# Patient Record
Sex: FEMALE | Race: WHITE | NOT HISPANIC OR LATINO | Employment: OTHER | URBAN - METROPOLITAN AREA
[De-identification: names, ages, dates, MRNs, and addresses within clinical notes are randomized per-mention and may not be internally consistent; named-entity substitution may affect disease eponyms.]

---

## 2020-08-25 ENCOUNTER — RECORDS - HEALTHEAST (OUTPATIENT)
Dept: LAB | Facility: CLINIC | Age: 85
End: 2020-08-25

## 2020-08-25 LAB
ALBUMIN SERPL-MCNC: 3.5 G/DL (ref 3.5–5)
ALP SERPL-CCNC: 57 U/L (ref 45–120)
ALT SERPL W P-5'-P-CCNC: 20 U/L (ref 0–45)
ANION GAP SERPL CALCULATED.3IONS-SCNC: 10 MMOL/L (ref 5–18)
AST SERPL W P-5'-P-CCNC: 19 U/L (ref 0–40)
BILIRUB SERPL-MCNC: 0.4 MG/DL (ref 0–1)
BUN SERPL-MCNC: 20 MG/DL (ref 8–28)
CALCIUM SERPL-MCNC: 9.7 MG/DL (ref 8.5–10.5)
CHLORIDE BLD-SCNC: 106 MMOL/L (ref 98–107)
CO2 SERPL-SCNC: 26 MMOL/L (ref 22–31)
CREAT SERPL-MCNC: 1.5 MG/DL (ref 0.6–1.1)
GFR SERPL CREATININE-BSD FRML MDRD: 33 ML/MIN/1.73M2
GLUCOSE BLD-MCNC: 181 MG/DL (ref 70–125)
POTASSIUM BLD-SCNC: 4.1 MMOL/L (ref 3.5–5)
PROT SERPL-MCNC: 6.8 G/DL (ref 6–8)
SODIUM SERPL-SCNC: 142 MMOL/L (ref 136–145)
TSH SERPL DL<=0.005 MIU/L-ACNC: 0.31 UIU/ML (ref 0.3–5)

## 2020-10-20 ENCOUNTER — RECORDS - HEALTHEAST (OUTPATIENT)
Dept: LAB | Facility: CLINIC | Age: 85
End: 2020-10-20

## 2020-10-20 LAB
ANION GAP SERPL CALCULATED.3IONS-SCNC: 10 MMOL/L (ref 5–18)
BUN SERPL-MCNC: 24 MG/DL (ref 8–28)
CALCIUM SERPL-MCNC: 9.4 MG/DL (ref 8.5–10.5)
CHLORIDE BLD-SCNC: 104 MMOL/L (ref 98–107)
CO2 SERPL-SCNC: 27 MMOL/L (ref 22–31)
CREAT SERPL-MCNC: 1.48 MG/DL (ref 0.6–1.1)
GFR SERPL CREATININE-BSD FRML MDRD: 33 ML/MIN/1.73M2
GLUCOSE BLD-MCNC: 158 MG/DL (ref 70–125)
POTASSIUM BLD-SCNC: 4.2 MMOL/L (ref 3.5–5)
SODIUM SERPL-SCNC: 141 MMOL/L (ref 136–145)

## 2020-10-21 LAB — BACTERIA SPEC CULT: NO GROWTH

## 2020-10-22 ENCOUNTER — TRANSFERRED RECORDS (OUTPATIENT)
Dept: HEALTH INFORMATION MANAGEMENT | Facility: CLINIC | Age: 85
End: 2020-10-22

## 2021-01-13 ENCOUNTER — RECORDS - HEALTHEAST (OUTPATIENT)
Dept: LAB | Facility: CLINIC | Age: 86
End: 2021-01-13

## 2021-01-14 LAB — BACTERIA SPEC CULT: NO GROWTH

## 2021-01-21 ENCOUNTER — RECORDS - HEALTHEAST (OUTPATIENT)
Dept: LAB | Facility: CLINIC | Age: 86
End: 2021-01-21

## 2021-01-21 LAB
ANION GAP SERPL CALCULATED.3IONS-SCNC: 10 MMOL/L (ref 5–18)
BUN SERPL-MCNC: 26 MG/DL (ref 8–28)
CALCIUM SERPL-MCNC: 9 MG/DL (ref 8.5–10.5)
CHLORIDE BLD-SCNC: 105 MMOL/L (ref 98–107)
CO2 SERPL-SCNC: 25 MMOL/L (ref 22–31)
CREAT SERPL-MCNC: 1.53 MG/DL (ref 0.6–1.1)
GFR SERPL CREATININE-BSD FRML MDRD: 32 ML/MIN/1.73M2
GLUCOSE BLD-MCNC: 176 MG/DL (ref 70–125)
POTASSIUM BLD-SCNC: 4.1 MMOL/L (ref 3.5–5)
SODIUM SERPL-SCNC: 140 MMOL/L (ref 136–145)

## 2021-04-19 ENCOUNTER — RECORDS - HEALTHEAST (OUTPATIENT)
Dept: LAB | Facility: CLINIC | Age: 86
End: 2021-04-19

## 2021-04-19 LAB
ANION GAP SERPL CALCULATED.3IONS-SCNC: 12 MMOL/L (ref 5–18)
BUN SERPL-MCNC: 22 MG/DL (ref 8–28)
CALCIUM SERPL-MCNC: 9.5 MG/DL (ref 8.5–10.5)
CHLORIDE BLD-SCNC: 104 MMOL/L (ref 98–107)
CO2 SERPL-SCNC: 25 MMOL/L (ref 22–31)
CREAT SERPL-MCNC: 1.26 MG/DL (ref 0.6–1.1)
GFR SERPL CREATININE-BSD FRML MDRD: 40 ML/MIN/1.73M2
GLUCOSE BLD-MCNC: 142 MG/DL (ref 70–125)
POTASSIUM BLD-SCNC: 3.9 MMOL/L (ref 3.5–5)
SODIUM SERPL-SCNC: 141 MMOL/L (ref 136–145)

## 2021-05-31 ENCOUNTER — RECORDS - HEALTHEAST (OUTPATIENT)
Dept: ADMINISTRATIVE | Facility: CLINIC | Age: 86
End: 2021-05-31

## 2021-07-20 ENCOUNTER — LAB REQUISITION (OUTPATIENT)
Dept: LAB | Facility: CLINIC | Age: 86
End: 2021-07-20

## 2021-07-20 DIAGNOSIS — E78.49 OTHER HYPERLIPIDEMIA: ICD-10-CM

## 2021-07-20 DIAGNOSIS — I10 ESSENTIAL (PRIMARY) HYPERTENSION: ICD-10-CM

## 2021-07-20 DIAGNOSIS — E03.9 HYPOTHYROIDISM, UNSPECIFIED: ICD-10-CM

## 2021-07-20 LAB
ANION GAP SERPL CALCULATED.3IONS-SCNC: 12 MMOL/L (ref 5–18)
BUN SERPL-MCNC: 23 MG/DL (ref 8–28)
CALCIUM SERPL-MCNC: 9.2 MG/DL (ref 8.5–10.5)
CHLORIDE BLD-SCNC: 106 MMOL/L (ref 98–107)
CHOLEST SERPL-MCNC: 163 MG/DL
CO2 SERPL-SCNC: 24 MMOL/L (ref 22–31)
CREAT SERPL-MCNC: 1.39 MG/DL (ref 0.6–1.1)
GFR SERPL CREATININE-BSD FRML MDRD: 34 ML/MIN/1.73M2
GLUCOSE BLD-MCNC: 173 MG/DL (ref 70–125)
HDLC SERPL-MCNC: 45 MG/DL
LDLC SERPL CALC-MCNC: 86 MG/DL
POTASSIUM BLD-SCNC: 4.3 MMOL/L (ref 3.5–5)
SODIUM SERPL-SCNC: 142 MMOL/L (ref 136–145)
TRIGL SERPL-MCNC: 162 MG/DL
TSH SERPL DL<=0.005 MIU/L-ACNC: 0.13 UIU/ML (ref 0.3–5)

## 2021-07-20 PROCEDURE — 80061 LIPID PANEL: CPT | Performed by: PHYSICIAN ASSISTANT

## 2021-07-20 PROCEDURE — 84439 ASSAY OF FREE THYROXINE: CPT | Performed by: PHYSICIAN ASSISTANT

## 2021-07-20 PROCEDURE — 80048 BASIC METABOLIC PNL TOTAL CA: CPT | Performed by: PHYSICIAN ASSISTANT

## 2021-07-20 PROCEDURE — 84443 ASSAY THYROID STIM HORMONE: CPT | Performed by: PHYSICIAN ASSISTANT

## 2021-07-21 LAB — T4 FREE SERPL-MCNC: 1.67 NG/DL (ref 0.7–1.8)

## 2021-10-21 ENCOUNTER — LAB REQUISITION (OUTPATIENT)
Dept: LAB | Facility: CLINIC | Age: 86
End: 2021-10-21

## 2021-10-21 DIAGNOSIS — E03.9 HYPOTHYROIDISM, UNSPECIFIED: ICD-10-CM

## 2021-10-21 DIAGNOSIS — E11.8 TYPE 2 DIABETES MELLITUS WITH UNSPECIFIED COMPLICATIONS (H): ICD-10-CM

## 2021-10-21 LAB
CREAT UR-MCNC: 110 MG/DL
MICROALBUMIN UR-MCNC: 2.72 MG/DL (ref 0–1.99)
MICROALBUMIN/CREAT UR: 24.7 MG/G CR
T4 FREE SERPL-MCNC: 1.5 NG/DL (ref 0.7–1.8)
TSH SERPL DL<=0.005 MIU/L-ACNC: 0.26 UIU/ML (ref 0.3–5)

## 2021-10-21 PROCEDURE — 84439 ASSAY OF FREE THYROXINE: CPT | Performed by: PHYSICIAN ASSISTANT

## 2021-10-21 PROCEDURE — 82043 UR ALBUMIN QUANTITATIVE: CPT | Performed by: PHYSICIAN ASSISTANT

## 2021-10-21 PROCEDURE — 84443 ASSAY THYROID STIM HORMONE: CPT | Performed by: PHYSICIAN ASSISTANT

## 2021-11-11 ENCOUNTER — LAB REQUISITION (OUTPATIENT)
Dept: LAB | Facility: CLINIC | Age: 86
End: 2021-11-11
Payer: COMMERCIAL

## 2021-11-11 DIAGNOSIS — L08.9 LOCAL INFECTION OF THE SKIN AND SUBCUTANEOUS TISSUE, UNSPECIFIED: ICD-10-CM

## 2021-11-11 PROCEDURE — 87205 SMEAR GRAM STAIN: CPT | Mod: ORL | Performed by: ORTHOPAEDIC SURGERY

## 2021-11-11 PROCEDURE — 87070 CULTURE OTHR SPECIMN AEROBIC: CPT | Mod: ORL | Performed by: ORTHOPAEDIC SURGERY

## 2021-11-12 LAB
GRAM STAIN RESULT: NORMAL
GRAM STAIN RESULT: NORMAL

## 2021-11-17 LAB — BACTERIA ABSC ANAEROBE+AEROBE CULT: NO GROWTH

## 2022-01-17 ENCOUNTER — LAB REQUISITION (OUTPATIENT)
Dept: LAB | Facility: CLINIC | Age: 87
End: 2022-01-17

## 2022-01-17 DIAGNOSIS — N39.498 OTHER SPECIFIED URINARY INCONTINENCE: ICD-10-CM

## 2022-01-17 PROCEDURE — 87086 URINE CULTURE/COLONY COUNT: CPT | Performed by: PHYSICIAN ASSISTANT

## 2022-01-19 LAB — BACTERIA UR CULT: NO GROWTH

## 2022-01-31 ENCOUNTER — LAB REQUISITION (OUTPATIENT)
Dept: LAB | Facility: CLINIC | Age: 87
End: 2022-01-31

## 2022-01-31 DIAGNOSIS — E11.8 TYPE 2 DIABETES MELLITUS WITH UNSPECIFIED COMPLICATIONS (H): ICD-10-CM

## 2022-01-31 DIAGNOSIS — E03.9 HYPOTHYROIDISM, UNSPECIFIED: ICD-10-CM

## 2022-01-31 PROCEDURE — 80048 BASIC METABOLIC PNL TOTAL CA: CPT | Performed by: PHYSICIAN ASSISTANT

## 2022-01-31 PROCEDURE — 84443 ASSAY THYROID STIM HORMONE: CPT | Performed by: PHYSICIAN ASSISTANT

## 2022-02-01 LAB
ANION GAP SERPL CALCULATED.3IONS-SCNC: 11 MMOL/L (ref 5–18)
BUN SERPL-MCNC: 21 MG/DL (ref 8–28)
CALCIUM SERPL-MCNC: 9.3 MG/DL (ref 8.5–10.5)
CHLORIDE BLD-SCNC: 104 MMOL/L (ref 98–107)
CO2 SERPL-SCNC: 26 MMOL/L (ref 22–31)
CREAT SERPL-MCNC: 1.5 MG/DL (ref 0.6–1.1)
GFR SERPL CREATININE-BSD FRML MDRD: 33 ML/MIN/1.73M2
GLUCOSE BLD-MCNC: 187 MG/DL (ref 70–125)
POTASSIUM BLD-SCNC: 4.3 MMOL/L (ref 3.5–5)
SODIUM SERPL-SCNC: 141 MMOL/L (ref 136–145)
TSH SERPL DL<=0.005 MIU/L-ACNC: 2.42 UIU/ML (ref 0.3–5)

## 2022-02-26 ENCOUNTER — HOSPITAL ENCOUNTER (INPATIENT)
Facility: HOSPITAL | Age: 87
LOS: 4 days | Discharge: HOME HEALTH | DRG: 871 | End: 2022-03-02
Attending: EMERGENCY MEDICINE | Admitting: INTERNAL MEDICINE
Payer: COMMERCIAL

## 2022-02-26 ENCOUNTER — APPOINTMENT (OUTPATIENT)
Dept: RADIOLOGY | Facility: HOSPITAL | Age: 87
DRG: 871 | End: 2022-02-26
Attending: INTERNAL MEDICINE
Payer: COMMERCIAL

## 2022-02-26 ENCOUNTER — APPOINTMENT (OUTPATIENT)
Dept: CT IMAGING | Facility: HOSPITAL | Age: 87
DRG: 871 | End: 2022-02-26
Attending: EMERGENCY MEDICINE
Payer: COMMERCIAL

## 2022-02-26 ENCOUNTER — APPOINTMENT (OUTPATIENT)
Dept: RADIOLOGY | Facility: HOSPITAL | Age: 87
DRG: 871 | End: 2022-02-26
Attending: EMERGENCY MEDICINE
Payer: COMMERCIAL

## 2022-02-26 DIAGNOSIS — N28.9 ACUTE RENAL INSUFFICIENCY: ICD-10-CM

## 2022-02-26 DIAGNOSIS — Z86.39 HISTORY OF DIABETES MELLITUS: ICD-10-CM

## 2022-02-26 DIAGNOSIS — R94.31 ABNORMAL ELECTROCARDIOGRAM: ICD-10-CM

## 2022-02-26 DIAGNOSIS — R41.0 CONFUSION: ICD-10-CM

## 2022-02-26 DIAGNOSIS — G93.40 ACUTE ENCEPHALOPATHY: ICD-10-CM

## 2022-02-26 DIAGNOSIS — K51.50 LEFT SIDED COLITIS WITHOUT COMPLICATIONS (H): ICD-10-CM

## 2022-02-26 DIAGNOSIS — I10 HYPERTENSION, UNSPECIFIED TYPE: Primary | ICD-10-CM

## 2022-02-26 DIAGNOSIS — E83.42 HYPOMAGNESEMIA: ICD-10-CM

## 2022-02-26 DIAGNOSIS — E86.0 DEHYDRATION: ICD-10-CM

## 2022-02-26 PROBLEM — A41.9 SEPSIS (H): Status: ACTIVE | Noted: 2022-02-26

## 2022-02-26 LAB
ALBUMIN SERPL-MCNC: 2.8 G/DL (ref 3.5–5)
ALBUMIN SERPL-MCNC: 3.2 G/DL (ref 3.5–5)
ALBUMIN UR-MCNC: 100 MG/DL
ALP SERPL-CCNC: 52 U/L (ref 45–120)
ALP SERPL-CCNC: 62 U/L (ref 45–120)
ALT SERPL W P-5'-P-CCNC: 14 U/L (ref 0–45)
ALT SERPL W P-5'-P-CCNC: 19 U/L (ref 0–45)
ANION GAP SERPL CALCULATED.3IONS-SCNC: 10 MMOL/L (ref 5–18)
ANION GAP SERPL CALCULATED.3IONS-SCNC: 13 MMOL/L (ref 5–18)
APPEARANCE UR: CLEAR
AST SERPL W P-5'-P-CCNC: 20 U/L (ref 0–40)
AST SERPL W P-5'-P-CCNC: 21 U/L (ref 0–40)
BASOPHILS # BLD AUTO: 0.1 10E3/UL (ref 0–0.2)
BASOPHILS NFR BLD AUTO: 0 %
BILIRUB DIRECT SERPL-MCNC: 0.3 MG/DL
BILIRUB SERPL-MCNC: 0.6 MG/DL (ref 0–1)
BILIRUB SERPL-MCNC: 0.8 MG/DL (ref 0–1)
BILIRUB UR QL STRIP: NEGATIVE
BUN SERPL-MCNC: 29 MG/DL (ref 8–28)
BUN SERPL-MCNC: 33 MG/DL (ref 8–28)
CALCIUM SERPL-MCNC: 8.6 MG/DL (ref 8.5–10.5)
CALCIUM SERPL-MCNC: 9.4 MG/DL (ref 8.5–10.5)
CHLORIDE BLD-SCNC: 101 MMOL/L (ref 98–107)
CHLORIDE BLD-SCNC: 104 MMOL/L (ref 98–107)
CO2 SERPL-SCNC: 23 MMOL/L (ref 22–31)
CO2 SERPL-SCNC: 24 MMOL/L (ref 22–31)
COLOR UR AUTO: YELLOW
CREAT SERPL-MCNC: 1.18 MG/DL (ref 0.6–1.1)
CREAT SERPL-MCNC: 1.4 MG/DL (ref 0.6–1.1)
EOSINOPHIL # BLD AUTO: 0.1 10E3/UL (ref 0–0.7)
EOSINOPHIL NFR BLD AUTO: 1 %
ERYTHROCYTE [DISTWIDTH] IN BLOOD BY AUTOMATED COUNT: 12.9 % (ref 10–15)
ERYTHROCYTE [DISTWIDTH] IN BLOOD BY AUTOMATED COUNT: 13 % (ref 10–15)
GFR SERPL CREATININE-BSD FRML MDRD: 36 ML/MIN/1.73M2
GFR SERPL CREATININE-BSD FRML MDRD: 44 ML/MIN/1.73M2
GLUCOSE BLD-MCNC: 183 MG/DL (ref 70–125)
GLUCOSE BLD-MCNC: 274 MG/DL (ref 70–125)
GLUCOSE BLDC GLUCOMTR-MCNC: 162 MG/DL (ref 70–99)
GLUCOSE BLDC GLUCOMTR-MCNC: 171 MG/DL (ref 70–99)
GLUCOSE UR STRIP-MCNC: >1000 MG/DL
HBA1C MFR BLD: 7.1 %
HCT VFR BLD AUTO: 39.9 % (ref 35–47)
HCT VFR BLD AUTO: 44.2 % (ref 35–47)
HGB BLD-MCNC: 12.9 G/DL (ref 11.7–15.7)
HGB BLD-MCNC: 14.2 G/DL (ref 11.7–15.7)
HGB UR QL STRIP: ABNORMAL
HOLD SPECIMEN: NORMAL
HOLD SPECIMEN: NORMAL
IMM GRANULOCYTES # BLD: 0.1 10E3/UL
IMM GRANULOCYTES NFR BLD: 0 %
INR PPP: 1.35 (ref 0.85–1.15)
KETONES UR STRIP-MCNC: NEGATIVE MG/DL
LACTATE SERPL-SCNC: 1.3 MMOL/L (ref 0.7–2)
LACTATE SERPL-SCNC: 2.6 MMOL/L (ref 0.7–2)
LEUKOCYTE ESTERASE UR QL STRIP: NEGATIVE
LYMPHOCYTES # BLD AUTO: 0.5 10E3/UL (ref 0.8–5.3)
LYMPHOCYTES NFR BLD AUTO: 3 %
MAGNESIUM SERPL-MCNC: 1.7 MG/DL (ref 1.8–2.6)
MCH RBC QN AUTO: 28.8 PG (ref 26.5–33)
MCH RBC QN AUTO: 28.9 PG (ref 26.5–33)
MCHC RBC AUTO-ENTMCNC: 32.1 G/DL (ref 31.5–36.5)
MCHC RBC AUTO-ENTMCNC: 32.3 G/DL (ref 31.5–36.5)
MCV RBC AUTO: 89 FL (ref 78–100)
MCV RBC AUTO: 90 FL (ref 78–100)
MONOCYTES # BLD AUTO: 1 10E3/UL (ref 0–1.3)
MONOCYTES NFR BLD AUTO: 7 %
MUCOUS THREADS #/AREA URNS LPF: PRESENT /LPF
NEUTROPHILS # BLD AUTO: 13.4 10E3/UL (ref 1.6–8.3)
NEUTROPHILS NFR BLD AUTO: 89 %
NITRATE UR QL: NEGATIVE
NRBC # BLD AUTO: 0 10E3/UL
NRBC BLD AUTO-RTO: 0 /100
PH UR STRIP: 5.5 [PH] (ref 5–7)
PLATELET # BLD AUTO: 224 10E3/UL (ref 150–450)
PLATELET # BLD AUTO: 257 10E3/UL (ref 150–450)
POTASSIUM BLD-SCNC: 3.6 MMOL/L (ref 3.5–5)
POTASSIUM BLD-SCNC: 3.7 MMOL/L (ref 3.5–5)
PROCALCITONIN SERPL-MCNC: 1.65 NG/ML (ref 0–0.49)
PROT SERPL-MCNC: 6.4 G/DL (ref 6–8)
PROT SERPL-MCNC: 7.1 G/DL (ref 6–8)
RBC # BLD AUTO: 4.47 10E6/UL (ref 3.8–5.2)
RBC # BLD AUTO: 4.93 10E6/UL (ref 3.8–5.2)
RBC URINE: 2 /HPF
SARS-COV-2 RNA RESP QL NAA+PROBE: NEGATIVE
SODIUM SERPL-SCNC: 137 MMOL/L (ref 136–145)
SODIUM SERPL-SCNC: 138 MMOL/L (ref 136–145)
SP GR UR STRIP: 1.02 (ref 1–1.03)
SQUAMOUS EPITHELIAL: 1 /HPF
TROPONIN I SERPL-MCNC: 0.02 NG/ML (ref 0–0.29)
UROBILINOGEN UR STRIP-MCNC: 3 MG/DL
WBC # BLD AUTO: 13.5 10E3/UL (ref 4–11)
WBC # BLD AUTO: 15.1 10E3/UL (ref 4–11)
WBC URINE: 1 /HPF

## 2022-02-26 PROCEDURE — 80053 COMPREHEN METABOLIC PANEL: CPT | Performed by: EMERGENCY MEDICINE

## 2022-02-26 PROCEDURE — 96365 THER/PROPH/DIAG IV INF INIT: CPT | Mod: 59

## 2022-02-26 PROCEDURE — 36415 COLL VENOUS BLD VENIPUNCTURE: CPT | Performed by: INTERNAL MEDICINE

## 2022-02-26 PROCEDURE — 36415 COLL VENOUS BLD VENIPUNCTURE: CPT | Performed by: EMERGENCY MEDICINE

## 2022-02-26 PROCEDURE — 85027 COMPLETE CBC AUTOMATED: CPT | Performed by: INTERNAL MEDICINE

## 2022-02-26 PROCEDURE — 250N000011 HC RX IP 250 OP 636

## 2022-02-26 PROCEDURE — 99285 EMERGENCY DEPT VISIT HI MDM: CPT | Mod: 25

## 2022-02-26 PROCEDURE — C9803 HOPD COVID-19 SPEC COLLECT: HCPCS

## 2022-02-26 PROCEDURE — 258N000003 HC RX IP 258 OP 636: Performed by: EMERGENCY MEDICINE

## 2022-02-26 PROCEDURE — 82248 BILIRUBIN DIRECT: CPT | Performed by: EMERGENCY MEDICINE

## 2022-02-26 PROCEDURE — 250N000011 HC RX IP 250 OP 636: Performed by: INTERNAL MEDICINE

## 2022-02-26 PROCEDURE — 81001 URINALYSIS AUTO W/SCOPE: CPT | Performed by: EMERGENCY MEDICINE

## 2022-02-26 PROCEDURE — 99221 1ST HOSP IP/OBS SF/LOW 40: CPT | Performed by: SURGERY

## 2022-02-26 PROCEDURE — 120N000001 HC R&B MED SURG/OB

## 2022-02-26 PROCEDURE — 84484 ASSAY OF TROPONIN QUANT: CPT | Mod: 91 | Performed by: INTERNAL MEDICINE

## 2022-02-26 PROCEDURE — 83605 ASSAY OF LACTIC ACID: CPT | Performed by: EMERGENCY MEDICINE

## 2022-02-26 PROCEDURE — 84155 ASSAY OF PROTEIN SERUM: CPT | Performed by: INTERNAL MEDICINE

## 2022-02-26 PROCEDURE — 83605 ASSAY OF LACTIC ACID: CPT | Performed by: INTERNAL MEDICINE

## 2022-02-26 PROCEDURE — 83735 ASSAY OF MAGNESIUM: CPT | Performed by: EMERGENCY MEDICINE

## 2022-02-26 PROCEDURE — 85025 COMPLETE CBC W/AUTO DIFF WBC: CPT | Performed by: EMERGENCY MEDICINE

## 2022-02-26 PROCEDURE — 83036 HEMOGLOBIN GLYCOSYLATED A1C: CPT | Performed by: INTERNAL MEDICINE

## 2022-02-26 PROCEDURE — 96361 HYDRATE IV INFUSION ADD-ON: CPT

## 2022-02-26 PROCEDURE — 96367 TX/PROPH/DG ADDL SEQ IV INF: CPT

## 2022-02-26 PROCEDURE — 85610 PROTHROMBIN TIME: CPT | Performed by: INTERNAL MEDICINE

## 2022-02-26 PROCEDURE — 250N000013 HC RX MED GY IP 250 OP 250 PS 637: Performed by: INTERNAL MEDICINE

## 2022-02-26 PROCEDURE — 250N000011 HC RX IP 250 OP 636: Performed by: EMERGENCY MEDICINE

## 2022-02-26 PROCEDURE — 84145 PROCALCITONIN (PCT): CPT | Performed by: INTERNAL MEDICINE

## 2022-02-26 PROCEDURE — 70450 CT HEAD/BRAIN W/O DYE: CPT

## 2022-02-26 PROCEDURE — 71045 X-RAY EXAM CHEST 1 VIEW: CPT

## 2022-02-26 PROCEDURE — 74177 CT ABD & PELVIS W/CONTRAST: CPT

## 2022-02-26 PROCEDURE — 87635 SARS-COV-2 COVID-19 AMP PRB: CPT | Performed by: EMERGENCY MEDICINE

## 2022-02-26 PROCEDURE — 84484 ASSAY OF TROPONIN QUANT: CPT | Performed by: EMERGENCY MEDICINE

## 2022-02-26 PROCEDURE — 74018 RADEX ABDOMEN 1 VIEW: CPT

## 2022-02-26 PROCEDURE — 99223 1ST HOSP IP/OBS HIGH 75: CPT | Mod: AI | Performed by: INTERNAL MEDICINE

## 2022-02-26 PROCEDURE — 93005 ELECTROCARDIOGRAM TRACING: CPT | Performed by: EMERGENCY MEDICINE

## 2022-02-26 RX ORDER — NALOXONE HYDROCHLORIDE 0.4 MG/ML
0.4 INJECTION, SOLUTION INTRAMUSCULAR; INTRAVENOUS; SUBCUTANEOUS
Status: DISCONTINUED | OUTPATIENT
Start: 2022-02-26 | End: 2022-03-02 | Stop reason: HOSPADM

## 2022-02-26 RX ORDER — NALOXONE HYDROCHLORIDE 0.4 MG/ML
0.2 INJECTION, SOLUTION INTRAMUSCULAR; INTRAVENOUS; SUBCUTANEOUS
Status: DISCONTINUED | OUTPATIENT
Start: 2022-02-26 | End: 2022-03-02 | Stop reason: HOSPADM

## 2022-02-26 RX ORDER — ONDANSETRON 2 MG/ML
4 INJECTION INTRAMUSCULAR; INTRAVENOUS EVERY 6 HOURS PRN
Status: DISCONTINUED | OUTPATIENT
Start: 2022-02-26 | End: 2022-03-02 | Stop reason: HOSPADM

## 2022-02-26 RX ORDER — ONDANSETRON 4 MG/1
4 TABLET, ORALLY DISINTEGRATING ORAL EVERY 6 HOURS PRN
Status: DISCONTINUED | OUTPATIENT
Start: 2022-02-26 | End: 2022-03-02 | Stop reason: HOSPADM

## 2022-02-26 RX ORDER — PIPERACILLIN SODIUM, TAZOBACTAM SODIUM 3; .375 G/15ML; G/15ML
3.38 INJECTION, POWDER, LYOPHILIZED, FOR SOLUTION INTRAVENOUS ONCE
Status: COMPLETED | OUTPATIENT
Start: 2022-02-26 | End: 2022-02-26

## 2022-02-26 RX ORDER — LEVOTHYROXINE SODIUM 100 UG/1
100 TABLET ORAL DAILY
Status: DISCONTINUED | OUTPATIENT
Start: 2022-02-26 | End: 2022-03-02 | Stop reason: HOSPADM

## 2022-02-26 RX ORDER — HYDRALAZINE HYDROCHLORIDE 20 MG/ML
5 INJECTION INTRAMUSCULAR; INTRAVENOUS EVERY 4 HOURS PRN
Status: DISCONTINUED | OUTPATIENT
Start: 2022-02-26 | End: 2022-03-02 | Stop reason: HOSPADM

## 2022-02-26 RX ORDER — SODIUM CHLORIDE 9 MG/ML
INJECTION, SOLUTION INTRAVENOUS ONCE
Status: COMPLETED | OUTPATIENT
Start: 2022-02-26 | End: 2022-02-26

## 2022-02-26 RX ORDER — UBIDECARENONE 75 MG
100 CAPSULE ORAL DAILY
COMMUNITY
End: 2024-01-14

## 2022-02-26 RX ORDER — SODIUM CHLORIDE AND POTASSIUM CHLORIDE 150; 900 MG/100ML; MG/100ML
INJECTION, SOLUTION INTRAVENOUS CONTINUOUS
Status: DISCONTINUED | OUTPATIENT
Start: 2022-02-26 | End: 2022-02-27

## 2022-02-26 RX ORDER — HYDRALAZINE HYDROCHLORIDE 20 MG/ML
5 INJECTION INTRAMUSCULAR; INTRAVENOUS ONCE
Status: COMPLETED | OUTPATIENT
Start: 2022-02-26 | End: 2022-02-26

## 2022-02-26 RX ORDER — AMLODIPINE BESYLATE 5 MG/1
10 TABLET ORAL DAILY
Status: DISCONTINUED | OUTPATIENT
Start: 2022-02-26 | End: 2022-03-02 | Stop reason: HOSPADM

## 2022-02-26 RX ORDER — HYDROMORPHONE HCL IN WATER/PF 6 MG/30 ML
0.5 PATIENT CONTROLLED ANALGESIA SYRINGE INTRAVENOUS
Status: DISCONTINUED | OUTPATIENT
Start: 2022-02-26 | End: 2022-03-02 | Stop reason: HOSPADM

## 2022-02-26 RX ORDER — DEXTROSE MONOHYDRATE 25 G/50ML
25-50 INJECTION, SOLUTION INTRAVENOUS
Status: DISCONTINUED | OUTPATIENT
Start: 2022-02-26 | End: 2022-03-02 | Stop reason: HOSPADM

## 2022-02-26 RX ORDER — PROCHLORPERAZINE 25 MG
12.5 SUPPOSITORY, RECTAL RECTAL EVERY 12 HOURS PRN
Status: DISCONTINUED | OUTPATIENT
Start: 2022-02-26 | End: 2022-03-02 | Stop reason: HOSPADM

## 2022-02-26 RX ORDER — LIDOCAINE 40 MG/G
CREAM TOPICAL
Status: DISCONTINUED | OUTPATIENT
Start: 2022-02-26 | End: 2022-03-02 | Stop reason: HOSPADM

## 2022-02-26 RX ORDER — NICOTINE POLACRILEX 4 MG
15-30 LOZENGE BUCCAL
Status: DISCONTINUED | OUTPATIENT
Start: 2022-02-26 | End: 2022-03-02 | Stop reason: HOSPADM

## 2022-02-26 RX ORDER — MAGNESIUM SULFATE HEPTAHYDRATE 40 MG/ML
2 INJECTION, SOLUTION INTRAVENOUS ONCE
Status: COMPLETED | OUTPATIENT
Start: 2022-02-26 | End: 2022-02-26

## 2022-02-26 RX ORDER — IOPAMIDOL 755 MG/ML
75 INJECTION, SOLUTION INTRAVASCULAR ONCE
Status: COMPLETED | OUTPATIENT
Start: 2022-02-26 | End: 2022-02-26

## 2022-02-26 RX ORDER — PIPERACILLIN SODIUM, TAZOBACTAM SODIUM 3; .375 G/15ML; G/15ML
3.38 INJECTION, POWDER, LYOPHILIZED, FOR SOLUTION INTRAVENOUS EVERY 8 HOURS
Status: DISCONTINUED | OUTPATIENT
Start: 2022-02-26 | End: 2022-03-02 | Stop reason: HOSPADM

## 2022-02-26 RX ORDER — SIMETHICONE 80 MG
80 TABLET,CHEWABLE ORAL EVERY 6 HOURS PRN
Status: DISCONTINUED | OUTPATIENT
Start: 2022-02-26 | End: 2022-03-02 | Stop reason: HOSPADM

## 2022-02-26 RX ORDER — PROCHLORPERAZINE MALEATE 5 MG
5 TABLET ORAL EVERY 6 HOURS PRN
Status: DISCONTINUED | OUTPATIENT
Start: 2022-02-26 | End: 2022-03-02 | Stop reason: HOSPADM

## 2022-02-26 RX ADMIN — PIPERACILLIN SODIUM AND TAZOBACTAM SODIUM 3.38 G: 3; .375 INJECTION, POWDER, LYOPHILIZED, FOR SOLUTION INTRAVENOUS at 23:42

## 2022-02-26 RX ADMIN — ONDANSETRON 4 MG: 4 TABLET, ORALLY DISINTEGRATING ORAL at 22:58

## 2022-02-26 RX ADMIN — Medication 1 MG: at 22:58

## 2022-02-26 RX ADMIN — MAGNESIUM SULFATE HEPTAHYDRATE 2 G: 40 INJECTION, SOLUTION INTRAVENOUS at 11:12

## 2022-02-26 RX ADMIN — AMLODIPINE BESYLATE 10 MG: 5 TABLET ORAL at 20:22

## 2022-02-26 RX ADMIN — SODIUM CHLORIDE: 9 INJECTION, SOLUTION INTRAVENOUS at 09:23

## 2022-02-26 RX ADMIN — HYDRALAZINE HYDROCHLORIDE 5 MG: 20 INJECTION INTRAMUSCULAR; INTRAVENOUS at 16:39

## 2022-02-26 RX ADMIN — PIPERACILLIN SODIUM AND TAZOBACTAM SODIUM 3.38 G: 3; .375 INJECTION, POWDER, LYOPHILIZED, FOR SOLUTION INTRAVENOUS at 17:47

## 2022-02-26 RX ADMIN — SODIUM CHLORIDE 500 ML: 9 INJECTION, SOLUTION INTRAVENOUS at 11:35

## 2022-02-26 RX ADMIN — PIPERACILLIN SODIUM AND TAZOBACTAM SODIUM 3.38 G: 3; .375 INJECTION, POWDER, LYOPHILIZED, FOR SOLUTION INTRAVENOUS at 10:01

## 2022-02-26 RX ADMIN — POTASSIUM CHLORIDE AND SODIUM CHLORIDE: 900; 150 INJECTION, SOLUTION INTRAVENOUS at 17:15

## 2022-02-26 RX ADMIN — SIMETHICONE 80 MG: 80 TABLET, CHEWABLE ORAL at 20:44

## 2022-02-26 RX ADMIN — SODIUM CHLORIDE: 9 INJECTION, SOLUTION INTRAVENOUS at 12:29

## 2022-02-26 RX ADMIN — IOPAMIDOL 75 ML: 755 INJECTION, SOLUTION INTRAVENOUS at 11:16

## 2022-02-26 RX ADMIN — HYDRALAZINE HYDROCHLORIDE 5 MG: 20 INJECTION INTRAMUSCULAR; INTRAVENOUS at 19:32

## 2022-02-26 ASSESSMENT — ACTIVITIES OF DAILY LIVING (ADL)
ADLS_ACUITY_SCORE: 7
WEAR_GLASSES_OR_BLIND: YES
FALL_HISTORY_WITHIN_LAST_SIX_MONTHS: NO
ADLS_ACUITY_SCORE: 12
ADLS_ACUITY_SCORE: 7
ADLS_ACUITY_SCORE: 12
ADLS_ACUITY_SCORE: 7
ADLS_ACUITY_SCORE: 7
CONCENTRATING,_REMEMBERING_OR_MAKING_DECISIONS_DIFFICULTY: NO
ADLS_ACUITY_SCORE: 7
ADLS_ACUITY_SCORE: 7
ADLS_ACUITY_SCORE: 12
WALKING_OR_CLIMBING_STAIRS_DIFFICULTY: NO
ADLS_ACUITY_SCORE: 7
DIFFICULTY_EATING/SWALLOWING: NO
DOING_ERRANDS_INDEPENDENTLY_DIFFICULTY: NO
ADLS_ACUITY_SCORE: 7
DRESSING/BATHING_DIFFICULTY: NO
TOILETING_ISSUES: NO
DEPENDENT_IADLS:: INDEPENDENT

## 2022-02-26 ASSESSMENT — ENCOUNTER SYMPTOMS
SHORTNESS OF BREATH: 0
ABDOMINAL PAIN: 1
BACK PAIN: 0
COUGH: 0
NAUSEA: 1

## 2022-02-26 NOTE — ED PROVIDER NOTES
EMERGENCY DEPARTMENT ENCOUNTER      NAME: Sissy Mccloud  AGE: 89 year old female  YOB: 1931  MRN: [id number]  EVALUATION DATE & TIME: 2/26/2022  8:59 AM    PCP: Estrella Niño    ED PROVIDER: Zhanna Malloy M.D.      CHIEF COMPLAINT     Chief Complaint   Patient presents with     Altered Mental Status     Abdominal Pain         FINAL IMPRESSION:     1. Acute renal insufficiency    2. Dehydration    3. Left sided colitis without complications (H)    4. Confusion    5. History of diabetes mellitus    6. Hypomagnesemia    7. Abnormal electrocardiogram          MEDICAL DECISION MAKING:       Pertinent Labs & Imaging studies reviewed. (See chart for details)    89 year old female presents to the Emergency Department for evaluation of confusion left-sided abdominal pain    ED Course as of 02/26/22 1437   Sat Feb 26, 2022   0907 80-year-old female presents via EMS.  The history is provided by patient and EMS and her daughter.  For the last 2 days she has been in the chair not doing much.  They thought she was dry heaving.  No history of fall.  Noticed that she was confused.   0907 On examination patient has dry mucous membranes.  She is oriented to self.  She knows the year does not know the month.  She has requested tenderness palpation in the left lower quadrant.  No signs of trauma.   0908 per daughter has been vaccinated against COVID has a history of thyroid problems she is diabetic hypertensive and a history of reflux.   0908 Differential diagnosis include but not limited to dehydration DKA electrolyte abnormalities sepsis pyelonephritis CVA perforation diverticulitis and subdural epidural among others.   0909 Plan to start an IV.  We will place patient.  Cardio blood pressure monitor.  Will hydrate patient.  Will image head and abdomen.   0932 EKG is abnormal with inverted T waves anterior laterally.   0934 Reevaluated.  Daughter states she will is more at baseline.  We will give her morphine  for pain she is quite tender.   1049 Renal insufficiency with a BUN of 33 and a creatinine 1.40.  Elevated white blood cell count of 15.1 with a left shift and concern about intra-abdominal pathology and with elevated lactic acid patient started on Zosyn.   1049 UA with glycosuria no nitrates no glucoside esterase.  Normal liver function test and bilirubin.   1049 Troponin 0.02 Covid negative magnesium slightly low at 1.7 will replace.   1050 Appearance Urine: Clear   1051 Carbon Dioxide: 23   1051 RBC Count: 4.93   1431 CT shows nonspecific left-sided colitis the differential is ischemic versus infectious versus inflammatory.   1431 Patient denies any recent travel no recent antibiotics no recent diarrhea.   1431 Because of ischemia was not differential I did contact Dr. Galvan general surgery who evaluated patient and felt this is more likely infectious.   1431 Clinically patient does not have pain out of proportion to examination to account for areas of chest etiology such as mesenteric ischemia.  She has pain with palpation.   1432 Plan to admit her for hydration.  I started on antibiotics given the elevated white blood cell count and lactic acid on the possible consolidation on the chest x-ray.  Admitted to hospitalist service in stable condition.   1432 Impression and decision making 89-year-old female presents via EMS for confusion.  On examination she has very dry mucous membranes she answer questions relatively appropriately no focal deficits.  Quite tender to palpation in the left lower quadrant.  She is found to be hyperglycemic with no evidence of DKA.  Elevated white blood cell count with elevated lactic acid chest x-ray with possible consolidation versus atelectasis on CT colitis.  Magnesium replaced in the Covid negative admitted for hydration pain control and repeat abdominal exams.   1433 Incidentally she does have an abnormal EKG with inverted T waves anterior laterally.  No previous EKGs available  for comparison denies chest pain or shortness of breath troponin within normal limits 0.02.       Vital Signs: Hypertensive  EKG: Normal sinus rhythm poor anterior progression inverted T waves in V2 V3 V4 V5 V6  Imaging: CT head CT abdomen and pelvis situs colitis.  Home Meds: Reviewed  ED meds/abx: Morphine Zosyn  Fluids: Normal saline    Labs  K 3.7  Cr 1.40  Wbc 15.1  Hgb 14.2  Platelets 257  Magnesium 1.7 troponin negative    Review of Previous Records  2016 cough and ear pain ED visit.      Consults  Hospitalist- Dr. Still  General Surgery- Dr. Berrios    ED COURSE     9:04 AM I met with patient for initial interview and exam. PPE includes: cap, surgical mask, protective glasses, gloves.    9:30 AM reevaluated and updated    10:52 AM evaluated and updated    12:13 PM I paged the admitting provider.    12:14 PM I updated the patient and family on plan of care.    12:22 PM I spoke with hospitalist Dr. Still about the patient. They accept her for Med-surgical inpatient admission.    12:30 PM I spoke to General Surgeon Dr. Berrios.    2:18 PM I spoke to Dr. Berrios in the ED.    At the conclusion of the encounter I discussed the results of all of the tests and the disposition. The questions were answered. The patient and daughter acknowledged understanding and was agreeable with the care plan.           MEDICATIONS GIVEN IN THE EMERGENCY:     Medications   sodium chloride 0.9% infusion ( Intravenous Stopped 2/26/22 1227)   piperacillin-tazobactam (ZOSYN) 3.375 g vial to attach to  mL bag (0 g Intravenous Stopped 2/26/22 1036)   magnesium sulfate 2 g in water intermittent infusion (0 g Intravenous Stopped 2/26/22 1226)   iopamidol (ISOVUE-370) solution 75 mL (75 mLs Intravenous Given 2/26/22 1116)   0.9% sodium chloride BOLUS (0 mLs Intravenous Stopped 2/26/22 1247)   sodium chloride 0.9% infusion ( Intravenous New Bag 2/26/22 1229)       NEW PRESCRIPTIONS STARTED AT TODAY'S ER VISIT     Current Discharge  "Medication List             =================================================================    HPI     Patient information was obtained from: patient, daughter, EMS    Use of : N/A       Sissy Mccloud is a 89 year old female who presents by EMS from home for evaluation of confusion.    Per EMS, patient comes from independent living and lives with her .  and daughter report x2 days of confusion. No recent falls. During the ride to the ED, patient began retching and was given zofran. Patient was also tender to palpation in the lower left quadrant. BS of 324. BP of 177 systolic. O2 sats WNL. Patient has a history of UTI's and bladder infections.    Per daughter, her father left her a voicemail saying the patient hasn't moved from the chair for x2 days and has been confused. The daughter drove to the patient's house. Once arriving at the patient's house, she noticed the patient was not herself. There was a bucket next to the patient that she was spitting into. No falls. Nonsmoker. No alcohol use.    Per patient, she is here because \"my  and daughter think I needed to come\". Patient reports lower left quadrant tenderness to any palpation in the area. Notes some nausea. Reports no falls.     Denies back pain, chest pain, shortness of breath, cough, and any other complaints.    Patient is vaccinated against COVID-19.    PMhx: type II diabetes mellitus, diastolic heart failure, GERD, hypertension, STEPHON, peripheral neuropathy.      REVIEW OF SYSTEMS   Review of Systems   Respiratory: Negative for cough and shortness of breath.    Cardiovascular: Negative for chest pain.   Gastrointestinal: Positive for abdominal pain (left lower quadrant) and nausea.   Musculoskeletal: Negative for back pain.   All other systems reviewed and are negative.       PAST MEDICAL HISTORY:   History reviewed. No pertinent past medical history.    PAST SURGICAL HISTORY:     Past Surgical History:   Procedure " Laterality Date     CHOLECYSTECTOMY           CURRENT MEDICATIONS:   No current outpatient medications on file.       ALLERGIES:     Allergies   Allergen Reactions     Ambien [Zolpidem] Unknown     Cleocin [Clindamycin] Unknown     Codeine Unknown       FAMILY HISTORY:   No family history on file.    SOCIAL HISTORY:     Social History     Socioeconomic History     Marital status:      Spouse name: Not on file     Number of children: Not on file     Years of education: Not on file     Highest education level: Not on file   Occupational History     Not on file   Tobacco Use     Smoking status: Not on file     Smokeless tobacco: Not on file   Substance and Sexual Activity     Alcohol use: Not on file     Drug use: Not on file     Sexual activity: Not on file   Other Topics Concern     Not on file   Social History Narrative     2/26/2022 Patient arrived via EMS.     Social Determinants of Health     Financial Resource Strain: Not on file   Food Insecurity: Not on file   Transportation Needs: Not on file   Physical Activity: Not on file   Stress: Not on file   Social Connections: Not on file   Intimate Partner Violence: Not on file   Housing Stability: Not on file       VITALS:   BP (!) 178/77   Pulse 88   Temp 98.5  F (36.9  C) (Oral)   Resp 20   Wt 82 kg (180 lb 12.4 oz)   SpO2 94%   BMI 33.06 kg/m      PHYSICAL EXAM     Physical Exam  Vitals and nursing note reviewed.   Constitutional:       Appearance: She is obese. She is ill-appearing.   HENT:      Mouth/Throat:      Comments: Severe dry  Cardiovascular:      Rate and Rhythm: Regular rhythm.   Abdominal:      Tenderness: There is abdominal tenderness.      Comments: Left lower quadrant.   Neurological:      Mental Status: She is alert.      Cranial Nerves: No dysarthria.      Comments: Knows the year does not know the mother knows the president.   Psychiatric:         Mood and Affect: Mood normal.         Physical Exam   Constitutional: Alert, elderly  female.    Head: Atraumatic.     Nose: Nose normal.     Mouth/Throat: Oropharynx is clear. Dry mucous membranes.    Eyes: EOM are normal. Pupils are equal, round, and reactive to light.     Ears: No hemotympanum.  Pearly white TM    Neck: Normal range of motion. Neck supple.     Cardiovascular: Normal rate, regular rhythm and normal heart sounds.      Pulmonary/Chest: Normal effort  and breath sounds normal.     Abdominal: Left lower quadrant tenderness. Right upper quadrant surgical scar.    Musculoskeletal: Normal range of motion.     Neurological: Alert, not oriented to month and circumstance.    Lymphatics: No edema.    : Normal anatomy.    Skin: Skin is warm and dry.     Psychiatric: Normal mood and affect. Behavior is normal.       LAB:     All pertinent labs reviewed and interpreted.  Labs Ordered and Resulted from Time of ED Arrival to Time of ED Departure   BASIC METABOLIC PANEL - Abnormal       Result Value    Sodium 137      Potassium 3.7      Chloride 101      Carbon Dioxide (CO2) 23      Anion Gap 13      Urea Nitrogen 33 (*)     Creatinine 1.40 (*)     Calcium 9.4      Glucose 274 (*)     GFR Estimate 36 (*)    HEPATIC FUNCTION PANEL - Abnormal    Bilirubin Total 0.8      Bilirubin Direct 0.3      Protein Total 7.1      Albumin 3.2 (*)     Alkaline Phosphatase 62      AST 20      ALT 19     LACTIC ACID WHOLE BLOOD - Abnormal    Lactic Acid 2.6 (*)    MAGNESIUM - Abnormal    Magnesium 1.7 (*)    ROUTINE UA WITH MICROSCOPIC REFLEX TO CULTURE - Abnormal    Color Urine Yellow      Appearance Urine Clear      Glucose Urine >1000 (*)     Bilirubin Urine Negative      Ketones Urine Negative      Specific Gravity Urine 1.025      Blood Urine 0.03 mg/dL (*)     pH Urine 5.5      Protein Albumin Urine 100  (*)     Urobilinogen Urine 3.0 (*)     Nitrite Urine Negative      Leukocyte Esterase Urine Negative      Mucus Urine Present (*)     RBC Urine 2      WBC Urine 1      Squamous Epithelials Urine 1     CBC  WITH PLATELETS AND DIFFERENTIAL - Abnormal    WBC Count 15.1 (*)     RBC Count 4.93      Hemoglobin 14.2      Hematocrit 44.2      MCV 90      MCH 28.8      MCHC 32.1      RDW 12.9      Platelet Count 257      % Neutrophils 89      % Lymphocytes 3      % Monocytes 7      % Eosinophils 1      % Basophils 0      % Immature Granulocytes 0      NRBCs per 100 WBC 0      Absolute Neutrophils 13.4 (*)     Absolute Lymphocytes 0.5 (*)     Absolute Monocytes 1.0      Absolute Eosinophils 0.1      Absolute Basophils 0.1      Absolute Immature Granulocytes 0.1      Absolute NRBCs 0.0     TROPONIN I - Normal    Troponin I 0.02     COVID-19 VIRUS (CORONAVIRUS) BY PCR - Normal    SARS CoV2 PCR Negative          RADIOLOGY:     Reviewed all pertinent imaging. Please see official radiology report.  CT Abdomen Pelvis w Contrast   Final Result   IMPRESSION:    1.  Left-sided colitis involving descending and sigmoid colon. Differential considerations would include both infectious/inflammatory colitis and ischemic colitis. Trace amount of peritoneal ascites.   2.  Colonic diverticulosis.   3.  2 mm nonobstructing calculus left lower pole   4.  Previous cholecystectomy and hysterectomy.      Head CT w/o contrast   Final Result   IMPRESSION:   1.  No acute intracranial process.      XR Chest Port 1 View   Final Result   IMPRESSION: Single AP view of the chest was obtained. Cardiomediastinal silhouette is within normal limits. Mild left basilar pulmonary opacities, likely atelectasis. A few nodular opacities project over the right midlung, appear slightly more prominent    as compared to 02/22/2016, indeterminate, can be further evaluated dedicated chest CT if clinically warranted. No significant pleural effusion or pneumothorax.              EKG:     EKG #1  Normal sinus rhythm poor anterior progression inverted T waves V1 aVL V2 through V6.  Normal axis    Time:    Ventricular rate bmp  Axis   GA interval ms  QRS duration ms  QT/QTC  ms    Compared to previous EKG on  I have independently reviewed and interpreted the EKG(s) documented above.      PROCEDURES:     Procedures      I, Savi Edgar, am serving as a scribe to document services personally performed by Dr. Malloy based on my observation and the provider's statements to me. I, Zhanna Malloy MD attest that Savi Edgar is acting in a scribe capacity, has observed my performance of the services and has documented them in accordance with my direction.    Zhanna Malloy M.D.  Emergency Medicine  [facility] EMERGENCY DEPARTMENT  [address] [place] [id number]  [phone]  Dept: [phone]       Zhanna Malloy MD  02/26/22 1039

## 2022-02-26 NOTE — CONSULTS
Care Management Initial Consult    General Information  Assessment completed with: Spouse or significant other, spouse Kareem  Type of CM/SW Visit: Initial Assessment    Primary Care Provider verified and updated as needed: Yes   Readmission within the last 30 days: no previous admission in last 30 days   Return Category: Progression of disease  Reason for Consult: discharge planning  Advance Care Planning: Advance Care Planning Reviewed: no concerns identified          Communication Assessment  Patient's communication style: spoken language (English or Bilingual)             Cognitive  Cognitive/Neuro/Behavioral:                        Living Environment:   People in home: spouse     Current living Arrangements: condominium      Able to return to prior arrangements: yes       Family/Social Support:  Care provided by: self, spouse/significant other  Provides care for: no one  Marital Status:             Description of Support System: Supportive, Involved    Support Assessment: Adequate family and caregiver support, Adequate social supports    Current Resources:   Patient receiving home care services: No     Community Resources: None  Equipment currently used at home: none  Supplies currently used at home: None    Employment/Financial:  Employment Status:          Financial Concerns: No concerns identified   Referral to Financial Worker: No       Lifestyle & Psychosocial Needs:  Social Determinants of Health     Tobacco Use: Not on file   Alcohol Use: Not on file   Financial Resource Strain: Not on file   Food Insecurity: Not on file   Transportation Needs: Not on file   Physical Activity: Not on file   Stress: Not on file   Social Connections: Not on file   Intimate Partner Violence: Not on file   Depression: Not on file   Housing Stability: Not on file       Functional Status:  Prior to admission patient needed assistance:   Dependent ADLs:: Independent  Dependent IADLs:: Independent  Assesssment of  Functional Status: Not at baseline with ADL Functioning, Not at baseline with mobility, Not at  functional baseline    Mental Health Status:  Mental Health Status: No Current Concerns       Chemical Dependency Status:                Values/Beliefs:  Spiritual, Cultural Beliefs, Congregation Practices, Values that affect care:                 Additional Information:  [facility] assessed, spoke to spouse, Kareem, lives w/spouse independent prior to arrival. No svcs and and DMEs. They are open to resources for homecare and TCU if Ins covers. Step dtr Jasmyn can transport.      Tia Seals RN

## 2022-02-26 NOTE — ED NOTES
Bed: JNED-05  Expected date: 2/26/22  Expected time:   Means of arrival: Ambulance  Comments:  Sawyer Montes  89 F

## 2022-02-26 NOTE — ED NOTES
[facility] ED Handoff Report    ED Chief Complaint: Confusion and abdominal pain    ED Diagnosis:  (N28.9) Acute renal insufficiency  Comment: None  Plan: See provider orders    (E86.0) Dehydration  Comment: None  Plan: Hydration    (K51.50) Left sided colitis without complications (H)  Comment: She is quite tender on the left side. Any movement gives her severe pain. Laying flat she is comfortable  Plan: See provider orders    (R41.0) Confusion  Comment: This has been for the last few days. Family feel it is getting worse.  Plan: See providers orders       PMH:  History reviewed. No pertinent past medical history.     Code Status:  No Order     Falls Risk: Yes Band: Applied    Current Living Situation/Residence: lives with a significant other     Elimination Status: Continent: Yes     Activity Level: SBA w/ walker    Patients Preferred Language:  English     Needed: No    Vital Signs:  BP (!) 187/80   Pulse 85   Temp 98.5  F (36.9  C) (Oral)   Resp 19   Wt 82 kg (180 lb 12.4 oz)   SpO2 94%   BMI 33.06 kg/m       Cardiac Rhythm: Sinus    Pain Score: 0/10    Is the Patient Confused:  Yes    Last Food or Drink: 2/25/22 at Unknown    Focused Assessment:  Family noted a change in confusion two days ago. They feel it has gotten worse since then. She also has had abdominal pain for some time. She is very tender on the left side with any palpation. She has not complained of any SOB.     Tests Performed: Done: Labs and Imaging    Treatments Provided:  Fluids and medications    Family Dynamics/Concerns: No    Family Updated On Visitor Policy: Yes    Plan of Care Communicated to Family: Yes    Who Was Updated about Plan of Care: Daughter has been present during her care today    Belongings Checklist Done and Signed by Patient: No    Medications sent with patient: NA    Covid: asymptomatic , negative    Additional Information: None    RN: Austen 2/26/2022 12:58 PM

## 2022-02-26 NOTE — ED TRIAGE NOTES
She comes from independent living where she lives with her . Family feel she has been confused for the last few days. Two days ago the daughter was with her and noted she was normal for her at that time. Today daughter feels she is more confused. She is able to walk at home. Unclear if she uses a walker or cane. Daughter feels she is not clear in her conversation about what she is talking about. She has tenderness in her abdomen. She is incontinent at this time. She denies SOB at this time.

## 2022-02-26 NOTE — CONSULTS
General Surgery Consultation  Sissy Mccloud MRN# [id number]   Age/Sex: 89 year old female YOB: 1931     Reason for consult: 1. Acute renal insufficiency    2. Dehydration    3. Left sided colitis without complications (H)    4. Confusion    5. History of diabetes mellitus    6. Hypomagnesemia    7. Abnormal electrocardiogram            Requesting physician: Dr. Still                   Assessment and Plan:   Assessment:  Colitis, at this point appears inconsistent with ischemic colitis.  Suspect more of a inflammatory or infectious process.  We will continue to follow closely, but no indication for surgical intervention at this point time.    Plan:  1.  IV antibiotic therapy  2.  Fluid resuscitation for dehydration and acute kidney injury  3.  Would maintain on clear liquid diet until improvements in infectious markers.          Chief Complaint:     Chief Complaint   Patient presents with     Altered Mental Status     Abdominal Pain        History is obtained from the patient    HPI:   Sissy Mccloud is a 89 year old female who presents with a -day history of worsening confusion from home.  She was brought in by her  and daughter.  At the time of her presentation, she was having some nausea and vomiting with tenderness across her lower abdomen.  She notes that she has had several small bowel movements over the past few days, and that her belly feels bigger.          Past Medical History:   History reviewed. No pertinent past medical history.           Past Surgical History:     Past Surgical History:   Procedure Laterality Date     CHOLECYSTECTOMY               Social History:               Family History:   No family history on file.           Allergies:     Allergies   Allergen Reactions     Ambien [Zolpidem] Unknown     Cleocin [Clindamycin] Unknown     Codeine Unknown              Medications:     Prior to Admission medications    Medication Sig Start Date End Date Taking? Authorizing  "Provider   amLODIPine (NORVASC) 5 MG tablet [AMLODIPINE (NORVASC) 5 MG TABLET] Take 5 mg by mouth daily. 2/22/16  Yes Provider, Historical   aspirin 81 mg chewable tablet [ASPIRIN 81 MG CHEWABLE TABLET] Chew 81 mg daily. 2/22/16  Yes Provider, Historical   cyanocobalamin (VITAMIN B-12) 100 MCG tablet Take 100 mcg by mouth daily   Yes Unknown, Entered By History   gabapentin (NEURONTIN) 300 MG capsule [GABAPENTIN (NEURONTIN) 300 MG CAPSULE] Take 300 mg by mouth daily. 2/22/16  Yes Provider, Historical   levothyroxine (SYNTHROID, LEVOTHROID) 100 MCG tablet [LEVOTHYROXINE (SYNTHROID, LEVOTHROID) 100 MCG TABLET] Take 100 mcg by mouth daily. 2/22/16  Yes Provider, Historical   metFORMIN (GLUMETZA) 500 MG (MOD) 24 hr tablet [METFORMIN (GLUMETZA) 500 MG (MOD) 24 HR TABLET] Take 500 mg by mouth daily with breakfast. 2/22/16  Yes Provider, Historical   pantoprazole (PROTONIX) 40 MG tablet [PANTOPRAZOLE (PROTONIX) 40 MG TABLET] Take 40 mg by mouth daily. 2/22/16  Yes Provider, Historical              Review of Systems:   The Review of Systems is negative other than noted in the HPI            Physical Exam:     Patient Vitals for the past 24 hrs:   BP Temp Temp src Pulse Resp SpO2 Height Weight   02/26/22 1626 (!) 185/76 98.1  F (36.7  C) Oral 87 18 95 % -- --   02/26/22 1440 (!) (P) 182/79 (P) 97.6  F (36.4  C) (P) Oral (P) 89 -- (P) 93 % 1.575 m (5' 2\") 68.7 kg (151 lb 6 oz)   02/26/22 1400 -- -- -- 88 20 94 % -- --   02/26/22 1345 -- -- -- 85 18 95 % -- --   02/26/22 1330 (!) 178/77 -- -- 88 20 92 % -- --   02/26/22 1315 -- -- -- 85 18 93 % -- --   02/26/22 1300 (!) 171/77 -- -- 84 19 95 % -- --   02/26/22 1245 -- -- -- 85 19 94 % -- --   02/26/22 1230 (!) 187/80 -- -- 65 20 97 % -- --   02/26/22 1215 -- -- -- 86 20 90 % -- --   02/26/22 1200 (!) 177/78 -- -- 89 21 96 % -- --   02/26/22 1145 -- -- -- 89 24 94 % -- --   02/26/22 1130 (!) 166/75 -- -- 91 18 95 % -- --   02/26/22 1115 (!) 175/80 -- -- 91 21 96 % -- -- "   02/26/22 1045 -- -- -- 82 22 93 % -- --   02/26/22 1030 (!) 147/69 -- -- 81 20 96 % -- --   02/26/22 1015 -- -- -- 83 21 95 % -- --   02/26/22 1000 (!) 154/70 -- -- 83 22 94 % -- --   02/26/22 0945 -- -- -- 86 19 96 % -- --   02/26/22 0930 (!) 170/74 -- -- 83 24 96 % -- --   02/26/22 0915 -- -- -- 86 22 96 % -- --   02/26/22 0903 (!) 154/110 -- -- 87 20 96 % -- 82 kg (180 lb 12.4 oz)   02/26/22 0900 (!) 154/110 98.5  F (36.9  C) Oral 87 -- 96 % -- --        No intake or output data in the 24 hours ending 02/26/22 1640   Constitutional:   awake, alert, cooperative, no apparent distress, and appears stated age       Eyes:   PERRL, conjunctiva/corneas clear, EOM's intact; no scleral edema or icterus noted        ENT:   Normocephalic, without obvious abnormality, atraumatic, Lips, mucosa, and tongue normal        Lungs:   Normal respiratory effort, no accessory muscle use       Cardiovascular:   Regular rate and rhythm       Abdomen:   Soft, tender to palpation of the left side of the abdomen.  Distended.       Musculoskeletal:   No obvious swelling, bruising or deformity       Skin:   Skin color and texture normal for patient, no rashes or lesions              Data:        CT imaging of the abdomen and pelvis obtained earlier today demonstrates colonic diverticulosis and bowel wall thickening involving the descending and sigmoid colon with some mild inflammatory fat stranding adjacent to the colon.  Notably her vasculature immediately adjacent to the descending and sigmoid colon as a reasonably good appearance of perfusion with clearly visible and perfused vessels running alongside the colon itself.  No evidence of perforation or abscess.    Results for orders placed or performed during the hospital encounter of 02/26/22 (from the past 24 hour(s))   CBC with platelets + differential    Narrative    The following orders were created for panel order CBC with platelets + differential.  Procedure                                Abnormality         Status                     ---------                               -----------         ------                     CBC with platelets and d...[[id number]]  Abnormal            Final result                 Please view results for these tests on the individual orders.   Basic metabolic panel   Result Value Ref Range    Sodium 137 136 - 145 mmol/L    Potassium 3.7 3.5 - 5.0 mmol/L    Chloride 101 98 - 107 mmol/L    Carbon Dioxide (CO2) 23 22 - 31 mmol/L    Anion Gap 13 5 - 18 mmol/L    Urea Nitrogen 33 (H) 8 - 28 mg/dL    Creatinine 1.40 (H) 0.60 - 1.10 mg/dL    Calcium 9.4 8.5 - 10.5 mg/dL    Glucose 274 (H) 70 - 125 mg/dL    GFR Estimate 36 (L) >60 mL/min/1.73m2   Hepatic function panel   Result Value Ref Range    Bilirubin Total 0.8 0.0 - 1.0 mg/dL    Bilirubin Direct 0.3 <=0.5 mg/dL    Protein Total 7.1 6.0 - 8.0 g/dL    Albumin 3.2 (L) 3.5 - 5.0 g/dL    Alkaline Phosphatase 62 45 - 120 U/L    AST 20 0 - 40 U/L    ALT 19 0 - 45 U/L   Lactic acid whole blood   Result Value Ref Range    Lactic Acid 2.6 (H) 0.7 - 2.0 mmol/L   Troponin I (now)   Result Value Ref Range    Troponin I 0.02 0.00 - 0.29 ng/mL   Magnesium   Result Value Ref Range    Magnesium 1.7 (L) 1.8 - 2.6 mg/dL   UA with Microscopic reflex to Culture    Specimen: Urine, Catheter   Result Value Ref Range    Color Urine Yellow Colorless, Straw, Light Yellow, Yellow    Appearance Urine Clear Clear    Glucose Urine >1000 (A) Negative mg/dL    Bilirubin Urine Negative Negative    Ketones Urine Negative Negative mg/dL    Specific Gravity Urine 1.025 1.001 - 1.030    Blood Urine 0.03 mg/dL (A) Negative    pH Urine 5.5 5.0 - 7.0    Protein Albumin Urine 100  (A) Negative mg/dL    Urobilinogen Urine 3.0 (A) <2.0 mg/dL    Nitrite Urine Negative Negative    Leukocyte Esterase Urine Negative Negative    Mucus Urine Present (A) None Seen /LPF    RBC Urine 2 <=2 /HPF    WBC Urine 1 <=5 /HPF    Squamous Epithelials Urine 1 <=1 /HPF    Narrative     Urine Culture not indicated   CBC with platelets and differential   Result Value Ref Range    WBC Count 15.1 (H) 4.0 - 11.0 10e3/uL    RBC Count 4.93 3.80 - 5.20 10e6/uL    Hemoglobin 14.2 11.7 - 15.7 g/dL    Hematocrit 44.2 35.0 - 47.0 %    MCV 90 78 - 100 fL    MCH 28.8 26.5 - 33.0 pg    MCHC 32.1 31.5 - 36.5 g/dL    RDW 12.9 10.0 - 15.0 %    Platelet Count 257 150 - 450 10e3/uL    % Neutrophils 89 %    % Lymphocytes 3 %    % Monocytes 7 %    % Eosinophils 1 %    % Basophils 0 %    % Immature Granulocytes 0 %    NRBCs per 100 WBC 0 <1 /100    Absolute Neutrophils 13.4 (H) 1.6 - 8.3 10e3/uL    Absolute Lymphocytes 0.5 (L) 0.8 - 5.3 10e3/uL    Absolute Monocytes 1.0 0.0 - 1.3 10e3/uL    Absolute Eosinophils 0.1 0.0 - 0.7 10e3/uL    Absolute Basophils 0.1 0.0 - 0.2 10e3/uL    Absolute Immature Granulocytes 0.1 <=0.4 10e3/uL    Absolute NRBCs 0.0 10e3/uL   Hemoglobin A1c   Result Value Ref Range    Hemoglobin A1C 7.1 (H) <=5.6 %   Asymptomatic COVID-19 Virus (Coronavirus) by PCR Nasopharyngeal    Specimen: Nasopharyngeal; Swab   Result Value Ref Range    SARS CoV2 PCR Negative Negative    Narrative    Testing was performed using the [employer]bin  SARS-CoV-2 & Influenza A/B Assay on the [employer]bin  Hanna  System.  This test should be ordered for the detection of SARS-COV-2 in individuals who meet SARS-CoV-2 clinical and/or epidemiological criteria. Test performance is unknown in asymptomatic patients.  This test is for in vitro diagnostic use under the FDA EUA for laboratories certified under CLIA to perform moderate and/or high complexity testing. This test has not been FDA cleared or approved.  A negative test does not rule out the presence of PCR inhibitors in the specimen or target RNA in concentration below the limit of detection for the assay. The possibility of a false negative should be considered if the patient's recent exposure or clinical presentation suggests COVID-19.  [facility] are certified  under the Clinical Laboratory Improvement Amendments of 1988 (CLIA-88) as qualified to perform moderate and/or high complexity laboratory testing.   XR Chest Port 1 View    Narrative    EXAM: XR CHEST PORTABLE 1 VIEW  LOCATION: [facility]  DATE/TIME: 02/26/2022, 9:21 AM    INDICATION: Confusion.  COMPARISON: Chest x-ray on 02/22/2016.      Impression    IMPRESSION: Single AP view of the chest was obtained. Cardiomediastinal silhouette is within normal limits. Mild left basilar pulmonary opacities, likely atelectasis. A few nodular opacities project over the right midlung, appear slightly more prominent   as compared to 02/22/2016, indeterminate, can be further evaluated dedicated chest CT if clinically warranted. No significant pleural effusion or pneumothorax.     Head CT w/o contrast    Narrative    EXAM: CT HEAD W/O CONTRAST  LOCATION: [facility]  DATE/TIME: 2/26/2022 11:02 AM    INDICATION: Mental status change, unknown cause  COMPARISON: 04/25/2008 brain MRI  TECHNIQUE: Routine CT Head without IV contrast. Multiplanar reformats. Dose reduction techniques were used.    FINDINGS:  INTRACRANIAL CONTENTS: No intracranial hemorrhage, extraaxial collection, or mass effect.  No CT evidence of acute infarct. Mild presumed chronic small vessel ischemic changes. Mild to moderate generalized volume loss. No hydrocephalus.     VISUALIZED ORBITS/SINUSES/MASTOIDS: Prior bilateral cataract surgery. Visualized portions of the orbits are otherwise unremarkable. No paranasal sinus mucosal disease. No middle ear or mastoid effusion.    BONES/SOFT TISSUES: No acute abnormality.      Impression    IMPRESSION:  1.  No acute intracranial process.   CT Abdomen Pelvis w Contrast    Narrative    EXAM: CT ABDOMEN PELVIS W CONTRAST  LOCATION: [facility]  DATE/TIME: 2/26/2022 11:02 AM    INDICATION: Left lower quadrant abdominal pain with nausea and  vomiting.  COMPARISON: 01/24/2013.  TECHNIQUE: CT scan of the abdomen and pelvis was performed following injection of IV contrast. Multiplanar reformats were obtained. Dose reduction techniques were used.  CONTRAST: 75ml Isovue 370    FINDINGS:   LOWER CHEST: Bibasilar subsegmental atelectasis versus scar.    HEPATOBILIARY: Cholecystectomy with no bile duct dilatation. Normal liver.    PANCREAS: Mild pancreatic atrophy. Pancreatic duct is nondilated.    SPLEEN: Normal.    ADRENAL GLANDS: Normal.    KIDNEYS/BLADDER: 2 mm nonobstructing calculus left lower pole. Mild generalized left renal cortical atrophy. Normal right kidney. No ureteral calculus nor hydronephrosis. The bladder is negative.    BOWEL: Colonic diverticulosis most pronounced in the sigmoid region. Bowel wall thickening involving the descending and sigmoid colon with mild pericolonic inflammation compatible with colitis. No evidence of obstruction. Normal appendix. Trace amount of   ascites.    LYMPH NODES: No lymphadenopathy.    VASCULATURE: No aortoiliac aneurysm.    PELVIC ORGANS: Hysterectomy. No adnexal mass.    MUSCULOSKELETAL: Mild chronic appearing T12 compression deformity. Degenerative changes lumbar spine. No suspicious osseous lesions.      Impression    IMPRESSION:   1.  Left-sided colitis involving descending and sigmoid colon. Differential considerations would include both infectious/inflammatory colitis and ischemic colitis. Trace amount of peritoneal ascites.  2.  Colonic diverticulosis.  3.  2 mm nonobstructing calculus left lower pole  4.  Previous cholecystectomy and hysterectomy.        Sanjeev Baldwin MD

## 2022-02-26 NOTE — PHARMACY-ADMISSION MEDICATION HISTORY
Pharmacy Note - Admission Medication History    Pertinent Provider Information: List provided by patients daughter Jasmyn. Patient unsure of the last time she took her medications, she thinks it has been a couple days.      ______________________________________________________________________    Prior To Admission (PTA) med list completed and updated in EMR.       PTA Med List   Medication Sig Last Dose     amLODIPine (NORVASC) 5 MG tablet [AMLODIPINE (NORVASC) 5 MG TABLET] Take 5 mg by mouth daily. Past Week at Unknown time     aspirin 81 mg chewable tablet [ASPIRIN 81 MG CHEWABLE TABLET] Chew 81 mg daily. Past Week at Unknown time     cyanocobalamin (VITAMIN B-12) 100 MCG tablet Take 100 mcg by mouth daily Past Week at Unknown time     gabapentin (NEURONTIN) 300 MG capsule [GABAPENTIN (NEURONTIN) 300 MG CAPSULE] Take 300 mg by mouth daily. Past Week at Unknown time     levothyroxine (SYNTHROID, LEVOTHROID) 100 MCG tablet [LEVOTHYROXINE (SYNTHROID, LEVOTHROID) 100 MCG TABLET] Take 100 mcg by mouth daily. Past Week at Unknown time     metFORMIN (GLUMETZA) 500 MG (MOD) 24 hr tablet [METFORMIN (GLUMETZA) 500 MG (MOD) 24 HR TABLET] Take 500 mg by mouth daily with breakfast. Past Week at Unknown time     pantoprazole (PROTONIX) 40 MG tablet [PANTOPRAZOLE (PROTONIX) 40 MG TABLET] Take 40 mg by mouth daily. Past Week at Unknown time       Information source(s): Patient and Family member  Method of interview communication: in-person    Summary of Changes to PTA Med List  New: vit B12  Discontinued: N/A  Changed: N/A    Patient was asked about OTC/herbal products specifically.  PTA med list reflects this.    In the past week, patient estimated taking medication this percent of the time:  50-90% due to other.    Allergies were reviewed, assessed, and updated with the patient.      Patient does not use any multi-dose medications prior to admission.    The information provided in this note is only as accurate as the sources  available at the time of the update(s).    Thank you for the opportunity to participate in the care of this patient.    Edwige He  2/26/2022 1:17 PM

## 2022-02-27 ENCOUNTER — APPOINTMENT (OUTPATIENT)
Dept: CARDIOLOGY | Facility: HOSPITAL | Age: 87
DRG: 871 | End: 2022-02-27
Attending: INTERNAL MEDICINE
Payer: COMMERCIAL

## 2022-02-27 ENCOUNTER — APPOINTMENT (OUTPATIENT)
Dept: PHYSICAL THERAPY | Facility: HOSPITAL | Age: 87
DRG: 871 | End: 2022-02-27
Attending: HOSPITALIST
Payer: COMMERCIAL

## 2022-02-27 LAB
ALBUMIN SERPL-MCNC: 2.5 G/DL (ref 3.5–5)
ALP SERPL-CCNC: 45 U/L (ref 45–120)
ALT SERPL W P-5'-P-CCNC: 12 U/L (ref 0–45)
ANION GAP SERPL CALCULATED.3IONS-SCNC: 8 MMOL/L (ref 5–18)
AST SERPL W P-5'-P-CCNC: 18 U/L (ref 0–40)
BASOPHILS # BLD AUTO: 0 10E3/UL (ref 0–0.2)
BASOPHILS NFR BLD AUTO: 0 %
BILIRUB SERPL-MCNC: 0.5 MG/DL (ref 0–1)
BNP SERPL-MCNC: 50 PG/ML (ref 0–167)
BUN SERPL-MCNC: 27 MG/DL (ref 8–28)
C REACTIVE PROTEIN LHE: 16.8 MG/DL (ref 0–0.8)
CALCIUM SERPL-MCNC: 7.8 MG/DL (ref 8.5–10.5)
CHLORIDE BLD-SCNC: 110 MMOL/L (ref 98–107)
CO2 SERPL-SCNC: 22 MMOL/L (ref 22–31)
CREAT SERPL-MCNC: 1.1 MG/DL (ref 0.6–1.1)
EOSINOPHIL # BLD AUTO: 0 10E3/UL (ref 0–0.7)
EOSINOPHIL NFR BLD AUTO: 0 %
ERYTHROCYTE [DISTWIDTH] IN BLOOD BY AUTOMATED COUNT: 13.2 % (ref 10–15)
GFR SERPL CREATININE-BSD FRML MDRD: 47 ML/MIN/1.73M2
GLUCOSE BLD-MCNC: 149 MG/DL (ref 70–125)
GLUCOSE BLDC GLUCOMTR-MCNC: 121 MG/DL (ref 70–99)
GLUCOSE BLDC GLUCOMTR-MCNC: 132 MG/DL (ref 70–99)
GLUCOSE BLDC GLUCOMTR-MCNC: 133 MG/DL (ref 70–99)
GLUCOSE BLDC GLUCOMTR-MCNC: 135 MG/DL (ref 70–99)
HCT VFR BLD AUTO: 37.9 % (ref 35–47)
HGB BLD-MCNC: 12.2 G/DL (ref 11.7–15.7)
IMM GRANULOCYTES # BLD: 0 10E3/UL
IMM GRANULOCYTES NFR BLD: 0 %
LACTATE SERPL-SCNC: 0.7 MMOL/L (ref 0.7–2)
LVEF ECHO: NORMAL
LYMPHOCYTES # BLD AUTO: 0.6 10E3/UL (ref 0.8–5.3)
LYMPHOCYTES NFR BLD AUTO: 6 %
MCH RBC QN AUTO: 29.1 PG (ref 26.5–33)
MCHC RBC AUTO-ENTMCNC: 32.2 G/DL (ref 31.5–36.5)
MCV RBC AUTO: 91 FL (ref 78–100)
MONOCYTES # BLD AUTO: 1 10E3/UL (ref 0–1.3)
MONOCYTES NFR BLD AUTO: 10 %
NEUTROPHILS # BLD AUTO: 8.5 10E3/UL (ref 1.6–8.3)
NEUTROPHILS NFR BLD AUTO: 84 %
NRBC # BLD AUTO: 0 10E3/UL
NRBC BLD AUTO-RTO: 0 /100
PLATELET # BLD AUTO: 194 10E3/UL (ref 150–450)
POTASSIUM BLD-SCNC: 3.9 MMOL/L (ref 3.5–5)
PROT SERPL-MCNC: 5.8 G/DL (ref 6–8)
RBC # BLD AUTO: 4.19 10E6/UL (ref 3.8–5.2)
SODIUM SERPL-SCNC: 140 MMOL/L (ref 136–145)
TROPONIN I SERPL-MCNC: 0.02 NG/ML (ref 0–0.29)
TROPONIN I SERPL-MCNC: 0.02 NG/ML (ref 0–0.29)
WBC # BLD AUTO: 10 10E3/UL (ref 4–11)

## 2022-02-27 PROCEDURE — 80053 COMPREHEN METABOLIC PANEL: CPT | Performed by: INTERNAL MEDICINE

## 2022-02-27 PROCEDURE — 83880 ASSAY OF NATRIURETIC PEPTIDE: CPT | Performed by: HOSPITALIST

## 2022-02-27 PROCEDURE — 250N000013 HC RX MED GY IP 250 OP 250 PS 637: Performed by: INTERNAL MEDICINE

## 2022-02-27 PROCEDURE — 97530 THERAPEUTIC ACTIVITIES: CPT | Mod: GP

## 2022-02-27 PROCEDURE — 99231 SBSQ HOSP IP/OBS SF/LOW 25: CPT | Performed by: PHYSICIAN ASSISTANT

## 2022-02-27 PROCEDURE — 99233 SBSQ HOSP IP/OBS HIGH 50: CPT | Performed by: HOSPITALIST

## 2022-02-27 PROCEDURE — C9113 INJ PANTOPRAZOLE SODIUM, VIA: HCPCS | Performed by: INTERNAL MEDICINE

## 2022-02-27 PROCEDURE — 999N000208 ECHOCARDIOGRAM COMPLETE

## 2022-02-27 PROCEDURE — 255N000002 HC RX 255 OP 636: Performed by: HOSPITALIST

## 2022-02-27 PROCEDURE — 120N000001 HC R&B MED SURG/OB

## 2022-02-27 PROCEDURE — 83605 ASSAY OF LACTIC ACID: CPT | Performed by: INTERNAL MEDICINE

## 2022-02-27 PROCEDURE — 97161 PT EVAL LOW COMPLEX 20 MIN: CPT | Mod: GP

## 2022-02-27 PROCEDURE — 93306 TTE W/DOPPLER COMPLETE: CPT | Mod: 26 | Performed by: INTERNAL MEDICINE

## 2022-02-27 PROCEDURE — 86140 C-REACTIVE PROTEIN: CPT | Performed by: PHYSICIAN ASSISTANT

## 2022-02-27 PROCEDURE — 84484 ASSAY OF TROPONIN QUANT: CPT | Performed by: INTERNAL MEDICINE

## 2022-02-27 PROCEDURE — 250N000011 HC RX IP 250 OP 636: Performed by: INTERNAL MEDICINE

## 2022-02-27 PROCEDURE — 36415 COLL VENOUS BLD VENIPUNCTURE: CPT | Performed by: INTERNAL MEDICINE

## 2022-02-27 PROCEDURE — 85025 COMPLETE CBC W/AUTO DIFF WBC: CPT | Performed by: INTERNAL MEDICINE

## 2022-02-27 RX ORDER — SODIUM CHLORIDE AND POTASSIUM CHLORIDE 150; 900 MG/100ML; MG/100ML
INJECTION, SOLUTION INTRAVENOUS CONTINUOUS
Status: DISCONTINUED | OUTPATIENT
Start: 2022-02-27 | End: 2022-03-02 | Stop reason: HOSPADM

## 2022-02-27 RX ADMIN — Medication 1 MG: at 20:11

## 2022-02-27 RX ADMIN — LEVOTHYROXINE SODIUM 100 MCG: 0.1 TABLET ORAL at 08:48

## 2022-02-27 RX ADMIN — HYDRALAZINE HYDROCHLORIDE 5 MG: 20 INJECTION INTRAMUSCULAR; INTRAVENOUS at 20:11

## 2022-02-27 RX ADMIN — PIPERACILLIN SODIUM AND TAZOBACTAM SODIUM 3.38 G: 3; .375 INJECTION, POWDER, LYOPHILIZED, FOR SOLUTION INTRAVENOUS at 09:00

## 2022-02-27 RX ADMIN — POTASSIUM CHLORIDE AND SODIUM CHLORIDE: 900; 150 INJECTION, SOLUTION INTRAVENOUS at 03:26

## 2022-02-27 RX ADMIN — AMLODIPINE BESYLATE 10 MG: 5 TABLET ORAL at 08:51

## 2022-02-27 RX ADMIN — ONDANSETRON 4 MG: 2 INJECTION INTRAMUSCULAR; INTRAVENOUS at 15:04

## 2022-02-27 RX ADMIN — PANTOPRAZOLE SODIUM 40 MG: 40 INJECTION, POWDER, FOR SOLUTION INTRAVENOUS at 08:48

## 2022-02-27 RX ADMIN — PERFLUTREN 4 ML: 6.52 INJECTION, SUSPENSION INTRAVENOUS at 08:35

## 2022-02-27 RX ADMIN — PIPERACILLIN SODIUM AND TAZOBACTAM SODIUM 3.38 G: 3; .375 INJECTION, POWDER, LYOPHILIZED, FOR SOLUTION INTRAVENOUS at 17:33

## 2022-02-27 RX ADMIN — POTASSIUM CHLORIDE AND SODIUM CHLORIDE: 900; 150 INJECTION, SOLUTION INTRAVENOUS at 14:49

## 2022-02-27 RX ADMIN — ONDANSETRON 4 MG: 2 INJECTION INTRAMUSCULAR; INTRAVENOUS at 08:45

## 2022-02-27 ASSESSMENT — ACTIVITIES OF DAILY LIVING (ADL)
ADLS_ACUITY_SCORE: 7
ADLS_ACUITY_SCORE: 11
ADLS_ACUITY_SCORE: 7
ADLS_ACUITY_SCORE: 7
ADLS_ACUITY_SCORE: 11
ADLS_ACUITY_SCORE: 11
ADLS_ACUITY_SCORE: 7
ADLS_ACUITY_SCORE: 11
ADLS_ACUITY_SCORE: 7
ADLS_ACUITY_SCORE: 11

## 2022-02-27 NOTE — PLAN OF CARE
"  Problem: Plan of Care - These are the overarching goals to be used throughout the patient stay.    Goal: Absence of Hospital-Acquired Illness or Injury  Outcome: Ongoing, Progressing  Intervention: Identify and Manage Fall Risk  Recent Flowsheet Documentation  Taken 2/27/2022 0000 by George Shelby RN  Safety Promotion/Fall Prevention:   assistive device/personal items within reach   bed alarm on   room door open   patient and family education   increased rounding and observation   lighting adjusted   increase visualization of patient     Problem: Risk for Delirium  Goal: Improved Sleep  Outcome: Ongoing, Progressing     Problem: Nausea and Vomiting  Goal: Fluid and Electrolyte Balance  Intervention: Prevent and Manage Nausea and Vomiting  Recent Flowsheet Documentation  Taken 2/27/2022 0000 by George Shelby RN  Nausea/Vomiting Interventions:   acupressure applied   antiemetic     Patient alert and able to make needs known. Denies pain, but states that she is not feeling well. When ask what she is experiencing, unable to verbalize, just saying \"I don't feel good.\" Administered PRN simethicone and zofran with intended results. Able to fall a sleep. VendRx patent. Diet advanced to clear liquid diet.    George Shelby, SULEMA                          "

## 2022-02-27 NOTE — PLAN OF CARE
Problem: Nausea and Vomiting  Goal: Fluid and Electrolyte Balance  Outcome: Ongoing, Not Progressing  Patient reports intermittent, mild nausea, exacerbated with movement.  Utilized ryder essential oil and accupressure bands.  Offered anti-emetic, patient declines at this time.  Patient remains NPO.    Problem: Pain Acute  Goal: Acceptable Pain Control and Functional Ability  Outcome: Ongoing, Not Progressing  Patient reports LLQ pain with palpation only.  Abdomen distended - patient denies passing flatus. Hypoactive bowel sounds.  IVF infusing, IV abx.  Surgery following.    Problem: Risk for Delirium  Goal: Improved Attention and Thought Clarity  Outcome: Ongoing, Not Progressing  Patient is oriented, but still mildly confused. Easily redirected.    Daughter, Jasmyn, at bedside, updated on plan of care.    Anh Gorman RN

## 2022-02-27 NOTE — PROGRESS NOTES
02/27/22 1500   Quick Adds   Type of Visit Initial PT Evaluation   Living Environment   People in Home spouse   Current Living Arrangements house   Home Accessibility stairs within home   Number of Stairs, Within Home, Primary ten  (To basement)   Stair Railings, Within Home, Primary railings safe and in good condition   Transportation Anticipated family or friend will provide   Living Environment Comments Pt reports she does not have to do stairs at home, 1 flight going to basement. Able to live on main level.    Self-Care   Usual Activity Tolerance good   Current Activity Tolerance fair   Regular Exercise No   Equipment Currently Used at Home none   Fall history within last six months yes   Number of times patient has fallen within last six months   (Pt reports 1 fall every 4 months)   Activity/Exercise/Self-Care Comment At baseline, IND with ADLs and IADLs    General Information   Onset of Illness/Injury or Date of Surgery 02/26/22   Referring Physician Steffany Vaz MD   Patient/Family Therapy Goals Statement (PT) return home   Pertinent History of Current Problem (include personal factors and/or comorbidities that impact the POC) per H&P: She has history of hypothyroidism, HTN, NIDDM, GERD and presented for evaluation of abdominal pain, distention, and altered mental status found to have colitis.    Existing Precautions/Restrictions fall   Weight-Bearing Status - LLE full weight-bearing   Weight-Bearing Status - RLE full weight-bearing   Cognition   Orientation Status (Cognition) oriented x 3   Range of Motion (ROM)   Range of Motion ROM is WFL  (BLE)   Strength (Manual Muscle Testing)   Strength (Manual Muscle Testing) Deficits observed during functional mobility  (BLE)   Bed Mobility   Bed Mobility supine-sit   Supine-Sit [place] (Bed Mobility) moderate assist (50% patient effort)   Transfers   Transfers sit-stand transfer;bed-chair transfer   Bed-Chair Transfer   Bed-Chair [place] (Transfers)  minimum assist (75% patient effort)   Sit-Stand Transfer   Sit-Stand [place] (Transfers) contact guard   Clinical Impression   Criteria for Skilled Therapeutic Intervention Yes, treatment indicated   PT Diagnosis (PT) impaired functional mobility   Influenced by the following impairments decreased strength, decreased balance, decreased endurance   Functional limitations due to impairments bed mobility, transfers, gait   Clinical Presentation (PT Evaluation Complexity) Stable/Uncomplicated   Clinical Presentation Rationale pt presents as medically diagnosed   Clinical Decision Making (Complexity) low complexity   Planned Therapy Interventions (PT) bed mobility training;gait training;transfer training   Anticipated Equipment Needs at Discharge (PT) walker, rolling  (FWW)   Risk & Benefits of therapy have been explained evaluation/treatment results reviewed;patient   PT Discharge Planning   PT Discharge Recommendation (DC Rec) Transitional Care Facility;home with assist;home with home care physical therapy   PT Rationale for DC Rec Pt required modA and increased time for bed mobility. Min A for transfers.   Total Evaluation Time   Total Evaluation Time (Minutes) 10   Physical Therapy Goals   PT Frequency Daily   PT Predicated Duration/Target Date for Goal Attainment 03/03/22   PT Goals Bed Mobility;Transfers;Gait   PT: Bed Mobility Modified independent;Supine to/from sit   PT: Transfers Supervision/stand-by assist;Sit to/from stand;Bed to/from chair;Assistive device   PT: Gait Supervision/stand-by assist;Assistive device;Rolling walker;100 feet

## 2022-02-27 NOTE — H&P
[facility]    History and Physical - Hospitalist Service       Date of Admission:  2/26/2022    Assessment & Plan      Sissy Mccloud is a 89 year old female admitted on 2/26/2022.    Sepsis, due to acute left-sided colitis /ID vs ischemic.  WBCs 13.5, lactate 2.6, procalcitonin 1.65, afebrile and hypertensive.   Patient without history of PAD, CAD, but at risk for them due to diabetes.  -IVF, Zosyn, clear liquid diet, symptomatic treatment  -Surgery consulted, Dr. Leal's recommendations reviewed    Non-insulin-dependent diabetes.  A1c 7.1.  PTA on Glucophage.  Hold Glucophage due to impaired renal function.  SSI NovoLog, check A1c.  Diabetic diet.    Acute renal failure, likely due to dehydration and sepsis.  Probable CKD 3.  Treating sepsis as above.  IVF.  IVF.  Avoid hypotension nephrotoxins.  Monitor renal function closely.    Hypomagnesemia, due to poor p.o.   Low normal potassium.  -Replacing and monitoring electrolytes.  Potassium containing IVF.    Acute metabolic encephalopathy, due to renal failure, sepsis.  Possible underlying cognitive impairment.  Nonfocal neuro exam.  CT head nonrevealing of acute pathology.  Normal TSH of 2.42 on 1/31/2022.  -Pathognomonic treatment as above.    Abnormal EKG.  DWI in inferior leads.  Prolonged QTC at 481 ms.  No previous EKG for comparison.  Normal troponin X1.  No known history of CAD.  -Laboratory monitoring, serial troponin, BNP, echo.    Isolated systolic hypertension.  SBP in 190s.  -PTA amlodipine, continue at higher dose of 10 mg.  As needed IV hydralazine.    COVID-19 PCR negative.  Patient is fully vaccinated.     Diet: Clear Liquid Diet    DVT Prophylaxis: Pneumatic Compression Devices  Foster Catheter: Not present  Central Lines: None  Cardiac Monitoring: None  Code Status: No CPR- Do NOT Intubate, per patient's daughter    Clinically Significant Risk Factors Present on Admission              # Coagulation Defect: INR = 1.35 (Ref  "range: 0.85 - 1.15) and/or PTT = N/A on admission, will monitor for bleeding  # Platelet Defect: home medication list includes an antiplatelet medication   # Diabetes, type II: last A1C 7.1 % (Ref range: <=5.6 %)  # Overweight: Estimated body mass index is 27.69 kg/m  as calculated from the following:    Height as of this encounter: 1.575 m (5' 2\").    Weight as of this encounter: 68.7 kg (151 lb 6 oz).      Disposition Plan   Expected Discharge: Anticipate hospitalization for more than 2 midnights.  PT/OT evaluate for disposition needs.     The patient's care was discussed with the Bedside Nurse, Patient and Patient's Family.-Patient's daughter at the bedside    Nicolasa Still MD  Hospitalist Service  [facility]  Securely message with the Vocera Web Console (learn more here)  Text page via [employer] Paging/Directory   _____________________________________________________________________    Chief Complaint   Abdominal pain, distention, confusion    History is obtained from the patient, electronic health record and emergency department physician    History of Present Illness   Sissy Mccloud is a 89 year old female with PMH of hypothyroidism, HTN, NIDDM, GERD, presented to ED from home for evaluation of confusion.  Per EMS, patient comes from independent living and lives with her .  and daughter report x2 days of confusion. No recent falls. During the ride to the ED, patient began retching and was given zofran. Patient was also tender to palpation in the lower left quadrant. BS of 324. BP of 177 systolic. O2 sats WNL. Patient has a history of UTI's and bladder infections.   Per daughter, her father left her a voicemail saying the patient hasn't moved from the chair for x2 days and has been confused. The daughter drove to the patient's house. Once arriving at the patient's house, she noticed the patient was not herself. There was a bucket next to the patient that she was spitting " "into. No falls. Nonsmoker. No alcohol use.  Per patient, she is here because \"my  and daughter think I needed to come\". Patient reports lower left quadrant tenderness to any palpation in the area. Notes some nausea. Reports no falls.   CT abdomen showed left-sided colitis.  When seen patient in room 405, in presence of her daughter, pt endorses increased abdominal pain and distention, since she presented to the hospital earlier today.  No audible bowel sounds.  Abdomen is grossly distended and diffusely tender, sensitive even to the touch with stethoscope.  Stat abdominal x-ray at 4:43 PM negative for high-grade obstruction, bowel wall thickening of left colon.  Patient states feeling less confused, which is confirmed by her daughter at the bedside.  She is feeling hungry.  Denies passing flatus.  She feels nauseous.  Denies chest pain, cough, dyspnea, back pain, dysuria.  Patient is vaccinated against COVID-19.    Review of Systems    The 10 point Review of Systems is negative other than noted in the HPI or here.     Past Medical History    I have reviewed this patient's medical history and updated it with pertinent information if needed.   History reviewed. No pertinent past medical history.    Past Surgical History   I have reviewed this patient's surgical history and updated it with pertinent information if needed.  Past Surgical History:   Procedure Laterality Date     CHOLECYSTECTOMY       Social History   I have reviewed this patient's social history and updated it with pertinent information if needed.  Social History     Tobacco Use     Smoking status: None     Smokeless tobacco: None   Substance Use Topics     Alcohol use: None     Drug use: None       Family History     Reviewed, not pertinent to chief complaint.    Prior to Admission Medications   Prior to Admission Medications   Prescriptions Last Dose Informant Patient Reported? Taking?   amLODIPine (NORVASC) 5 MG tablet Past Week at Unknown time "  Yes Yes   Sig: [AMLODIPINE (NORVASC) 5 MG TABLET] Take 5 mg by mouth daily.   aspirin 81 mg chewable tablet Past Week at Unknown time  Yes Yes   Sig: [ASPIRIN 81 MG CHEWABLE TABLET] Chew 81 mg daily.   cyanocobalamin (VITAMIN B-12) 100 MCG tablet Past Week at Unknown time  Yes Yes   Sig: Take 100 mcg by mouth daily   gabapentin (NEURONTIN) 300 MG capsule Past Week at Unknown time  Yes Yes   Sig: [GABAPENTIN (NEURONTIN) 300 MG CAPSULE] Take 300 mg by mouth daily.   levothyroxine (SYNTHROID, LEVOTHROID) 100 MCG tablet Past Week at Unknown time  Yes Yes   Sig: [LEVOTHYROXINE (SYNTHROID, LEVOTHROID) 100 MCG TABLET] Take 100 mcg by mouth daily.   metFORMIN (GLUMETZA) 500 MG (MOD) 24 hr tablet Past Week at Unknown time  Yes Yes   Sig: [METFORMIN (GLUMETZA) 500 MG (MOD) 24 HR TABLET] Take 500 mg by mouth daily with breakfast.   pantoprazole (PROTONIX) 40 MG tablet Past Week at Unknown time  Yes Yes   Sig: [PANTOPRAZOLE (PROTONIX) 40 MG TABLET] Take 40 mg by mouth daily.      Facility-Administered Medications: None     Allergies   Allergies   Allergen Reactions     Ambien [Zolpidem] Unknown     Cleocin [Clindamycin] Unknown     Codeine Unknown       Physical Exam   Vital Signs: Temp: 98.1  F (36.7  C) Temp src: Oral BP: (!) 196/77 Pulse: 88   Resp: 18 SpO2: 95 % O2 Device: None (Room air)    Weight: 151 lbs 6 oz    General: Alert and oriented x 2. Not in obvious distress.  Patient's daughter at the bedside.  HEENT: NC, AT. Neck- supple, No JVP elevation, lymphadenopathy or thyromegaly. Trachea-central.  Chest: Clear to auscultation bilaterally.  Heart: S1S2 regular. No M/R/G.  Abdomen: Soft, distended, tender to superficial palpation.  No audible bowel sounds.  Back: No spine tenderness. No CVA tenderness.  Extremities: No leg swelling. Peripheral pulses 2+ bilaterally.  Neuro: Cranial nerves 1-12 grossly normal. No focal neurological deficit    Data   Data reviewed today: I reviewed all medications, new labs and imaging  results over the last 24 hours. I personally reviewed      Recent Labs   Lab 02/26/22  1648 02/26/22  1642 02/26/22  0920 02/26/22  0918   WBC  --  13.5*  --  15.1*   HGB  --  12.9  --  14.2   MCV  --  89  --  90   PLT  --  224  --  257   INR  --   --  1.35*  --    NA  --  138  --  137   POTASSIUM  --  3.6  --  3.7   CHLORIDE  --  104  --  101   CO2  --  24  --  23   BUN  --  29*  --  33*   CR  --  1.18*  --  1.40*   ANIONGAP  --  10  --  13   CRISTY  --  8.6  --  9.4   * 183*  --  274*   ALBUMIN  --  2.8*  --  3.2*   PROTTOTAL  --  6.4  --  7.1   BILITOTAL  --  0.6  --  0.8   ALKPHOS  --  52  --  62   ALT  --  14  --  19   AST  --  21  --  20     13.5 (H)    \    12.9    /    224   N 89    L N/A    138    104    29 (H) /   ------------------------------------ 171 (H)   ALT 14   AST 21   AP 52   ALB 2.8 (L)   Ca 8.6  3.6    24    1.18 (H) \    % RETIC N/A    LDH N/A  Troponin N/A    BNP N/A    CK N/A  INR 1.35 (H)   PTT N/A    D-dimer N/A    Fibrinogen N/A    Antithrombin N/A  Ferritin N/A  CRP N/A    IL-6 N/A  Recent Results (from the past 24 hour(s))   XR Chest Port 1 View    Narrative    EXAM: XR CHEST PORTABLE 1 VIEW  LOCATION: [facility]  DATE/TIME: 02/26/2022, 9:21 AM    INDICATION: Confusion.  COMPARISON: Chest x-ray on 02/22/2016.      Impression    IMPRESSION: Single AP view of the chest was obtained. Cardiomediastinal silhouette is within normal limits. Mild left basilar pulmonary opacities, likely atelectasis. A few nodular opacities project over the right midlung, appear slightly more prominent   as compared to 02/22/2016, indeterminate, can be further evaluated dedicated chest CT if clinically warranted. No significant pleural effusion or pneumothorax.     Head CT w/o contrast    Narrative    EXAM: CT HEAD W/O CONTRAST  LOCATION: [facility]  DATE/TIME: 2/26/2022 11:02 AM    INDICATION: Mental status change, unknown cause  COMPARISON: 04/25/2008  brain MRI  TECHNIQUE: Routine CT Head without IV contrast. Multiplanar reformats. Dose reduction techniques were used.    FINDINGS:  INTRACRANIAL CONTENTS: No intracranial hemorrhage, extraaxial collection, or mass effect.  No CT evidence of acute infarct. Mild presumed chronic small vessel ischemic changes. Mild to moderate generalized volume loss. No hydrocephalus.     VISUALIZED ORBITS/SINUSES/MASTOIDS: Prior bilateral cataract surgery. Visualized portions of the orbits are otherwise unremarkable. No paranasal sinus mucosal disease. No middle ear or mastoid effusion.    BONES/SOFT TISSUES: No acute abnormality.      Impression    IMPRESSION:  1.  No acute intracranial process.   CT Abdomen Pelvis w Contrast    Narrative    EXAM: CT ABDOMEN PELVIS W CONTRAST  LOCATION: [facility]  DATE/TIME: 2/26/2022 11:02 AM    INDICATION: Left lower quadrant abdominal pain with nausea and vomiting.  COMPARISON: 01/24/2013.  TECHNIQUE: CT scan of the abdomen and pelvis was performed following injection of IV contrast. Multiplanar reformats were obtained. Dose reduction techniques were used.  CONTRAST: 75ml Isovue 370    FINDINGS:   LOWER CHEST: Bibasilar subsegmental atelectasis versus scar.    HEPATOBILIARY: Cholecystectomy with no bile duct dilatation. Normal liver.    PANCREAS: Mild pancreatic atrophy. Pancreatic duct is nondilated.    SPLEEN: Normal.    ADRENAL GLANDS: Normal.    KIDNEYS/BLADDER: 2 mm nonobstructing calculus left lower pole. Mild generalized left renal cortical atrophy. Normal right kidney. No ureteral calculus nor hydronephrosis. The bladder is negative.    BOWEL: Colonic diverticulosis most pronounced in the sigmoid region. Bowel wall thickening involving the descending and sigmoid colon with mild pericolonic inflammation compatible with colitis. No evidence of obstruction. Normal appendix. Trace amount of   ascites.    LYMPH NODES: No lymphadenopathy.    VASCULATURE: No aortoiliac  aneurysm.    PELVIC ORGANS: Hysterectomy. No adnexal mass.    MUSCULOSKELETAL: Mild chronic appearing T12 compression deformity. Degenerative changes lumbar spine. No suspicious osseous lesions.      Impression    IMPRESSION:   1.  Left-sided colitis involving descending and sigmoid colon. Differential considerations would include both infectious/inflammatory colitis and ischemic colitis. Trace amount of peritoneal ascites.  2.  Colonic diverticulosis.  3.  2 mm nonobstructing calculus left lower pole  4.  Previous cholecystectomy and hysterectomy.   XR Abdomen Port 1 View    Narrative    EXAM: XR ABDOMEN PORT 1 VIEWS  LOCATION: [facility]  DATE/TIME: 2/26/2022 4:32 PM    INDICATION: rapidly increasing abdominal distension  COMPARISON: CT 2/26/2022      Impression    IMPRESSION: The bowel gas is nonspecific but not suggestive of a high-grade obstruction. Again noted is wall thickening and edema in the left colon. Cholecystectomy. Excreted contrast in the lumen of the bladder.

## 2022-02-27 NOTE — PLAN OF CARE
Problem: Pain Acute  Goal: Acceptable Pain Control and Functional Ability  2/27/2022 1600 by Anh Gorman, RN  Outcome: Ongoing, Progressing  Patient reports mild LLQ tenderness with palpation only, declines need for pain medication at this time.    Problem: Nausea and Vomiting  Goal: Fluid and Electrolyte Balance  2/27/2022 1559 by Anh Gorman, RN  Outcome: Ongoing, Progressing  Intermittent nausea present, exacerbated by movement. Improved after PRN Zofran.   Clear liquid diet - poor po intake overall, IV fluids infusing.    Anh Gorman, RN

## 2022-02-27 NOTE — PROGRESS NOTES
Care Management Follow Up    Length of Stay (days): 1    Expected Discharge Date: 03/01/2022     Concerns to be Addressed:     Medical Progression. Surgery following. IV antibiotics.   Patient plan of care discussed at interdisciplinary rounds: Yes    Anticipated Discharge Disposition:  Home with home care vs TCU     Anticipated Discharge Services:  Requested PT and OT consults when appropriate  Anticipated Discharge DME:  To be determined     Patient/family educated on Medicare website which has current facility and service quality ratings:  Not at this time  Education Provided on the Discharge Plan:  Per team  Patient/Family in Agreement with the Plan:  Yes    Referrals Placed by CM/SW:  None at this time   Private pay costs discussed: Not applicable    Additional Information:  Chart Reviewed. Requested PT and OT evaluations when able. Per previous care manager note family open to home care vs TCU if needed and covered by insurance. At baseline from home with spouse Kareem; independent prior to admission. Step Daughter Jasmyn can likely transport. Care manager to follow.       Myla Man RN

## 2022-02-27 NOTE — PROGRESS NOTES
[facility]    Medicine Progress Note - Hospitalist Service       Date of Admission:  2/26/2022    Assessment & Plan            Sissy Mccloud is a 89 year old female admitted on 2/26/2022. She has history of hypothyroidism, HTN, NIDDM, GERD and presented for evaluation of abdominal pain, distention, and altered mental status found to have colitis. Hospital Day: 2     #Sepsis, due to acute left-sided colitis, favored to be ischemic.  On admission WBCs 13.5, lactic acidosis 2.6, procalcitonin 1.65, afebrile and hypertensive.   Patient without history of PAD, CAD, but at risk for them due to diabetes.  -IVF, Zosyn, clear liquid diet, symptomatic treatment  -Surgery consulted, Dr. Baldwin's recommendations reviewed  -Trend labs     #Non-insulin-dependent diabetes.  A1c 7.1.  PTA on Glucophage.  Hold Glucophage due to impaired renal function.  SSI NovoLog, check A1c.      #Acute renal failure, likely due to dehydration and sepsis.  Probable CKD 3.  Treating sepsis as above.  IVF.  Avoid hypotension nephrotoxins.  Monitor renal function closely. Thus far creatinine seems to be improving     #Hypomagnesemia, due to poor p.o.   Low normal potassium.  -Replacing and monitoring electrolytes.  Potassium containing IVF.     #Acute metabolic encephalopathy, due to renal failure, sepsis.  Possible underlying cognitive impairment.  Nonfocal neuro exam.  CT head nonrevealing of acute pathology.  Normal TSH of 2.42 on 1/31/2022.  -Treatment of metabolic issues as above.  -PT/OT assessments     #Abnormal EKG.   ST flattening in inferior leads, inverted T waves in lateral leads.   No previous EKG for comparison.  Normal troponin x3.  No known history of CAD.  Echo normal     #Hypertension.  SBP in 190s.  -PTA amlodipine, continue at higher dose of 10 mg.  As needed IV hydralazine.    #GERD, IV PPI  #Hypothyroidism, home Synthroid         Diet: Clear Liquid Diet    DVT Prophylaxis: Moderate risk. James Foster  Catheter: Not present  Central Lines: None  Code Status: No CPR- Do NOT Intubate      Disposition Plan   Disposition: Home when ready, likely multiple days  Discharge barriers: IV abx, symptoms, diet advancement  Medically ready to discharge today: No  Estimated discharge date: 03/01/2022     The patient's care was discussed with the Patient and Patient's Family.    Steffany Vaz MD  Hospitalist Service  [facility]  Text page via [employer] Paging/Directory      Clinically Significant Risk Factors Present on Admission             # Diabetes, type II: last A1C 7.1 % (Ref range: <=5.6 %)  ____________        Physical Exam   Vital Signs: Temp: 98.9  F (37.2  C) Temp src: Axillary BP: (!) 150/69 Pulse: 77   Resp: 18 SpO2: 95 % O2 Device: None (Room air)    Weight: 151 lbs 6 oz  General: in no apparent distress, non-toxic and alert female lying in hospital bed oriented to person and place  HEENT: Head normocephalic atraumatic, oral mucosa moist. Sclerae anicteric  CV: Regular rhythm, normal rate, no murmurs  Resp: No wheezes, no rales or rhonchi, no focal consolidations  GI: Belly mildly distended, nontender to gentle palpation, active bowel sounds  Skin: No rashes or lesions  Extremities: No peripheral edema  Psych: Normal affect, mood dysthymic  Neuro: CNII-XII grossly intact, moving all 4 extremities      Data   Recent Results (from the past 24 hour(s))   Lactic acid whole blood    Collection Time: 02/26/22  4:42 PM   Result Value Ref Range    Lactic Acid 1.3 0.7 - 2.0 mmol/L   Procalcitonin    Collection Time: 02/26/22  4:42 PM   Result Value Ref Range    Procalcitonin 1.65 (H) 0.00 - 0.49 ng/mL   Comprehensive metabolic panel    Collection Time: 02/26/22  4:42 PM   Result Value Ref Range    Sodium 138 136 - 145 mmol/L    Potassium 3.6 3.5 - 5.0 mmol/L    Chloride 104 98 - 107 mmol/L    Carbon Dioxide (CO2) 24 22 - 31 mmol/L    Anion Gap 10 5 - 18 mmol/L    Urea Nitrogen 29 (H) 8 - 28 mg/dL     Creatinine 1.18 (H) 0.60 - 1.10 mg/dL    Calcium 8.6 8.5 - 10.5 mg/dL    Glucose 183 (H) 70 - 125 mg/dL    Alkaline Phosphatase 52 45 - 120 U/L    AST 21 0 - 40 U/L    ALT 14 0 - 45 U/L    Protein Total 6.4 6.0 - 8.0 g/dL    Albumin 2.8 (L) 3.5 - 5.0 g/dL    Bilirubin Total 0.6 0.0 - 1.0 mg/dL    GFR Estimate 44 (L) >60 mL/min/1.73m2   CBC with platelets    Collection Time: 02/26/22  4:42 PM   Result Value Ref Range    WBC Count 13.5 (H) 4.0 - 11.0 10e3/uL    RBC Count 4.47 3.80 - 5.20 10e6/uL    Hemoglobin 12.9 11.7 - 15.7 g/dL    Hematocrit 39.9 35.0 - 47.0 %    MCV 89 78 - 100 fL    MCH 28.9 26.5 - 33.0 pg    MCHC 32.3 31.5 - 36.5 g/dL    RDW 13.0 10.0 - 15.0 %    Platelet Count 224 150 - 450 10e3/uL   Glucose by meter    Collection Time: 02/26/22  4:48 PM   Result Value Ref Range    GLUCOSE BY METER POCT 171 (H) 70 - 99 mg/dL   Glucose by meter    Collection Time: 02/26/22  8:51 PM   Result Value Ref Range    GLUCOSE BY METER POCT 162 (H) 70 - 99 mg/dL   Troponin I    Collection Time: 02/26/22 11:54 PM   Result Value Ref Range    Troponin I 0.02 0.00 - 0.29 ng/mL   Comprehensive metabolic panel    Collection Time: 02/27/22  5:52 AM   Result Value Ref Range    Sodium 140 136 - 145 mmol/L    Potassium 3.9 3.5 - 5.0 mmol/L    Chloride 110 (H) 98 - 107 mmol/L    Carbon Dioxide (CO2) 22 22 - 31 mmol/L    Anion Gap 8 5 - 18 mmol/L    Urea Nitrogen 27 8 - 28 mg/dL    Creatinine 1.10 0.60 - 1.10 mg/dL    Calcium 7.8 (L) 8.5 - 10.5 mg/dL    Glucose 149 (H) 70 - 125 mg/dL    Alkaline Phosphatase 45 45 - 120 U/L    AST 18 0 - 40 U/L    ALT 12 0 - 45 U/L    Protein Total 5.8 (L) 6.0 - 8.0 g/dL    Albumin 2.5 (L) 3.5 - 5.0 g/dL    Bilirubin Total 0.5 0.0 - 1.0 mg/dL    GFR Estimate 47 (L) >60 mL/min/1.73m2   Lactic acid whole blood    Collection Time: 02/27/22  5:52 AM   Result Value Ref Range    Lactic Acid 0.7 0.7 - 2.0 mmol/L   Troponin I    Collection Time: 02/27/22  5:52 AM   Result Value Ref Range    Troponin I 0.02  0.00 - 0.29 ng/mL   CBC with platelets and differential    Collection Time: 02/27/22  5:52 AM   Result Value Ref Range    WBC Count 10.0 4.0 - 11.0 10e3/uL    RBC Count 4.19 3.80 - 5.20 10e6/uL    Hemoglobin 12.2 11.7 - 15.7 g/dL    Hematocrit 37.9 35.0 - 47.0 %    MCV 91 78 - 100 fL    MCH 29.1 26.5 - 33.0 pg    MCHC 32.2 31.5 - 36.5 g/dL    RDW 13.2 10.0 - 15.0 %    Platelet Count 194 150 - 450 10e3/uL    % Neutrophils 84 %    % Lymphocytes 6 %    % Monocytes 10 %    % Eosinophils 0 %    % Basophils 0 %    % Immature Granulocytes 0 %    NRBCs per 100 WBC 0 <1 /100    Absolute Neutrophils 8.5 (H) 1.6 - 8.3 10e3/uL    Absolute Lymphocytes 0.6 (L) 0.8 - 5.3 10e3/uL    Absolute Monocytes 1.0 0.0 - 1.3 10e3/uL    Absolute Eosinophils 0.0 0.0 - 0.7 10e3/uL    Absolute Basophils 0.0 0.0 - 0.2 10e3/uL    Absolute Immature Granulocytes 0.0 <=0.4 10e3/uL    Absolute NRBCs 0.0 10e3/uL   B-Type Natriuretic Peptide ([facility] Only)    Collection Time: 02/27/22  5:52 AM   Result Value Ref Range    BNP 50 0 - 167 pg/mL   CRP inflammation    Collection Time: 02/27/22  5:52 AM   Result Value Ref Range    CRP 16.8 (H) 0.0-<0.8 mg/dL   Glucose by meter    Collection Time: 02/27/22  8:18 AM   Result Value Ref Range    GLUCOSE BY METER POCT 135 (H) 70 - 99 mg/dL   Echocardiogram Complete    Collection Time: 02/27/22  9:20 AM   Result Value Ref Range    LVEF  60-65%    Glucose by meter    Collection Time: 02/27/22 12:24 PM   Result Value Ref Range    GLUCOSE BY METER POCT 133 (H) 70 - 99 mg/dL     ____________  Interval History   Data reviewed today: I reviewed all medications, new labs and imaging results over the last 24 hours. I personally reviewed no images or EKG's today.  patient states not feeling great this morning. denies pain or nausea but just that she is so tired. belly a little bit distended, BS active. patient not very interested in clears this morning. continue IV Zosyn. I called and updated her daughter, Jasmyn

## 2022-02-27 NOTE — PROGRESS NOTES
"General Surgery Progress Note:    Hospital Day # 1    ASSESSMENT:   1. Acute renal insufficiency    2. Dehydration    3. Left sided colitis without complications (H)    4. Confusion    5. History of diabetes mellitus    6. Hypomagnesemia    7. Abnormal electrocardiogram        Sissy Mccloud is a 89 year old female with left-sided colitis and low suspicion for ischemic colitis and most likely inflammatory and/or infectious   Leukocytosis resolved  Lactic acid levels normal  Ongoing abdominal pain  PLAN:   -Even though patient has ongoing abdominal pain she has no evidence of impending complications as her lactic acid levels are normal and white count within normal and vital signs are also stable.  -Patient not drinking much however I am okay with that due to her abdominal pain.  May want to add on clear Ensure just to help with nutrition if she does tolerate clears later on.  -Continue IV antibiotic therapy  -We will continue to follow patient      SUBJECTIVE:   Sissy Mccloud is feeling weak.  She states that she does not feel well and mainly her biggest complaint is weakness.  Currently she denies any abdominal pain.  She does not have much of an appetite has not been drinking much at all.  Not having bowel movements.    Patient Vitals for the past 24 hrs:   BP Temp Temp src Pulse Resp SpO2 Height Weight   02/27/22 1015 (!) 150/69 -- -- -- -- -- -- --   02/27/22 0811 (!) 170/74 98.9  F (37.2  C) Axillary 77 18 95 % -- --   02/27/22 0329 (!) 172/71 -- -- -- -- -- -- --   02/26/22 2250 (!) 164/66 98.5  F (36.9  C) Oral 77 18 91 % -- --   02/26/22 1855 (!) 196/77 -- -- 88 -- -- -- --   02/26/22 1626 (!) 185/76 98.1  F (36.7  C) Oral 87 18 95 % -- --   02/26/22 1440 (!) 182/79 97.6  F (36.4  C) Oral 89 -- 93 % 1.575 m (5' 2\") 68.7 kg (151 lb 6 oz)   02/26/22 1400 -- -- -- 88 20 94 % -- --   02/26/22 1345 -- -- -- 85 18 95 % -- --   02/26/22 1330 (!) 178/77 -- -- 88 20 92 % -- --   02/26/22 1315 -- -- -- 85 18 93 % -- " --   02/26/22 1300 (!) 171/77 -- -- 84 19 95 % -- --   02/26/22 1245 -- -- -- 85 19 94 % -- --   02/26/22 1230 (!) 187/80 -- -- 65 20 97 % -- --   02/26/22 1215 -- -- -- 86 20 90 % -- --   02/26/22 1200 (!) 177/78 -- -- 89 21 96 % -- --   02/26/22 1145 -- -- -- 89 24 94 % -- --   02/26/22 1130 (!) 166/75 -- -- 91 18 95 % -- --       Physical Exam:  General: No acute distress however it just looks uncomfortable    ABD: Distended soft exquisite tenderness right lower quadrant and left lower quadrant with some localized peritoneal symptoms and rebound.  EXT:no CCE    Admission on 02/26/2022   Component Date Value     Sodium 02/26/2022 137      Potassium 02/26/2022 3.7      Chloride 02/26/2022 101      Carbon Dioxide (CO2) 02/26/2022 23      Anion Gap 02/26/2022 13      Urea Nitrogen 02/26/2022 33 (A)     Creatinine 02/26/2022 1.40 (A)     Calcium 02/26/2022 9.4      Glucose 02/26/2022 274 (A)     GFR Estimate 02/26/2022 36 (A)     Bilirubin Total 02/26/2022 0.8      Bilirubin Direct 02/26/2022 0.3      Protein Total 02/26/2022 7.1      Albumin 02/26/2022 3.2 (A)     Alkaline Phosphatase 02/26/2022 62      AST 02/26/2022 20      ALT 02/26/2022 19      Lactic Acid 02/26/2022 2.6 (A)     Troponin I 02/26/2022 0.02      Magnesium 02/26/2022 1.7 (A)     SARS CoV2 PCR 02/26/2022 Negative      Color Urine 02/26/2022 Yellow      Appearance Urine 02/26/2022 Clear      Glucose Urine 02/26/2022 >1000 (A)     Bilirubin Urine 02/26/2022 Negative      Ketones Urine 02/26/2022 Negative      Specific Gravity Urine 02/26/2022 1.025      Blood Urine 02/26/2022 0.03 mg/dL (A)     pH Urine 02/26/2022 5.5      Protein Albumin Urine 02/26/2022 100  (A)     Urobilinogen Urine 02/26/2022 3.0 (A)     Nitrite Urine 02/26/2022 Negative      Leukocyte Esterase Urine 02/26/2022 Negative      Mucus Urine 02/26/2022 Present (A)     RBC Urine 02/26/2022 2      WBC Urine 02/26/2022 1      Squamous Epithelials Uri* 02/26/2022 1      WBC Count  02/26/2022 15.1 (A)     RBC Count 02/26/2022 4.93      Hemoglobin 02/26/2022 14.2      Hematocrit 02/26/2022 44.2      MCV 02/26/2022 90      MCH 02/26/2022 28.8      MCHC 02/26/2022 32.1      RDW 02/26/2022 12.9      Platelet Count 02/26/2022 257      % Neutrophils 02/26/2022 89      % Lymphocytes 02/26/2022 3      % Monocytes 02/26/2022 7      % Eosinophils 02/26/2022 1      % Basophils 02/26/2022 0      % Immature Granulocytes 02/26/2022 0      NRBCs per 100 WBC 02/26/2022 0      Absolute Neutrophils 02/26/2022 13.4 (A)     Absolute Lymphocytes 02/26/2022 0.5 (A)     Absolute Monocytes 02/26/2022 1.0      Absolute Eosinophils 02/26/2022 0.1      Absolute Basophils 02/26/2022 0.1      Absolute Immature Granul* 02/26/2022 0.1      Absolute NRBCs 02/26/2022 0.0      Hold Specimen 02/26/2022 [facility]      Hold Specimen 02/26/2022 [facility]      Hemoglobin A1C 02/26/2022 7.1 (A)     Lactic Acid 02/26/2022 1.3      Procalcitonin 02/26/2022 1.65 (A)     Sodium 02/26/2022 138      Potassium 02/26/2022 3.6      Chloride 02/26/2022 104      Carbon Dioxide (CO2) 02/26/2022 24      Anion Gap 02/26/2022 10      Urea Nitrogen 02/26/2022 29 (A)     Creatinine 02/26/2022 1.18 (A)     Calcium 02/26/2022 8.6      Glucose 02/26/2022 183 (A)     Alkaline Phosphatase 02/26/2022 52      AST 02/26/2022 21      ALT 02/26/2022 14      Protein Total 02/26/2022 6.4      Albumin 02/26/2022 2.8 (A)     Bilirubin Total 02/26/2022 0.6      GFR Estimate 02/26/2022 44 (A)     INR 02/26/2022 1.35 (A)     WBC Count 02/26/2022 13.5 (A)     RBC Count 02/26/2022 4.47      Hemoglobin 02/26/2022 12.9      Hematocrit 02/26/2022 39.9      MCV 02/26/2022 89      MCH 02/26/2022 28.9      MCHC 02/26/2022 32.3      RDW 02/26/2022 13.0      Platelet Count 02/26/2022 224      GLUCOSE BY METER POCT 02/26/2022 171 (A)     Troponin I 02/26/2022 0.02      GLUCOSE BY METER POCT 02/26/2022 162 (A)     Sodium 02/27/2022 140      Potassium 02/27/2022 3.9      Chloride 02/27/2022  110 (A)     Carbon Dioxide (CO2) 02/27/2022 22      Anion Gap 02/27/2022 8      Urea Nitrogen 02/27/2022 27      Creatinine 02/27/2022 1.10      Calcium 02/27/2022 7.8 (A)     Glucose 02/27/2022 149 (A)     Alkaline Phosphatase 02/27/2022 45      AST 02/27/2022 18      ALT 02/27/2022 12      Protein Total 02/27/2022 5.8 (A)     Albumin 02/27/2022 2.5 (A)     Bilirubin Total 02/27/2022 0.5      GFR Estimate 02/27/2022 47 (A)     Lactic Acid 02/27/2022 0.7      Troponin I 02/27/2022 0.02      LVEF  02/27/2022 60-65%      WBC Count 02/27/2022 10.0      RBC Count 02/27/2022 4.19      Hemoglobin 02/27/2022 12.2      Hematocrit 02/27/2022 37.9      MCV 02/27/2022 91      MCH 02/27/2022 29.1      MCHC 02/27/2022 32.2      RDW 02/27/2022 13.2      Platelet Count 02/27/2022 194      % Neutrophils 02/27/2022 84      % Lymphocytes 02/27/2022 6      % Monocytes 02/27/2022 10      % Eosinophils 02/27/2022 0      % Basophils 02/27/2022 0      % Immature Granulocytes 02/27/2022 0      NRBCs per 100 WBC 02/27/2022 0      Absolute Neutrophils 02/27/2022 8.5 (A)     Absolute Lymphocytes 02/27/2022 0.6 (A)     Absolute Monocytes 02/27/2022 1.0      Absolute Eosinophils 02/27/2022 0.0      Absolute Basophils 02/27/2022 0.0      Absolute Immature Granul* 02/27/2022 0.0      Absolute NRBCs 02/27/2022 0.0      BNP 02/27/2022 50      GLUCOSE BY METER POCT 02/27/2022 135 (A)        Laura Burton PA-C

## 2022-02-28 ENCOUNTER — APPOINTMENT (OUTPATIENT)
Dept: OCCUPATIONAL THERAPY | Facility: HOSPITAL | Age: 87
DRG: 871 | End: 2022-02-28
Attending: HOSPITALIST
Payer: COMMERCIAL

## 2022-02-28 LAB
ANION GAP SERPL CALCULATED.3IONS-SCNC: 8 MMOL/L (ref 5–18)
ATRIAL RATE - MUSE: 87 BPM
BUN SERPL-MCNC: 22 MG/DL (ref 8–28)
C REACTIVE PROTEIN LHE: 8.4 MG/DL (ref 0–0.8)
CALCIUM SERPL-MCNC: 7.9 MG/DL (ref 8.5–10.5)
CHLORIDE BLD-SCNC: 112 MMOL/L (ref 98–107)
CO2 SERPL-SCNC: 21 MMOL/L (ref 22–31)
CREAT SERPL-MCNC: 1.05 MG/DL (ref 0.6–1.1)
DIASTOLIC BLOOD PRESSURE - MUSE: NORMAL MMHG
ERYTHROCYTE [DISTWIDTH] IN BLOOD BY AUTOMATED COUNT: 13.2 % (ref 10–15)
GFR SERPL CREATININE-BSD FRML MDRD: 50 ML/MIN/1.73M2
GLUCOSE BLD-MCNC: 127 MG/DL (ref 70–125)
GLUCOSE BLDC GLUCOMTR-MCNC: 114 MG/DL (ref 70–99)
GLUCOSE BLDC GLUCOMTR-MCNC: 125 MG/DL (ref 70–99)
GLUCOSE BLDC GLUCOMTR-MCNC: 135 MG/DL (ref 70–99)
GLUCOSE BLDC GLUCOMTR-MCNC: 172 MG/DL (ref 70–99)
HCT VFR BLD AUTO: 39.4 % (ref 35–47)
HGB BLD-MCNC: 12.4 G/DL (ref 11.7–15.7)
INTERPRETATION ECG - MUSE: NORMAL
MCH RBC QN AUTO: 28.7 PG (ref 26.5–33)
MCHC RBC AUTO-ENTMCNC: 31.5 G/DL (ref 31.5–36.5)
MCV RBC AUTO: 91 FL (ref 78–100)
P AXIS - MUSE: 75 DEGREES
PLATELET # BLD AUTO: 236 10E3/UL (ref 150–450)
POTASSIUM BLD-SCNC: 4.1 MMOL/L (ref 3.5–5)
PR INTERVAL - MUSE: 164 MS
QRS DURATION - MUSE: 70 MS
QT - MUSE: 400 MS
QTC - MUSE: 481 MS
R AXIS - MUSE: 0 DEGREES
RBC # BLD AUTO: 4.32 10E6/UL (ref 3.8–5.2)
SODIUM SERPL-SCNC: 141 MMOL/L (ref 136–145)
SYSTOLIC BLOOD PRESSURE - MUSE: NORMAL MMHG
T AXIS - MUSE: 164 DEGREES
VENTRICULAR RATE- MUSE: 87 BPM
WBC # BLD AUTO: 8.4 10E3/UL (ref 4–11)

## 2022-02-28 PROCEDURE — 250N000011 HC RX IP 250 OP 636: Performed by: HOSPITALIST

## 2022-02-28 PROCEDURE — 250N000013 HC RX MED GY IP 250 OP 250 PS 637: Performed by: INTERNAL MEDICINE

## 2022-02-28 PROCEDURE — 97535 SELF CARE MNGMENT TRAINING: CPT | Mod: GO

## 2022-02-28 PROCEDURE — 86140 C-REACTIVE PROTEIN: CPT | Performed by: HOSPITALIST

## 2022-02-28 PROCEDURE — 36415 COLL VENOUS BLD VENIPUNCTURE: CPT | Performed by: HOSPITALIST

## 2022-02-28 PROCEDURE — C9113 INJ PANTOPRAZOLE SODIUM, VIA: HCPCS | Performed by: INTERNAL MEDICINE

## 2022-02-28 PROCEDURE — 120N000001 HC R&B MED SURG/OB

## 2022-02-28 PROCEDURE — 99232 SBSQ HOSP IP/OBS MODERATE 35: CPT | Performed by: HOSPITALIST

## 2022-02-28 PROCEDURE — 99231 SBSQ HOSP IP/OBS SF/LOW 25: CPT | Performed by: PHYSICIAN ASSISTANT

## 2022-02-28 PROCEDURE — 250N000011 HC RX IP 250 OP 636: Performed by: INTERNAL MEDICINE

## 2022-02-28 PROCEDURE — 97166 OT EVAL MOD COMPLEX 45 MIN: CPT | Mod: GO

## 2022-02-28 PROCEDURE — 80048 BASIC METABOLIC PNL TOTAL CA: CPT | Performed by: HOSPITALIST

## 2022-02-28 PROCEDURE — 85027 COMPLETE CBC AUTOMATED: CPT | Performed by: HOSPITALIST

## 2022-02-28 RX ADMIN — POTASSIUM CHLORIDE AND SODIUM CHLORIDE: 900; 150 INJECTION, SOLUTION INTRAVENOUS at 11:42

## 2022-02-28 RX ADMIN — LEVOTHYROXINE SODIUM 100 MCG: 0.1 TABLET ORAL at 08:19

## 2022-02-28 RX ADMIN — PIPERACILLIN SODIUM AND TAZOBACTAM SODIUM 3.38 G: 3; .375 INJECTION, POWDER, LYOPHILIZED, FOR SOLUTION INTRAVENOUS at 16:17

## 2022-02-28 RX ADMIN — POTASSIUM CHLORIDE AND SODIUM CHLORIDE: 900; 150 INJECTION, SOLUTION INTRAVENOUS at 00:18

## 2022-02-28 RX ADMIN — AMLODIPINE BESYLATE 10 MG: 5 TABLET ORAL at 08:18

## 2022-02-28 RX ADMIN — PIPERACILLIN SODIUM AND TAZOBACTAM SODIUM 3.38 G: 3; .375 INJECTION, POWDER, LYOPHILIZED, FOR SOLUTION INTRAVENOUS at 08:34

## 2022-02-28 RX ADMIN — PIPERACILLIN SODIUM AND TAZOBACTAM SODIUM 3.38 G: 3; .375 INJECTION, POWDER, LYOPHILIZED, FOR SOLUTION INTRAVENOUS at 00:17

## 2022-02-28 RX ADMIN — PANTOPRAZOLE SODIUM 40 MG: 40 INJECTION, POWDER, FOR SOLUTION INTRAVENOUS at 08:19

## 2022-02-28 ASSESSMENT — ACTIVITIES OF DAILY LIVING (ADL)
ADLS_ACUITY_SCORE: 14
ADLS_ACUITY_SCORE: 11
ADLS_ACUITY_SCORE: 13
ADLS_ACUITY_SCORE: 11
ADLS_ACUITY_SCORE: 14
ADLS_ACUITY_SCORE: 11
ADLS_ACUITY_SCORE: 13
ADLS_ACUITY_SCORE: 11
ADLS_ACUITY_SCORE: 13
ADLS_ACUITY_SCORE: 11
ADLS_ACUITY_SCORE: 14
ADLS_ACUITY_SCORE: 13
ADLS_ACUITY_SCORE: 14
ADLS_ACUITY_SCORE: 11
ADLS_ACUITY_SCORE: 13
ADLS_ACUITY_SCORE: 13
PREVIOUS_RESPONSIBILITIES: MEAL PREP;HOUSEKEEPING;LAUNDRY;SHOPPING;MEDICATION MANAGEMENT;FINANCES;DRIVING

## 2022-02-28 NOTE — PROGRESS NOTES
[facility]    Medicine Progress Note - Hospitalist Service       Date of Admission:  2/26/2022    Assessment & Plan            Sissy Mccloud is a 89 year old female admitted on 2/26/2022. She has history of hypothyroidism, HTN, NIDDM, GERD and presented for evaluation of abdominal pain, distention, and altered mental status found to have colitis. Hospital Day: 3     #Sepsis, due to acute left-sided colitis, favored to be ischemic.  On admission WBCs 13.5, lactic acidosis 2.6, procalcitonin 1.65, afebrile and hypertensive.   Patient without history of PAD, CAD, but at risk for them due to diabetes.  -IVF, Zosyn, symptomatic treatment  -Surgery following, okay with diet advancement today and we will advance her to full liquid diet.  Surgeon recommends low fiber diet for about a week  -Trend labs  -Per surgeon she could discharge from the hospital soon but given age, fragility I would like to watch her little bit longer and advance diet slowly, family in favor of this. Patient minimal appetite currently so I think she is just not ready to advance any further     #Non-insulin-dependent diabetes.  A1c 7.1.  PTA on Glucophage.  Hold Glucophage due to impaired renal function.  SSI NovoLog     #Acute renal failure, likely due to dehydration and sepsis.  Probable CKD 3.  Treating sepsis as above.    Continue IV fluid, decrease rate.  Avoid hypotension nephrotoxins.  Monitor renal function closely. Thus far creatinine improving     #Hypomagnesemia, due to poor p.o.   Low normal potassium.  -Replacing and monitoring electrolytes.  Potassium containing IVF.  Check mag in the morning     #Acute metabolic encephalopathy, due to renal failure, sepsis.  Possible underlying cognitive impairment.  Nonfocal neuro exam.  CT head nonrevealing of acute pathology.  Normal TSH of 2.42 on 1/31/2022.  -Treatment of metabolic issues as above.  -PT/OT assessments     #Abnormal EKG.   ST flattening in inferior leads,  inverted T waves in lateral leads.   No previous EKG for comparison.  Normal troponin x3.  No known history of CAD.  Echo normal     #Hypertension.  SBP in 190s.  -PTA amlodipine, continue at higher dose of 10 mg.  As needed IV hydralazine.    #GERD, IV PPI  #Hypothyroidism, home Synthroid         Diet: Advance Diet as Tolerated: Clear Liquid Diet. Decrease IVF   DVT Prophylaxis: Moderate risk. SCDs   Foster Catheter: Not present  Central Lines: None  Code Status: No CPR- Do NOT Intubate      Disposition Plan   Disposition: Home when ready, likely multiple days  Discharge barriers: IV abx, symptoms, diet advancement  Medically ready to discharge today: No  Estimated discharge date: 02/28/2022     The patient's care was discussed with the Patient and Patient's Family.    Steffany Vaz MD  Hospitalist Service  [facility]  Text page via [employer] Paging/Directory      Clinically Significant Risk Factors Present on Admission             # Diabetes, type II: last A1C 7.1 % (Ref range: <=5.6 %)  ____________        Physical Exam   Vital Signs: Temp: 98.8  F (37.1  C) Temp src: Oral BP: (!) 157/72 Pulse: 66   Resp: 18 SpO2: 97 % O2 Device: None (Room air)    Weight: 151 lbs 6 oz  General: in no apparent distress, non-toxic and alert female lying in hospital bed oriented to person and place  HEENT: Head normocephalic atraumatic, oral mucosa moist. Sclerae anicteric  GI: Belly mildly distended, nontender to gentle palpation, but she did frown at me when I examined her abdomen; active bowel sounds  Skin: No rashes or lesions  Extremities: No peripheral edema  Psych: Normal affect, mood dysthymic  Neuro: CNII-XII grossly intact, moving all 4 extremities      Data   Recent Results (from the past 24 hour(s))   Glucose by meter    Collection Time: 02/27/22  4:54 PM   Result Value Ref Range    GLUCOSE BY METER POCT 132 (H) 70 - 99 mg/dL   Glucose by meter    Collection Time: 02/27/22  8:57 PM   Result Value Ref  Range    GLUCOSE BY METER POCT 121 (H) 70 - 99 mg/dL   CBC with platelets    Collection Time: 02/28/22  6:01 AM   Result Value Ref Range    WBC Count 8.4 4.0 - 11.0 10e3/uL    RBC Count 4.32 3.80 - 5.20 10e6/uL    Hemoglobin 12.4 11.7 - 15.7 g/dL    Hematocrit 39.4 35.0 - 47.0 %    MCV 91 78 - 100 fL    MCH 28.7 26.5 - 33.0 pg    MCHC 31.5 31.5 - 36.5 g/dL    RDW 13.2 10.0 - 15.0 %    Platelet Count 236 150 - 450 10e3/uL   Basic metabolic panel    Collection Time: 02/28/22  6:01 AM   Result Value Ref Range    Sodium 141 136 - 145 mmol/L    Potassium 4.1 3.5 - 5.0 mmol/L    Chloride 112 (H) 98 - 107 mmol/L    Carbon Dioxide (CO2) 21 (L) 22 - 31 mmol/L    Anion Gap 8 5 - 18 mmol/L    Urea Nitrogen 22 8 - 28 mg/dL    Creatinine 1.05 0.60 - 1.10 mg/dL    Calcium 7.9 (L) 8.5 - 10.5 mg/dL    Glucose 127 (H) 70 - 125 mg/dL    GFR Estimate 50 (L) >60 mL/min/1.73m2   CRP inflammation    Collection Time: 02/28/22  6:01 AM   Result Value Ref Range    CRP 8.4 (H) 0.0-<0.8 mg/dL   Glucose by meter    Collection Time: 02/28/22  8:22 AM   Result Value Ref Range    GLUCOSE BY METER POCT 114 (H) 70 - 99 mg/dL   Glucose by meter    Collection Time: 02/28/22 12:30 PM   Result Value Ref Range    GLUCOSE BY METER POCT 125 (H) 70 - 99 mg/dL     ____________  Interval History   Data reviewed today: I reviewed all medications, new labs and imaging results over the last 24 hours. I personally reviewed no images or EKG's today.  patient doing ok today. scowled when I was feeling her belly but denied pain. irritated by multiple staff visits this morning, states she wants to go home. seems to be tolerating CLD. no BMs. can likely advance diet but will make sure surgery team agree.    I called and updated dtr Jasmyn

## 2022-02-28 NOTE — PLAN OF CARE
Daughter here this afternoon. States Sissy is at her baseline for mentation. Does not eat very well at home- will try Ensure with ice cream (likes very cold milkshake like drinks)  home routine generally sleeps until 9-10am and naps in the afternoon.  Denies abdominal pain. Had stool today. IV fluids infusing. Falls and pressure ulcer prevention plans in place. Ambika Castro RN      Problem: Diabetes Comorbidity  Goal: Blood Glucose Level Within Targeted Range  Outcome: Ongoing, Progressing     Problem: Risk for Delirium  Goal: Improved Attention and Thought Clarity  Outcome: Ongoing, Progressing  Goal: Improved Sleep  Outcome: Ongoing, Progressing         Problem: Plan of Care - These are the overarching goals to be used throughout the patient stay.    Goal: Optimal Comfort and Wellbeing  Outcome: Ongoing, Progressing     Problem: Nausea and Vomiting  Goal: Fluid and Electrolyte Balance  Outcome: Ongoing, Progressing

## 2022-02-28 NOTE — PROGRESS NOTES
General Surgery Progress Note:    Hospital Day # 2    ASSESSMENT:   1. Acute renal insufficiency    2. Dehydration    3. Left sided colitis without complications (H)    4. Confusion    5. History of diabetes mellitus    6. Hypomagnesemia    7. Abnormal electrocardiogram        Sissy Mccloud is a 89 year old female with left-sided colitis and low suspicion for ischemic colitis and most likely inflammatory and/or infectious   Abdominal pain improving    PLAN:   -Okay advance diet as tolerated-would recommend to advance to a low fiber softer diet for the next week.  -Okay to continue IV antibiotics while she is here but most likely will not need to go home with it.  -Once medically ready should be able to go to TCU either later today or tomorrow  -As patient has getting better there are no surgical indications and at this time we will sign off.  Please call us if there is any questions concerns or worsening symptoms.    SUBJECTIVE:   Sissy Mccloud doing okay.  She states that she has no complaints.  She does feel weak but she is not complaining about it as much.  She still does not feel the greatest.  She denies any abdominal pain.  Does not think she is passing gas.  Does not know about a bowel movement.  There is report of a bowel movement in chart.  She is afebrile.    Patient Vitals for the past 24 hrs:   BP Temp Temp src Pulse Resp SpO2   02/28/22 0842 (!) 197/79 98.8  F (37.1  C) Oral 71 16 97 %   02/28/22 0014 (!) 170/72 98.8  F (37.1  C) Oral 73 16 95 %   02/27/22 2006 (!) 179/75 98.5  F (36.9  C) Oral 72 18 96 %   02/27/22 1616 (!) 170/70 98.6  F (37  C) Oral 79 18 97 %   02/27/22 1015 (!) 150/69 -- -- -- -- --       Physical Exam:  General: NAD, pleasant    ABD: Soft nontender much improved from yesterday  EXT:no CCE    Admission on 02/26/2022   Component Date Value     Sodium 02/26/2022 137      Potassium 02/26/2022 3.7      Chloride 02/26/2022 101      Carbon Dioxide (CO2) 02/26/2022 23      Anion Gap  02/26/2022 13      Urea Nitrogen 02/26/2022 33 (A)     Creatinine 02/26/2022 1.40 (A)     Calcium 02/26/2022 9.4      Glucose 02/26/2022 274 (A)     GFR Estimate 02/26/2022 36 (A)     Bilirubin Total 02/26/2022 0.8      Bilirubin Direct 02/26/2022 0.3      Protein Total 02/26/2022 7.1      Albumin 02/26/2022 3.2 (A)     Alkaline Phosphatase 02/26/2022 62      AST 02/26/2022 20      ALT 02/26/2022 19      Lactic Acid 02/26/2022 2.6 (A)     Troponin I 02/26/2022 0.02      Magnesium 02/26/2022 1.7 (A)     SARS CoV2 PCR 02/26/2022 Negative      Color Urine 02/26/2022 Yellow      Appearance Urine 02/26/2022 Clear      Glucose Urine 02/26/2022 >1000 (A)     Bilirubin Urine 02/26/2022 Negative      Ketones Urine 02/26/2022 Negative      Specific Gravity Urine 02/26/2022 1.025      Blood Urine 02/26/2022 0.03 mg/dL (A)     pH Urine 02/26/2022 5.5      Protein Albumin Urine 02/26/2022 100  (A)     Urobilinogen Urine 02/26/2022 3.0 (A)     Nitrite Urine 02/26/2022 Negative      Leukocyte Esterase Urine 02/26/2022 Negative      Mucus Urine 02/26/2022 Present (A)     RBC Urine 02/26/2022 2      WBC Urine 02/26/2022 1      Squamous Epithelials Uri* 02/26/2022 1      WBC Count 02/26/2022 15.1 (A)     RBC Count 02/26/2022 4.93      Hemoglobin 02/26/2022 14.2      Hematocrit 02/26/2022 44.2      MCV 02/26/2022 90      MCH 02/26/2022 28.8      MCHC 02/26/2022 32.1      RDW 02/26/2022 12.9      Platelet Count 02/26/2022 257      % Neutrophils 02/26/2022 89      % Lymphocytes 02/26/2022 3      % Monocytes 02/26/2022 7      % Eosinophils 02/26/2022 1      % Basophils 02/26/2022 0      % Immature Granulocytes 02/26/2022 0      NRBCs per 100 WBC 02/26/2022 0      Absolute Neutrophils 02/26/2022 13.4 (A)     Absolute Lymphocytes 02/26/2022 0.5 (A)     Absolute Monocytes 02/26/2022 1.0      Absolute Eosinophils 02/26/2022 0.1      Absolute Basophils 02/26/2022 0.1      Absolute Immature Granul* 02/26/2022 0.1      Absolute NRBCs 02/26/2022  0.0      Hold Specimen 02/26/2022 JI      Hold Specimen 02/26/2022 [facility]      Hemoglobin A1C 02/26/2022 7.1 (A)     Lactic Acid 02/26/2022 1.3      Procalcitonin 02/26/2022 1.65 (A)     Sodium 02/26/2022 138      Potassium 02/26/2022 3.6      Chloride 02/26/2022 104      Carbon Dioxide (CO2) 02/26/2022 24      Anion Gap 02/26/2022 10      Urea Nitrogen 02/26/2022 29 (A)     Creatinine 02/26/2022 1.18 (A)     Calcium 02/26/2022 8.6      Glucose 02/26/2022 183 (A)     Alkaline Phosphatase 02/26/2022 52      AST 02/26/2022 21      ALT 02/26/2022 14      Protein Total 02/26/2022 6.4      Albumin 02/26/2022 2.8 (A)     Bilirubin Total 02/26/2022 0.6      GFR Estimate 02/26/2022 44 (A)     INR 02/26/2022 1.35 (A)     WBC Count 02/26/2022 13.5 (A)     RBC Count 02/26/2022 4.47      Hemoglobin 02/26/2022 12.9      Hematocrit 02/26/2022 39.9      MCV 02/26/2022 89      MCH 02/26/2022 28.9      MCHC 02/26/2022 32.3      RDW 02/26/2022 13.0      Platelet Count 02/26/2022 224      GLUCOSE BY METER POCT 02/26/2022 171 (A)     Troponin I 02/26/2022 0.02      GLUCOSE BY METER POCT 02/26/2022 162 (A)     Sodium 02/27/2022 140      Potassium 02/27/2022 3.9      Chloride 02/27/2022 110 (A)     Carbon Dioxide (CO2) 02/27/2022 22      Anion Gap 02/27/2022 8      Urea Nitrogen 02/27/2022 27      Creatinine 02/27/2022 1.10      Calcium 02/27/2022 7.8 (A)     Glucose 02/27/2022 149 (A)     Alkaline Phosphatase 02/27/2022 45      AST 02/27/2022 18      ALT 02/27/2022 12      Protein Total 02/27/2022 5.8 (A)     Albumin 02/27/2022 2.5 (A)     Bilirubin Total 02/27/2022 0.5      GFR Estimate 02/27/2022 47 (A)     Lactic Acid 02/27/2022 0.7      Troponin I 02/27/2022 0.02      LVEF  02/27/2022 60-65%      WBC Count 02/27/2022 10.0      RBC Count 02/27/2022 4.19      Hemoglobin 02/27/2022 12.2      Hematocrit 02/27/2022 37.9      MCV 02/27/2022 91      MCH 02/27/2022 29.1      MCHC 02/27/2022 32.2      RDW 02/27/2022 13.2      Platelet Count  02/27/2022 194      % Neutrophils 02/27/2022 84      % Lymphocytes 02/27/2022 6      % Monocytes 02/27/2022 10      % Eosinophils 02/27/2022 0      % Basophils 02/27/2022 0      % Immature Granulocytes 02/27/2022 0      NRBCs per 100 WBC 02/27/2022 0      Absolute Neutrophils 02/27/2022 8.5 (A)     Absolute Lymphocytes 02/27/2022 0.6 (A)     Absolute Monocytes 02/27/2022 1.0      Absolute Eosinophils 02/27/2022 0.0      Absolute Basophils 02/27/2022 0.0      Absolute Immature Granul* 02/27/2022 0.0      Absolute NRBCs 02/27/2022 0.0      BNP 02/27/2022 50      GLUCOSE BY METER POCT 02/27/2022 135 (A)     CRP 02/27/2022 16.8 (A)     GLUCOSE BY METER POCT 02/27/2022 133 (A)     GLUCOSE BY METER POCT 02/27/2022 132 (A)     GLUCOSE BY METER POCT 02/27/2022 121 (A)     WBC Count 02/28/2022 8.4      RBC Count 02/28/2022 4.32      Hemoglobin 02/28/2022 12.4      Hematocrit 02/28/2022 39.4      MCV 02/28/2022 91      MCH 02/28/2022 28.7      MCHC 02/28/2022 31.5      RDW 02/28/2022 13.2      Platelet Count 02/28/2022 236      Sodium 02/28/2022 141      Potassium 02/28/2022 4.1      Chloride 02/28/2022 112 (A)     Carbon Dioxide (CO2) 02/28/2022 21 (A)     Anion Gap 02/28/2022 8      Urea Nitrogen 02/28/2022 22      Creatinine 02/28/2022 1.05      Calcium 02/28/2022 7.9 (A)     Glucose 02/28/2022 127 (A)     GFR Estimate 02/28/2022 50 (A)     CRP 02/28/2022 8.4 (A)     GLUCOSE BY METER POCT 02/28/2022 114 (A)        Laura Burton PA-C

## 2022-02-28 NOTE — PROGRESS NOTES
02/28/22 0910   Quick Adds   Type of Visit Initial Occupational Therapy Evaluation   Living Environment   People in Home spouse   Current Living Arrangements house   Home Accessibility no concerns   Self-Care   Usual Activity Tolerance moderate   Current Activity Tolerance fair   Instrumental Activities of Daily Living (IADL)   Previous Responsibilities meal prep;housekeeping;laundry;shopping;medication management;finances;driving   General Information   Onset of Illness/Injury or Date of Surgery 02/19/22   Cognitive Status Examination   Orientation Status person;place   Range of Motion Comprehensive   General Range of Motion bilateral upper extremity ROM WFL   Comment, General Range of Motion R shoulder flexion impaired   Bed Mobility   Bed Mobility rolling left;supine-sit;sit-supine   Rolling Left [place] (Bed Mobility) moderate assist (50% patient effort)   Supine-Sit [place] (Bed Mobility) maximum assist (25% patient effort)   Sit-Supine [place] (Bed Mobility) minimum assist (75% patient effort)   Comment (Bed Mobility) pt lethargic, sat eob then laid down, back up to siiting with therapist help, pt laid down and declined further mobility   Transfers   Transfer Comments pt declined   Clinical Impression   Criteria for Skilled Therapeutic Interventions Met (OT) Yes, treatment indicated   OT Diagnosis decreased adl's   OT Problem List-Impairments impacting ADL activity tolerance impaired;cognition;mobility;strength   Assessment of Occupational Performance 3-5 Performance Deficits   Planned Therapy Interventions (OT) ADL retraining;cognition;strengthening;transfer training   Clinical Decision Making Complexity (OT) moderate complexity   Risk & Benefits of therapy have been explained evaluation/treatment results reviewed   OT Discharge Planning   OT Discharge Recommendation (DC Rec) home with assist  (pending progress)   OT Rationale for DC Rec pt at baseline is independent with adl's/mobility    Total Evaluation Time (Minutes)   Total Evaluation Time (Minutes) 15   OT Goals   Therapy Frequency (OT) Daily   OT Predicated Duration/Target Date for Goal Attainment 03/07/22   OT Goals Lower Body Dressing;Transfers;Cognition   OT: Lower Body Dressing Supervision/stand-by assist   OT: Transfer Supervision/stand-by assist   OT: Cognitive Patient/caregiver will verbalize understanding of cognitive assessment results/recommendations as needed for safe discharge planning

## 2022-02-28 NOTE — PLAN OF CARE
Problem: Nausea and Vomiting  Goal: Fluid and Electrolyte Balance  Outcome: Ongoing, Progressing     Problem: Risk for Delirium  Goal: Improved Sleep  Outcome: Ongoing, Progressing     Patient alert, slightly confused and forgetful. Denies any pain or nausea during this shift. Able to sleep majority of night shift. Blood pressure elevated. Administered PRN hydralazine 5 mg with moderate results. Pure wick in place.    George Shelby RN

## 2022-02-28 NOTE — PROGRESS NOTES
"CLINICAL NUTRITION SERVICES - ASSESSMENT NOTE     Nutrition Prescription    RECOMMENDATIONS FOR MDs/PROVIDERS TO ORDER:      Malnutrition Status:    moderate    Recommendations already ordered by Registered Dietitian (RD):  glucerna with ice cream 3 x/day    Future/Additional Recommendations:       REASON FOR ASSESSMENT  Sissy Mccloud is a/an 89 year old female assessed by the dietitian for Admission Nutrition Risk Screen for positive    NUTRITION HISTORY  Met pt and pt's daughter.  Pt reports decreased appetite > 1 month.  Pt reports lost 30 lb last spring.  Pt's daughter states that pt has regained some wt.  Pt likes chocolate Ensure with ice cream.      CURRENT NUTRITION ORDERS  Diet: Full Liquid  Intake/Tolerance: Pt eating small meals.    LABS  Labs reviewed    MEDICATIONS  Medications reviewed    ANTHROPOMETRICS  Height: 157.5 cm (5' 2\")  Most Recent Weight: 68.7 kg (151 lb 6 oz)      BMI: Overweight BMI 25-29.9  Weight History:  Wt Readings from Last 6 Encounters:   02/26/22 68.7 kg (151 lb 6 oz)   No wt hx.    Dosing Weight: 68.7 kg    ASSESSED NUTRITION NEEDS  Estimated Energy Needs: 1400+ kcals/day (20+)  Justification: Maintenance  Estimated Protein Needs: 69+ grams protein/day (1+)  Justification: Repletion  Estimated Fluid Needs: 1400 mL/day (1 mL/kcal)   Justification: Maintenance    PHYSICAL FINDINGS  See malnutrition section below.     MALNUTRITION:  % Weight Loss:  None noted  % Intake:  </= 75% for >/= 1 month (severe malnutrition)  Subcutaneous Fat Loss:  Upper arm region mild depletion  Muscle Loss:  Clavicle bone region mild depletion and Acromion bone region mild depletion  Fluid Retention:  None noted    Malnutrition Diagnosis: Moderate malnutrition  In Context of:  Acute illness or injury  Chronic illness or disease    NUTRITION DIAGNOSIS  Malnutrition related to acute and chronic illness as evidenced by decreased intake, muscle and fat loss      INTERVENTIONS  Implementation  Nutrition " Education: discussed protein supplements for healing/repletions   Medical food supplement therapy     Goals  Patient to consume % of nutritionally adequate meals three times per day, or the equivalent with supplements/snacks.     Monitoring/Evaluation  Progress toward goals will be monitored and evaluated per protocol.

## 2022-02-28 NOTE — PROGRESS NOTES
Care Management Follow Up    Length of Stay (days): 2    Expected Discharge Date: 02/28/2022     Concerns to be Addressed:  Medical Progression     Patient plan of care discussed at interdisciplinary rounds: Yes    Anticipated Discharge Disposition:  TBD     Anticipated Discharge Services:  TBD  Anticipated Discharge DME:  Per therapy rec    Patient/family educated on Medicare website which has current facility and service quality ratings:  Not at this time  Education Provided on the Discharge Plan:  TBD  Patient/Family in Agreement with the Plan:  TBD    Referrals Placed by CM/SW:  None at this time  Private pay costs discussed: Not applicable    Additional Information:  SW intern introduced self to Pt and informed Pt of therapy recommendations. Pt states that she does not want to go to a TCU or have home care. Intern left  with Pt  Kareem to discuss discharge plans.      GEETA Worthy Intern

## 2022-03-01 ENCOUNTER — APPOINTMENT (OUTPATIENT)
Dept: OCCUPATIONAL THERAPY | Facility: HOSPITAL | Age: 87
DRG: 871 | End: 2022-03-01
Payer: COMMERCIAL

## 2022-03-01 LAB
ANION GAP SERPL CALCULATED.3IONS-SCNC: 11 MMOL/L (ref 5–18)
BUN SERPL-MCNC: 16 MG/DL (ref 8–28)
C REACTIVE PROTEIN LHE: 3.6 MG/DL (ref 0–0.8)
CALCIUM SERPL-MCNC: 7.8 MG/DL (ref 8.5–10.5)
CHLORIDE BLD-SCNC: 112 MMOL/L (ref 98–107)
CO2 SERPL-SCNC: 17 MMOL/L (ref 22–31)
CREAT SERPL-MCNC: 0.99 MG/DL (ref 0.6–1.1)
ERYTHROCYTE [DISTWIDTH] IN BLOOD BY AUTOMATED COUNT: 12.8 % (ref 10–15)
GFR SERPL CREATININE-BSD FRML MDRD: 54 ML/MIN/1.73M2
GLUCOSE BLD-MCNC: 130 MG/DL (ref 70–125)
GLUCOSE BLDC GLUCOMTR-MCNC: 115 MG/DL (ref 70–99)
GLUCOSE BLDC GLUCOMTR-MCNC: 118 MG/DL (ref 70–99)
GLUCOSE BLDC GLUCOMTR-MCNC: 133 MG/DL (ref 70–99)
GLUCOSE BLDC GLUCOMTR-MCNC: 141 MG/DL (ref 70–99)
HCT VFR BLD AUTO: 40.1 % (ref 35–47)
HGB BLD-MCNC: 12.6 G/DL (ref 11.7–15.7)
MAGNESIUM SERPL-MCNC: 1.8 MG/DL (ref 1.8–2.6)
MCH RBC QN AUTO: 28.8 PG (ref 26.5–33)
MCHC RBC AUTO-ENTMCNC: 31.4 G/DL (ref 31.5–36.5)
MCV RBC AUTO: 92 FL (ref 78–100)
PLATELET # BLD AUTO: 242 10E3/UL (ref 150–450)
POTASSIUM BLD-SCNC: 4.2 MMOL/L (ref 3.5–5)
RBC # BLD AUTO: 4.38 10E6/UL (ref 3.8–5.2)
SODIUM SERPL-SCNC: 140 MMOL/L (ref 136–145)
WBC # BLD AUTO: 7.9 10E3/UL (ref 4–11)

## 2022-03-01 PROCEDURE — 86140 C-REACTIVE PROTEIN: CPT | Performed by: HOSPITALIST

## 2022-03-01 PROCEDURE — 36415 COLL VENOUS BLD VENIPUNCTURE: CPT | Performed by: HOSPITALIST

## 2022-03-01 PROCEDURE — 120N000001 HC R&B MED SURG/OB

## 2022-03-01 PROCEDURE — 99233 SBSQ HOSP IP/OBS HIGH 50: CPT | Performed by: EMERGENCY MEDICINE

## 2022-03-01 PROCEDURE — 250N000011 HC RX IP 250 OP 636: Performed by: INTERNAL MEDICINE

## 2022-03-01 PROCEDURE — 250N000011 HC RX IP 250 OP 636: Performed by: HOSPITALIST

## 2022-03-01 PROCEDURE — 250N000013 HC RX MED GY IP 250 OP 250 PS 637: Performed by: INTERNAL MEDICINE

## 2022-03-01 PROCEDURE — 999N000127 HC STATISTIC PERIPHERAL IV START W US GUIDANCE

## 2022-03-01 PROCEDURE — 97129 THER IVNTJ 1ST 15 MIN: CPT | Mod: GO

## 2022-03-01 PROCEDURE — 80048 BASIC METABOLIC PNL TOTAL CA: CPT | Performed by: HOSPITALIST

## 2022-03-01 PROCEDURE — 83735 ASSAY OF MAGNESIUM: CPT | Performed by: HOSPITALIST

## 2022-03-01 PROCEDURE — 85027 COMPLETE CBC AUTOMATED: CPT | Performed by: HOSPITALIST

## 2022-03-01 PROCEDURE — C9113 INJ PANTOPRAZOLE SODIUM, VIA: HCPCS | Performed by: INTERNAL MEDICINE

## 2022-03-01 RX ADMIN — PIPERACILLIN SODIUM AND TAZOBACTAM SODIUM 3.38 G: 3; .375 INJECTION, POWDER, LYOPHILIZED, FOR SOLUTION INTRAVENOUS at 08:07

## 2022-03-01 RX ADMIN — POTASSIUM CHLORIDE AND SODIUM CHLORIDE: 900; 150 INJECTION, SOLUTION INTRAVENOUS at 03:22

## 2022-03-01 RX ADMIN — PIPERACILLIN SODIUM AND TAZOBACTAM SODIUM 3.38 G: 3; .375 INJECTION, POWDER, LYOPHILIZED, FOR SOLUTION INTRAVENOUS at 00:36

## 2022-03-01 RX ADMIN — PANTOPRAZOLE SODIUM 40 MG: 40 INJECTION, POWDER, FOR SOLUTION INTRAVENOUS at 07:56

## 2022-03-01 RX ADMIN — LEVOTHYROXINE SODIUM 100 MCG: 0.1 TABLET ORAL at 08:02

## 2022-03-01 RX ADMIN — AMLODIPINE BESYLATE 10 MG: 5 TABLET ORAL at 08:02

## 2022-03-01 RX ADMIN — PIPERACILLIN SODIUM AND TAZOBACTAM SODIUM 3.38 G: 3; .375 INJECTION, POWDER, LYOPHILIZED, FOR SOLUTION INTRAVENOUS at 16:48

## 2022-03-01 ASSESSMENT — ACTIVITIES OF DAILY LIVING (ADL)
ADLS_ACUITY_SCORE: 12
ADLS_ACUITY_SCORE: 12
ADLS_ACUITY_SCORE: 13
ADLS_ACUITY_SCORE: 12
ADLS_ACUITY_SCORE: 14
ADLS_ACUITY_SCORE: 12
ADLS_ACUITY_SCORE: 13
ADLS_ACUITY_SCORE: 13
ADLS_ACUITY_SCORE: 12
ADLS_ACUITY_SCORE: 12
ADLS_ACUITY_SCORE: 13
ADLS_ACUITY_SCORE: 13
ADLS_ACUITY_SCORE: 12
ADLS_ACUITY_SCORE: 14
ADLS_ACUITY_SCORE: 12
ADLS_ACUITY_SCORE: 13
ADLS_ACUITY_SCORE: 14
ADLS_ACUITY_SCORE: 13
ADLS_ACUITY_SCORE: 12
ADLS_ACUITY_SCORE: 14

## 2022-03-01 NOTE — PROGRESS NOTES
"Care Management Follow Up    Length of Stay (days): 3    Expected Discharge Date: 03/02/2022     Concerns to be Addressed:     Discharge planning  Patient plan of care discussed at interdisciplinary rounds: Yes    Anticipated Discharge Disposition:  Home      Anticipated Discharge Services:  Home PT?  Anticipated Discharge DME:  Walker?    Patient/family educated on Medicare website which has current facility and service quality ratings:    Education Provided on the Discharge Plan:    Patient/Family in Agreement with the Plan:  Yes, pt and dtr jasmyn    Referrals Placed by CM/SW:    Private pay costs discussed: Not applicable    Additional Information:  Chart reviewed. SW met with patient today in her room to introduce self, CM role and review discharge plan. Pt is declining TCU, would like to discharge to her home with her , pt reports she lives in a one level home and she and her  \"share the chores.\" She reports she does not use a walker at baseline.   Pt gave SW verbal consent to reach out to daughter to provide update on discharge planning. SW spoke to patient's daughter Jasmyn, Jasmyn will be visiting pt in the hospital today, will discuss home PT for option for home discharge.       Jessenia Baisn, ADRIANASW        "

## 2022-03-01 NOTE — PLAN OF CARE
"  Problem: Risk for Delirium  Goal: Optimal Coping  Outcome: Ongoing, Not Progressing     Problem: Pain Acute  Goal: Acceptable Pain Control and Functional Ability  Outcome: Ongoing, Progressing     Problem: Diabetes Comorbidity  Goal: Blood Glucose Level Within Targeted Range  Outcome: Ongoing, Progressing   Goal Outcome Evaluation:    Patient A and O to self and family with fluctuating cognition regarding other areas. Patient Knows she is at [facility] abut later PT was staring at the tv screen on the care channel. When she was asked what she was watching she replied \"The Bachelor, I really want to see who he chooses.\"    Patient Blood sugars WNL this shift. On full liquid diet. Ordered tomato soup, didn't like the soup, writer got her some hot chocolate. She enjoyed that.   Patient running NS with 20 k at 50 ml/hr.   No complaints of pain this shift.       "

## 2022-03-01 NOTE — PLAN OF CARE
Problem: Pain Acute  Goal: Acceptable Pain Control and Functional Ability  Outcome: Ongoing, Progressing  Problem: Nausea and Vomiting  Goal: Fluid and Electrolyte Balance  Outcome: Ongoing, Progressing  Denies pain or nausea.   Tolerating oral intake, but still does not have much of an appetite. Ate 50% of lunch (pancakes).         Problem: Risk for Delirium  Goal: Improved Attention and Thought Clarity  Outcome: Ongoing, Progressing  Cognition appears improved. Patient is oriented x4 and conversating appropriately with staff.    Anh Gorman RN

## 2022-03-01 NOTE — PROGRESS NOTES
Daily Progress Note    Assessment/Plan:  Sissy Mccloud is a 89 year old female admitted with sepsis secondary to colitis     #Sepsis, due to acute left-sided colitis, favored to be ischemic.  On admission WBCs 13.5, lactic acidosis 2.6, procalcitonin 1.65, afebrile and hypertensive.   Patient without history of PAD, CAD, but at risk for them due to diabetes.  -IVF, Zosyn, symptomatic treatment  -Surgery following, okay with diet advancement today and we will advance her to full liquid diet.  Surgeon recommends low fiber diet for about a week  -Still eating poorly, she states her diet is too bland.  Will advance her to a low fiber diet today and see how she does with eating.     #Non-insulin-dependent diabetes.  A1c 7.1.  PTA on Glucophage.  Holding Glucophage due to impaired renal function-we will resume today..  SSI NovoLog  A1c at goal for her age and blood sugars currently adequate, will continue to hold for now.     #Acute renal failure, likely due to dehydration and sepsis.  Resolved       #Acute metabolic encephalopathy, due to renal failure, sepsis.  Possible underlying cognitive impairment.  Nonfocal neuro exam.  CT head nonrevealing of acute pathology.  Normal TSH of 2.42 on 1/31/2022.  -Treatment of metabolic issues as above.  -PT/OT assessments     #Abnormal EKG.   ST flattening in inferior leads, inverted T waves in lateral leads.   No previous EKG for comparison.  Normal troponin x3.  No known history of CAD.  Echo normal     #Hypertension.  SBP in 190s.  -PTA amlodipine, continue at higher dose of 10 mg.  As needed IV hydralazine.     #GERD, IV PPI  #Hypothyroidism, home Synthroid          DVT Prophylaxis: Moderate risk. SCDs        Code status:No CPR- Do NOT Intubate        Barriers to Discharge: Not eating    Disposition: Anticipate discharge tomorrow,    Subjective:  Sissy is new to me today, chart reviewed and discussed with staff. Overall she feels better but she has not really eaten or drank  anything. When I asked her why she states that her current diet is just too bland. We will advance her diet to the recommended low fiber soft diet and see how this goes. Discussed with her daughter Jasmyn.        Current Medications Reviewed via EHR List    Objective:  Vital signs in last 24 hours:  [unfilled]  .prog  Weight:   @THISENCWEIGHTS(1)@  Weight change:   Body mass index is 28.73 kg/m .    Intake/Output last 3 shifts:  I/O last 3 completed shifts:  In: 3499 [P.O.:745; I.V.:2754]  Out: 1250 [Urine:1250]  Intake/Output this shift:  No intake/output data recorded.    Physical Exam:  General: No apparent distress  CV: Regular rate and rhythm  Lungs: Clear to auscultation  Abdomen: Soft, nontender, active bowel sounds        Imaging:  Personally Reviewed.  Echocardiogram Complete    Result Date: 2022  [id number]^MARCO^[place] [address]  Name: ALLIE LOERA MRN: [id number] : 1931 Study Date: 2022 08:24 AM Age: 89 yrs Gender: Female Patient Location: [facility] Reason For Study: Abn EKG Ordering Physician: JUAN LARA Performed By: ELIZABETH  BSA: 1.7 m2 Height: 62 in Weight: 151 lb HR: 75 ______________________________________________________________________________ Procedure Complete Echo Adult. Adequate quality two-dimensional was performed and interpreted. ______________________________________________________________________________ Interpretation Summary  The left ventricle is normal in size with mild sigmoid septal hypertrophy. Left ventricular function is normal.The ejection fraction is 60-65%. Normal right ventricle size and systolic function. No significant valve disease identified.  There is no comparison study available. ______________________________________________________________________________ I      WMSI = 1.00     % Normal = 100  X - Cannot   0 -                      (2) - Mildly 2 -          Segments   Size Interpret    Hyperkinetic 1 - Normal  Hypokinetic  Hypokinetic  1-2     small                                                    7 -          3-5    moderate 3 - Akinetic 4 -          5 -         6 - Akinetic Dyskinetic   6-14    large              Dyskinetic   Aneurysmal  w/scar       w/scar       15-16   diffuse  Left Ventricle The left ventricle is normal in size. Left ventricular function is normal.The ejection fraction is 60-65%. A sigmoid septum is present. Left ventricular diastolic function is normal. No regional wall motion abnormalities noted.  Right Ventricle Normal right ventricle size and systolic function.  Atria Normal left atrial size. Right atrial size is normal. There is no color Doppler evidence of an atrial shunt.  Mitral Valve Mitral valve leaflets appear normal. There is no evidence of mitral stenosis or clinically significant mitral regurgitation.  Tricuspid Valve Tricuspid valve leaflets appear normal. There is no evidence of tricuspid stenosis or clinically significant tricuspid regurgitation. Right ventricular systolic pressure could not be approximated due to inadequate tricuspid regurgitation.  Aortic Valve The aortic valve is trileaflet. Aortic valve leaflets appear normal. There is no evidence of aortic stenosis or clinically significant aortic regurgitation.  Pulmonic Valve The pulmonic valve is not well seen, but is grossly normal. This degree of valvular regurgitation is within normal limits. There is trace pulmonic valvular regurgitation.  Vessels The aorta root is normal. Normal size ascending aorta. IVC diameter <2.1 cm collapsing >50% with sniff suggests a normal RA pressure of 3 mmHg.  Pericardium There is no pericardial effusion.  Rhythm Sinus rhythm was noted.  ______________________________________________________________________________ MMode/2D Measurements & Calculations IVSd: 1.2 cm LVIDd: 4.0 cm LVIDs: 3.2 cm LVPWd: 1.1 cm  FS: 20.2 % LV mass(C)d: 158.2 grams LV  mass(C)dI: 93.3 grams/m2 Ao root diam: 2.7 cm LA dimension: 3.4 cm asc Aorta Diam: 2.9 cm LA/Ao: 1.3 LVOT diam: 1.6 cm LVOT area: 2.1 cm2 LA Volume Indexed (AL/bp): 28.8 ml/m2 RWT: 0.53  Doppler Measurements & Calculations MV E max gideon: 82.6 cm/sec MV A max gideon: 123.6 cm/sec MV E/A: 0.67  MV dec slope: 503.0 cm/sec2 MV dec time: 0.17 sec Ao V2 max: 122.2 cm/sec Ao max P.0 mmHg Ao V2 mean: 80.3 cm/sec Ao mean PG: 3.1 mmHg Ao V2 VTI: 28.8 cm REINA(I,D): 1.9 cm2 REINA(V,D): 1.8 cm2 LV V1 max P.5 mmHg LV V1 max: 106.5 cm/sec LV V1 VTI: 26.2 cm SV(LVOT): 54.6 ml SI(LVOT): 32.2 ml/m2 PA acc time: 0.15 sec AV Gideon Ratio (DI): 0.87 REINA Index (cm2/m2): 1.1 E/E' av.3 Lateral E/e': 9.8 Medial E/e': 16.7  ______________________________________________________________________________ Report approved by: Dexter Barnett 2022 10:29 AM       XR Chest Port 1 View    Result Date: 2022  EXAM: XR CHEST PORTABLE 1 VIEW LOCATION: [facility] DATE/TIME: 2022, 9:21 AM INDICATION: Confusion. COMPARISON: Chest x-ray on 2016.     IMPRESSION: Single AP view of the chest was obtained. Cardiomediastinal silhouette is within normal limits. Mild left basilar pulmonary opacities, likely atelectasis. A few nodular opacities project over the right midlung, appear slightly more prominent as compared to 2016, indeterminate, can be further evaluated dedicated chest CT if clinically warranted. No significant pleural effusion or pneumothorax.     XR Abdomen Port 1 View    Result Date: 2022  EXAM: XR ABDOMEN PORT 1 VIEWS LOCATION: [facility] DATE/TIME: 2022 4:32 PM INDICATION: rapidly increasing abdominal distension COMPARISON: CT 2022     IMPRESSION: The bowel gas is nonspecific but not suggestive of a high-grade obstruction. Again noted is wall thickening and edema in the left colon. Cholecystectomy. Excreted contrast in the lumen of the bladder.      CT Abdomen Pelvis w Contrast    Result Date: 2/26/2022  EXAM: CT ABDOMEN PELVIS W CONTRAST LOCATION: [facility] DATE/TIME: 2/26/2022 11:02 AM INDICATION: Left lower quadrant abdominal pain with nausea and vomiting. COMPARISON: 01/24/2013. TECHNIQUE: CT scan of the abdomen and pelvis was performed following injection of IV contrast. Multiplanar reformats were obtained. Dose reduction techniques were used. CONTRAST: 75ml Isovue 370 FINDINGS: LOWER CHEST: Bibasilar subsegmental atelectasis versus scar. HEPATOBILIARY: Cholecystectomy with no bile duct dilatation. Normal liver. PANCREAS: Mild pancreatic atrophy. Pancreatic duct is nondilated. SPLEEN: Normal. ADRENAL GLANDS: Normal. KIDNEYS/BLADDER: 2 mm nonobstructing calculus left lower pole. Mild generalized left renal cortical atrophy. Normal right kidney. No ureteral calculus nor hydronephrosis. The bladder is negative. BOWEL: Colonic diverticulosis most pronounced in the sigmoid region. Bowel wall thickening involving the descending and sigmoid colon with mild pericolonic inflammation compatible with colitis. No evidence of obstruction. Normal appendix. Trace amount of  ascites. LYMPH NODES: No lymphadenopathy. VASCULATURE: No aortoiliac aneurysm. PELVIC ORGANS: Hysterectomy. No adnexal mass. MUSCULOSKELETAL: Mild chronic appearing T12 compression deformity. Degenerative changes lumbar spine. No suspicious osseous lesions.     IMPRESSION: 1.  Left-sided colitis involving descending and sigmoid colon. Differential considerations would include both infectious/inflammatory colitis and ischemic colitis. Trace amount of peritoneal ascites. 2.  Colonic diverticulosis. 3.  2 mm nonobstructing calculus left lower pole 4.  Previous cholecystectomy and hysterectomy.    Head CT w/o contrast    Result Date: 2/26/2022  EXAM: CT HEAD W/O CONTRAST LOCATION: [facility] DATE/TIME: 2/26/2022 11:02 AM INDICATION: Mental status  change, unknown cause COMPARISON: 04/25/2008 brain MRI TECHNIQUE: Routine CT Head without IV contrast. Multiplanar reformats. Dose reduction techniques were used. FINDINGS: INTRACRANIAL CONTENTS: No intracranial hemorrhage, extraaxial collection, or mass effect.  No CT evidence of acute infarct. Mild presumed chronic small vessel ischemic changes. Mild to moderate generalized volume loss. No hydrocephalus. VISUALIZED ORBITS/SINUSES/MASTOIDS: Prior bilateral cataract surgery. Visualized portions of the orbits are otherwise unremarkable. No paranasal sinus mucosal disease. No middle ear or mastoid effusion. BONES/SOFT TISSUES: No acute abnormality.     IMPRESSION: 1.  No acute intracranial process.      Lab Results:  Personally Reviewed.   Fingerstick Blood Glucose: @[id number](POCGLUFGR:10)@    Last Hbg A1C: No results found for: HGBA1C   Lab Results   Component Value Date    INR 1.35 (H) 02/26/2022     Recent Results (from the past 24 hour(s))   Glucose by meter    Collection Time: 02/28/22 12:30 PM   Result Value Ref Range    GLUCOSE BY METER POCT 125 (H) 70 - 99 mg/dL   Glucose by meter    Collection Time: 02/28/22  4:42 PM   Result Value Ref Range    GLUCOSE BY METER POCT 135 (H) 70 - 99 mg/dL   Glucose by meter    Collection Time: 02/28/22  9:23 PM   Result Value Ref Range    GLUCOSE BY METER POCT 172 (H) 70 - 99 mg/dL   Magnesium    Collection Time: 03/01/22  5:53 AM   Result Value Ref Range    Magnesium 1.8 1.8 - 2.6 mg/dL   CBC with platelets    Collection Time: 03/01/22  5:53 AM   Result Value Ref Range    WBC Count 7.9 4.0 - 11.0 10e3/uL    RBC Count 4.38 3.80 - 5.20 10e6/uL    Hemoglobin 12.6 11.7 - 15.7 g/dL    Hematocrit 40.1 35.0 - 47.0 %    MCV 92 78 - 100 fL    MCH 28.8 26.5 - 33.0 pg    MCHC 31.4 (L) 31.5 - 36.5 g/dL    RDW 12.8 10.0 - 15.0 %    Platelet Count 242 150 - 450 10e3/uL   Basic metabolic panel    Collection Time: 03/01/22  5:53 AM   Result Value Ref Range    Sodium 140 136 - 145 mmol/L     Potassium 4.2 3.5 - 5.0 mmol/L    Chloride 112 (H) 98 - 107 mmol/L    Carbon Dioxide (CO2) 17 (L) 22 - 31 mmol/L    Anion Gap 11 5 - 18 mmol/L    Urea Nitrogen 16 8 - 28 mg/dL    Creatinine 0.99 0.60 - 1.10 mg/dL    Calcium 7.8 (L) 8.5 - 10.5 mg/dL    Glucose 130 (H) 70 - 125 mg/dL    GFR Estimate 54 (L) >60 mL/min/1.73m2   CRP inflammation    Collection Time: 03/01/22  5:53 AM   Result Value Ref Range    CRP 3.6 (H) 0.0-<0.8 mg/dL   Glucose by meter    Collection Time: 03/01/22  8:07 AM   Result Value Ref Range    GLUCOSE BY METER POCT 115 (H) 70 - 99 mg/dL           Advanced Care Planning    Time > 35 minutes with greater than 50% of time spent in counseling and coordination of care.     Paxton Felix MD  Date: 3/1/2022  Time: 9:09 AM  [facility]

## 2022-03-01 NOTE — PLAN OF CARE
Problem: Plan of Care - These are the overarching goals to be used throughout the patient stay.    Goal: Plan of Care Review/Shift Note    Goal Outcome Evaluation: pt continues to have poor appetite but denies any pain or nausea. Pt on full liquid diet.  Ivf running as ordered with labs to be rechecked this am. Will monitor.

## 2022-03-02 ENCOUNTER — APPOINTMENT (OUTPATIENT)
Dept: PHYSICAL THERAPY | Facility: HOSPITAL | Age: 87
DRG: 871 | End: 2022-03-02
Payer: COMMERCIAL

## 2022-03-02 VITALS
WEIGHT: 157.1 LBS | SYSTOLIC BLOOD PRESSURE: 142 MMHG | HEART RATE: 60 BPM | RESPIRATION RATE: 20 BRPM | HEIGHT: 62 IN | OXYGEN SATURATION: 97 % | TEMPERATURE: 97.5 F | DIASTOLIC BLOOD PRESSURE: 69 MMHG | BODY MASS INDEX: 28.91 KG/M2

## 2022-03-02 LAB — GLUCOSE BLDC GLUCOMTR-MCNC: 110 MG/DL (ref 70–99)

## 2022-03-02 PROCEDURE — 250N000011 HC RX IP 250 OP 636: Performed by: HOSPITALIST

## 2022-03-02 PROCEDURE — 99239 HOSP IP/OBS DSCHRG MGMT >30: CPT | Performed by: EMERGENCY MEDICINE

## 2022-03-02 PROCEDURE — C9113 INJ PANTOPRAZOLE SODIUM, VIA: HCPCS | Performed by: INTERNAL MEDICINE

## 2022-03-02 PROCEDURE — 250N000013 HC RX MED GY IP 250 OP 250 PS 637: Performed by: INTERNAL MEDICINE

## 2022-03-02 PROCEDURE — 97530 THERAPEUTIC ACTIVITIES: CPT | Mod: GP

## 2022-03-02 PROCEDURE — 250N000011 HC RX IP 250 OP 636: Performed by: INTERNAL MEDICINE

## 2022-03-02 RX ORDER — AMLODIPINE BESYLATE 10 MG/1
10 TABLET ORAL DAILY
Qty: 30 TABLET | Refills: 0 | Status: SHIPPED | OUTPATIENT
Start: 2022-03-03 | End: 2024-01-14

## 2022-03-02 RX ADMIN — LEVOTHYROXINE SODIUM 100 MCG: 0.1 TABLET ORAL at 09:16

## 2022-03-02 RX ADMIN — PANTOPRAZOLE SODIUM 40 MG: 40 INJECTION, POWDER, FOR SOLUTION INTRAVENOUS at 09:16

## 2022-03-02 RX ADMIN — AMLODIPINE BESYLATE 10 MG: 5 TABLET ORAL at 09:16

## 2022-03-02 RX ADMIN — PIPERACILLIN SODIUM AND TAZOBACTAM SODIUM 3.38 G: 3; .375 INJECTION, POWDER, LYOPHILIZED, FOR SOLUTION INTRAVENOUS at 00:03

## 2022-03-02 RX ADMIN — POTASSIUM CHLORIDE AND SODIUM CHLORIDE: 900; 150 INJECTION, SOLUTION INTRAVENOUS at 03:18

## 2022-03-02 RX ADMIN — PIPERACILLIN SODIUM AND TAZOBACTAM SODIUM 3.38 G: 3; .375 INJECTION, POWDER, LYOPHILIZED, FOR SOLUTION INTRAVENOUS at 09:16

## 2022-03-02 ASSESSMENT — ACTIVITIES OF DAILY LIVING (ADL)
ADLS_ACUITY_SCORE: 13

## 2022-03-02 NOTE — PLAN OF CARE
Occupational Therapy Discharge Summary    Reason for therapy discharge:    Discharged to home.    Progress towards therapy goal(s). See goals on Care Plan in [facility] electronic health record for goal details.  Goals partially met.  Barriers to achieving goals:   discharge from facility.    Therapy recommendation(s):    No further therapy is recommended.    Goal Outcome Evaluation:

## 2022-03-02 NOTE — DISCHARGE SUMMARY
[facility] MEDICINE  DISCHARGE SUMMARY     Primary Care Physician: Estrella Niño  Admission Date: 2/26/2022   Discharge Provider: Alexy Felix MD Discharge Date: 3/2/2022   Diet:   Active Diet and Nourishment Order   Procedures     Snacks/Supplements Adult: Glucerna; Between Meals     Low Fiber Diet     Diet       Code Status: No CPR- Do NOT Intubate   Activity: DCACTIVITY: Activity as tolerated        Condition at Discharge: Stable     REASON FOR PRESENTATION(See Admission Note for Details)   Colitis    PRINCIPAL & ACTIVE DISCHARGE DIAGNOSES     Principal Problem:    Left sided colitis without complications (H)  Active Problems:    Dehydration    Acute renal insufficiency    Sepsis (H)    Hypomagnesemia    Non-insulin dependent type 2 diabetes mellitus (H)    Acute encephalopathy      PENDING LABS     Unresulted Labs Ordered in the Past 30 Days of this Admission     No orders found from 1/27/2022 to 2/27/2022.            PROCEDURES ( this hospitalization only)          RECOMMENDATIONS TO OUTPATIENT PROVIDER FOR F/U VISIT     Follow-up Appointments     Follow-up and recommended labs and tests       Follow-up in primary clinic in 3 to 5 days to assess medication changes   and nutrition status             {Additional follow-up instructions/to-do's for PCP : Monitor medication changes and nutrition status    DISPOSITION     Home with home care    SUMMARY OF HOSPITAL COURSE:      Sissy Mccloud is a 89 year old female admitted with sepsis secondary to colitis.  On admission white count was elevated as was lactate and procalcitonin.  She was not hypotensive.  She was started on IV Zosyn and was seen by surgery with no surgical recommendations.  Surgery signed off yesterday and recommended advancement to a low fiber diet.  She has been eating poorly, she states because she does not like the food in the hospital.  She was kept again overnight last night to see if we can  improve her appetite and it seems a little bit better but not optimal.  We have recommended transitional care but she refuses.  I talked to her about the risk of going home given her poor appetite and weakness but she states she wants to go home.  She feels she will do better with her own food and will sleep better given the continual interruptions.  She did accept home care.  I called and updated her daughter Jasmyn.  Her A1c was 7.1 and her home Metformin will be resumed.  Her LES and encephalopathy pathway on admission have resolved.  Had abnormal EKG with ST flattening in inferior leads with inverted T waves in lateral leads with no previous EKG for comparison.  Troponins were negative x3 and echocardiogram was normal.  Blood pressure ran high and her Norvasc was increased with acceptable pressures and not too low.    Addendum: Also had essential hypertension requiring increase in her Norvasc    Addendum: Also had moderate protein calorie malnutrition       Discharge Medications with Med changes:     Current Discharge Medication List      CONTINUE these medications which have CHANGED    Details   amLODIPine (NORVASC) 10 MG tablet Take 1 tablet (10 mg) by mouth daily  Qty: 30 tablet, Refills: 0    Associated Diagnoses: Hypertension, unspecified type         CONTINUE these medications which have NOT CHANGED    Details   aspirin 81 mg chewable tablet [ASPIRIN 81 MG CHEWABLE TABLET] Chew 81 mg daily.      cyanocobalamin (VITAMIN B-12) 100 MCG tablet Take 100 mcg by mouth daily      gabapentin (NEURONTIN) 300 MG capsule [GABAPENTIN (NEURONTIN) 300 MG CAPSULE] Take 300 mg by mouth daily.      levothyroxine (SYNTHROID, LEVOTHROID) 100 MCG tablet [LEVOTHYROXINE (SYNTHROID, LEVOTHROID) 100 MCG TABLET] Take 100 mcg by mouth daily.      metFORMIN (GLUMETZA) 500 MG (MOD) 24 hr tablet [METFORMIN (GLUMETZA) 500 MG (MOD) 24 HR TABLET] Take 500 mg by mouth daily with breakfast.      pantoprazole (PROTONIX) 40 MG tablet  [PANTOPRAZOLE (PROTONIX) 40 MG TABLET] Take 40 mg by mouth daily.                   Rationale for medication changes:      See summary        Consults       SOCIAL WORK IP CONSULT  SURGERY GENERAL IP CONSULT  OCCUPATIONAL THERAPY ADULT IP CONSULT  PHYSICAL THERAPY ADULT IP CONSULT    Immunizations given this encounter     Most Recent Immunizations   Administered Date(s) Administered     COVID-19,PF,Moderna 12/02/2021     FLUAD(HD)65+ QUAD 09/15/2021           Anticoagulation Information      Recent INR results:   Recent Labs   Lab 02/26/22  0920   INR 1.35*     Warfarin doses (if applicable) or name of other anticoagulant:       SIGNIFICANT IMAGING FINDINGS     Results for orders placed or performed during the hospital encounter of 02/26/22   XR Chest Port 1 View    Impression    IMPRESSION: Single AP view of the chest was obtained. Cardiomediastinal silhouette is within normal limits. Mild left basilar pulmonary opacities, likely atelectasis. A few nodular opacities project over the right midlung, appear slightly more prominent   as compared to 02/22/2016, indeterminate, can be further evaluated dedicated chest CT if clinically warranted. No significant pleural effusion or pneumothorax.     Head CT w/o contrast    Impression    IMPRESSION:  1.  No acute intracranial process.   CT Abdomen Pelvis w Contrast    Impression    IMPRESSION:   1.  Left-sided colitis involving descending and sigmoid colon. Differential considerations would include both infectious/inflammatory colitis and ischemic colitis. Trace amount of peritoneal ascites.  2.  Colonic diverticulosis.  3.  2 mm nonobstructing calculus left lower pole  4.  Previous cholecystectomy and hysterectomy.   XR Abdomen Port 1 View    Impression    IMPRESSION: The bowel gas is nonspecific but not suggestive of a high-grade obstruction. Again noted is wall thickening and edema in the left colon. Cholecystectomy. Excreted contrast in the lumen of the bladder.     Echocardiogram Complete   Result Value Ref Range    LVEF  60-65%        SIGNIFICANT LABORATORY FINDINGS     Most Recent 3 CBC's:Recent Labs   Lab Test 03/01/22  0553 02/28/22  0601 02/27/22  0552   WBC 7.9 8.4 10.0   HGB 12.6 12.4 12.2   MCV 92 91 91    236 194           Discharge Orders        Home Care Referral      Reason for your hospital stay    Colitis     Follow-up and recommended labs and tests     Follow-up in primary clinic in 3 to 5 days to assess medication changes and nutrition status     Activity    Your activity upon discharge: activity as tolerated     Diet    Follow this diet upon discharge: Orders Placed This Encounter      Snacks/Supplements Adult: Glucerna; Between Meals      Low Fiber Diet       Examination   Physical Exam   Temp:  [97.5  F (36.4  C)-98.5  F (36.9  C)] 97.5  F (36.4  C)  Pulse:  [60-62] 60  Resp:  [18-20] 20  BP: (133-159)/(57-69) 142/69  SpO2:  [94 %-97 %] 97 %  Wt Readings from Last 1 Encounters:   03/01/22 71.3 kg (157 lb 1.6 oz)       General: No apparent distress  Heart: Regular in rhythm  Lungs: Clear to auscultation  Abdomen: Soft nontender      Please see EMR for more detailed significant labs, imaging, consultant notes etc.    I, Alexy Felix MD, personally saw the patient today and spent greater than 30 minutes discharging this patient.    Alexy Felix MD  [facility]    CC:Estrella Niño

## 2022-03-02 NOTE — PROGRESS NOTES
Physical Therapy Discharge Summary    Reason for therapy discharge:    Discharged to home with home therapy.    Progress towards therapy goal(s). See goals on Care Plan in [facility] electronic health record for goal details.  Goals partially met.  Barriers to achieving goals:   Refusals to participate.    Therapy recommendation(s):    Continued therapy is recommended.  Rationale/Recommendations:  Full return to PLOF.

## 2022-03-02 NOTE — PLAN OF CARE
Problem: Plan of Care - These are the overarching goals to be used throughout the patient stay.    Goal: Plan of Care Review/Shift Note  Description: The Plan of Care Review/Shift note should be completed every shift.  The Outcome Evaluation is a brief statement about your assessment that the patient is improving, declining, or no change.  This information will be displayed automatically on your shift note.  Outcome: Met   Goal Outcome Evaluation:      Discharge education and instructions reviewed with patient.  Questions answered.  Belongings sent with patient.  Discharging to home, transported by daughter.

## 2022-03-02 NOTE — PROGRESS NOTES
"Consumed 240cc of hot chocolate and most of kylie food cake for dinner.  Did not want to eat pizza that she ordered as it is \"too spicy.\"   Declined offers to order other food items.    Anh Gorman RN  "

## 2022-03-02 NOTE — PROGRESS NOTES
Care Management Discharge Note    Discharge Date: 03/02/2022       Discharge Disposition:  Home with     Discharge Services:  Home PT    Discharge DME:  [facility]    Discharge Transportation: family or friend will provide    Private pay costs discussed: Not applicable    PAS Confirmation Code:    Patient/family educated on Medicare website which has current facility and service quality ratings:      Education Provided on the Discharge Plan:    Persons Notified of Discharge Plans: Patient and daughter   Patient/Family in Agreement with the Plan:      Handoff Referral Completed: Yes    Additional Information:  Chart reviewed. Pt is discharging to her home today with her . Daughter providing transportation.   Referral placed to [facility]- orders faxed.   Per intake representative, home care can see early next week, likely Monday 3/7/22.    ALEXANDRIA Beltran

## 2022-03-03 ENCOUNTER — PATIENT OUTREACH (OUTPATIENT)
Dept: CARE COORDINATION | Facility: CLINIC | Age: 87
End: 2022-03-03
Payer: COMMERCIAL

## 2022-03-03 ENCOUNTER — NURSE TRIAGE (OUTPATIENT)
Dept: NURSING | Facility: CLINIC | Age: 87
End: 2022-03-03
Payer: COMMERCIAL

## 2022-03-03 DIAGNOSIS — Z71.89 OTHER SPECIFIED COUNSELING: ICD-10-CM

## 2022-03-03 NOTE — TELEPHONE ENCOUNTER
Triage call:   No consent to communicate- daughter calling- not with patient- PCP with [facility] per daughter  Daughter is calling with concerns about hives- patient told her they are on her back and very itchy- daughter wanting to know if she can taken benadryl.   Writer advised that daughter should be with patient to appropriately triage symptoms. Writer also advised contacting her PCP about this issues as they would have access to her medication list and be the most appropriate place for care.     Jordyn Matamoros RN BSN 3/3/2022 8:58 AM        Additional Information    Negative: Nursing judgment    Negative: Nursing judgment    Negative: Nursing judgment    Negative: Nursing judgment    Information only question and nurse able to answer     Not with patient    Protocols used: INFORMATION ONLY CALL - NO TRIAGE-A-[place]

## 2022-03-03 NOTE — PROGRESS NOTES
"Clinic Care Coordination Contact  [facility]: Post-Discharge Note  SITUATION                                                      Admission:    Admission Date: 02/26/22   Reason for Admission: Colitis  Discharge:   Discharge Date: 03/02/22  Discharge Diagnosis: Colitis    BACKGROUND                                                      Per hospital discharge summary and inpatient provider notes:    Sissy Mccloud is a 89 year old female with PMH of hypothyroidism, HTN, NIDDM, GERD, presented to ED from home for evaluation of confusion.  Per EMS, patient comes from independent living and lives with her .  and daughter report x2 days of confusion. No recent falls. During the ride to the ED, patient began retching and was given zofran. Patient was also tender to palpation in the lower left quadrant. BS of 324. BP of 177 systolic. O2 sats WNL. Patient has a history of UTI's and bladder infections.   Per daughter, her father left her a voicemail saying the patient hasn't moved from the chair for x2 days and has been confused. The daughter drove to the patient's house. Once arriving at the patient's house, she noticed the patient was not herself. There was a bucket next to the patient that she was spitting into. No falls. Nonsmoker. No alcohol use.  Per patient, she is here because \"my  and daughter think I needed to come\". Patient reports lower left quadrant tenderness to any palpation in the area. Notes some nausea. Reports no falls.   CT abdomen showed left-sided colitis.  When seen patient in room 405, in presence of her daughter, pt endorses increased abdominal pain and distention, since she presented to the hospital earlier today.  No audible bowel sounds.  Abdomen is grossly distended and diffusely tender, sensitive even to the touch with stethoscope.  Stat abdominal x-ray at 4:43 PM negative for high-grade obstruction, bowel wall thickening of left colon.  Patient states feeling less " confused, which is confirmed by her daughter at the bedside.  She is feeling hungry.  Denies passing flatus.  She feels nauseous.  Denies chest pain, cough, dyspnea, back pain, dysuria.  Patient is vaccinated against COVID-19.    ASSESSMENT      Enrollment  Primary Care Care Coordination Status: Not a Candidate    Discharge Assessment  How are you doing now that you are home?: I am still weak. I am eating a little bit and I do have some hives on my back. Patient's daughter called a triage nurse this morning and then called her PCP and they are waiting to hear from her PCP about the hives. My tongue is a little sore too.  How are your symptoms? (Red Flag symptoms escalate to triage hotline per guidelines): Unchanged  Do you feel your condition is stable enough to be safe at home until your provider visit?: Yes  Does the patient have their discharge instructions? : Yes  Does the patient have questions regarding their discharge instructions? : No  Were you started on any new medications or were there changes to any of your previous medications? : Yes (There was a change to one of her medications but no new medications were started.)  Do you have all of your needed medical supplies or equipment (DME)?  (i.e. oxygen tank, CPAP, cane, etc.): Yes  Discharge follow-up appointment scheduled within 14 calendar days? : Yes (Patient has an appointment scheduled sometime next week but does not know what day.)  Discharge Follow Up Appointment Scheduled with?: Primary Care Provider                  PLAN                                                      Outpatient Plan: Follow-up in primary clinic in 3 to 5 days to assess medication changes and nutrition status    No future appointments.      For any urgent concerns, please contact our 24 hour nurse triage line: [phone] ([phone])         CHACHO Maher  [phone]  [facility]

## 2022-03-08 NOTE — PROGRESS NOTES
03/08/22 Received call From [facility] requesting discharge summary with home care orders, as they had not received them. Was re-faxed.     Myla Man, RN Care Manager

## 2022-03-17 ENCOUNTER — LAB REQUISITION (OUTPATIENT)
Dept: LAB | Facility: CLINIC | Age: 87
End: 2022-03-17

## 2022-03-17 DIAGNOSIS — K52.9 NONINFECTIVE GASTROENTERITIS AND COLITIS, UNSPECIFIED: ICD-10-CM

## 2022-03-17 LAB
ANION GAP SERPL CALCULATED.3IONS-SCNC: 14 MMOL/L (ref 5–18)
BUN SERPL-MCNC: 14 MG/DL (ref 8–28)
CALCIUM SERPL-MCNC: 8.3 MG/DL (ref 8.5–10.5)
CHLORIDE BLD-SCNC: 102 MMOL/L (ref 98–107)
CO2 SERPL-SCNC: 26 MMOL/L (ref 22–31)
CREAT SERPL-MCNC: 1.31 MG/DL (ref 0.6–1.1)
GFR SERPL CREATININE-BSD FRML MDRD: 39 ML/MIN/1.73M2
GLUCOSE BLD-MCNC: 195 MG/DL (ref 70–125)
POTASSIUM BLD-SCNC: 3.8 MMOL/L (ref 3.5–5)
SODIUM SERPL-SCNC: 142 MMOL/L (ref 136–145)

## 2022-03-17 PROCEDURE — 80048 BASIC METABOLIC PNL TOTAL CA: CPT | Performed by: PHYSICIAN ASSISTANT

## 2022-06-16 ENCOUNTER — LAB REQUISITION (OUTPATIENT)
Dept: LAB | Facility: CLINIC | Age: 87
End: 2022-06-16

## 2022-06-16 DIAGNOSIS — R39.9 UNSPECIFIED SYMPTOMS AND SIGNS INVOLVING THE GENITOURINARY SYSTEM: ICD-10-CM

## 2022-06-16 PROCEDURE — 87186 SC STD MICRODIL/AGAR DIL: CPT | Performed by: PHYSICIAN ASSISTANT

## 2022-06-20 ENCOUNTER — LAB REQUISITION (OUTPATIENT)
Dept: LAB | Facility: CLINIC | Age: 87
End: 2022-06-20

## 2022-06-20 DIAGNOSIS — E11.8 TYPE 2 DIABETES MELLITUS WITH UNSPECIFIED COMPLICATIONS (H): ICD-10-CM

## 2022-06-20 DIAGNOSIS — E78.49 OTHER HYPERLIPIDEMIA: ICD-10-CM

## 2022-06-20 LAB
ANION GAP SERPL CALCULATED.3IONS-SCNC: 10 MMOL/L (ref 5–18)
BACTERIA UR CULT: ABNORMAL
BACTERIA UR CULT: ABNORMAL
BUN SERPL-MCNC: 32 MG/DL (ref 8–28)
CALCIUM SERPL-MCNC: 9.2 MG/DL (ref 8.5–10.5)
CHLORIDE BLD-SCNC: 103 MMOL/L (ref 98–107)
CHOLEST SERPL-MCNC: 163 MG/DL
CO2 SERPL-SCNC: 27 MMOL/L (ref 22–31)
CREAT SERPL-MCNC: 1.62 MG/DL (ref 0.6–1.1)
GFR SERPL CREATININE-BSD FRML MDRD: 30 ML/MIN/1.73M2
GLUCOSE BLD-MCNC: 191 MG/DL (ref 70–125)
HDLC SERPL-MCNC: 43 MG/DL
LDLC SERPL CALC-MCNC: 81 MG/DL
POTASSIUM BLD-SCNC: 4.3 MMOL/L (ref 3.5–5)
SODIUM SERPL-SCNC: 140 MMOL/L (ref 136–145)
TRIGL SERPL-MCNC: 195 MG/DL

## 2022-06-20 PROCEDURE — 80061 LIPID PANEL: CPT | Performed by: PHYSICIAN ASSISTANT

## 2022-06-20 PROCEDURE — 82310 ASSAY OF CALCIUM: CPT | Performed by: PHYSICIAN ASSISTANT

## 2022-10-18 ENCOUNTER — LAB REQUISITION (OUTPATIENT)
Dept: LAB | Facility: CLINIC | Age: 87
End: 2022-10-18

## 2022-10-18 DIAGNOSIS — N18.32 CHRONIC KIDNEY DISEASE, STAGE 3B (H): ICD-10-CM

## 2022-10-18 LAB
ANION GAP SERPL CALCULATED.3IONS-SCNC: 11 MMOL/L (ref 7–15)
BUN SERPL-MCNC: 20.4 MG/DL (ref 8–23)
CALCIUM SERPL-MCNC: 9.4 MG/DL (ref 8.2–9.6)
CHLORIDE SERPL-SCNC: 103 MMOL/L (ref 98–107)
CREAT SERPL-MCNC: 1.36 MG/DL (ref 0.51–0.95)
DEPRECATED HCO3 PLAS-SCNC: 28 MMOL/L (ref 22–29)
GFR SERPL CREATININE-BSD FRML MDRD: 37 ML/MIN/1.73M2
GLUCOSE SERPL-MCNC: 169 MG/DL (ref 70–99)
POTASSIUM SERPL-SCNC: 4.5 MMOL/L (ref 3.4–5.3)
SODIUM SERPL-SCNC: 142 MMOL/L (ref 136–145)

## 2022-10-18 PROCEDURE — 82310 ASSAY OF CALCIUM: CPT | Performed by: PHYSICIAN ASSISTANT

## 2023-02-16 ENCOUNTER — LAB REQUISITION (OUTPATIENT)
Dept: LAB | Facility: CLINIC | Age: 88
End: 2023-02-16

## 2023-02-16 DIAGNOSIS — E11.65 TYPE 2 DIABETES MELLITUS WITH HYPERGLYCEMIA (H): ICD-10-CM

## 2023-02-16 DIAGNOSIS — E03.9 HYPOTHYROIDISM, UNSPECIFIED: ICD-10-CM

## 2023-02-16 LAB
ANION GAP SERPL CALCULATED.3IONS-SCNC: 14 MMOL/L (ref 7–15)
BUN SERPL-MCNC: 19.6 MG/DL (ref 8–23)
CALCIUM SERPL-MCNC: 9.7 MG/DL (ref 8.2–9.6)
CHLORIDE SERPL-SCNC: 105 MMOL/L (ref 98–107)
CREAT SERPL-MCNC: 1.25 MG/DL (ref 0.51–0.95)
DEPRECATED HCO3 PLAS-SCNC: 25 MMOL/L (ref 22–29)
GFR SERPL CREATININE-BSD FRML MDRD: 40 ML/MIN/1.73M2
GLUCOSE SERPL-MCNC: 179 MG/DL (ref 70–99)
POTASSIUM SERPL-SCNC: 4.5 MMOL/L (ref 3.4–5.3)
SODIUM SERPL-SCNC: 144 MMOL/L (ref 136–145)
TSH SERPL DL<=0.005 MIU/L-ACNC: 1.28 UIU/ML (ref 0.3–4.2)

## 2023-02-16 PROCEDURE — 80048 BASIC METABOLIC PNL TOTAL CA: CPT | Performed by: PHYSICIAN ASSISTANT

## 2023-02-16 PROCEDURE — 84443 ASSAY THYROID STIM HORMONE: CPT | Performed by: PHYSICIAN ASSISTANT

## 2023-05-01 ENCOUNTER — LAB REQUISITION (OUTPATIENT)
Dept: LAB | Facility: CLINIC | Age: 88
End: 2023-05-01

## 2023-05-01 DIAGNOSIS — R31.29 OTHER MICROSCOPIC HEMATURIA: ICD-10-CM

## 2023-05-01 PROCEDURE — 81003 URINALYSIS AUTO W/O SCOPE: CPT | Performed by: PHYSICIAN ASSISTANT

## 2023-05-02 LAB
ALBUMIN UR-MCNC: 20 MG/DL
APPEARANCE UR: ABNORMAL
BILIRUB UR QL STRIP: NEGATIVE
COLOR UR AUTO: YELLOW
GLUCOSE UR STRIP-MCNC: NEGATIVE MG/DL
HGB UR QL STRIP: NEGATIVE
HYALINE CASTS: 5 /LPF
KETONES UR STRIP-MCNC: NEGATIVE MG/DL
LEUKOCYTE ESTERASE UR QL STRIP: ABNORMAL
MUCOUS THREADS #/AREA URNS LPF: PRESENT /LPF
NITRATE UR QL: NEGATIVE
PH UR STRIP: 5.5 [PH] (ref 5–7)
RBC URINE: <1 /HPF
SP GR UR STRIP: 1.02 (ref 1–1.03)
SQUAMOUS EPITHELIAL: 2 /HPF
UROBILINOGEN UR STRIP-MCNC: 2 MG/DL
WBC URINE: 7 /HPF

## 2023-09-08 ENCOUNTER — LAB REQUISITION (OUTPATIENT)
Dept: LAB | Facility: CLINIC | Age: 88
End: 2023-09-08

## 2023-09-08 DIAGNOSIS — E78.49 OTHER HYPERLIPIDEMIA: ICD-10-CM

## 2023-09-08 DIAGNOSIS — I10 ESSENTIAL (PRIMARY) HYPERTENSION: ICD-10-CM

## 2023-09-08 LAB
ALBUMIN SERPL BCG-MCNC: 4.2 G/DL (ref 3.5–5.2)
ALP SERPL-CCNC: 66 U/L (ref 35–104)
ALT SERPL W P-5'-P-CCNC: 9 U/L (ref 0–50)
ANION GAP SERPL CALCULATED.3IONS-SCNC: 15 MMOL/L (ref 7–15)
AST SERPL W P-5'-P-CCNC: 13 U/L (ref 0–45)
BILIRUB SERPL-MCNC: 0.3 MG/DL
BUN SERPL-MCNC: 32.8 MG/DL (ref 8–23)
CALCIUM SERPL-MCNC: 9.3 MG/DL (ref 8.2–9.6)
CHLORIDE SERPL-SCNC: 102 MMOL/L (ref 98–107)
CHOLEST SERPL-MCNC: 167 MG/DL
CREAT SERPL-MCNC: 1.51 MG/DL (ref 0.51–0.95)
DEPRECATED HCO3 PLAS-SCNC: 25 MMOL/L (ref 22–29)
EGFRCR SERPLBLD CKD-EPI 2021: 32 ML/MIN/1.73M2
GLUCOSE SERPL-MCNC: 170 MG/DL (ref 70–99)
HDLC SERPL-MCNC: 54 MG/DL
LDLC SERPL CALC-MCNC: 82 MG/DL
NONHDLC SERPL-MCNC: 113 MG/DL
POTASSIUM SERPL-SCNC: 3.9 MMOL/L (ref 3.4–5.3)
PROT SERPL-MCNC: 6.6 G/DL (ref 6.4–8.3)
SODIUM SERPL-SCNC: 142 MMOL/L (ref 136–145)
TRIGL SERPL-MCNC: 156 MG/DL

## 2023-09-08 PROCEDURE — 80061 LIPID PANEL: CPT | Performed by: PHYSICIAN ASSISTANT

## 2023-09-08 PROCEDURE — 80053 COMPREHEN METABOLIC PANEL: CPT | Performed by: PHYSICIAN ASSISTANT

## 2023-11-10 ENCOUNTER — LAB REQUISITION (OUTPATIENT)
Dept: LAB | Facility: CLINIC | Age: 88
End: 2023-11-10

## 2023-11-10 ENCOUNTER — TRANSFERRED RECORDS (OUTPATIENT)
Dept: HEALTH INFORMATION MANAGEMENT | Facility: CLINIC | Age: 88
End: 2023-11-10
Payer: COMMERCIAL

## 2023-11-10 DIAGNOSIS — E11.42 TYPE 2 DIABETES MELLITUS WITH DIABETIC POLYNEUROPATHY (H): ICD-10-CM

## 2023-11-10 PROCEDURE — 80048 BASIC METABOLIC PNL TOTAL CA: CPT | Performed by: PHYSICIAN ASSISTANT

## 2023-11-11 LAB
ANION GAP SERPL CALCULATED.3IONS-SCNC: 13 MMOL/L (ref 7–15)
BUN SERPL-MCNC: 28.6 MG/DL (ref 8–23)
CALCIUM SERPL-MCNC: 9.2 MG/DL (ref 8.2–9.6)
CHLORIDE SERPL-SCNC: 104 MMOL/L (ref 98–107)
CREAT SERPL-MCNC: 1.51 MG/DL (ref 0.51–0.95)
DEPRECATED HCO3 PLAS-SCNC: 25 MMOL/L (ref 22–29)
EGFRCR SERPLBLD CKD-EPI 2021: 32 ML/MIN/1.73M2
GLUCOSE SERPL-MCNC: 214 MG/DL (ref 70–99)
POTASSIUM SERPL-SCNC: 4 MMOL/L (ref 3.4–5.3)
SODIUM SERPL-SCNC: 142 MMOL/L (ref 135–145)

## 2023-11-13 ENCOUNTER — TRANSFERRED RECORDS (OUTPATIENT)
Dept: HEALTH INFORMATION MANAGEMENT | Facility: CLINIC | Age: 88
End: 2023-11-13
Payer: COMMERCIAL

## 2023-11-13 LAB
EJECTION FRACTION: NORMAL %
EJECTION FRACTION: NORMAL %

## 2023-11-17 ENCOUNTER — TRANSCRIBE ORDERS (OUTPATIENT)
Dept: OTHER | Age: 88
End: 2023-11-17

## 2023-11-17 ENCOUNTER — MEDICAL CORRESPONDENCE (OUTPATIENT)
Dept: HEALTH INFORMATION MANAGEMENT | Facility: CLINIC | Age: 88
End: 2023-11-17
Payer: COMMERCIAL

## 2023-11-17 DIAGNOSIS — R06.09 EXERTIONAL DYSPNEA: Primary | ICD-10-CM

## 2024-01-14 ENCOUNTER — APPOINTMENT (OUTPATIENT)
Dept: RADIOLOGY | Facility: HOSPITAL | Age: 89
DRG: 372 | End: 2024-01-14
Attending: EMERGENCY MEDICINE
Payer: COMMERCIAL

## 2024-01-14 ENCOUNTER — HOSPITAL ENCOUNTER (INPATIENT)
Facility: HOSPITAL | Age: 89
LOS: 4 days | Discharge: HOME HEALTH | DRG: 372 | End: 2024-01-19
Attending: EMERGENCY MEDICINE | Admitting: INTERNAL MEDICINE
Payer: COMMERCIAL

## 2024-01-14 ENCOUNTER — APPOINTMENT (OUTPATIENT)
Dept: CT IMAGING | Facility: HOSPITAL | Age: 89
DRG: 372 | End: 2024-01-14
Attending: EMERGENCY MEDICINE
Payer: COMMERCIAL

## 2024-01-14 DIAGNOSIS — N30.00 ACUTE CYSTITIS WITHOUT HEMATURIA: ICD-10-CM

## 2024-01-14 DIAGNOSIS — K52.9 COLITIS: ICD-10-CM

## 2024-01-14 DIAGNOSIS — R29.6 MULTIPLE FALLS: ICD-10-CM

## 2024-01-14 DIAGNOSIS — R29.6 RECURRENT FALLS: ICD-10-CM

## 2024-01-14 DIAGNOSIS — M25.511 ACUTE PAIN OF RIGHT SHOULDER: Primary | ICD-10-CM

## 2024-01-14 PROBLEM — N18.30 CKD (CHRONIC KIDNEY DISEASE) STAGE 3, GFR 30-59 ML/MIN (H): Status: ACTIVE | Noted: 2018-03-12

## 2024-01-14 PROBLEM — I10 HYPERTENSION: Status: ACTIVE | Noted: 2024-01-14

## 2024-01-14 LAB
ALBUMIN SERPL BCG-MCNC: 4.2 G/DL (ref 3.5–5.2)
ALBUMIN UR-MCNC: 30 MG/DL
ALP SERPL-CCNC: 77 U/L (ref 40–150)
ALT SERPL W P-5'-P-CCNC: 10 U/L (ref 0–50)
ANION GAP SERPL CALCULATED.3IONS-SCNC: 13 MMOL/L (ref 7–15)
APPEARANCE UR: ABNORMAL
AST SERPL W P-5'-P-CCNC: 19 U/L (ref 0–45)
BACTERIA #/AREA URNS HPF: ABNORMAL /HPF
BILIRUB SERPL-MCNC: 0.6 MG/DL
BILIRUB UR QL STRIP: NEGATIVE
BUN SERPL-MCNC: 36.2 MG/DL (ref 8–23)
CALCIUM SERPL-MCNC: 9.3 MG/DL (ref 8.2–9.6)
CHLORIDE SERPL-SCNC: 99 MMOL/L (ref 98–107)
COLOR UR AUTO: YELLOW
CREAT SERPL-MCNC: 1.42 MG/DL (ref 0.51–0.95)
DEPRECATED HCO3 PLAS-SCNC: 28 MMOL/L (ref 22–29)
EGFRCR SERPLBLD CKD-EPI 2021: 35 ML/MIN/1.73M2
ERYTHROCYTE [DISTWIDTH] IN BLOOD BY AUTOMATED COUNT: 12.8 % (ref 10–15)
GLUCOSE SERPL-MCNC: 210 MG/DL (ref 70–99)
GLUCOSE UR STRIP-MCNC: NEGATIVE MG/DL
HCT VFR BLD AUTO: 40.6 % (ref 35–47)
HGB BLD-MCNC: 12.9 G/DL (ref 11.7–15.7)
HGB UR QL STRIP: ABNORMAL
HOLD SPECIMEN: NORMAL
HYALINE CASTS: 6 /LPF
KETONES UR STRIP-MCNC: NEGATIVE MG/DL
LACTATE SERPL-SCNC: 1.9 MMOL/L (ref 0.7–2)
LEUKOCYTE ESTERASE UR QL STRIP: ABNORMAL
LIPASE SERPL-CCNC: 15 U/L (ref 13–60)
MAGNESIUM SERPL-MCNC: 2.1 MG/DL (ref 1.7–2.3)
MCH RBC QN AUTO: 29.3 PG (ref 26.5–33)
MCHC RBC AUTO-ENTMCNC: 31.8 G/DL (ref 31.5–36.5)
MCV RBC AUTO: 92 FL (ref 78–100)
MUCOUS THREADS #/AREA URNS LPF: PRESENT /LPF
NITRATE UR QL: POSITIVE
PH UR STRIP: 5.5 [PH] (ref 5–7)
PLATELET # BLD AUTO: 227 10E3/UL (ref 150–450)
POTASSIUM SERPL-SCNC: 4.2 MMOL/L (ref 3.4–5.3)
PROT SERPL-MCNC: 7.3 G/DL (ref 6.4–8.3)
RBC # BLD AUTO: 4.4 10E6/UL (ref 3.8–5.2)
RBC URINE: 16 /HPF
SODIUM SERPL-SCNC: 140 MMOL/L (ref 135–145)
SP GR UR STRIP: 1.01 (ref 1–1.03)
SQUAMOUS EPITHELIAL: 9 /HPF
UROBILINOGEN UR STRIP-MCNC: 3 MG/DL
WBC # BLD AUTO: 8.5 10E3/UL (ref 4–11)
WBC CLUMPS #/AREA URNS HPF: PRESENT /HPF
WBC URINE: >182 /HPF

## 2024-01-14 PROCEDURE — 83735 ASSAY OF MAGNESIUM: CPT | Performed by: EMERGENCY MEDICINE

## 2024-01-14 PROCEDURE — 80053 COMPREHEN METABOLIC PANEL: CPT | Performed by: EMERGENCY MEDICINE

## 2024-01-14 PROCEDURE — 84439 ASSAY OF FREE THYROXINE: CPT | Performed by: INTERNAL MEDICINE

## 2024-01-14 PROCEDURE — 83690 ASSAY OF LIPASE: CPT | Performed by: EMERGENCY MEDICINE

## 2024-01-14 PROCEDURE — 87086 URINE CULTURE/COLONY COUNT: CPT | Performed by: EMERGENCY MEDICINE

## 2024-01-14 PROCEDURE — 36415 COLL VENOUS BLD VENIPUNCTURE: CPT | Performed by: EMERGENCY MEDICINE

## 2024-01-14 PROCEDURE — 250N000011 HC RX IP 250 OP 636: Performed by: EMERGENCY MEDICINE

## 2024-01-14 PROCEDURE — 84484 ASSAY OF TROPONIN QUANT: CPT | Performed by: INTERNAL MEDICINE

## 2024-01-14 PROCEDURE — 93005 ELECTROCARDIOGRAM TRACING: CPT | Performed by: EMERGENCY MEDICINE

## 2024-01-14 PROCEDURE — 83605 ASSAY OF LACTIC ACID: CPT | Performed by: EMERGENCY MEDICINE

## 2024-01-14 PROCEDURE — 74177 CT ABD & PELVIS W/CONTRAST: CPT

## 2024-01-14 PROCEDURE — 96366 THER/PROPH/DIAG IV INF ADDON: CPT

## 2024-01-14 PROCEDURE — 73200 CT UPPER EXTREMITY W/O DYE: CPT | Mod: RT

## 2024-01-14 PROCEDURE — 85014 HEMATOCRIT: CPT | Performed by: EMERGENCY MEDICINE

## 2024-01-14 PROCEDURE — 72125 CT NECK SPINE W/O DYE: CPT

## 2024-01-14 PROCEDURE — 250N000013 HC RX MED GY IP 250 OP 250 PS 637: Performed by: EMERGENCY MEDICINE

## 2024-01-14 PROCEDURE — 96365 THER/PROPH/DIAG IV INF INIT: CPT | Mod: 59

## 2024-01-14 PROCEDURE — 96375 TX/PRO/DX INJ NEW DRUG ADDON: CPT

## 2024-01-14 PROCEDURE — 99223 1ST HOSP IP/OBS HIGH 75: CPT | Performed by: INTERNAL MEDICINE

## 2024-01-14 PROCEDURE — 73030 X-RAY EXAM OF SHOULDER: CPT | Mod: RT

## 2024-01-14 PROCEDURE — 81001 URINALYSIS AUTO W/SCOPE: CPT | Performed by: EMERGENCY MEDICINE

## 2024-01-14 PROCEDURE — 83036 HEMOGLOBIN GLYCOSYLATED A1C: CPT | Performed by: INTERNAL MEDICINE

## 2024-01-14 PROCEDURE — 84443 ASSAY THYROID STIM HORMONE: CPT | Performed by: INTERNAL MEDICINE

## 2024-01-14 PROCEDURE — 99285 EMERGENCY DEPT VISIT HI MDM: CPT | Mod: 25

## 2024-01-14 PROCEDURE — 70450 CT HEAD/BRAIN W/O DYE: CPT

## 2024-01-14 PROCEDURE — 73502 X-RAY EXAM HIP UNI 2-3 VIEWS: CPT

## 2024-01-14 RX ORDER — ONDANSETRON 2 MG/ML
4 INJECTION INTRAMUSCULAR; INTRAVENOUS ONCE
Status: COMPLETED | OUTPATIENT
Start: 2024-01-14 | End: 2024-01-14

## 2024-01-14 RX ORDER — AMLODIPINE BESYLATE 5 MG/1
5 TABLET ORAL EVERY EVENING
Status: ON HOLD | COMMUNITY
Start: 2023-10-28 | End: 2024-06-18

## 2024-01-14 RX ORDER — CEFTRIAXONE 2 G/1
2 INJECTION, POWDER, FOR SOLUTION INTRAMUSCULAR; INTRAVENOUS ONCE
Status: COMPLETED | OUTPATIENT
Start: 2024-01-14 | End: 2024-01-14

## 2024-01-14 RX ORDER — ACETAMINOPHEN 325 MG/1
650 TABLET ORAL ONCE
Status: COMPLETED | OUTPATIENT
Start: 2024-01-14 | End: 2024-01-14

## 2024-01-14 RX ORDER — IOPAMIDOL 755 MG/ML
75 INJECTION, SOLUTION INTRAVASCULAR ONCE
Status: COMPLETED | OUTPATIENT
Start: 2024-01-14 | End: 2024-01-14

## 2024-01-14 RX ORDER — LEVOTHYROXINE SODIUM 88 UG/1
88 TABLET ORAL EVERY MORNING
COMMUNITY
Start: 2023-11-25 | End: 2024-06-25 | Stop reason: ALTCHOICE

## 2024-01-14 RX ADMIN — IOPAMIDOL 75 ML: 755 INJECTION, SOLUTION INTRAVENOUS at 19:03

## 2024-01-14 RX ADMIN — ACETAMINOPHEN 650 MG: 325 TABLET ORAL at 19:19

## 2024-01-14 RX ADMIN — CEFTRIAXONE SODIUM 2 G: 2 INJECTION, POWDER, FOR SOLUTION INTRAMUSCULAR; INTRAVENOUS at 21:37

## 2024-01-14 RX ADMIN — ONDANSETRON 4 MG: 2 INJECTION INTRAMUSCULAR; INTRAVENOUS at 19:20

## 2024-01-14 ASSESSMENT — ACTIVITIES OF DAILY LIVING (ADL)
ADLS_ACUITY_SCORE: 35

## 2024-01-14 NOTE — ED PROVIDER NOTES
"EMERGENCY DEPARTMENT ENCOUNTER      NAME: Sissy Mccloud  YOB: 1931  MRN: [id number]    FINAL IMPRESSION  1. Acute cystitis without hematuria    2. Multiple falls    3. Acute pain of right shoulder    4. Colitis        MEDICAL DECISION MAKING   Pertinent Labs & Imaging studies reviewed. (See chart for details)    Sissy Mccloud is a 89 year old female who presents via EMS from home after 2 falls at home.  Patient reports that she slipped and fell on the ice in her driveway 2 days ago, landing on her right side.  She was able to get up with assistance of her neighbor but later in the day, had another fall which she attributes to weakness in her legs, states \"my legs gave out.\"  She did have some head trauma with the first fall, states that she hit her head on the mahan of the car, but denies loss of consciousness.  Today, she decided to come in, she states that she has been feeling generally unwell and weak.  In addition to pain in her right shoulder and hip from falling, she also complains of left shoulder pain and abdominal discomfort.  She wonders if she might be constipated.  She has no pain in her head, neck, back, chest and also denies cough, fever, dyspnea, or urinary symptoms.  She did have some nausea and reportedly, an episode of emesis earlier today but is now feeling a bit better.    Outside records reviewed.  Patient has a history of spinal compression fracture and was seen at emergency room in May 2023 for this. She also has a history of colitis. She lives independently with her  and typically ambulates without assistance of a walker, cane or wheelchair.  She is not on a blood thinner.      With regards to fall, considered intracranial hemorrhage, fracture, dislocation, spinal column injury, soft tissue contusion, muscular strain/sprain, ligamentous injury, contusion.  With regards to abdominal discomfort, considered constipation, colitis, diverticulitis, cystitis, pyelonephritis, " obstruction, hernia, gastroenteritis.  Patient is adamant that the first fall was mechanical, as she slipped on the ice but she did have a second fall that she attributes to weakness.  She denies experiencing any preceding palpitations, chest pain, focal neurodeficits, dyspnea, or other symptoms and had no loss of consciousness but cannot rule out metabolic derangement, anemia, occult infection, arrhythmia, ischemia.  Discussed options for workup and management with the patient.  We have agreed on plan for labs, EKG, CT head and cervical spine, x-ray of bilateral shoulders, x-ray of hip, UA, and management of symptoms with IV antiemetic and Tylenol for now.      ED Course as of 01/14/24 2120   Sun Jan 14, 2024 1913 Comprehensive metabolic panel(!)  CMP notable for creatinine of 1.42, appears to be at baseline. Glucose elevated at 210 but no acidosis or AG to suggest DKA. No other significant electrolyte or hepatobiliary disease.    1914 Lipase: 15  Lipase within normal limits, symptoms less likely related to acute pancreatitis.     1914 CBC (+ platelets, no diff)  CBC reassuring. No evidence of leukocytosis to suggest systemic infectious/inflammatory process. No acute anemia. PLTs wnl.    2057 UA with Microscopic reflex to Culture(!)  UA with evidence of infection, nitrite positive, many bacteria, WBC clumps, and large amount of WBCs.  Will plan to send for culture and start antibiotics.       EKG showed no evidence of acute ischemia or arrhythmia.  CT of head showed no acute intracranial hemorrhage or abnormalities.  CT of cervical spine without acute fracture or subluxation.  CT abdomen/pelvis did reveal a long segment colitis of the descending and sigmoid colon without clear acute diverticulitis.  Given patient has had multiple episodes of incontinence here, will plan to send C. difficile and add lactate to rule out ischemic process.  I independently reviewed x-ray of left shoulder which did not show any acute  fracture or dislocation but did show postoperative changes.  X-ray of right shoulder did not show any obvious acute fracture but radiologist did comment that winging of the scapula did make it difficult to completely rule out.  X-ray of pelvis and right hip showed no evidence of acute fracture.  I rechecked the patient after she returned from imaging and she reported feeling improved with regards to pain.  I recommended that we proceed with a CT scan of the shoulder and she was comfortable with this plan.    Lactate within normal limits, unlikely ischemic colitis.  Patient was assisted to the bathroom and left a urine sample which did reveal obvious evidence of infection, as above.  She was transported to CT scan and fortunately, CT of shoulder did not show any acute injury related to fall 2 days ago.  Will plan to start antibiotics.  I reviewed results of workup with patient and we discussed options for ongoing workup and management.  Given colitis, UTI, and multiple falls at home, we agreed on plan for admission.  RN did update patient's family member with results and plan.  I discussed the case with hospital medicine team who agreed to facilitate admission.    I independently reviewed XR bilateral shoulders, x-ray of pelvis (as noted above), formal radiologist read pending.      Medical Decision Making  Obtained supplemental history:Supplemental history obtained?: Documented in chart  Reviewed external records: External records reviewed?: Documented in chart and Outpatient Record: 5/22/23 [facility] visit  Care impacted by chronic illness:Chronic Kidney Disease, Diabetes, and Hypertension  Care significantly affected by social determinants of health:N/A  Did you consider but not order tests?: Work up considered but not performed and documented in chart, if applicable  Did you interpret images independently?: Independent interpretation of ECG and images noted in documentation, when  applicable.  Consultation discussion with other provider:Did you involve another provider (consultant, MH, pharmacy, etc.)?: I discussed the care with another health care provider, see documentation for details.  Admit.      ED COURSE  5:38 PM I met with the patient for the initial interview and physical examination. Discussed plan for treatment and workup in the ED.    7:37 PM I updated the patient on their results and plan and re-evaluated them. Patient would like to stay.   8:20 PM I updated the patient on their results and plan and re-evaluated them.    8:56 PM I updated the patient on their results and plan and re-evaluated them.  I gave the patient her sandwich.   9:17 PM Spoke to hospitalist, Dr. Still who accepts patient for admission.    MEDICATIONS GIVEN IN THE ED  Medications   cefTRIAXone (ROCEPHIN) 2 g vial to attach to  ml bag for ADULTS or NS 50 ml bag for PEDS (has no administration in time range)   ondansetron (ZOFRAN) injection 4 mg (4 mg Intravenous $Given 1/14/24 1920)   acetaminophen (TYLENOL) tablet 650 mg (650 mg Oral $Given 1/14/24 1919)   iopamidol (ISOVUE-370) solution 75 mL (75 mLs Intravenous $Given 1/14/24 1903)     =================================================================    Chief Complaint   Patient presents with    Fall       HPI:    Patient information was obtained from: documented in chart    Use of : N/A     Sissy Mccloud is a 89 year old female with a pertinent medical history of chronic kidney disease stage 3, heart failure, hypertension, and diabetes mellitus type II who presents after a fall.     Per patient's RN: patient fell Friday morning on ice and hit her head, and fell again Friday afternoon. Patient endorses pain in right shoulder and right hip from the falls that is worse today. She is not on blood thinners.    Patient reports that she fell on ice Friday morning (1/12) and hit her head without loss of consciousness. She denies  lightheadedness or weakness preceding the falls. Patient needed help from her neighbor to get back up. Patient endorses right shoulder pain and right hip pain resulting from the falls. Patient reports that at baseline she can walk without a cane, walker, or assistance. Patient also endorses nausea that began in the ED today (1/14). Patient denies falling today. She also denies shortness of breath, cough, chest pain, vomiting, urinary symptoms, neck pain, back pain, and any other symptoms or complaints at this time.     Patient notes that she broke her left arm and left hip 1 year ago. She endorses left shoulder pain and left arm pain today. Patient also endorses abdominal pain with an unknown onset. Patient reports a history of kidney problems that have resolved.    ScHx: patient lives alone with her .    RELEVANT HISTORY, MEDICATIONS, & ALLERGIES   Past medical history, surgical history, family history, medications, and allergies reviewed and pertinent noted in HPI.    REVIEW OF SYSTEMS:  A complete review of systems was performed with pertinent positives and negatives noted in the HPI. All other systems negative.     PHYSICAL EXAM:    Vitals: BP (!) 156/75   Pulse 64   Temp 98.3  F (36.8  C) (Oral)   Resp 17   LMP  (LMP Unknown)   SpO2 95%    General: Alert and interactive, comfortable appearing.  HENT: Atraumatic. Full AROM of neck. No midline tenderness to palpation. Conjunctiva clear.   Cardiovascular: Regular rate and rhythm.   Chest/Pulmonary: Normal work of breathing. Speaking in complete sentences. Lungs CTAB. No chest wall tenderness or deformities.  Abdomen: Soft, nondistended. Mild LLQ TTP without guarding or rebound.  Extremities: Diffuse TTP bilateral shoulders with limited ROM 2/2 to pain. No TTP of elbow, forearm, wrist. Normal ROM of elbow and wrist. Palpable radial pulses.   Diffuse TTP L hip/greater trochanter. No TTP thigh, knee, ankle. Palpable distal pulses. No obvious rotational  deformity or leg length discrepancy.   Skin: Warm and dry. Normal skin color.   Neuro: Speech clear. CNs grossly intact.   Psych: Normal affect/mood, cooperative, memory appropriate.    LAB  Labs Ordered and Resulted from Time of ED Arrival to Time of ED Departure   COMPREHENSIVE METABOLIC PANEL - Abnormal       Result Value    Sodium 140      Potassium 4.2      Carbon Dioxide (CO2) 28      Anion Gap 13      Urea Nitrogen 36.2 (*)     Creatinine 1.42 (*)     GFR Estimate 35 (*)     Calcium 9.3      Chloride 99      Glucose 210 (*)     Alkaline Phosphatase 77      AST 19      ALT 10      Protein Total 7.3      Albumin 4.2      Bilirubin Total 0.6     ROUTINE UA WITH MICROSCOPIC REFLEX TO CULTURE - Abnormal    Color Urine Yellow      Appearance Urine Turbid (*)     Glucose Urine Negative      Bilirubin Urine Negative      Ketones Urine Negative      Specific Gravity Urine 1.015      Blood Urine 0.03 mg/dL (*)     pH Urine 5.5      Protein Albumin Urine 30 (*)     Urobilinogen Urine 3.0 (*)     Nitrite Urine Positive (*)     Leukocyte Esterase Urine 500 Susi/uL (*)     Bacteria Urine Many (*)     WBC Clumps Urine Present (*)     Mucus Urine Present (*)     RBC Urine 16 (*)     WBC Urine >182 (*)     Squamous Epithelials Urine 9 (*)     Hyaline Casts Urine 6 (*)    CBC WITH PLATELETS - Normal    WBC Count 8.5      RBC Count 4.40      Hemoglobin 12.9      Hematocrit 40.6      MCV 92      MCH 29.3      MCHC 31.8      RDW 12.8      Platelet Count 227     LIPASE - Normal    Lipase 15     MAGNESIUM - Normal    Magnesium 2.1     LACTIC ACID WHOLE BLOOD - Normal    Lactic Acid 1.9     C. DIFFICILE TOXIN B PCR WITH REFLEX TO C. DIFFICILE ANTIGEN AND TOXINS A/B EIA   URINE CULTURE       RADIOLOGY  CT Shoulder Right w/o Contrast   Final Result   IMPRESSION:   1.  No evidence for acute fracture or dislocation.   2.  Severe end-stage degenerative change at the glenohumeral joint with bone-on-bone contact and bony remodeling of the  "glenoid.   3.  Extensive osteophytic spurring inferiorly along the articular surface of the humeral head.   4.  The abnormal calcification seen on plain film corresponds to a large osteophyte arising from the posterior superior corner of the glenoid rim but this is a chronic finding.   5.  Fluid in the subcoracoid recess of the joint.   6.  Hypertrophic change at the AC joint.         XR Pelvis and Hip Right 2 Views   Final Result   IMPRESSION: Both hips negative for fracture or dislocation. Screw fixation of the left iliac wing. No pelvic fractures are identified. Vascular calcifications.      XR Shoulder Left G/E 3 Views   Final Result   IMPRESSION:       Left shoulder: Postoperative changes of IM dirk and screw fixation of an old fracture of the left humerus. No acute fractures are identified. Degenerative change at the glenohumeral joint. No dislocation. Hypertrophic change at the AC joint.      Right shoulder: There is a calcification along the superior aspect of the glenoid of uncertain donor site. This is worrisome for an acute fracture. There is severe degenerative change at the glenoid humeral joint. There is significant \"winging\" of the    scapula secondary  to thoracic kyphosis and no good AP view of the shoulder is obtained. CT may be helpful for further evaluation if indicated. Hypertrophic change at the AC joint.      XR Shoulder Right G/E 3 Views   Final Result   IMPRESSION:       Left shoulder: Postoperative changes of IM dirk and screw fixation of an old fracture of the left humerus. No acute fractures are identified. Degenerative change at the glenohumeral joint. No dislocation. Hypertrophic change at the AC joint.      Right shoulder: There is a calcification along the superior aspect of the glenoid of uncertain donor site. This is worrisome for an acute fracture. There is severe degenerative change at the glenoid humeral joint. There is significant \"winging\" of the    scapula secondary  to thoracic " kyphosis and no good AP view of the shoulder is obtained. CT may be helpful for further evaluation if indicated. Hypertrophic change at the AC joint.      CT Abdomen Pelvis w Contrast   Final Result   IMPRESSION:    1.  Long segment colitis involving the descending and sigmoid colon. While there is extensive diverticulosis, there is no focal inflammation surrounding a single diverticulum to indicate acute diverticulitis. Consider follow-up colonoscopy after    treatment to exclude underlying lesion.   2.  Possible cystitis, recommend correlation with urinalysis.      CT Cervical Spine w/o Contrast   Final Result   IMPRESSION:      HEAD CT:   1. Senescent changes and sequelae of chronic microangiopathy without acute intracranial abnormality.      CERVICAL SPINE CT:   1. No acute traumatic injury of the cervical spine.       CT Head w/o Contrast   Final Result   IMPRESSION:      HEAD CT:   1. Senescent changes and sequelae of chronic microangiopathy without acute intracranial abnormality.      CERVICAL SPINE CT:   1. No acute traumatic injury of the cervical spine.         EKG  Performed at: 1732  Impression: NSR. Flat ST segments and T waves. Slightly prolonged Qtc. No acute ischemic changes. No evidence of arrhythmia. Compared to previous, T waves now upright in anterolateral leads.  Rate: 62  Rhythm: Sinus  QRS Interval: 92  QTc Interval: 482  Comparison: 2/26/2022    All laboratory and imaging results and EKG's were personally reviewed and interpreted by myself prior to disposition decision.       I, Su Iverson, am serving as a scribe to document services personally performed by Dr. Monet Rankin based on my observation and the provider's statements to me. I, Monet Rankin MD attest that Su Iverson is acting in a scribe capacity, has observed my performance of the services and has documented them in accordance with my direction.    Monet Rankin M.D.  Emergency Medicine  [facility]  [facility] EMERGENCY DEPARTMENT  [address] [place] [id number]  [phone]  Dept: [phone]      Monet Rankin MD  01/15/24 0112     Burow's Graft Text: The defect edges were debeveled with a #15 scalpel blade. Given the location of the defect, shape of the defect, the proximity to free margins and the presence of a standing cone deformity a Burow's skin graft was deemed most appropriate. The standing cone was removed and this tissue was then trimmed to the shape of the primary defect. The adipose tissue was also removed until only dermis and epidermis were left.  The skin margins of the secondary defect were undermined to an appropriate distance in all directions utilizing iris scissors.  The secondary defect was closed with interrupted buried subcutaneous sutures.  The skin edges were then re-apposed with running  sutures.  The skin graft was then placed in the primary defect and oriented appropriately.

## 2024-01-14 NOTE — ED NOTES
Bed: [facility]-  Expected date:   Expected time:   Means of arrival:   Comments:  [facility] - 89F Fall

## 2024-01-14 NOTE — ED TRIAGE NOTES
"Pt arrived via [facility] EMS from home.  Complaining of right shoulder pain and right hip/ upper abdominal discomfort per medics.  Had a fall last Friday AM while she was trying to bring trash out , did hit her head no blood thinners and was able to  get up.  Had another fall Friday night.  Alert and oriented.  Pt said she has chronic right shoulder problem \" bone on bone.\"  Had vomiting this AM per daughter.  Daughter diagnosed to have Covid the last day.  Noted pt having intermittent cough and has mask in place.  VS per Medics 132/99 83 95%        "

## 2024-01-15 ENCOUNTER — APPOINTMENT (OUTPATIENT)
Dept: OCCUPATIONAL THERAPY | Facility: HOSPITAL | Age: 89
DRG: 372 | End: 2024-01-15
Attending: INTERNAL MEDICINE
Payer: COMMERCIAL

## 2024-01-15 ENCOUNTER — APPOINTMENT (OUTPATIENT)
Dept: MRI IMAGING | Facility: HOSPITAL | Age: 89
DRG: 372 | End: 2024-01-15
Attending: PHYSICIAN ASSISTANT
Payer: COMMERCIAL

## 2024-01-15 ENCOUNTER — APPOINTMENT (OUTPATIENT)
Dept: RADIOLOGY | Facility: HOSPITAL | Age: 89
DRG: 372 | End: 2024-01-15
Attending: PHYSICIAN ASSISTANT
Payer: COMMERCIAL

## 2024-01-15 ENCOUNTER — APPOINTMENT (OUTPATIENT)
Dept: PHYSICAL THERAPY | Facility: HOSPITAL | Age: 89
DRG: 372 | End: 2024-01-15
Attending: INTERNAL MEDICINE
Payer: COMMERCIAL

## 2024-01-15 PROBLEM — K51.50 LEFT SIDED COLITIS WITHOUT COMPLICATIONS (H): Status: ACTIVE | Noted: 2022-02-26

## 2024-01-15 PROBLEM — E11.9 NON-INSULIN DEPENDENT TYPE 2 DIABETES MELLITUS (H): Status: ACTIVE | Noted: 2022-02-26

## 2024-01-15 PROBLEM — R29.6 RECURRENT FALLS: Status: ACTIVE | Noted: 2024-01-15

## 2024-01-15 PROBLEM — R29.6 MULTIPLE FALLS: Status: ACTIVE | Noted: 2024-01-15

## 2024-01-15 PROBLEM — K52.9 COLITIS: Status: ACTIVE | Noted: 2024-01-15

## 2024-01-15 PROBLEM — M25.511 ACUTE PAIN OF RIGHT SHOULDER: Status: ACTIVE | Noted: 2024-01-15

## 2024-01-15 PROBLEM — N30.00 ACUTE CYSTITIS WITHOUT HEMATURIA: Status: ACTIVE | Noted: 2024-01-15

## 2024-01-15 LAB
ALBUMIN SERPL BCG-MCNC: 3.3 G/DL (ref 3.5–5.2)
ALP SERPL-CCNC: 60 U/L (ref 40–150)
ALT SERPL W P-5'-P-CCNC: 8 U/L (ref 0–50)
ANION GAP SERPL CALCULATED.3IONS-SCNC: 9 MMOL/L (ref 7–15)
AST SERPL W P-5'-P-CCNC: 15 U/L (ref 0–45)
BACTERIA UR CULT: NORMAL
BASOPHILS # BLD AUTO: 0 10E3/UL (ref 0–0.2)
BASOPHILS NFR BLD AUTO: 0 %
BILIRUB SERPL-MCNC: 0.2 MG/DL
BUN SERPL-MCNC: 31.8 MG/DL (ref 8–23)
C DIFF GDH STL QL IA: POSITIVE
C DIFF TOX A+B STL QL IA: NEGATIVE
C DIFF TOX B STL QL: POSITIVE
CALCIUM SERPL-MCNC: 8.7 MG/DL (ref 8.2–9.6)
CHLORIDE SERPL-SCNC: 101 MMOL/L (ref 98–107)
CREAT SERPL-MCNC: 1.38 MG/DL (ref 0.51–0.95)
DEPRECATED HCO3 PLAS-SCNC: 27 MMOL/L (ref 22–29)
EGFRCR SERPLBLD CKD-EPI 2021: 36 ML/MIN/1.73M2
EOSINOPHIL # BLD AUTO: 0.1 10E3/UL (ref 0–0.7)
EOSINOPHIL NFR BLD AUTO: 1 %
ERYTHROCYTE [DISTWIDTH] IN BLOOD BY AUTOMATED COUNT: 12.7 % (ref 10–15)
GLUCOSE BLDC GLUCOMTR-MCNC: 115 MG/DL (ref 70–99)
GLUCOSE BLDC GLUCOMTR-MCNC: 120 MG/DL (ref 70–99)
GLUCOSE BLDC GLUCOMTR-MCNC: 131 MG/DL (ref 70–99)
GLUCOSE BLDC GLUCOMTR-MCNC: 167 MG/DL (ref 70–99)
GLUCOSE BLDC GLUCOMTR-MCNC: 205 MG/DL (ref 70–99)
GLUCOSE SERPL-MCNC: 162 MG/DL (ref 70–99)
HBA1C MFR BLD: 7.3 %
HCT VFR BLD AUTO: 33.6 % (ref 35–47)
HGB BLD-MCNC: 10.7 G/DL (ref 11.7–15.7)
IMM GRANULOCYTES # BLD: 0 10E3/UL
IMM GRANULOCYTES NFR BLD: 0 %
LACTATE SERPL-SCNC: 1.3 MMOL/L (ref 0.7–2)
LYMPHOCYTES # BLD AUTO: 1.2 10E3/UL (ref 0.8–5.3)
LYMPHOCYTES NFR BLD AUTO: 17 %
MCH RBC QN AUTO: 29.1 PG (ref 26.5–33)
MCHC RBC AUTO-ENTMCNC: 31.8 G/DL (ref 31.5–36.5)
MCV RBC AUTO: 91 FL (ref 78–100)
MONOCYTES # BLD AUTO: 0.6 10E3/UL (ref 0–1.3)
MONOCYTES NFR BLD AUTO: 7 %
NEUTROPHILS # BLD AUTO: 5.5 10E3/UL (ref 1.6–8.3)
NEUTROPHILS NFR BLD AUTO: 75 %
NRBC # BLD AUTO: 0 10E3/UL
NRBC BLD AUTO-RTO: 0 /100
PLATELET # BLD AUTO: 183 10E3/UL (ref 150–450)
POTASSIUM SERPL-SCNC: 4.4 MMOL/L (ref 3.4–5.3)
PROT SERPL-MCNC: 5.8 G/DL (ref 6.4–8.3)
RBC # BLD AUTO: 3.68 10E6/UL (ref 3.8–5.2)
SODIUM SERPL-SCNC: 137 MMOL/L (ref 135–145)
T4 FREE SERPL-MCNC: 1.57 NG/DL (ref 0.9–1.7)
TROPONIN T SERPL HS-MCNC: 33 NG/L
TSH SERPL DL<=0.005 MIU/L-ACNC: 7.98 UIU/ML (ref 0.3–4.2)
WBC # BLD AUTO: 7.4 10E3/UL (ref 4–11)

## 2024-01-15 PROCEDURE — 250N000009 HC RX 250: Performed by: INTERNAL MEDICINE

## 2024-01-15 PROCEDURE — 3E0U3BZ INTRODUCTION OF ANESTHETIC AGENT INTO JOINTS, PERCUTANEOUS APPROACH: ICD-10-PCS | Performed by: RADIOLOGY

## 2024-01-15 PROCEDURE — 87493 C DIFF AMPLIFIED PROBE: CPT | Performed by: EMERGENCY MEDICINE

## 2024-01-15 PROCEDURE — 82040 ASSAY OF SERUM ALBUMIN: CPT | Performed by: INTERNAL MEDICINE

## 2024-01-15 PROCEDURE — 250N000011 HC RX IP 250 OP 636: Performed by: INTERNAL MEDICINE

## 2024-01-15 PROCEDURE — 83605 ASSAY OF LACTIC ACID: CPT | Performed by: INTERNAL MEDICINE

## 2024-01-15 PROCEDURE — 20610 DRAIN/INJ JOINT/BURSA W/O US: CPT | Mod: RT

## 2024-01-15 PROCEDURE — 97165 OT EVAL LOW COMPLEX 30 MIN: CPT | Mod: GO

## 2024-01-15 PROCEDURE — 250N000011 HC RX IP 250 OP 636: Performed by: RADIOLOGY

## 2024-01-15 PROCEDURE — 73221 MRI JOINT UPR EXTREM W/O DYE: CPT | Mod: RT

## 2024-01-15 PROCEDURE — 120N000001 HC R&B MED SURG/OB

## 2024-01-15 PROCEDURE — 82374 ASSAY BLOOD CARBON DIOXIDE: CPT | Performed by: INTERNAL MEDICINE

## 2024-01-15 PROCEDURE — 97162 PT EVAL MOD COMPLEX 30 MIN: CPT | Mod: GP

## 2024-01-15 PROCEDURE — 3E0U33Z INTRODUCTION OF ANTI-INFLAMMATORY INTO JOINTS, PERCUTANEOUS APPROACH: ICD-10-PCS | Performed by: RADIOLOGY

## 2024-01-15 PROCEDURE — 85025 COMPLETE CBC W/AUTO DIFF WBC: CPT | Performed by: INTERNAL MEDICINE

## 2024-01-15 PROCEDURE — 250N000013 HC RX MED GY IP 250 OP 250 PS 637: Performed by: INTERNAL MEDICINE

## 2024-01-15 PROCEDURE — 99233 SBSQ HOSP IP/OBS HIGH 50: CPT | Performed by: INTERNAL MEDICINE

## 2024-01-15 PROCEDURE — 97535 SELF CARE MNGMENT TRAINING: CPT | Mod: GO

## 2024-01-15 PROCEDURE — 36415 COLL VENOUS BLD VENIPUNCTURE: CPT | Performed by: INTERNAL MEDICINE

## 2024-01-15 PROCEDURE — 82962 GLUCOSE BLOOD TEST: CPT

## 2024-01-15 PROCEDURE — 258N000003 HC RX IP 258 OP 636: Performed by: INTERNAL MEDICINE

## 2024-01-15 PROCEDURE — 97530 THERAPEUTIC ACTIVITIES: CPT | Mod: GP

## 2024-01-15 PROCEDURE — 97116 GAIT TRAINING THERAPY: CPT | Mod: GP

## 2024-01-15 PROCEDURE — 87324 CLOSTRIDIUM AG IA: CPT | Performed by: EMERGENCY MEDICINE

## 2024-01-15 RX ORDER — BUPIVACAINE HYDROCHLORIDE 5 MG/ML
30 INJECTION, SOLUTION EPIDURAL; INTRACAUDAL ONCE
Status: DISCONTINUED | OUTPATIENT
Start: 2024-01-15 | End: 2024-01-15

## 2024-01-15 RX ORDER — TRIAMCINOLONE ACETONIDE 40 MG/ML
40 INJECTION, SUSPENSION INTRA-ARTICULAR; INTRAMUSCULAR ONCE
Status: COMPLETED | OUTPATIENT
Start: 2024-01-15 | End: 2024-01-15

## 2024-01-15 RX ORDER — SODIUM CHLORIDE, SODIUM LACTATE, POTASSIUM CHLORIDE, CALCIUM CHLORIDE 600; 310; 30; 20 MG/100ML; MG/100ML; MG/100ML; MG/100ML
INJECTION, SOLUTION INTRAVENOUS CONTINUOUS
Status: DISCONTINUED | OUTPATIENT
Start: 2024-01-15 | End: 2024-01-16

## 2024-01-15 RX ORDER — DEXTROSE MONOHYDRATE 25 G/50ML
25-50 INJECTION, SOLUTION INTRAVENOUS
Status: DISCONTINUED | OUTPATIENT
Start: 2024-01-15 | End: 2024-01-17

## 2024-01-15 RX ORDER — DEXTROSE MONOHYDRATE 25 G/50ML
25-50 INJECTION, SOLUTION INTRAVENOUS
Status: DISCONTINUED | OUTPATIENT
Start: 2024-01-15 | End: 2024-01-19 | Stop reason: HOSPADM

## 2024-01-15 RX ORDER — AMOXICILLIN 250 MG
1 CAPSULE ORAL 2 TIMES DAILY PRN
Status: DISCONTINUED | OUTPATIENT
Start: 2024-01-15 | End: 2024-01-19 | Stop reason: HOSPADM

## 2024-01-15 RX ORDER — LIDOCAINE HYDROCHLORIDE 10 MG/ML
30 INJECTION, SOLUTION EPIDURAL; INFILTRATION; INTRACAUDAL; PERINEURAL ONCE
Status: COMPLETED | OUTPATIENT
Start: 2024-01-15 | End: 2024-01-15

## 2024-01-15 RX ORDER — METRONIDAZOLE 500 MG/100ML
500 INJECTION, SOLUTION INTRAVENOUS EVERY 12 HOURS
Status: DISCONTINUED | OUTPATIENT
Start: 2024-01-15 | End: 2024-01-16

## 2024-01-15 RX ORDER — GABAPENTIN 300 MG/1
300 CAPSULE ORAL EVERY EVENING
Status: DISCONTINUED | OUTPATIENT
Start: 2024-01-15 | End: 2024-01-19 | Stop reason: HOSPADM

## 2024-01-15 RX ORDER — SODIUM CHLORIDE, SODIUM LACTATE, POTASSIUM CHLORIDE, CALCIUM CHLORIDE 600; 310; 30; 20 MG/100ML; MG/100ML; MG/100ML; MG/100ML
INJECTION, SOLUTION INTRAVENOUS CONTINUOUS
Status: ACTIVE | OUTPATIENT
Start: 2024-01-15 | End: 2024-01-15

## 2024-01-15 RX ORDER — ACETAMINOPHEN 325 MG/1
975 TABLET ORAL EVERY 8 HOURS
Qty: 27 TABLET | Refills: 0 | Status: DISCONTINUED | OUTPATIENT
Start: 2024-01-15 | End: 2024-01-16

## 2024-01-15 RX ORDER — ROPIVACAINE HYDROCHLORIDE 5 MG/ML
15 INJECTION, SOLUTION EPIDURAL; INFILTRATION; PERINEURAL ONCE
Qty: 30 ML | Refills: 0 | Status: COMPLETED | OUTPATIENT
Start: 2024-01-15 | End: 2024-01-15

## 2024-01-15 RX ORDER — ACETAMINOPHEN 325 MG/1
650 TABLET ORAL EVERY 6 HOURS PRN
Status: DISCONTINUED | OUTPATIENT
Start: 2024-01-15 | End: 2024-01-19 | Stop reason: HOSPADM

## 2024-01-15 RX ORDER — ONDANSETRON 4 MG/1
4 TABLET, ORALLY DISINTEGRATING ORAL EVERY 6 HOURS PRN
Status: DISCONTINUED | OUTPATIENT
Start: 2024-01-15 | End: 2024-01-19 | Stop reason: HOSPADM

## 2024-01-15 RX ORDER — LIDOCAINE 40 MG/G
CREAM TOPICAL
Status: DISCONTINUED | OUTPATIENT
Start: 2024-01-15 | End: 2024-01-19 | Stop reason: HOSPADM

## 2024-01-15 RX ORDER — CIPROFLOXACIN 2 MG/ML
400 INJECTION, SOLUTION INTRAVENOUS EVERY 12 HOURS
Status: DISCONTINUED | OUTPATIENT
Start: 2024-01-15 | End: 2024-01-15 | Stop reason: DRUGHIGH

## 2024-01-15 RX ORDER — ONDANSETRON 2 MG/ML
4 INJECTION INTRAMUSCULAR; INTRAVENOUS EVERY 6 HOURS PRN
Status: DISCONTINUED | OUTPATIENT
Start: 2024-01-15 | End: 2024-01-19 | Stop reason: HOSPADM

## 2024-01-15 RX ORDER — ENOXAPARIN SODIUM 100 MG/ML
40 INJECTION SUBCUTANEOUS EVERY 24 HOURS
Status: DISCONTINUED | OUTPATIENT
Start: 2024-01-15 | End: 2024-01-15 | Stop reason: DRUGHIGH

## 2024-01-15 RX ORDER — AMLODIPINE BESYLATE 5 MG/1
5 TABLET ORAL EVERY EVENING
Status: DISCONTINUED | OUTPATIENT
Start: 2024-01-15 | End: 2024-01-19 | Stop reason: HOSPADM

## 2024-01-15 RX ORDER — PANTOPRAZOLE SODIUM 40 MG/1
40 TABLET, DELAYED RELEASE ORAL EVERY EVENING
Status: DISCONTINUED | OUTPATIENT
Start: 2024-01-15 | End: 2024-01-19 | Stop reason: HOSPADM

## 2024-01-15 RX ORDER — AMLODIPINE BESYLATE 5 MG/1
5 TABLET ORAL EVERY EVENING
Status: DISCONTINUED | OUTPATIENT
Start: 2024-01-15 | End: 2024-01-15

## 2024-01-15 RX ORDER — ENOXAPARIN SODIUM 100 MG/ML
30 INJECTION SUBCUTANEOUS EVERY 24 HOURS
Status: DISCONTINUED | OUTPATIENT
Start: 2024-01-15 | End: 2024-01-19 | Stop reason: HOSPADM

## 2024-01-15 RX ORDER — NICOTINE POLACRILEX 4 MG
15-30 LOZENGE BUCCAL
Status: DISCONTINUED | OUTPATIENT
Start: 2024-01-15 | End: 2024-01-17

## 2024-01-15 RX ORDER — CIPROFLOXACIN 2 MG/ML
400 INJECTION, SOLUTION INTRAVENOUS EVERY 24 HOURS
Status: DISCONTINUED | OUTPATIENT
Start: 2024-01-15 | End: 2024-01-16

## 2024-01-15 RX ORDER — NICOTINE POLACRILEX 4 MG
15-30 LOZENGE BUCCAL
Status: DISCONTINUED | OUTPATIENT
Start: 2024-01-15 | End: 2024-01-19 | Stop reason: HOSPADM

## 2024-01-15 RX ORDER — ROPIVACAINE HYDROCHLORIDE 5 MG/ML
30 INJECTION, SOLUTION EPIDURAL; INFILTRATION; PERINEURAL ONCE
Status: DISCONTINUED | OUTPATIENT
Start: 2024-01-15 | End: 2024-01-15

## 2024-01-15 RX ORDER — CALCIUM CARBONATE 500 MG/1
1000 TABLET, CHEWABLE ORAL 4 TIMES DAILY PRN
Status: DISCONTINUED | OUTPATIENT
Start: 2024-01-15 | End: 2024-01-19 | Stop reason: HOSPADM

## 2024-01-15 RX ORDER — HYDRALAZINE HYDROCHLORIDE 20 MG/ML
10 INJECTION INTRAMUSCULAR; INTRAVENOUS EVERY 6 HOURS PRN
Status: DISCONTINUED | OUTPATIENT
Start: 2024-01-15 | End: 2024-01-16

## 2024-01-15 RX ORDER — AMOXICILLIN 250 MG
2 CAPSULE ORAL 2 TIMES DAILY PRN
Status: DISCONTINUED | OUTPATIENT
Start: 2024-01-15 | End: 2024-01-19 | Stop reason: HOSPADM

## 2024-01-15 RX ORDER — LEVOTHYROXINE SODIUM 88 UG/1
88 TABLET ORAL EVERY MORNING
Status: DISCONTINUED | OUTPATIENT
Start: 2024-01-15 | End: 2024-01-19 | Stop reason: HOSPADM

## 2024-01-15 RX ORDER — LIDOCAINE 4 G/G
1 PATCH TOPICAL
Status: DISCONTINUED | OUTPATIENT
Start: 2024-01-15 | End: 2024-01-19 | Stop reason: HOSPADM

## 2024-01-15 RX ADMIN — HYDRALAZINE HYDROCHLORIDE 10 MG: 20 INJECTION INTRAMUSCULAR; INTRAVENOUS at 21:22

## 2024-01-15 RX ADMIN — PANTOPRAZOLE SODIUM 40 MG: 40 TABLET, DELAYED RELEASE ORAL at 01:43

## 2024-01-15 RX ADMIN — SODIUM CHLORIDE, POTASSIUM CHLORIDE, SODIUM LACTATE AND CALCIUM CHLORIDE: 600; 310; 30; 20 INJECTION, SOLUTION INTRAVENOUS at 01:10

## 2024-01-15 RX ADMIN — ACETAMINOPHEN 650 MG: 325 TABLET ORAL at 01:08

## 2024-01-15 RX ADMIN — GABAPENTIN 300 MG: 300 CAPSULE ORAL at 01:43

## 2024-01-15 RX ADMIN — TRIAMCINOLONE ACETONIDE 40 MG: 40 INJECTION, SUSPENSION INTRA-ARTICULAR; INTRAMUSCULAR at 16:14

## 2024-01-15 RX ADMIN — LEVOTHYROXINE SODIUM 88 MCG: 88 TABLET ORAL at 07:55

## 2024-01-15 RX ADMIN — ROPIVACAINE HYDROCHLORIDE 15 ML: 5 INJECTION, SOLUTION EPIDURAL; INFILTRATION; PERINEURAL at 15:40

## 2024-01-15 RX ADMIN — ACETAMINOPHEN 975 MG: 325 TABLET ORAL at 09:40

## 2024-01-15 RX ADMIN — DICLOFENAC SODIUM 2 G: 10 GEL TOPICAL at 10:50

## 2024-01-15 RX ADMIN — LIDOCAINE 1 PATCH: 4 PATCH TOPICAL at 09:38

## 2024-01-15 RX ADMIN — AMLODIPINE BESYLATE 5 MG: 5 TABLET ORAL at 01:43

## 2024-01-15 RX ADMIN — SODIUM CHLORIDE, POTASSIUM CHLORIDE, SODIUM LACTATE AND CALCIUM CHLORIDE: 600; 310; 30; 20 INJECTION, SOLUTION INTRAVENOUS at 14:13

## 2024-01-15 RX ADMIN — CIPROFLOXACIN 400 MG: 400 INJECTION, SOLUTION INTRAVENOUS at 02:31

## 2024-01-15 RX ADMIN — ENOXAPARIN SODIUM 30 MG: 30 INJECTION SUBCUTANEOUS at 07:57

## 2024-01-15 RX ADMIN — GABAPENTIN 300 MG: 300 CAPSULE ORAL at 20:13

## 2024-01-15 RX ADMIN — PANTOPRAZOLE SODIUM 40 MG: 40 TABLET, DELAYED RELEASE ORAL at 20:13

## 2024-01-15 RX ADMIN — METRONIDAZOLE 500 MG: 500 INJECTION, SOLUTION INTRAVENOUS at 01:15

## 2024-01-15 RX ADMIN — ACETAMINOPHEN 975 MG: 325 TABLET ORAL at 17:27

## 2024-01-15 RX ADMIN — LIDOCAINE HYDROCHLORIDE 5 ML: 10 INJECTION, SOLUTION EPIDURAL; INFILTRATION; INTRACAUDAL; PERINEURAL at 16:14

## 2024-01-15 RX ADMIN — METRONIDAZOLE 500 MG: 500 INJECTION, SOLUTION INTRAVENOUS at 12:33

## 2024-01-15 RX ADMIN — AMLODIPINE BESYLATE 5 MG: 5 TABLET ORAL at 20:13

## 2024-01-15 RX ADMIN — DICLOFENAC SODIUM 2 G: 10 GEL TOPICAL at 17:27

## 2024-01-15 ASSESSMENT — ACTIVITIES OF DAILY LIVING (ADL)
ADLS_ACUITY_SCORE: 39
ADLS_ACUITY_SCORE: 44
ADLS_ACUITY_SCORE: 40
ADLS_ACUITY_SCORE: 39
ADLS_ACUITY_SCORE: 39
ADLS_ACUITY_SCORE: 44
ADLS_ACUITY_SCORE: 44
DEPENDENT_IADLS:: INDEPENDENT
ADLS_ACUITY_SCORE: 44
ADLS_ACUITY_SCORE: 35
ADLS_ACUITY_SCORE: 40
ADLS_ACUITY_SCORE: 40
ADLS_ACUITY_SCORE: 37

## 2024-01-15 NOTE — PROGRESS NOTES
Writer took over this pt at 1500. Pt was at radiology when writer took pt. Radiologist called around 1535 and told writer that he would recommend MRI to be done to confirm about right shoulder fracture. Writer paged hospitalist and notified. Hospitalist told writer to call ortho. Paged ortho but no call so far. Re- paged again. Pt just arrived to unit at 1605 to room.

## 2024-01-15 NOTE — PROGRESS NOTES
01/15/24 1025   Appointment Info   Signing Clinician's Name / Credentials (PT) Desirae Reese DPT   Living Environment   People in Home spouse   Current Living Arrangements house   Home Accessibility no concerns   Living Environment Comments walk in s hower, shower chair, grab bars.   Self-Care   Usual Activity Tolerance moderate   Current Activity Tolerance fair   Equipment Currently Used at Home none  (owns FWW but does not use)   Fall history within last six months yes   Number of times patient has fallen within last six months 2   Activity/Exercise/Self-Care Comment ind w/ ADLs/IADLs. helps  at home   General Information   Onset of Illness/Injury or Date of Surgery 01/14/24   Referring Physician Nicolasa Still MD   Patient/Family Therapy Goals Statement (PT) wanting to go home   Pertinent History of Current Problem (include personal factors and/or comorbidities that impact the POC) Sissy Mccloud is a 89 year old female with PMH of hypothyroidism, HTN, NIDDM, GERD, DJD, left-sided colitis, presented to ED from home for evaluation of recurrent falls and right-sided hip and shoulder pain.   Existing Precautions/Restrictions fall   Pain Assessment   Patient Currently in Pain Yes, see Vital Sign flowsheet  (shoulder pain B, buttocks pain)   Range of Motion (ROM)   Range of Motion ROM is WFL   Strength (Manual Muscle Testing)   Strength (Manual Muscle Testing) Deficits observed during functional mobility   Bed Mobility   Bed Mobility supine-sit;sit-supine   Supine-Sit Summers (Bed Mobility) moderate assist (50% patient effort);1 person assist   Sit-Supine [place]mers (Bed Mobility) moderate assist (50% patient effort);1 person assist   Bed Mobility Limitations decreased ability to use arms for pushing/pulling   Impairments Contributing to Impaired Bed Mobility pain;decreased strength   Comment, (Bed Mobility) assist for trunk into upright and BLEs towards EOB for OOB, assist for BLEs into  bed   Transfers   Transfers sit-stand transfer   Sit-Stand Transfer   Sit-Stand Clyde (Transfers) minimum assist (75% patient effort)   Assistive Device (Sit-Stand Transfers) other (see comments)  (IV pole)   Comment, (Sit-Stand Transfer) unsteady   Gait/Stairs (Locomotion)   C[place]de Level (Gait) contact guard;minimum assist (75% patient effort)   Assistive Device (Gait)   (pushing IV pole)   Distance in Feet (Gait) 25'   Pattern (Gait) step-through   Deviations/Abnormal Patterns (Gait) gait speed decreased   Comment, (Gait/Stairs) stairs not completed d/t weakness. Flexed posture, unsteady   Clinical Impression   Criteria for Skilled Therapeutic Intervention Yes, treatment indicated   PT Diagnosis (PT) imparied functional mobility, gait abnormality   Influenced by the following impairments decraesed strength, decreased endurance, impaired balance, pain   Functional limitations due to impairments gait, transfers, bed mob   Clinical Presentation (PT Evaluation Complexity) evolving   Clinical Presentation Rationale pt presents as medically diagnosed   Clinical Decision Making (Complexity) moderate complexity   Planned Therapy Interventions (PT) balance training;bed mobility training;gait training;home exercise program;neuromuscular re-education;patient/family education;strengthening;transfer training   Risk & Benefits of therapy have been explained evaluation/treatment results reviewed;patient   PT Total Evaluation Time   PT Eval, Moderate Complexity Minutes (47044) 10   Physical Therapy Goals   PT Frequency Daily   PT Predicted Duration/Target Date for Goal Attainment 01/22/24   PT Goals Bed Mobility;Transfers;Gait   PT: Bed Mobility Supervision/stand-by assist;Supine to/from sit   PT: Transfers Supervision/stand-by assist;Sit to/from stand;Bed to/from chair   PT: Gait Supervision/stand-by assist;Assistive device;Rolling walker;150 feet   Interventions   Interventions Quick Adds Gait Training;Therapeutic  Activity   Therapeutic Activity   Therapeutic Activities: dynamic activities to improve functional performance Minutes (14557) 8   Symptoms Noted During/After Treatment Fatigue   Treatment Detail/Skilled Intervention additional sit <> stand x 3 minutes x 2 reps with SBA-CGA with holding onto rail/IV pole. Sit <> Stand from toilet Jay x 1. Unsteady at times. Washing hands at sink with SBA. back in bed with call light in hand, both rails up   Gait Training   Gait Training Minutes (09343) 8   Symptoms Noted During/After Treatment (Gait Training) fatigue   Treatment Detail/Skilled Intervention amb x additional 25' with CGA without AD, slower pace, more unsteady than with use of pushign IV pole. Path deviation noted. Cues for upright.   Distance in Feet 25'   [place] Level (Gait Training) contact guard   Physical Assistance Level (Gait Training) supervision;verbal cues;1 person assist   Weight Bearing (Gait Training) weight-bearing as tolerated   Assistive Device (Gait Training) other (see comments)  (none)   Pattern Analysis (Gait Training) swing-through gait   Gait Analysis Deviations decreased rashid   PT Discharge Planning   PT Plan amb w/ FWW vs no AD, inc amb as tolerated, bed mob, transfer safety   PT Discharge Recommendation (DC Rec) home with assist;home with home care physical therapy  (pending progress, may need TCU)   PT Rationale for DC Rec moving okay, a little more unsteady than baseline. Will trial walker, if pt unsteady with walker, may need TCU for improvement in balance.   PT Brief overview of current status amb x 25' w/ pushing IV pole, 25' without AD, CGA. bed mob modA x 1   PT Equipment Needed at Discharge walker, rolling   Total Session Time   Timed Code Treatment Minutes 16   Total Session Time (sum of timed and untimed services) 26

## 2024-01-15 NOTE — PROGRESS NOTES
[facility]    Medicine Progress Note - Hospitalist Service    Date of Admission:  1/14/2024    Assessment & Plan      Sissy Mccloud is a 89 year old female with PMH of hypothyroidism, HTN, NIDDM, GERD, DJD who was brought to ED for evaluation of fall, right shoulder/hip pain and generalized weakness.  CT imaging reported colitis, UA concerning for infection.  Patient admitted on 1/14/2024 for further management      Admission CT imaging reported sigmoid colitis;  Patient reported history of constipation but denied abdominal pain, nausea, vomiting prior to admission.  -- CT showed local segment colitis involving descending and sigmoid colon, with extensive diverticulosis no signs of diverticulitis.  There is colitis in the same location as in March 2022.  --On admission, normal WBC count, normal lactic acid.  --On admission, initiated on clear liquid diet, ABX with IV Cipro and Flagyl and GI consulted.  Awaiting input    Abnormal UA and CT reported possible cystitis;  -- Patient denied urinary symptoms, fever but reported generalized weakness  --On IV Cipro.  Follow on UCx    Recurrent falls;  Both falls occurred on 1/13/2024.  Likely due to generalized weakness from above issues.  -- On admission, CT head and cervical spine negative for acute issues.  X-ray hip negative for fracture or dislocation.  --Fall precaution.  PT OT consulted    Severe bilateral shoulder pain, right more than left;  -- CT right shoulder negative for acute fracture or dislocation but reported severe end-stage degenerative changes  --Added to scheduled Tylenol, added lidocaine patch on right shoulder, added diclofenac gel on both shoulder.  Try to avoid narcotics given her age and risk of delirium.  --PTA Neurontin  -- PT OT consulted  --Given limitation of daily living of activities due to severe pain, consulted orthopedic surgery    Type II DM with hyperglycemia;  -- PTA Glucophage on hold  -- Insulin sliding scale  "and hypoglycemic protocol    LES on CKD stage III  --On IV fluid.  BMP in a.m.    Essential hypertension; elevated, likely due to pain issues  --Better pain control.  PTA amlodipine.  As needed hydralazine.    Hypothyroidism;  -- Mild elevated TSH but free T4 on higher normal range.  Continue PTA Synthyroid       Diet: Clear Liquid Diet    DVT Prophylaxis: Enoxaparin (Lovenox) SQ  Foster Catheter: Not present  Lines: None     Cardiac Monitoring: ACTIVE order. Indication: QTc prolonging medication (48 hours)  Code Status: No CPR- Do NOT Intubate            Diet: Clear Liquid Diet    DVT Prophylaxis: Enoxaparin (Lovenox) SQ  Foster Catheter: Not present  Lines: None     Cardiac Monitoring: ACTIVE order. Indication: QTc prolonging medication (48 hours)  Code Status: No CPR- Do NOT Intubate      Clinically Significant Risk Factors Present on Admission              # Hypoalbuminemia: Lowest albumin = 3.3 g/dL at 1/15/2024  4:50 AM, will monitor as appropriate     # Hypertension: Noted on problem list     # DMII: A1C = 7.3 % (Ref range: <5.7 %) within past 6 months    # Overweight: Estimated body mass index is 25.79 kg/m  as calculated from the following:    Height as of this encounter: 1.575 m (5' 2\").    Weight as of this encounter: 64 kg (141 lb).              Disposition Plan      Expected Discharge Date: 01/17/2024                    Artemio Haywood MD  Hospitalist Service  [facility]  Securely message with [employer] (more info)  Text page via [facility] Paging/Directory   ______________________________________________________________________    Interval History   Patient is new to me.  Patient seen and examined.  Notes, labs, imaging report personally reviewed.  Patient denied feeling short of breath or chest pain or cough or congestion.  Patient also denied having abdominal pain, nausea, vomiting.  Reported normal she is constipated but had bowel movement after coming to the hospital.  Patient main " complaint is ongoing severe bilateral shoulder joint pain.  Discussed with nursing staffs.  Discussed with patient's daughter about ongoing medical issues and management.    Physical Exam   Vital Signs: Temp: 98  F (36.7  C) Temp src: Oral BP: (!) 149/65 Pulse: 60   Resp: 18 SpO2: 94 % O2 Device: None (Room air)    Weight: 140 lbs 15.99 oz      General: Not in obvious distress.  HEENT: Normocephalic, supple neck  Chest: Clear to auscultation bilateral anteriorly, no wheezing  Heart: S1S2 normal, regular  Abdomen: Soft. NT, ND. Bowel sounds- active.  Extremities: No legs swelling  Musculoskeletal; limited bilateral shoulder joint movement mostly with lifting up, tenderness appreciated on shoulder joint area but no local swelling, erythema  Neuro: alert and awake, grossly non-focal        Medical Decision Making             Data     I have personally reviewed the following data over the past 24 hrs:    7.4  \   10.7 (L)   / 183     137 101 31.8 (H) /  120 (H)   4.4 27 1.38 (H) \     ALT: 8 AST: 15 AP: 60 TBILI: 0.2   ALB: 3.3 (L) TOT PROTEIN: 5.8 (L) LIPASE: 15     Trop: 33 (H) BNP: N/A     TSH: 7.98 (H) T4: 1.57 A1C: 7.3 (H)     Procal: N/A CRP: N/A Lactic Acid: 1.3         Imaging results reviewed over the past 24 hrs:   Recent Results (from the past 24 hour(s))   CT Head w/o Contrast    Narrative    EXAM: CT HEAD W/O CONTRAST, CT CERVICAL SPINE W/O CONTRAST  LOCATION: [facility]  DATE/TIME: 1/14/2024 7:03 PM CST    INDICATION: Headache and neck pain after trauma  COMPARISON: CT head dated 05/22/2023  TECHNIQUE:   1) Routine CT Head without IV contrast. Multiplanar reformats. Dose reduction techniques were used.  2) Routine CT Cervical Spine without IV contrast. Multiplanar reformats. Dose reduction techniques were used.    FINDINGS:  HEAD CT:   INTRACRANIAL CONTENTS: No acute intracranial hemorrhage. No CT evidence of acute infarct. Sequelae of mild chronic microangiopathy. Mild cerebral  volume loss without hydrocephalus. No extra-axial fluid collections.  Patent basal cisterns.     VISUALIZED ORBITS/SINUSES/MASTOIDS: Postoperative change of bilateral lenses, otherwise the orbits are unremarkable. The visualized paranasal sinuses and temporal bone structures are well-aerated.     BONES/SOFT TISSUES: The calvarium and skull base are unremarkable.     CERVICAL SPINE CT:  VERTEBRA: The spine is imaged from the skull base through mid body of T3. Straightening of the usual cervical lordosis without significant spondylolisthesis. No evidence of acute fracture. No aggressive osseous lesion.      CANAL/FORAMINA: Multilevel degenerative changes without high-grade spinal canal stenosis or high-grade neural foraminal narrowing.     PARASPINAL: No prevertebral or paravertebral soft tissue swelling.  Visualized lungs are clear. The thyroid gland is unremarkable. Mild carotid artery bifurcation calcification.      Impression    IMPRESSION:    HEAD CT:  1. Senescent changes and sequelae of chronic microangiopathy without acute intracranial abnormality.    CERVICAL SPINE CT:  1. No acute traumatic injury of the cervical spine.    CT Cervical Spine w/o Contrast    Narrative    EXAM: CT HEAD W/O CONTRAST, CT CERVICAL SPINE W/O CONTRAST  LOCATION: [facility]  DATE/TIME: 1/14/2024 7:03 PM CST    INDICATION: Headache and neck pain after trauma  COMPARISON: CT head dated 05/22/2023  TECHNIQUE:   1) Routine CT Head without IV contrast. Multiplanar reformats. Dose reduction techniques were used.  2) Routine CT Cervical Spine without IV contrast. Multiplanar reformats. Dose reduction techniques were used.    FINDINGS:  HEAD CT:   INTRACRANIAL CONTENTS: No acute intracranial hemorrhage. No CT evidence of acute infarct. Sequelae of mild chronic microangiopathy. Mild cerebral volume loss without hydrocephalus. No extra-axial fluid collections.  Patent basal cisterns.     VISUALIZED  ORBITS/SINUSES/MASTOIDS: Postoperative change of bilateral lenses, otherwise the orbits are unremarkable. The visualized paranasal sinuses and temporal bone structures are well-aerated.     BONES/SOFT TISSUES: The calvarium and skull base are unremarkable.     CERVICAL SPINE CT:  VERTEBRA: The spine is imaged from the skull base through mid body of T3. Straightening of the usual cervical lordosis without significant spondylolisthesis. No evidence of acute fracture. No aggressive osseous lesion.      CANAL/FORAMINA: Multilevel degenerative changes without high-grade spinal canal stenosis or high-grade neural foraminal narrowing.     PARASPINAL: No prevertebral or paravertebral soft tissue swelling.  Visualized lungs are clear. The thyroid gland is unremarkable. Mild carotid artery bifurcation calcification.      Impression    IMPRESSION:    HEAD CT:  1. Senescent changes and sequelae of chronic microangiopathy without acute intracranial abnormality.    CERVICAL SPINE CT:  1. No acute traumatic injury of the cervical spine.    CT Abdomen Pelvis w Contrast    Narrative    EXAM: CT ABDOMEN PELVIS W CONTRAST  LOCATION: [facility]  DATE: 1/14/2024    INDICATION: abdominal pain  COMPARISON: Pelvic radiograph 11/28/2022  TECHNIQUE: CT scan of the abdomen and pelvis was performed following injection of IV contrast. Multiplanar reformats were obtained. Dose reduction techniques were used.  CONTRAST: isovue 370  75ml    FINDINGS:   LOWER CHEST: Unremarkable.    HEPATOBILIARY: Cholecystectomy. No evidence of christian biliary obstruction.    PANCREAS: Normal.    SPLEEN: Normal.    ADRENAL GLANDS: Normal.    KIDNEYS/BLADDER: Bilateral nonobstructing renal calculi, largest in the left lower pole measuring 3 mm. No ureterolithiasis or hydronephrosis. Urinary bladder is decompressed with possible mucosal hyperenhancement. No significant surrounding fat   stranding.    BOWEL: Extensive colonic diverticulosis  "with relatively long segment wall thickening and adjacent inflammatory changes involving the descending and sigmoid colon. No evidence of christian bowel obstruction or perforation. Large volume of formed stool in the   rectal vault.    LYMPH NODES: No suspicious lymphadenopathy.    VASCULATURE: Scattered calcified atherosclerosis.    PELVIC ORGANS: Hysterectomy. Trace free fluid in the pelvis.    MUSCULOSKELETAL: No acute bony abnormality. Unchanged screw in the left iliac wing.      Impression    IMPRESSION:   1.  Long segment colitis involving the descending and sigmoid colon. While there is extensive diverticulosis, there is no focal inflammation surrounding a single diverticulum to indicate acute diverticulitis. Consider follow-up colonoscopy after   treatment to exclude underlying lesion.  2.  Possible cystitis, recommend correlation with urinalysis.   XR Shoulder Right G/E 3 Views    Narrative    EXAM: XR SHOULDER RIGHT G/E 3 VIEWS, XR SHOULDER LEFT G/E 3 VIEWS  LOCATION: [facility]  DATE: 1/14/2024    INDICATION: fall, pain  COMPARISON: None.      Impression    IMPRESSION:     Left shoulder: Postoperative changes of IM dirk and screw fixation of an old fracture of the left humerus. No acute fractures are identified. Degenerative change at the glenohumeral joint. No dislocation. Hypertrophic change at the AC joint.    Right shoulder: There is a calcification along the superior aspect of the glenoid of uncertain donor site. This is worrisome for an acute fracture. There is severe degenerative change at the glenoid humeral joint. There is significant \"winging\" of the   scapula secondary  to thoracic kyphosis and no good AP view of the shoulder is obtained. CT may be helpful for further evaluation if indicated. Hypertrophic change at the AC joint.   XR Shoulder Left G/E 3 Views    Narrative    EXAM: XR SHOULDER RIGHT G/E 3 VIEWS, XR SHOULDER LEFT G/E 3 VIEWS  LOCATION: [facility]" [facility]  DATE: 1/14/2024    INDICATION: fall, pain  COMPARISON: None.      Impression    IMPRESSION:     Left shoulder: Postoperative changes of IM dirk and screw fixation of an old fracture of the left humerus. No acute fractures are identified. Degenerative change at the glenohumeral joint. No dislocation. Hypertrophic change at the AC joint.    Right shoulder: There is a calcification along the superior aspect of the glenoid of uncertain donor site. This is worrisome for an acute fracture. There is severe degenerative change at the glenoid humeral joint. There is significant \"winging\" of the   scapula secondary  to thoracic kyphosis and no good AP view of the shoulder is obtained. CT may be helpful for further evaluation if indicated. Hypertrophic change at the AC joint.   XR Pelvis and Hip Right 2 Views    Narrative    EXAM: XR PELVIS AND HIP RIGHT 2 VIEWS  LOCATION: [facility]  DATE: 1/14/2024    INDICATION: fall, pain  COMPARISON: None.      Impression    IMPRESSION: Both hips negative for fracture or dislocation. Screw fixation of the left iliac wing. No pelvic fractures are identified. Vascular calcifications.   CT Shoulder Right w/o Contrast    Narrative    EXAM: CT SHOULDER RIGHT W/O CONTRAST  LOCATION: [facility]  DATE: 1/14/2024    INDICATION: Fall, pain, abnormal XR  COMPARISON: X-ray evaluation 01/14/2024  TECHNIQUE: Noncontrast. Axial, sagittal and coronal thin-section reconstruction. Dose reduction techniques were used.     FINDINGS:     BONES:  -The right glenohumeral joint is negative for fracture or dislocation. There is severe end-stage degenerative change with bone-on-bone contact and bony remodeling of the glenoid. There is also extensive osteophytic spurring inferiorly. The calcification   seen on plain film evaluation corresponds to a large osteophyte arising from the posterior superior corner of the glenoid rim. Hypertrophic change at " the AC joint. The clavicle is negative for fracture. The scapula is negative for fracture.    SOFT TISSUES:  -Fluid in the subcoracoid recess.      Impression    IMPRESSION:  1.  No evidence for acute fracture or dislocation.  2.  Severe end-stage degenerative change at the glenohumeral joint with bone-on-bone contact and bony remodeling of the glenoid.  3.  Extensive osteophytic spurring inferiorly along the articular surface of the humeral head.  4.  The abnormal calcification seen on plain film corresponds to a large osteophyte arising from the posterior superior corner of the glenoid rim but this is a chronic finding.  5.  Fluid in the subcoracoid recess of the joint.  6.  Hypertrophic change at the AC joint.

## 2024-01-15 NOTE — PLAN OF CARE
NURSING PROGRESS NOTE  Shift Summary      Date: January 15, 2024   [id number]  Neuro/Musculoskeletal:  A&Ox4. Calm and pleasant towards writer and other staff. Receptive to all cares.   Cardiac:  On TELE monitorinst degree AV block/ NSR. Denied and offered no c/o CP. BP slightly elevated, received scheduled amlodipine.    Respiratory: LS: CTA, absent of any adventitious sounds. SpO2: 96% on RA. Absent of SOB. No cough.   GI/:  BS: normoactive x4Q. Stool sample sent for Cdiff rule out during evening shift. No loose stools overnight. Bladder scanned for 8mls. Pt likely dehydrated and is receiving mIVF. Wears a brief for occasional dribbling.   Diet/Appetite: Tolerating a clear liquid diet.   Activity:  A1 w/ gaitbelt. Fall precautions.   Pain:  C/o 5/10 HA, received PRN Tylenol, which was effective. Also c/o generalized joint aches, especially shoulders.   Skin:  No new deficits noted.   LDAs + Drips/IVF:  PIV x1 to LUE, dressing CDI. Started on mIVF: LR@100ml/hr. On scheduled Flagyl and Cipro.   Protocols/Labs:  Not on any electrolyte replacement protocols.     Pertinent Shift Updates:  On enteric precautions/ isolation for Cdiff rule out. Awaiting results.      Goal Outcome Evaluation:    Problem: UTI (Urinary Tract Infection)  Goal: Improved Infection Symptoms  Outcome: Progressing

## 2024-01-15 NOTE — CONSULTS
ORTHOPEDIC CONSULTATION    Consultation  Sissy Mccloud,  1931, MRN [id number]    Colitis [K52.9]    PCP: Hannah Caballero, [phone]   Code status:  No CPR- Do NOT Intubate       Extended Emergency Contact Information  Primary Emergency Contact: Kareem Mccloud  Address: [address] [place] [address] [id number] [place] [address]  Mobile Phone: [phone]  Relation: Spouse  Secondary Emergency Contact: Jasmyn Pineda   [place]  Home Phone: [phone]  Mobile Phone: [phone]  Relation: Daughter         IMPRESSION:  Severe right shoulder glenohumeral osteoarthritis     PLAN:  This patient was discussed with Dr. Mckeon, on-call surgeon for [facility] and they are in agreement with the following plan.   -No indication for urgent surgical intervention at this time  -Patient has had chronic osteoarthritis of the right shoulder.  She has seen Dr. Felix as an outpatient for this issue, has discussed possible total shoulder arthroplasty however has not been a good surgical candidate so has never had this done.  She has had corticosteroid injections into the shoulder in the past which do give her good relief however she notes it has been years since she has had 1.  Pain does always return a few months after the injection and she feels that the injections are quite painful.  She has not had one done as she has difficulty getting to outpatient follow-up visits due to lack of mobility.  Because of this I do think it be reasonable to do a corticosteroid injection into the glenohumeral joint here in the hospital.  Because this is a glenohumeral injection I like this to be done under ultrasound guidance and will consult interventional radiology to perform this procedure for her   -weight-bear as tolerated to the right upper extremity  - Follow up outpatient for further management    Ortho will sign off. Please feel free to reach out with any further questions or concerns.           Thank  "you for including [facility] in the care of Sissy Mccloud. It has been a pleasure participating in their care.        CHIEF COMPLAINT: Colitis    HISTORY OF PRESENT ILLNESS:  The patient is seen in orthopedic consultation at the request of Artemio Haywood MD for right shoulder pain.  The patient is a 89 year old female with PMH significant for DM, LES    The patient presents today with right shoulder pain.  She came into the emergency department on 1/14 due to sigmoid colitis.  She additionally is complaining of right shoulder pain.  She has had recent falls both on 01/13/2024 likely due to generalized weakness.  She does not note injury to the shoulder during her recent falls.  She has a history of chronic osteoarthritis of the right shoulder and has been seen with Dr. Felix as an outpatient.  As she is not a surgical candidate for total shoulder arthroplasty she has had corticosteroid injections in the past.  Most recent injections have been done years ago.  She does note that they do give her short-term therapeutic effect but pain does always return.  She has not been able to continue to get injections due to difficulty with following up at outpatient visits due to her overall health and living in an assisted living facility.      ALLERGIES:   Review of patient's allergies indicates   Allergies   Allergen Reactions    Ambien [Zolpidem] Unknown    Cleocin [Clindamycin] Unknown    Codeine Unknown         MEDICATIONS UPON ADMISSION:  Medications were reviewed.  They include:   (Not in a hospital admission)        SOCIAL HISTORY:   she        FAMILY HISTORY:  family history is not on file.      REVIEW OF SYSTEMS:   Reviewed with patient. See HPI, otherwise negative       PHYSICAL EXAMINATION:  Vitals: BP (!) 149/65 (BP Location: Left arm)   Pulse 60   Temp 98  F (36.7  C) (Oral)   Resp 18   Ht 1.575 m (5' 2\")   Wt 64 kg (141 lb)   LMP  (LMP Unknown)   SpO2 94%   BMI 25.79 kg/m    General: On " examination, the patient is resting comfortably, NAD, awake, and alert and oriented to person, place, time, and, and general circumstances   SKIN: There is no evidence of erythema, warmth, ecchymosis, swelling, deformity, crepitus, break to the skin, open wound.  Pulses:  brachial and radial pulse is intact and equal bilaterally  Sensation: intact and equal bilaterally to the distal upper extremities.  Tenderness: NTTP of the humerus, elbow, ulna, radius, wrist and hand  ROM: Abduction forward flexion to about 75 degrees.   Motor: hand  intact, wrist flexion/extension with resistance intact.  Contralateral side= Full range of motion, Negative joint instability findings, 5/5 motor groups about the joint, Non-tender.       RADIOGRAPHIC EVALUATION:  Personally reviewed  EXAM: CT SHOULDER RIGHT W/O CONTRAST  LOCATION: [facility]  DATE: 1/14/2024     INDICATION: Fall, pain, abnormal XR  COMPARISON: X-ray evaluation 01/14/2024  TECHNIQUE: Noncontrast. Axial, sagittal and coronal thin-section reconstruction. Dose reduction techniques were used.      FINDINGS:      BONES:  -The right glenohumeral joint is negative for fracture or dislocation. There is severe end-stage degenerative change with bone-on-bone contact and bony remodeling of the glenoid. There is also extensive osteophytic spurring inferiorly. The calcification   seen on plain film evaluation corresponds to a large osteophyte arising from the posterior superior corner of the glenoid rim. Hypertrophic change at the AC joint. The clavicle is negative for fracture. The scapula is negative for fracture.     SOFT TISSUES:  -Fluid in the subcoracoid recess.                                                                      IMPRESSION:  1.  No evidence for acute fracture or dislocation.  2.  Severe end-stage degenerative change at the glenohumeral joint with bone-on-bone contact and bony remodeling of the glenoid.  3.  Extensive  osteophytic spurring inferiorly along the articular surface of the humeral head.  4.  The abnormal calcification seen on plain film corresponds to a large osteophyte arising from the posterior superior corner of the glenoid rim but this is a chronic finding.  5.  Fluid in the subcoracoid recess of the joint.  6.  Hypertrophic change at the AC joint.    PERTINENT LABS:  Personally reviewed  Recent Labs   Lab Test 01/15/24  0450 02/26/22  1642 02/26/22  0920   INR  --   --  1.35*   HGB 10.7*   < >  --       < >  --     < > = values in this interval not displayed.         EYAD LUND PA-C  Date: 1/15/2024  Time: 11:04 AM  [facility]    CC1:   Artemio Haywood MD    CC2:   Hannah Caballero

## 2024-01-15 NOTE — PROGRESS NOTES
"   01/15/24 0854   Appointment Info   Signing Clinician's Name / Credentials (OT) Chikis Jasmina, OTR/L   Living Environment   People in Home spouse   Current Living Arrangements house   Home Accessibility no concerns   Living Environment Comments Pt has a walk-in shower with shower chair and grab bars.   Self-Care   Equipment Currently Used at Home none  (owns [facility] but does not use)   Fall history within last six months yes   Number of times patient has fallen within last six months 2   Activity/Exercise/Self-Care Comment Independent with ADLs.   Instrumental Activities of Daily Living (IADL)   IADL Comments Independent with cleaning, cooking, and laundry but states it is getting hard to manage.   General Information   Onset of Illness/Injury or Date of Surgery 01/14/24   Referring Physician Nicolasa Still MD   Patient/Family Therapy Goal Statement (OT) to go home   Additional Occupational Profile Info/Pertinent History of Current Problem Per chart review, pt \"is a 89 year old female with PMH of hypothyroidism, HTN, NIDDM, GERD, DJD who was brought to ED for evaluation of fall, right shoulder/hip pain and generalized weakness.  CT imaging reported colitis, UA concerning for infection.  Patient admitted on 1/14/2024 for further management\"   Existing Precautions/Restrictions fall   Right Upper Extremity (Weight-bearing Status) weight-bearing as tolerated (WBAT)   Cognitive Status Examination   Orientation Status orientation to person, place and time  (A&Ox4)   Pain Assessment   Patient Currently in Pain No   Range of Motion Comprehensive   Comment, General Range of Motion Limited ROM in R shoulder d/t pain   Strength Comprehensive (MMT)   Comment, General Manual Muscle Testing (MMT) Assessment Generalized BUE weakness, R weaker than L d/t pain.   Bed Mobility   Bed Mobility scooting/bridging;supine-sit;sit-supine   Scooting/Bridging Savage (Bed Mobility) dependent (less than 25% patient effort);2 person " assist   Supine-Sit Brandon (Bed Mobility) moderate assist (50% patient effort)   Sit-Supine Brandon (Bed Mobility) moderate assist (50% patient effort)   Assistive Device (Bed Mobility) draw sheet   Transfers   Transfers sit-stand transfer   Sit-Stand Transfer   Sit-Stand Brandon (Transfers) minimum assist (75% patient effort)   Balance   Balance Comments Pt slightly unsteady on feet with no AD, CGA for safety with no significant LOB.   Activities of Daily Living   BADL Assessment/Intervention lower body dressing;toileting   Lower Body Dressing Assessment/Training   Position (Lower Body Dressing) edge of bed sitting   Brandon Level (Lower Body Dressing) moderate assist (50% patient effort)   Toileting   Comment, (Toileting) Per clinical reasoning, anticipate pt will require Ax1 with toileting tasks.   Brandon Level (Toileting) not tested   Clinical Impression   Criteria for Skilled Therapeutic Interventions Met (OT) Yes, treatment indicated   OT Diagnosis Impaired ability to perform ADLs, IADLs, and functional mobility.   Influenced by the following impairments s/p fall   OT Problem List-Impairments impacting ADL problems related to;activity tolerance impaired;balance;mobility;range of motion (ROM);pain;strength   Assessment of Occupational Performance 1-3 Performance Deficits   Identified Performance Deficits bed mobility, lower body dressing, toileting   Planned Therapy Interventions (OT) ADL retraining;balance training;bed mobility training;motor coordination training;strengthening;transfer training;home program guidelines;progressive activity/exercise;risk factor education   Clinical Decision Making Complexity (OT) problem focused assessment/low complexity   Risk & Benefits of therapy have been explained evaluation/treatment results reviewed;care plan/treatment goals reviewed;risks/benefits reviewed;current/potential barriers reviewed;participants voiced agreement with care  plan;participants included;patient   OT Total Evaluation Time   OT Eval, Low Complexity Minutes (98232) 13   OT Goals   Therapy Frequency (OT) Daily   OT Predicted Duration/Target Date for Goal Attainment 01/22/24   OT Goals Lower Body Dressing;Bed Mobility;Toilet Transfer/Toileting   OT: Lower Body Dressing Supervision/stand-by assist;using adaptive equipment;including set-up/clothing retrieval   OT: Bed Mobility Supervision/stand-by assist;supine to/from sitting   OT: Toilet Transfer/Toileting Supervision/stand-by assist;toilet transfer;cleaning and garment management;using adaptive equipment   Self-Care/Home Management   Self-Care/Home Mgmt/ADL, Compensatory, Meal Prep Minutes (98527) 10   Symptoms Noted During/After Treatment (Meal Preparation/Planning Training) fatigue   Treatment Detail/Skilled Intervention Discussed with RN current order for sling for RUE, per RN still active order. Obtained sling for pt and educated pt on donning, Max A required d/t pain. Pt stated sling helped with pain however restricted movement. Pt ambulated in room 10' with CGA and cueing to use IV for balance d/t unsteadiness. Pt required CGA and cueing to safely scoot hips into bed. At end of session, discussed with MD order for sling, per MD order for sling will be discontinued to allow pt to use RUE functionally within pain tolerance, pt verbalized understanding.   OT Discharge Planning   OT Plan toileting in bathroom, lower body dressing, standing G/H   OT Discharge Recommendation (DC Rec) Transitional Care Facility   OT Rationale for DC Rec At this time, pt requiring Ax1 for all ADLs, IADLs, and functional mobility. TCU safest option at this time pending progress during hospital stay.   OT Brief overview of current status CGA functional mobility, Mod A bed mobility, Mod A lower body dressing   Total Session Time   Timed Code Treatment Minutes 10   Total Session Time (sum of timed and untimed services) 23

## 2024-01-15 NOTE — H&P
[facility]    History and Physical - Hospitalist Service       Date of Admission:  1/14/2024    Assessment & Plan      Sissy Mccloud is a 89 year old female with PMH of hypothyroidism, HTN, NIDDM, GERD, DJD, left-sided colitis, presented to ED from home for evaluation of recurrent falls and right-sided hip and shoulder pain.    Colitis, likely ischemic.  CT showed local segment colitis involving descending and sigmoid colon, with extensive diverticulosis no signs of diverticulitis.  There is colitis in the same location as in March 2022.  Patient without history of PAD, CAD, but at risk for them due to diabetes, HLD, advanced age.  Patient normal tensive.  WBCs 8.5.  Lactate 1.9.  -Liquid diet, IVF  -Symptomatic treatment with analgesics, antiemetics, antipyretics  -Ciprofloxacin/Flagyl  -GI consult  -A.m. labs, including lactate     UTI.  Probable urinary retention, given patient's symptoms of straining to urinate.  -Ciprofloxacin as above  -Negative for urinary retention/bladder scan  -Follow UC    Recurrent falls.  Both falls occurred on 1/13/2024.  Likely due to generalized weakness from colitis/UTI, dehydration.  No sustained traumatic injuries, per extensive radiologic evaluation.  Right shoulder severe pain, likely due to soft tissue contusion, with underlying severe right shoulder DJD.  -Symptomatic treatment  -PT/OT  -Right arm sling    Non-insulin-dependent diabetes.  A1c 7.1 2022.  PTA on Glucophage.  Hold Glucophage due to impaired renal function.  SSI NovoLog, check A1c.       Acute renal failure, likely due to dehydration.  Probable CKD 3.  Treating sepsis as above.  IVF.  IVF.  Avoid hypotension nephrotoxins.  Monitor renal function closely.     Isolated systolic hypertension.  SBP in 170s.  -PTA amlodipine.         Diet: Clear Liquid Diet    DVT Prophylaxis: Enoxaparin (Lovenox) SQ  Foster Catheter: Not present  Lines: None     Cardiac Monitoring: ACTIVE order. Indication:  QTc prolonging medication (48 hours)  Code Status: No CPR- Do NOT Intubate      Clinically Significant Risk Factors Present on Admission     # Hypertension: Noted on problem list              Disposition Plan      Expected Discharge Date: 01/17/2024               Nicolasa Still MD  Hospitalist Service  [facility]  Securely message with [employer] (more info)  Text page via [facility] Paging/Directory   ______________________________________________________________________    Chief Complaint   Recurrent falls, right shoulder and right hip pain, urinary incontinence, dysuria, abdominal pain.    History is obtained from the patient, electronic health record, and emergency department physician    History of Present Illness   Sissy Mccloud is a 89 year old female with PMH of hypothyroidism, HTN, NIDDM, GERD, DJD, left-sided colitis, presented to ED from home for evaluation of recurrent falls and right-sided Hip and shoulder pain.  Reportedly she felt on Friday a.m., while taking trash out.  She hit her head against parked vehicle.  Denies loss of consciousness.  Not anticoagulated.  Recurrent fall the same day, stating her legs gave out.  Patient endorses left-sided abdominal pain, with some nausea, but not vomiting.  She was hospitalized in March 2022 with left-sided colitis and CT abdomen showed colitis in the same location today.  Endorses dysuria, states not being able to void more than a cup at a time.  She denies hematuria, suprapubic pain or flank pain, fever, chills.  She denies chest pain, cardiac palpitations, productive cough, diarrhea.    Past Medical History    No past medical history on file.    Past Surgical History   Past Surgical History:   Procedure Laterality Date    CHOLECYSTECTOMY       Prior to Admission Medications   Prior to Admission Medications   Prescriptions Last Dose Informant Patient Reported? Taking?   amLODIPine (NORVASC) 5 MG tablet 1/13/2024 at pm  Yes Yes   Sig: Take  5 mg by mouth every evening   gabapentin (NEURONTIN) 300 MG capsule 1/13/2024 at pm  Yes Yes   Sig: [GABAPENTIN (NEURONTIN) 300 MG CAPSULE] Take 300 mg by mouth daily.   levothyroxine (SYNTHROID/LEVOTHROID) 88 MCG tablet 1/14/2024 at am  Yes Yes   Sig: Take 88 mcg by mouth every morning   metFORMIN (GLUCOPHAGE) 500 MG tablet 1/13/2024 at pm  Yes Yes   Sig: Take 500 mg by mouth daily (with dinner)   pantoprazole (PROTONIX) 40 MG tablet 1/13/2024 at pm  Yes Yes   Sig: [PANTOPRAZOLE (PROTONIX) 40 MG TABLET] Take 40 mg by mouth daily.      Facility-Administered Medications: None     Review of Systems    The 10 point Review of Systems is negative other than noted in the HPI.    Social History   I have reviewed this patient's social history and updated it with pertinent information if needed.    Family History   No significant family history, including no history of: Stroke, heart attack.    Allergies   Allergies   Allergen Reactions    Ambien [Zolpidem] Unknown    Cleocin [Clindamycin] Unknown    Codeine Unknown        Physical Exam   Vital Signs: Temp: 98.3  F (36.8  C) Temp src: Oral BP: (!) 176/74 Pulse: 66   Resp: 17 SpO2: 95 % O2 Device: None (Room air)    Weight: 0 lbs 0 oz  General: Alert and oriented x 2. Not in obvious distress.  Patient's daughter at the bedside.  HEENT: NC, AT. Neck- supple, No JVP elevation, lymphadenopathy or thyromegaly. Trachea-central.  Chest: Clear to auscultation bilaterally.  Heart: S1S2 regular. No M/R/G.  Abdomen: Soft, distended, tender to superficial palpation on the left side.  Hypoactive bowel sounds.  Back: No spine tenderness. No CVA tenderness.  Extremities: Right shoulder limited ROM due to pain.  Neuro: Cranial nerves 1-12 grossly normal. No focal neurological deficit    Medical Decision Making     80 MINUTES SPENT BY ME on the date of service doing chart review, history, exam, documentation & further activities per the note.      Data     I have personally reviewed the  following data over the past 24 hrs:    8.5  \   12.9   / 227     140 99 36.2 (H) /  210 (H)   4.2 28 1.42 (H) \     ALT: 10 AST: 19 AP: 77 TBILI: 0.6   ALB: 4.2 TOT PROTEIN: 7.3 LIPASE: 15     Procal: N/A CRP: N/A Lactic Acid: 1.9         Imaging results reviewed over the past 24 hrs:   Recent Results (from the past 24 hour(s))   CT Head w/o Contrast    Narrative    EXAM: CT HEAD W/O CONTRAST, CT CERVICAL SPINE W/O CONTRAST  LOCATION: [facility]  DATE/TIME: 1/14/2024 7:03 PM CST    INDICATION: Headache and neck pain after trauma  COMPARISON: CT head dated 05/22/2023  TECHNIQUE:   1) Routine CT Head without IV contrast. Multiplanar reformats. Dose reduction techniques were used.  2) Routine CT Cervical Spine without IV contrast. Multiplanar reformats. Dose reduction techniques were used.    FINDINGS:  HEAD CT:   INTRACRANIAL CONTENTS: No acute intracranial hemorrhage. No CT evidence of acute infarct. Sequelae of mild chronic microangiopathy. Mild cerebral volume loss without hydrocephalus. No extra-axial fluid collections.  Patent basal cisterns.     VISUALIZED ORBITS/SINUSES/MASTOIDS: Postoperative change of bilateral lenses, otherwise the orbits are unremarkable. The visualized paranasal sinuses and temporal bone structures are well-aerated.     BONES/SOFT TISSUES: The calvarium and skull base are unremarkable.     CERVICAL SPINE CT:  VERTEBRA: The spine is imaged from the skull base through mid body of T3. Straightening of the usual cervical lordosis without significant spondylolisthesis. No evidence of acute fracture. No aggressive osseous lesion.      CANAL/FORAMINA: Multilevel degenerative changes without high-grade spinal canal stenosis or high-grade neural foraminal narrowing.     PARASPINAL: No prevertebral or paravertebral soft tissue swelling.  Visualized lungs are clear. The thyroid gland is unremarkable. Mild carotid artery bifurcation calcification.      Impression     IMPRESSION:    HEAD CT:  1. Senescent changes and sequelae of chronic microangiopathy without acute intracranial abnormality.    CERVICAL SPINE CT:  1. No acute traumatic injury of the cervical spine.    CT Cervical Spine w/o Contrast    Narrative    EXAM: CT HEAD W/O CONTRAST, CT CERVICAL SPINE W/O CONTRAST  LOCATION: [facility]  DATE/TIME: 1/14/2024 7:03 PM CST    INDICATION: Headache and neck pain after trauma  COMPARISON: CT head dated 05/22/2023  TECHNIQUE:   1) Routine CT Head without IV contrast. Multiplanar reformats. Dose reduction techniques were used.  2) Routine CT Cervical Spine without IV contrast. Multiplanar reformats. Dose reduction techniques were used.    FINDINGS:  HEAD CT:   INTRACRANIAL CONTENTS: No acute intracranial hemorrhage. No CT evidence of acute infarct. Sequelae of mild chronic microangiopathy. Mild cerebral volume loss without hydrocephalus. No extra-axial fluid collections.  Patent basal cisterns.     VISUALIZED ORBITS/SINUSES/MASTOIDS: Postoperative change of bilateral lenses, otherwise the orbits are unremarkable. The visualized paranasal sinuses and temporal bone structures are well-aerated.     BONES/SOFT TISSUES: The calvarium and skull base are unremarkable.     CERVICAL SPINE CT:  VERTEBRA: The spine is imaged from the skull base through mid body of T3. Straightening of the usual cervical lordosis without significant spondylolisthesis. No evidence of acute fracture. No aggressive osseous lesion.      CANAL/FORAMINA: Multilevel degenerative changes without high-grade spinal canal stenosis or high-grade neural foraminal narrowing.     PARASPINAL: No prevertebral or paravertebral soft tissue swelling.  Visualized lungs are clear. The thyroid gland is unremarkable. Mild carotid artery bifurcation calcification.      Impression    IMPRESSION:    HEAD CT:  1. Senescent changes and sequelae of chronic microangiopathy without acute intracranial  abnormality.    CERVICAL SPINE CT:  1. No acute traumatic injury of the cervical spine.    CT Abdomen Pelvis w Contrast    Narrative    EXAM: CT ABDOMEN PELVIS W CONTRAST  LOCATION: [facility]  DATE: 1/14/2024    INDICATION: abdominal pain  COMPARISON: Pelvic radiograph 11/28/2022  TECHNIQUE: CT scan of the abdomen and pelvis was performed following injection of IV contrast. Multiplanar reformats were obtained. Dose reduction techniques were used.  CONTRAST: isovue 370  75ml    FINDINGS:   LOWER CHEST: Unremarkable.    HEPATOBILIARY: Cholecystectomy. No evidence of christian biliary obstruction.    PANCREAS: Normal.    SPLEEN: Normal.    ADRENAL GLANDS: Normal.    KIDNEYS/BLADDER: Bilateral nonobstructing renal calculi, largest in the left lower pole measuring 3 mm. No ureterolithiasis or hydronephrosis. Urinary bladder is decompressed with possible mucosal hyperenhancement. No significant surrounding fat   stranding.    BOWEL: Extensive colonic diverticulosis with relatively long segment wall thickening and adjacent inflammatory changes involving the descending and sigmoid colon. No evidence of christian bowel obstruction or perforation. Large volume of formed stool in the   rectal vault.    LYMPH NODES: No suspicious lymphadenopathy.    VASCULATURE: Scattered calcified atherosclerosis.    PELVIC ORGANS: Hysterectomy. Trace free fluid in the pelvis.    MUSCULOSKELETAL: No acute bony abnormality. Unchanged screw in the left iliac wing.      Impression    IMPRESSION:   1.  Long segment colitis involving the descending and sigmoid colon. While there is extensive diverticulosis, there is no focal inflammation surrounding a single diverticulum to indicate acute diverticulitis. Consider follow-up colonoscopy after   treatment to exclude underlying lesion.  2.  Possible cystitis, recommend correlation with urinalysis.   XR Shoulder Right G/E 3 Views    Narrative    EXAM: XR SHOULDER RIGHT G/E 3 VIEWS, XR  "SHOULDER LEFT G/E 3 VIEWS  LOCATION: [facility]  DATE: 1/14/2024    INDICATION: fall, pain  COMPARISON: None.      Impression    IMPRESSION:     Left shoulder: Postoperative changes of IM dirk and screw fixation of an old fracture of the left humerus. No acute fractures are identified. Degenerative change at the glenohumeral joint. No dislocation. Hypertrophic change at the AC joint.    Right shoulder: There is a calcification along the superior aspect of the glenoid of uncertain donor site. This is worrisome for an acute fracture. There is severe degenerative change at the glenoid humeral joint. There is significant \"winging\" of the   scapula secondary  to thoracic kyphosis and no good AP view of the shoulder is obtained. CT may be helpful for further evaluation if indicated. Hypertrophic change at the AC joint.   XR Shoulder Left G/E 3 Views    Narrative    EXAM: XR SHOULDER RIGHT G/E 3 VIEWS, XR SHOULDER LEFT G/E 3 VIEWS  LOCATION: [facility]  DATE: 1/14/2024    INDICATION: fall, pain  COMPARISON: None.      Impression    IMPRESSION:     Left shoulder: Postoperative changes of IM dirk and screw fixation of an old fracture of the left humerus. No acute fractures are identified. Degenerative change at the glenohumeral joint. No dislocation. Hypertrophic change at the AC joint.    Right shoulder: There is a calcification along the superior aspect of the glenoid of uncertain donor site. This is worrisome for an acute fracture. There is severe degenerative change at the glenoid humeral joint. There is significant \"winging\" of the   scapula secondary  to thoracic kyphosis and no good AP view of the shoulder is obtained. CT may be helpful for further evaluation if indicated. Hypertrophic change at the AC joint.   XR Pelvis and Hip Right 2 Views    Narrative    EXAM: XR PELVIS AND HIP RIGHT 2 VIEWS  LOCATION: [facility]  DATE: " 1/14/2024    INDICATION: fall, pain  COMPARISON: None.      Impression    IMPRESSION: Both hips negative for fracture or dislocation. Screw fixation of the left iliac wing. No pelvic fractures are identified. Vascular calcifications.   CT Shoulder Right w/o Contrast    Narrative    EXAM: CT SHOULDER RIGHT W/O CONTRAST  LOCATION: [facility]  DATE: 1/14/2024    INDICATION: Fall, pain, abnormal XR  COMPARISON: X-ray evaluation 01/14/2024  TECHNIQUE: Noncontrast. Axial, sagittal and coronal thin-section reconstruction. Dose reduction techniques were used.     FINDINGS:     BONES:  -The right glenohumeral joint is negative for fracture or dislocation. There is severe end-stage degenerative change with bone-on-bone contact and bony remodeling of the glenoid. There is also extensive osteophytic spurring inferiorly. The calcification   seen on plain film evaluation corresponds to a large osteophyte arising from the posterior superior corner of the glenoid rim. Hypertrophic change at the AC joint. The clavicle is negative for fracture. The scapula is negative for fracture.    SOFT TISSUES:  -Fluid in the subcoracoid recess.      Impression    IMPRESSION:  1.  No evidence for acute fracture or dislocation.  2.  Severe end-stage degenerative change at the glenohumeral joint with bone-on-bone contact and bony remodeling of the glenoid.  3.  Extensive osteophytic spurring inferiorly along the articular surface of the humeral head.  4.  The abnormal calcification seen on plain film corresponds to a large osteophyte arising from the posterior superior corner of the glenoid rim but this is a chronic finding.  5.  Fluid in the subcoracoid recess of the joint.  6.  Hypertrophic change at the AC joint.

## 2024-01-15 NOTE — CONSULTS
Care Management Initial Consult    General Information  Assessment completed with: Patient,    Type of CM/SW Visit: Initial Assessment    Primary Care Provider verified and updated as needed: Yes   Readmission within the last 30 days: no previous admission in last 30 days      Reason for Consult: discharge planning  Advance Care Planning:            Communication Assessment  Patient's communication style: spoken language (English or Bilingual)             Cognitive  Cognitive/Neuro/Behavioral: WDL                      Living Environment:   People in home: spouse     Current living Arrangements: house      Able to return to prior arrangements: yes       Family/Social Support:  Care provided by: self  Provides care for: no one  Marital Status:   , Children          Description of Support System: Supportive, Involved    Support Assessment: Adequate family and caregiver support    Current Resources:   Patient receiving home care services: No     Community Resources: None  Equipment currently used at home: none (owns FWW but does not use)  Supplies currently used at home: None    Employment/Financial:  Employment Status: retired        Financial Concerns: none           Does the patient's insurance plan have a 3 day qualifying hospital stay waiver?  Yes     Which insurance plan 3 day waiver is available? Alternative insurance waiver    Will the waiver be used for post-acute placement? No    Lifestyle & Psychosocial Needs:  Social Determinants of Health     Food Insecurity: Not on file   Depression: Not on file   Housing Stability: Not on file   Tobacco Use: Not on file   Financial Resource Strain: Not on file   Alcohol Use: Not on file   Transportation Needs: Not on file   Physical Activity: Not on file   Interpersonal Safety: Not on file   Stress: Not on file   Social Connections: Not on file       Functional Status:  Prior to admission patient needed assistance:   Dependent ADLs:: Independent  Dependent  IADLs:: Independent       Mental Health Status:  Mental Health Status: No Current Concerns       Chemical Dependency Status:  Chemical Dependency Status: No Current Concerns             Values/Beliefs:  Spiritual, Cultural Beliefs, Restoration Practices, Values that affect care: no               Additional Information:  CM met with patient in room to discuss discharge planning and complete initial assessment.     Patient lives in a house with . She is independent with ADL/IADLs at baseline and drives. Patient reports no DME or home care services.    Therapy rec is home with assist and home PT. Patient declines home care and states she has had it in the past and it wasn't helpful. Anticipate discharge home, dtr to transport.     CM will continue to monitor progression of care, review team recommendations and provide discharge planning assist as needed.     TRINO VarnerW

## 2024-01-15 NOTE — CONSULTS
[facility] Consult       Name: Sissy Mccloud    Medical Record #: [id number]    YOB: 1931    Date/Time: 1/15/2024/10:39 AM    Reason for Consultation: Artemio Haywood MD has asked me to evaluate Sissy Mccloud regarding left sided colitis.    HPI: This is a 89-year-old female who presented with recurrent falls.  She also noted some mild left-sided abdominal pain.  CT scan of the abdomen pelvis revealed inflammation in the descending and sigmoid colon as well as possible cystitis.  She had similar left-sided abdominal pain and some nausea in February 2022 and a CT scan at that time also showed some left-sided colitis.  She reports that after her February 2022 episode of abdominal pain, that abdominal pain resolved.  She denies any current diarrhea and reports that she is usually constipated.  She reports her last colonoscopy was over 10 years ago.  Patient is currently on ciprofloxacin and metronidazole.  Patient reports that her left side abdominal pain seems to have improved.    Patient Active Problem List   Diagnosis    Dehydration    Confusion    Acute renal insufficiency    Left sided colitis without complications (H)    Sepsis (H)    Hypomagnesemia    Non-insulin dependent type 2 diabetes mellitus (H)    Acute encephalopathy    CKD (chronic kidney disease) stage 3, GFR 30-59 ml/min (H)    Diastolic heart failure (H)    Hypertension    Controlled type 2 diabetes mellitus with complication, without long-term current use of insulin (H)    Recurrent falls    Colitis    Acute cystitis without hematuria    Multiple falls    Acute pain of right shoulder          Review of Systems (ROS): A comprehensive review of systems was negative.    Past Medical History:  No past medical history on file.    Medications:   Current Outpatient Medications   Medication Sig Dispense Refill    amLODIPine (NORVASC) 5 MG tablet Take 5 mg by mouth every evening      gabapentin (NEURONTIN) 300 MG capsule [GABAPENTIN  "(NEURONTIN) 300 MG CAPSULE] Take 300 mg by mouth daily.      levothyroxine (SYNTHROID/LEVOTHROID) 88 MCG tablet Take 88 mcg by mouth every morning      metFORMIN (GLUCOPHAGE) 500 MG tablet Take 500 mg by mouth daily (with dinner)      pantoprazole (PROTONIX) 40 MG tablet [PANTOPRAZOLE (PROTONIX) 40 MG TABLET] Take 40 mg by mouth daily.         Allergies: Ambien [zolpidem], Cleocin [clindamycin], and Codeine    Family History:  No family history on file.    Social History:  Social History     Socioeconomic History    Marital status:      Spouse name: Not on file    Number of children: Not on file    Years of education: Not on file    Highest education level: Not on file   Occupational History    Not on file   Tobacco Use    Smoking status: Not on file    Smokeless tobacco: Not on file   Substance and Sexual Activity    Alcohol use: Not on file    Drug use: Not on file    Sexual activity: Not on file   Other Topics Concern    Not on file   Social History Narrative    Not on file     Social Determinants of Health     Financial Resource Strain: Not on file   Food Insecurity: Not on file   Transportation Needs: Not on file   Physical Activity: Not on file   Stress: Not on file   Social Connections: Not on file   Interpersonal Safety: Not on file   Housing Stability: Not on file       PHYSICAL EXAMINATION:  BP (!) 149/65 (BP Location: Left arm)   Pulse 60   Temp 98  F (36.7  C) (Oral)   Resp 18   Ht 1.575 m (5' 2\")   Wt 64 kg (141 lb)   LMP  (LMP Unknown)   SpO2 94%   BMI 25.79 kg/m   Body mass index is 25.79 kg/m .  General:  NAD  HEENT: Sclera non-icteric.    Gastrointestinal: Non-distended, Minimal left sided tenderness  Skin: No jaundice    I have reviewed recent laboratory studies:    LABORATORY DATA:    Recent Labs   Lab Test 01/15/24  0450   WBC 7.4   RBC 3.68*   HGB 10.7*   HCT 33.6*   MCV 91   MCH 29.1   MCHC 31.8   RDW 12.7         Sodium   Date Value Ref Range Status   01/15/2024 137 135 - " 145 mmol/L Final     Comment:     Reference intervals for this test were updated on 09/26/2023 to more accurately reflect our healthy population. There may be differences in the flagging of prior results with similar values performed with this method. Interpretation of those prior results can be made in the context of the updated reference intervals.      Potassium   Date Value Ref Range Status   01/15/2024 4.4 3.4 - 5.3 mmol/L Final   06/20/2022 4.3 3.5 - 5.0 mmol/L Final     Chloride   Date Value Ref Range Status   01/15/2024 101 98 - 107 mmol/L Final   06/20/2022 103 98 - 107 mmol/L Final     Carbon Dioxide (CO2)   Date Value Ref Range Status   01/15/2024 27 22 - 29 mmol/L Final   06/20/2022 27 22 - 31 mmol/L Final     Anion Gap   Date Value Ref Range Status   01/15/2024 9 7 - 15 mmol/L Final   06/20/2022 10 5 - 18 mmol/L Final     Glucose   Date Value Ref Range Status   01/15/2024 162 (H) 70 - 99 mg/dL Final   06/20/2022 191 (H) 70 - 125 mg/dL Final     GLUCOSE BY METER POCT   Date Value Ref Range Status   01/15/2024 120 (H) 70 - 99 mg/dL Final     Urea Nitrogen   Date Value Ref Range Status   01/15/2024 31.8 (H) 8.0 - 23.0 mg/dL Final   06/20/2022 32 (H) 8 - 28 mg/dL Final     Creatinine   Date Value Ref Range Status   01/15/2024 1.38 (H) 0.51 - 0.95 mg/dL Final     GFR Estimate   Date Value Ref Range Status   01/15/2024 36 (L) >60 mL/min/1.73m2 Final   04/19/2021 40 (L) >60 mL/min/1.73m2 Final     Calcium   Date Value Ref Range Status   01/15/2024 8.7 8.2 - 9.6 mg/dL Final      Recent Labs   Lab Test 01/15/24  0450   PROTTOTAL 5.8*   ALBUMIN 3.3*   BILITOTAL 0.2   ALKPHOS 60   AST 15   ALT 8      INR   Date Value Ref Range Status   02/26/2022 1.35 (H) 0.85 - 1.15 Final         Radiology: reviewed.    Impression: Patient is a 89-year-old female who presented with recurrent falls.  She also noted some mild left-sided abdominal pain.  CT scan of the abdomen pelvis revealed inflammation in the descending and  sigmoid colon as well as possible cystitis.  She had similar left-sided abdominal pain and some nausea in February 2022 and a CT scan at that time also showed some left-sided colitis.  She reports that after her February 2022 episode of abdominal pain, that abdominal pain resolved.  She denies any current diarrhea and reports that she is usually constipated.  She reports her last colonoscopy was over 10 years ago.  Patient is currently on ciprofloxacin and metronidazole.  Patient reports that her left side abdominal pain seems to have improved.  Possible etiologies of her left-sided colitis include infectious colitis, ischemic colitis, inflammatory bowel disease, less likely a colonic malignancy.    Recommendation:   -Complete 1 week of antibiotics  -IV fluid  -Maintain adequate hydration  -We discussed possible colonoscopy versus repeat CT scan of the abdomen and pelvis in 2 months to confirm complete resolution of the left-sided colitis.  Patient declined colonoscopy but was open to a repeat CT scan in 2 months.  This should be arranged through her primary care provider.  -Continue supportive care.  -Will sign off.  Please call with questions.    Michelle Mazariegos MD  1/15/2024  [facility]

## 2024-01-15 NOTE — ED NOTES
Please call pt daughter Jasmyn when ready for discharge at [phone]; Kareem ([facility]) [phone] [facility] or [phone] home.

## 2024-01-15 NOTE — PLAN OF CARE
Problem: Pain Acute  Goal: Optimal Pain Control and Function  Outcome: Progressing  Intervention: Develop Pain Management Plan  Recent Flowsheet Documentation  Taken 1/15/2024 0900 by Ashely Morin RN  Pain Management Interventions: medication (see MAR)  Intervention: Prevent or Manage Pain  Recent Flowsheet Documentation  Taken 1/15/2024 0900 by Ashely Morin RN  Medication Review/Management: medications reviewed   Goal Outcome Evaluation:       Patient denies abdominal pain.  Pain in shoulders.  Right greater than left.  Lidocaine patch, tylenol and voltaren given per MD order.  Steroid injection to right shoulder planned for this afternoon.      Tolerating full liquid diet.  Loose stool x1. Up to bathroom with assist of 1.

## 2024-01-15 NOTE — MEDICATION SCRIBE - ADMISSION MEDICATION HISTORY
Medication Scribe Admission Medication History    Admission medication history is complete. The information provided in this note is only as accurate as the sources available at the time of the update.    Information Source(s): Patient via in-person    Pertinent Information: Patient reports self management of medications.    Patient reports no longer taking: Aspirin, B12    Changes made to PTA medication list:  Added: None  Deleted: Aspirin, B12  Changed: Amlodipine 10 to 5, Levothroid 100 to 88, Metformin from Glumetza to Glucophage    Medication Affordability:  Not including over the counter (OTC) medications, was there a time in the past 3 months when you did not take your medications as prescribed because of cost?: No    Allergies reviewed with patient and updates made in EHR: yes    Medication History Completed By: Fish Thomas 1/14/2024 7:23 PM    PTA Med List   Medication Sig Last Dose    amLODIPine (NORVASC) 5 MG tablet Take 5 mg by mouth every evening 1/13/2024 at pm    gabapentin (NEURONTIN) 300 MG capsule [GABAPENTIN (NEURONTIN) 300 MG CAPSULE] Take 300 mg by mouth daily. 1/13/2024 at pm    levothyroxine (SYNTHROID/LEVOTHROID) 88 MCG tablet Take 88 mcg by mouth every morning 1/14/2024 at am    metFORMIN (GLUCOPHAGE) 500 MG tablet Take 500 mg by mouth daily (with dinner) 1/13/2024 at pm    pantoprazole (PROTONIX) 40 MG tablet [PANTOPRAZOLE (PROTONIX) 40 MG TABLET] Take 40 mg by mouth daily. 1/13/2024 at pm

## 2024-01-15 NOTE — PROVIDER NOTIFICATION
Writer spoke with orthopedic PA Maxim Avery about MRI recommendation. He did ordered MRI. MRI checklist was done and sent to MRI.

## 2024-01-16 ENCOUNTER — APPOINTMENT (OUTPATIENT)
Dept: OCCUPATIONAL THERAPY | Facility: HOSPITAL | Age: 89
DRG: 372 | End: 2024-01-16
Payer: COMMERCIAL

## 2024-01-16 ENCOUNTER — APPOINTMENT (OUTPATIENT)
Dept: PHYSICAL THERAPY | Facility: HOSPITAL | Age: 89
DRG: 372 | End: 2024-01-16
Payer: COMMERCIAL

## 2024-01-16 LAB
ANION GAP SERPL CALCULATED.3IONS-SCNC: 10 MMOL/L (ref 7–15)
BASOPHILS # BLD AUTO: 0 10E3/UL (ref 0–0.2)
BASOPHILS NFR BLD AUTO: 0 %
BUN SERPL-MCNC: 26.3 MG/DL (ref 8–23)
CALCIUM SERPL-MCNC: 9 MG/DL (ref 8.2–9.6)
CHLORIDE SERPL-SCNC: 98 MMOL/L (ref 98–107)
CREAT SERPL-MCNC: 1.25 MG/DL (ref 0.51–0.95)
DEPRECATED HCO3 PLAS-SCNC: 25 MMOL/L (ref 22–29)
EGFRCR SERPLBLD CKD-EPI 2021: 40 ML/MIN/1.73M2
EOSINOPHIL # BLD AUTO: 0 10E3/UL (ref 0–0.7)
EOSINOPHIL NFR BLD AUTO: 0 %
ERYTHROCYTE [DISTWIDTH] IN BLOOD BY AUTOMATED COUNT: 12.7 % (ref 10–15)
GLUCOSE BLDC GLUCOMTR-MCNC: 180 MG/DL (ref 70–99)
GLUCOSE BLDC GLUCOMTR-MCNC: 182 MG/DL (ref 70–99)
GLUCOSE BLDC GLUCOMTR-MCNC: 187 MG/DL (ref 70–99)
GLUCOSE BLDC GLUCOMTR-MCNC: 199 MG/DL (ref 70–99)
GLUCOSE SERPL-MCNC: 316 MG/DL (ref 70–99)
HCT VFR BLD AUTO: 37.6 % (ref 35–47)
HGB BLD-MCNC: 11.7 G/DL (ref 11.7–15.7)
IMM GRANULOCYTES # BLD: 0 10E3/UL
IMM GRANULOCYTES NFR BLD: 0 %
LYMPHOCYTES # BLD AUTO: 0.4 10E3/UL (ref 0.8–5.3)
LYMPHOCYTES NFR BLD AUTO: 4 %
MAGNESIUM SERPL-MCNC: 1.8 MG/DL (ref 1.7–2.3)
MCH RBC QN AUTO: 29 PG (ref 26.5–33)
MCHC RBC AUTO-ENTMCNC: 31.1 G/DL (ref 31.5–36.5)
MCV RBC AUTO: 93 FL (ref 78–100)
MONOCYTES # BLD AUTO: 0.1 10E3/UL (ref 0–1.3)
MONOCYTES NFR BLD AUTO: 2 %
NEUTROPHILS # BLD AUTO: 7.4 10E3/UL (ref 1.6–8.3)
NEUTROPHILS NFR BLD AUTO: 94 %
NRBC # BLD AUTO: 0 10E3/UL
NRBC BLD AUTO-RTO: 0 /100
PLATELET # BLD AUTO: 219 10E3/UL (ref 150–450)
POTASSIUM SERPL-SCNC: 4.4 MMOL/L (ref 3.4–5.3)
RBC # BLD AUTO: 4.03 10E6/UL (ref 3.8–5.2)
SODIUM SERPL-SCNC: 133 MMOL/L (ref 135–145)
WBC # BLD AUTO: 8 10E3/UL (ref 4–11)

## 2024-01-16 PROCEDURE — 250N000013 HC RX MED GY IP 250 OP 250 PS 637: Performed by: INTERNAL MEDICINE

## 2024-01-16 PROCEDURE — 36415 COLL VENOUS BLD VENIPUNCTURE: CPT | Performed by: STUDENT IN AN ORGANIZED HEALTH CARE EDUCATION/TRAINING PROGRAM

## 2024-01-16 PROCEDURE — 97530 THERAPEUTIC ACTIVITIES: CPT | Mod: GP

## 2024-01-16 PROCEDURE — 83735 ASSAY OF MAGNESIUM: CPT | Performed by: STUDENT IN AN ORGANIZED HEALTH CARE EDUCATION/TRAINING PROGRAM

## 2024-01-16 PROCEDURE — 85025 COMPLETE CBC W/AUTO DIFF WBC: CPT | Performed by: STUDENT IN AN ORGANIZED HEALTH CARE EDUCATION/TRAINING PROGRAM

## 2024-01-16 PROCEDURE — 250N000011 HC RX IP 250 OP 636: Performed by: INTERNAL MEDICINE

## 2024-01-16 PROCEDURE — 80048 BASIC METABOLIC PNL TOTAL CA: CPT | Performed by: STUDENT IN AN ORGANIZED HEALTH CARE EDUCATION/TRAINING PROGRAM

## 2024-01-16 PROCEDURE — 120N000001 HC R&B MED SURG/OB

## 2024-01-16 PROCEDURE — 250N000013 HC RX MED GY IP 250 OP 250 PS 637: Performed by: STUDENT IN AN ORGANIZED HEALTH CARE EDUCATION/TRAINING PROGRAM

## 2024-01-16 PROCEDURE — 97116 GAIT TRAINING THERAPY: CPT | Mod: GP

## 2024-01-16 PROCEDURE — 99232 SBSQ HOSP IP/OBS MODERATE 35: CPT | Performed by: STUDENT IN AN ORGANIZED HEALTH CARE EDUCATION/TRAINING PROGRAM

## 2024-01-16 PROCEDURE — 250N000011 HC RX IP 250 OP 636: Performed by: STUDENT IN AN ORGANIZED HEALTH CARE EDUCATION/TRAINING PROGRAM

## 2024-01-16 PROCEDURE — 82962 GLUCOSE BLOOD TEST: CPT

## 2024-01-16 PROCEDURE — 97535 SELF CARE MNGMENT TRAINING: CPT | Mod: GO

## 2024-01-16 RX ORDER — HYDRALAZINE HYDROCHLORIDE 20 MG/ML
5 INJECTION INTRAMUSCULAR; INTRAVENOUS EVERY 4 HOURS PRN
Status: DISCONTINUED | OUTPATIENT
Start: 2024-01-16 | End: 2024-01-19 | Stop reason: HOSPADM

## 2024-01-16 RX ORDER — ACETAMINOPHEN 325 MG/1
975 TABLET ORAL EVERY 8 HOURS
Status: COMPLETED | OUTPATIENT
Start: 2024-01-16 | End: 2024-01-18

## 2024-01-16 RX ORDER — VANCOMYCIN HYDROCHLORIDE 50 MG/ML
125 KIT ORAL 4 TIMES DAILY
Qty: 100 ML | Refills: 0 | Status: DISCONTINUED | OUTPATIENT
Start: 2024-01-16 | End: 2024-01-19 | Stop reason: HOSPADM

## 2024-01-16 RX ORDER — ROPIVACAINE HYDROCHLORIDE 5 MG/ML
15 INJECTION, SOLUTION EPIDURAL; INFILTRATION; PERINEURAL ONCE
Status: COMPLETED | OUTPATIENT
Start: 2024-01-16 | End: 2024-01-15

## 2024-01-16 RX ADMIN — CIPROFLOXACIN 400 MG: 400 INJECTION, SOLUTION INTRAVENOUS at 01:00

## 2024-01-16 RX ADMIN — VANCOMYCIN HYDROCHLORIDE 125 MG: KIT at 16:03

## 2024-01-16 RX ADMIN — LEVOTHYROXINE SODIUM 88 MCG: 88 TABLET ORAL at 07:58

## 2024-01-16 RX ADMIN — HYDRALAZINE HYDROCHLORIDE 5 MG: 20 INJECTION INTRAMUSCULAR; INTRAVENOUS at 21:47

## 2024-01-16 RX ADMIN — VANCOMYCIN HYDROCHLORIDE 125 MG: KIT at 10:43

## 2024-01-16 RX ADMIN — VANCOMYCIN HYDROCHLORIDE 125 MG: KIT at 21:34

## 2024-01-16 RX ADMIN — AMLODIPINE BESYLATE 5 MG: 5 TABLET ORAL at 21:34

## 2024-01-16 RX ADMIN — ACETAMINOPHEN 975 MG: 325 TABLET ORAL at 10:42

## 2024-01-16 RX ADMIN — DICLOFENAC SODIUM 2 G: 10 GEL TOPICAL at 08:15

## 2024-01-16 RX ADMIN — ACETAMINOPHEN 975 MG: 325 TABLET ORAL at 21:33

## 2024-01-16 RX ADMIN — GABAPENTIN 300 MG: 300 CAPSULE ORAL at 21:34

## 2024-01-16 RX ADMIN — METRONIDAZOLE 500 MG: 500 INJECTION, SOLUTION INTRAVENOUS at 00:01

## 2024-01-16 RX ADMIN — ENOXAPARIN SODIUM 30 MG: 30 INJECTION SUBCUTANEOUS at 07:58

## 2024-01-16 RX ADMIN — PANTOPRAZOLE SODIUM 40 MG: 40 TABLET, DELAYED RELEASE ORAL at 21:33

## 2024-01-16 RX ADMIN — DICLOFENAC SODIUM 2 G: 10 GEL TOPICAL at 18:43

## 2024-01-16 RX ADMIN — LIDOCAINE 1 PATCH: 4 PATCH TOPICAL at 07:58

## 2024-01-16 ASSESSMENT — ACTIVITIES OF DAILY LIVING (ADL)
ADLS_ACUITY_SCORE: 29
ADLS_ACUITY_SCORE: 29
DIFFICULTY_COMMUNICATING: NO
FALL_HISTORY_WITHIN_LAST_SIX_MONTHS: YES
ADLS_ACUITY_SCORE: 33
VISION_MANAGEMENT: GLASSES
DIFFICULTY_EATING/SWALLOWING: NO
WALKING_OR_CLIMBING_STAIRS_DIFFICULTY: NO
DRESSING/BATHING_DIFFICULTY: NO
ADLS_ACUITY_SCORE: 33
ADLS_ACUITY_SCORE: 33
ADLS_ACUITY_SCORE: 29
ADLS_ACUITY_SCORE: 33
ADLS_ACUITY_SCORE: 29
ADLS_ACUITY_SCORE: 29
ADLS_ACUITY_SCORE: 33
HEARING_DIFFICULTY_OR_DEAF: YES
ADLS_ACUITY_SCORE: 33
ADLS_ACUITY_SCORE: 33
DOING_ERRANDS_INDEPENDENTLY_DIFFICULTY: NO
CONCENTRATING,_REMEMBERING_OR_MAKING_DECISIONS_DIFFICULTY: NO
WEAR_GLASSES_OR_BLIND: YES

## 2024-01-16 NOTE — PLAN OF CARE
Problem: Hypertension Acute  Goal: Blood Pressure Within Desired Range  Outcome: Progressing  Intervention: Normalize Blood Pressure  Recent Flowsheet Documentation  Taken 1/16/2024 1222 by Neymar Johnson, RN  Sensory Stimulation Regulation: care clustered  Medication Review/Management: medications reviewed     Problem: Pain Acute  Goal: Optimal Pain Control and Function  Outcome: Progressing  Intervention: Prevent or Manage Pain  Recent Flowsheet Documentation  Taken 1/16/2024 1222 by Neymar Johnson, RN  Sensory Stimulation Regulation: care clustered  Medication Review/Management: medications reviewed  Intervention: Optimize Psychosocial Wellbeing  Recent Flowsheet Documentation  Taken 1/16/2024 1222 by Neymar Johnson, RN  Spiritual Activities Assistance: affirmation provided  Supportive Measures: active listening utilized   Goal Outcome Evaluation:    Patient A&Ox4, inconsistent with time. Pleasant, cooperative with cares and assessment. Patient endorses pain bilaterally in both arms and shoulders. Repositioned, tylenol PRN and active range of motion promoted. Hypertensive upon arrival; IV replaced in afternoon, recheck normotensive. Able to ambulate to bedside chair with minimal assistance.

## 2024-01-16 NOTE — PLAN OF CARE
Per IR team after review of CT scan there was concern for possible nondisplaced fracture, recommended MRI of shoulder for further evaluation.  MRI completed and reviewed this morning, does not indicate a nondisplaced fracture.  Corticosteroid injection has been completed, continue plan as outlined in my previous consult note.    Ortho will sign off. Please feel free to reach out with any further questions or concerns.     EYAD LUND PA-C

## 2024-01-16 NOTE — PLAN OF CARE
Problem: Adult Inpatient Plan of Care  Goal: Plan of Care Review  Description: The Plan of Care Review/Shift note should be completed every shift.  The Outcome Evaluation is a brief statement about your assessment that the patient is improving, declining, or no change.  This information will be displayed automatically on your shift  note.  Outcome: Progressing  Flowsheets (Taken 1/15/2024 2132)  Plan of Care Reviewed With: patient     Problem: UTI (Urinary Tract Infection)  Goal: Improved Infection Symptoms  Outcome: Progressing     Problem: Pain Acute  Goal: Optimal Pain Control and Function  Outcome: Progressing  Intervention: Develop Pain Management Plan  Recent Flowsheet Documentation  Taken 1/15/2024 1727 by Dallas Gamino RN  Pain Management Interventions: medication (see MAR)  Taken 1/15/2024 1640 by Dallas Gamino RN  Pain Management Interventions: emotional support  Intervention: Prevent or Manage Pain  Recent Flowsheet Documentation  Taken 1/15/2024 2020 by Dallas Gamino RN  Medication Review/Management: medications reviewed  Taken 1/15/2024 1642 by Dallas Gamino RN  Medication Review/Management: medications reviewed     Problem: Hypertension Acute  Goal: Blood Pressure Within Desired Range  Outcome: Progressing  Intervention: Normalize Blood Pressure  Recent Flowsheet Documentation  Taken 1/15/2024 2020 by Dallas Gamino RN  Medication Review/Management: medications reviewed  Taken 1/15/2024 1642 by Dallas Gamino RN  Medication Review/Management: medications reviewed   Goal Outcome Evaluation:      Plan of Care Reviewed With: patient      Pt alert and oriented times 3. She was disoriented to time. Vitals mostly stable except at bedtime, BP was elevated. Gave scheduled norvasc which did not help. BP was 188/80 and then 193/ 85. Beni MADISON was notified and got order for prn hydralazine. Gave 10 mg IV hydralazine. BP at 2212 was 148/68 with HR of 61.  Pt  denied any pain at this time. Pt laying comfortably. Pt had lg incontinent of urine twice this shift. Bladder scan and did not showed much residual. Purewick placed and draining well. Pt had MRI this evening. Tele sinus sanjeev with 1st degree AV block. Blood sugar before meal was 115 mg/dl and bedtime was 205 mg/dl. Pt has LR running at 50 ml/hr. Pt tolerated full liquid diet without any problem. Can advance diet to regular in morning.

## 2024-01-16 NOTE — PROGRESS NOTES
PRIMARY DIAGNOSIS: ACUTE PAIN  OUTPATIENT/OBSERVATION GOALS TO BE MET BEFORE DISCHARGE:  1. Pain Status: Improved-controlled with oral pain medications.    2. Return to near baseline physical activity: No    3. Cleared for discharge by consultants (if involved): No    Discharge Planner Nurse   Safe discharge environment identified: No  Barriers to discharge: Yes , doing MRI of right shoulder to rule out fracutre       Entered by: Dallas Gamino RN 01/15/2024 6:30 PM     Please review provider order for any additional goals.   Nurse to notify provider when observation goals have been met and patient is ready for discharge.

## 2024-01-16 NOTE — PROGRESS NOTES
Met with patient  to review role of care management, progression of care and possible need for services at discharge, including OP services, home care, or skilled nursing care. Patient alert, oriented and engaged in the conversation.     Pt has not decided if she is open to TCU and would let the CM team know if she decides to go to TCU. At present, she is thinking home with PT.

## 2024-01-16 NOTE — PROGRESS NOTES
[facility]    Medicine Progress Note - Hospitalist Service    Date of Admission:  1/14/2024    Assessment & Plan   Sissy Mccloud is a 89 year old female with PMH of hypothyroidism, HTN, NIDDM, GERD, DJD who was brought to ED for evaluation of fall, right shoulder/hip pain and generalized weakness.  CT imaging reported colitis, UA concerning for infection.  Patient admitted on 1/14/2024 for further management      Left sided colitis  C.diff colitis  -- Patient reported history of constipation but denied abdominal pain, nausea, vomiting prior to admission.  -- CT showed local segment colitis involving descending and sigmoid colon, with extensive diverticulosis no signs of diverticulitis.  There is colitis in the same location as in March 2022.  -- On admission, normal WBC count, normal lactic acid.  -- s/p IV cipro and flagyl  -- 01/16: Discussed with Dr. Mazariegos ([facility]) regarding c.diff result and agreed to start vancomycin po for 10-14 days and discontinue systemic antibiotics. Repeat CT-abd/pelvis by PCP in 2 months.    UTI-resolved  -- Patient denied urinary symptoms, fever but reported generalized weakness  -- U/A : +ve for nitrite. Ucx: contaminated  -- Completed 3 days of IV abx- ceftriaxone and cipro    Recurrent falls;  -- Both falls occurred on 1/13/2024.  Likely due to generalized weakness from above issues.  -- On admission, CT head and cervical spine negative for acute issues.  X-ray hip negative for fracture or dislocation.  -- Fall precaution.  PT OT consulted: TCU    Severe bilateral shoulder pain, right more than left;  -- CT right shoulder negative for acute fracture or dislocation but reported severe end-stage degenerative changes  -- Added to scheduled Tylenol, added lidocaine patch on right shoulder, added diclofenac gel on both shoulder.  Try to avoid narcotics given her age and risk of delirium.  -- PTA Neurontin  -- PT OT consulted  -- Orthopedics consult appreciated:   "No fracture on MRI shoulder. Corticosteroid injection completed. OP follow up. S/o.    Type II DM with hyperglycemia;  -- PTA Glucophage on hold  -- Insulin sliding scale, I:C 1:15, and hypoglycemic protocol    LES on CKD stage III  -- On IV fluid.  BMP pending    Essential hypertension  -- elevated, likely due to pain issues  -- Better pain control.  PTA amlodipine.  As needed hydralazine.    Hypothyroidism;  -- Mild elevated TSH but free T4 on higher normal range.  Continue PTA Synthyroid              Diet: Full Liquid Diet    DVT Prophylaxis: Enoxaparin (Lovenox) SQ  Foster Catheter: Not present  Lines: None     Cardiac Monitoring: None  Code Status: No CPR- Do NOT Intubate      Clinically Significant Risk Factors              # Hypoalbuminemia: Lowest albumin = 3.3 g/dL at 1/15/2024  4:50 AM, will monitor as appropriate     # Hypertension: Noted on problem list       # DMII: A1C = 7.3 % (Ref range: <5.7 %) within past 6 months, PRESENT ON ADMISSION  # Overweight: Estimated body mass index is 25.79 kg/m  as calculated from the following:    Height as of this encounter: 1.575 m (5' 2\").    Weight as of this encounter: 64 kg (141 lb)., PRESENT ON ADMISSION     # Financial/Environmental Concerns: none         Disposition Plan     Expected Discharge Date: 01/17/2024                    Emani Layne MD  Hospitalist Service  [facility]  Securely message with [employer] (more info)  Text page via [employer] Paging/Directory   ______________________________________________________________________    Interval History   Patient is new to me today. Chart reviewed.  Patient is seen and examined at bedside.   Pt has non bloody diarrhea.     Physical Exam   Vital Signs: Temp: 97.7  F (36.5  C) Temp src: Oral BP: (!) 187/76 Pulse: 64   Resp: 18 SpO2: 98 % O2 Device: None (Room air)    Weight: 140 lbs 15.99 oz    GEN: Alert and oriented. Not in acute distress.  HEENT: Atraumatic, mucous membrane- moist and " pink.  Chest: Bilateral air entry.  CVS: S1S2 regular.   Abdomen: Soft. Non-tender, non-distended. No organomegaly. No guarding or rigidity. Bowel sounds active.   Extremities: No pedal edema.  CNS: No involuntary movements.  Skin: no cyanosis or clubbing.     Medical Decision Making             Data

## 2024-01-17 ENCOUNTER — APPOINTMENT (OUTPATIENT)
Dept: OCCUPATIONAL THERAPY | Facility: HOSPITAL | Age: 89
DRG: 372 | End: 2024-01-17
Payer: COMMERCIAL

## 2024-01-17 ENCOUNTER — APPOINTMENT (OUTPATIENT)
Dept: PHYSICAL THERAPY | Facility: HOSPITAL | Age: 89
DRG: 372 | End: 2024-01-17
Payer: COMMERCIAL

## 2024-01-17 LAB
ANION GAP SERPL CALCULATED.3IONS-SCNC: 8 MMOL/L (ref 7–15)
BASOPHILS # BLD AUTO: 0 10E3/UL (ref 0–0.2)
BASOPHILS NFR BLD AUTO: 0 %
BUN SERPL-MCNC: 21.6 MG/DL (ref 8–23)
CALCIUM SERPL-MCNC: 9.1 MG/DL (ref 8.2–9.6)
CHLORIDE SERPL-SCNC: 105 MMOL/L (ref 98–107)
CREAT SERPL-MCNC: 1.14 MG/DL (ref 0.51–0.95)
DEPRECATED HCO3 PLAS-SCNC: 27 MMOL/L (ref 22–29)
EGFRCR SERPLBLD CKD-EPI 2021: 45 ML/MIN/1.73M2
EOSINOPHIL # BLD AUTO: 0 10E3/UL (ref 0–0.7)
EOSINOPHIL NFR BLD AUTO: 0 %
ERYTHROCYTE [DISTWIDTH] IN BLOOD BY AUTOMATED COUNT: 12.8 % (ref 10–15)
GLUCOSE BLDC GLUCOMTR-MCNC: 135 MG/DL (ref 70–99)
GLUCOSE BLDC GLUCOMTR-MCNC: 141 MG/DL (ref 70–99)
GLUCOSE BLDC GLUCOMTR-MCNC: 149 MG/DL (ref 70–99)
GLUCOSE BLDC GLUCOMTR-MCNC: 217 MG/DL (ref 70–99)
GLUCOSE SERPL-MCNC: 160 MG/DL (ref 70–99)
HCT VFR BLD AUTO: 37.4 % (ref 35–47)
HGB BLD-MCNC: 12.4 G/DL (ref 11.7–15.7)
IMM GRANULOCYTES # BLD: 0 10E3/UL
IMM GRANULOCYTES NFR BLD: 1 %
LYMPHOCYTES # BLD AUTO: 0.6 10E3/UL (ref 0.8–5.3)
LYMPHOCYTES NFR BLD AUTO: 7 %
MCH RBC QN AUTO: 29.6 PG (ref 26.5–33)
MCHC RBC AUTO-ENTMCNC: 33.2 G/DL (ref 31.5–36.5)
MCV RBC AUTO: 89 FL (ref 78–100)
MONOCYTES # BLD AUTO: 0.3 10E3/UL (ref 0–1.3)
MONOCYTES NFR BLD AUTO: 4 %
NEUTROPHILS # BLD AUTO: 6.6 10E3/UL (ref 1.6–8.3)
NEUTROPHILS NFR BLD AUTO: 88 %
NRBC # BLD AUTO: 0 10E3/UL
NRBC BLD AUTO-RTO: 0 /100
PLATELET # BLD AUTO: 246 10E3/UL (ref 150–450)
POTASSIUM SERPL-SCNC: 4.5 MMOL/L (ref 3.4–5.3)
RBC # BLD AUTO: 4.19 10E6/UL (ref 3.8–5.2)
SODIUM SERPL-SCNC: 140 MMOL/L (ref 135–145)
WBC # BLD AUTO: 7.5 10E3/UL (ref 4–11)

## 2024-01-17 PROCEDURE — 97116 GAIT TRAINING THERAPY: CPT | Mod: GP

## 2024-01-17 PROCEDURE — 97112 NEUROMUSCULAR REEDUCATION: CPT | Mod: GP

## 2024-01-17 PROCEDURE — 97110 THERAPEUTIC EXERCISES: CPT | Mod: GP

## 2024-01-17 PROCEDURE — 120N000001 HC R&B MED SURG/OB

## 2024-01-17 PROCEDURE — 250N000013 HC RX MED GY IP 250 OP 250 PS 637: Performed by: INTERNAL MEDICINE

## 2024-01-17 PROCEDURE — 36415 COLL VENOUS BLD VENIPUNCTURE: CPT | Performed by: STUDENT IN AN ORGANIZED HEALTH CARE EDUCATION/TRAINING PROGRAM

## 2024-01-17 PROCEDURE — 250N000013 HC RX MED GY IP 250 OP 250 PS 637: Performed by: STUDENT IN AN ORGANIZED HEALTH CARE EDUCATION/TRAINING PROGRAM

## 2024-01-17 PROCEDURE — 97129 THER IVNTJ 1ST 15 MIN: CPT | Mod: GO

## 2024-01-17 PROCEDURE — 99232 SBSQ HOSP IP/OBS MODERATE 35: CPT | Performed by: STUDENT IN AN ORGANIZED HEALTH CARE EDUCATION/TRAINING PROGRAM

## 2024-01-17 PROCEDURE — 85025 COMPLETE CBC W/AUTO DIFF WBC: CPT | Performed by: STUDENT IN AN ORGANIZED HEALTH CARE EDUCATION/TRAINING PROGRAM

## 2024-01-17 PROCEDURE — 97535 SELF CARE MNGMENT TRAINING: CPT | Mod: GO

## 2024-01-17 PROCEDURE — 258N000003 HC RX IP 258 OP 636: Performed by: STUDENT IN AN ORGANIZED HEALTH CARE EDUCATION/TRAINING PROGRAM

## 2024-01-17 PROCEDURE — 250N000011 HC RX IP 250 OP 636: Performed by: STUDENT IN AN ORGANIZED HEALTH CARE EDUCATION/TRAINING PROGRAM

## 2024-01-17 PROCEDURE — 250N000011 HC RX IP 250 OP 636: Performed by: INTERNAL MEDICINE

## 2024-01-17 PROCEDURE — 80048 BASIC METABOLIC PNL TOTAL CA: CPT | Performed by: STUDENT IN AN ORGANIZED HEALTH CARE EDUCATION/TRAINING PROGRAM

## 2024-01-17 RX ORDER — SODIUM CHLORIDE 9 MG/ML
INJECTION, SOLUTION INTRAVENOUS CONTINUOUS
Status: ACTIVE | OUTPATIENT
Start: 2024-01-17 | End: 2024-01-18

## 2024-01-17 RX ADMIN — PANTOPRAZOLE SODIUM 40 MG: 40 TABLET, DELAYED RELEASE ORAL at 21:02

## 2024-01-17 RX ADMIN — VANCOMYCIN HYDROCHLORIDE 125 MG: KIT at 09:14

## 2024-01-17 RX ADMIN — ACETAMINOPHEN 975 MG: 325 TABLET ORAL at 21:02

## 2024-01-17 RX ADMIN — AMLODIPINE BESYLATE 5 MG: 5 TABLET ORAL at 21:02

## 2024-01-17 RX ADMIN — DICLOFENAC SODIUM 2 G: 10 GEL TOPICAL at 09:15

## 2024-01-17 RX ADMIN — VANCOMYCIN HYDROCHLORIDE 125 MG: KIT at 17:33

## 2024-01-17 RX ADMIN — VANCOMYCIN HYDROCHLORIDE 125 MG: KIT at 12:18

## 2024-01-17 RX ADMIN — DICLOFENAC SODIUM 2 G: 10 GEL TOPICAL at 17:34

## 2024-01-17 RX ADMIN — HYDRALAZINE HYDROCHLORIDE 5 MG: 20 INJECTION INTRAMUSCULAR; INTRAVENOUS at 09:39

## 2024-01-17 RX ADMIN — LIDOCAINE 1 PATCH: 4 PATCH TOPICAL at 09:13

## 2024-01-17 RX ADMIN — GABAPENTIN 300 MG: 300 CAPSULE ORAL at 21:02

## 2024-01-17 RX ADMIN — LEVOTHYROXINE SODIUM 88 MCG: 88 TABLET ORAL at 09:15

## 2024-01-17 RX ADMIN — VANCOMYCIN HYDROCHLORIDE 125 MG: KIT at 21:02

## 2024-01-17 RX ADMIN — ACETAMINOPHEN 975 MG: 325 TABLET ORAL at 13:05

## 2024-01-17 RX ADMIN — SODIUM CHLORIDE: 9 INJECTION, SOLUTION INTRAVENOUS at 15:49

## 2024-01-17 RX ADMIN — ENOXAPARIN SODIUM 30 MG: 30 INJECTION SUBCUTANEOUS at 09:13

## 2024-01-17 RX ADMIN — HYDRALAZINE HYDROCHLORIDE 5 MG: 20 INJECTION INTRAMUSCULAR; INTRAVENOUS at 23:42

## 2024-01-17 ASSESSMENT — ACTIVITIES OF DAILY LIVING (ADL)
ADLS_ACUITY_SCORE: 33

## 2024-01-17 NOTE — PLAN OF CARE
Problem: Adult Inpatient Plan of Care  Goal: Optimal Comfort and Wellbeing  Outcome: Progressing     Problem: Adult Inpatient Plan of Care  Goal: Absence of Hospital-Acquired Illness or Injury  Intervention: Prevent Skin Injury  Recent Flowsheet Documentation  Taken 1/17/2024 0014 by Carol Skaggs RN  Body Position: position changed independently   Goal Outcome Evaluation:    Patient alert but forgetful at times. HTN at times but improved overnight to 163/62. Reports pain in shoulders when up, denied pain this morning.  Enteric precautions for C-diff. Purewick in place with adequate output. Tolerating clear liquid diet, Up A1 with gaitbelt/walker.

## 2024-01-17 NOTE — PLAN OF CARE
Problem: Hypertension Acute  Goal: Blood Pressure Within Desired Range  1/16/2024 1925 by Neymar Johnson, RN  Outcome: Progressing  1/16/2024 1434 by Neymar Johnson, RN  Outcome: Progressing  Intervention: Normalize Blood Pressure  Recent Flowsheet Documentation  Taken 1/16/2024 1530 by Neymar Johnson, RN  Sensory Stimulation Regulation: care clustered  Medication Review/Management: medications reviewed  Taken 1/16/2024 1222 by Neymar Johnson, RN  Sensory Stimulation Regulation: care clustered  Medication Review/Management: medications reviewed  Problem: UTI (Urinary Tract Infection)  Goal: Improved Infection Symptoms  1/16/2024 1925 by Neymar Johnson, RN  Outcome: Progressing  1/16/2024 1434 by Neymar Johnson, RN  Outcome: Progressing    Goal Outcome Evaluation:  Patient A&Ox3, forgetful at times. Able to ambulate stand by assist with a gait belt. Complains of bilateral shoulder pain; scheduled voltaren cream and lidocaine patch placed. Incontinent of bladder and bowel. Purewick placed and intact. Vitally stable on room air. Maintained SBP <160.

## 2024-01-17 NOTE — PROGRESS NOTES
"CLINICAL NUTRITION SERVICES - ASSESSMENT NOTE     Nutrition Prescription    RECOMMENDATIONS FOR MDs/PROVIDERS TO ORDER:  Recommend advancing diet if appropriate. Pt has been on full liquids since 1/15, tolerating p.o    Malnutrition Status:    Does not qualify    Recommendations already ordered by Registered Dietitian (RD):  None    Future/Additional Recommendations:  Add supplement if intake is inadequate     REASON FOR ASSESSMENT  Sissy Mccloud is a/an 89 year old female assessed by the dietitian for Admission Nutrition Risk Screen for positive with Unsure of weight loss and eating poorly d/t decreased appetite    Pt presents with cdiff, colitis, recurrent falls d/t weakness from infection, LES on ckd3  Hx DM2, hypothyroidism, GERD, Colitis, constipation, diverticulosis    NUTRITION HISTORY  Pt lives with her spouse in a house, independent    Pt reports no decrease in intake at home or here. She is interested in information regarding increased help at home with meal prep and cleaning. She does most of the meal prep with her  occasionally making a meal. Pt is feeling \"tired\" and feels her 's expectations for her to care for the home are too much.   Eating 2 meal/day at baseline    CURRENT NUTRITION ORDERS  Diet: Full Liquid since 1/15  Intake/Tolerance: 75% of 3 meals yesterday with estimated intake 1543 kcal, 53 g protein meeting 100% of estimated kcal and 80% of estimated protein needs    Pt reports she very much enjoyed her breakfast this am and her appetite is good. Her abd is \"tender\" but no diarrhea. She is eager to advance her diet. She is enjoying being \"pampered\" in the hospital.     LABS  Labs reviewed  BUN 21.6 WNL, improved  Cr 1.14 (H), improved  -199 mg/dl past 24 hours, in fair control    MEDICATIONS  Medications reviewed  Ssi, novolog 1u: 15 g Cho, levothyroxine, protonix, oral abx    ANTHROPOMETRICS  Height: 157.5 cm (5' 2\")  Most Recent Weight: 64 kg (141 lb)    IBW: 50.1 " "kg  BMI: Overweight BMI 25-29.9  Weight History:   Wt Readings from Last 10 Encounters:   01/15/24  05/22/23  11/29/22  09/13/22 64 kg (141 lb)  64 kg (141 lb)   143.2 lb   68.0 kg (149.9 lb )   03/01/22 71.3 kg (157 lb 1.6 oz)   Pt reports she has lost 40-50 lb \"in not even a year\" as she used to weigh 180 lb which is what is on her 's license- question reliability of this hx as reflected in available wt data-Previous weight loss 2 years ago. No recent weight loss this year      Dosing Weight: 53.5 kg adjusted weight    ASSESSED NUTRITION NEEDS  Estimated Energy Needs: 2060-2215 kcals/day (25 - 30 kcals/kg)  Justification: Maintenance  Estimated Protein Needs: 64-80 grams protein/day (1.2 - 1.5 grams of pro/kg)  Justification: Hypercatabolism with acute illness and Repletion  Estimated Fluid Needs: 8587-6327 mL/day (25 - 30 mL/kg)   Justification: Maintenance    PHYSICAL FINDINGS  See malnutrition section below.  GI - last BM 1/15 x 1      MALNUTRITION:  % Weight Loss:  None noted  % Intake:  No decreased intake noted  Subcutaneous Fat Loss:  None observed  Muscle Loss:  Temporal region mild depletion, Acromion bone region mild depletion, and Dorsal hand region mild depletion- likely age related sarcopenia  Fluid Retention:  None noted    Malnutrition Diagnosis: Patient does not meet two of the above criteria necessary for diagnosing malnutrition  In Context of:  Acute illness or injury    NUTRITION DIAGNOSIS  No nutrition dx at this time    INTERVENTIONS  Implementation  -Recommend advancing diet  -Educated pt on meal delivery service as option ([employer], meals on wheels)  -spoke with social work regarding pt request for information on in home services    Goals  Advance diet as able  Meet > 75% of estimated nutrition needs  Maintain weight     Monitoring/Evaluation  Progress toward goals will be monitored and evaluated per protocol.    "

## 2024-01-17 NOTE — PROGRESS NOTES
[facility]    Medicine Progress Note - Hospitalist Service    Date of Admission:  1/14/2024    Assessment & Plan   Sissy Mccloud is a 89 year old female with PMH of hypothyroidism, HTN, NIDDM, GERD, DJD who was brought to ED for evaluation of fall, right shoulder/hip pain and generalized weakness.  CT imaging reported colitis, UA concerning for infection.  Patient admitted on 1/14/2024 for further management      Left sided colitis  C.diff colitis  -- Patient reported history of constipation but denied abdominal pain, nausea, vomiting prior to admission.  -- CT showed local segment colitis involving descending and sigmoid colon, with extensive diverticulosis no signs of diverticulitis.  There is colitis in the same location as in March 2022.  -- On admission, normal WBC count, normal lactic acid.  -- s/p IV cipro and flagyl  -- 01/16: Discussed with Dr. Mazariegos ([facility]) regarding c.diff result and agreed to start vancomycin po for 10-14 days and discontinue systemic antibiotics. Repeat CT-abd/pelvis by PCP in 2 months.  -- 01/17: Tolerating fluid diet. Advanced to low fiber diet    UTI-resolved  -- Patient denied urinary symptoms, fever but reported generalized weakness  -- U/A : +ve for nitrite. Ucx: >100,000 CFU/mL Mixture of Urogenital Roxann. Considered contaminated  -- Completed 3 days of IV abx- ceftriaxone and cipro    Recurrent falls;  -- Both falls occurred on 1/13/2024.  Likely due to generalized weakness from above issues.  -- On admission, CT head and cervical spine negative for acute issues.  X-ray hip negative for fracture or dislocation.  -- Fall precaution.  PT OT consulted: TCU- Pt declined    Hyponatremia, mild- improved  -- likely 2/2 dehydration  -- IVF    Severe bilateral shoulder pain, right more than left;  -- CT right shoulder negative for acute fracture or dislocation but reported severe end-stage degenerative changes  -- Added to scheduled Tylenol, added lidocaine  "patch on right shoulder, added diclofenac gel on both shoulder.  Try to avoid narcotics given her age and risk of delirium.  -- PTA Neurontin  -- Continue PT OT  -- Orthopedics consult appreciated:  No fracture on MRI shoulder. Corticosteroid injection completed. OP follow up. S/o.    Type II DM with hyperglycemia;  -- A1c: 7.3%  -- PTA Glucophage on hold  -- Insulin sliding scale, I:C 1:15, and hypoglycemic protocol    LES on CKD stage III- LES improving  -- On IV fluid.  BMP pending    Essential hypertension  -- elevated, likely due to pain issues  -- Better pain control.  PTA amlodipine.  As needed hydralazine.    Hypothyroidism;  -- Mild elevated TSH but free T4 on higher normal range.  Continue PTA Synthyroid              Diet: Combination Diet Low Fiber Diet; 2 gm NA Diet    DVT Prophylaxis: Enoxaparin (Lovenox) SQ  Foster Catheter: Not present  Lines: None     Cardiac Monitoring: None  Code Status: No CPR- Do NOT Intubate      Clinically Significant Risk Factors              # Hypoalbuminemia: Lowest albumin = 3.3 g/dL at 1/15/2024  4:50 AM, will monitor as appropriate     # Hypertension: Noted on problem list       # DMII: A1C = 7.3 % (Ref range: <5.7 %) within past 6 months, PRESENT ON ADMISSION  # Overweight: Estimated body mass index is 25.79 kg/m  as calculated from the following:    Height as of this encounter: 1.575 m (5' 2\").    Weight as of this encounter: 64 kg (141 lb)., PRESENT ON ADMISSION       # Financial/Environmental Concerns: none         Disposition Plan      Expected Discharge Date: 01/19/2024                    Emani Layne MD  Hospitalist Service  [facility]  Securely message with [employer] (more info)  Text page via [employer] Paging/Directory   ______________________________________________________________________    Interval History   Patient is new to me today. Chart reviewed.  Patient is seen and examined at bedside.   Abdominal pain and diarrhea  improving.  "     Physical Exam   Vital Signs: Temp: 98  F (36.7  C) Temp src: Oral BP: (!) 156/67 Pulse: 68   Resp: 18 SpO2: 93 % O2 Device: None (Room air)    Weight: 140 lbs 15.99 oz    GEN: Alert and oriented. Not in acute distress.  HEENT: Atraumatic, mucous membrane- moist and pink.  Chest: Bilateral air entry.  CVS: S1S2 regular.   Abdomen: Soft. Non-tender, non-distended. No organomegaly. No guarding or rigidity. Bowel sounds active.   Extremities: No pedal edema.  CNS: No involuntary movements.  Skin: no cyanosis or clubbing.     Medical Decision Making             Data

## 2024-01-17 NOTE — PROGRESS NOTES
"Care Management Follow Up    Length of Stay (days): 2    Expected Discharge Date: 01/17/2024     Concerns to be Addressed:  Discharge planning; see below for CM notes.  Patient plan of care discussed at interdisciplinary rounds: Yes    Anticipated Discharge Disposition:  See therapy recommendations; care management following.      Anticipated Discharge Services:  resources given for private home services  Anticipated Discharge DME:  Per therapy (if indicated).    Patient/family educated on Medicare website which has current facility and service quality ratings:  no  Education Provided on the Discharge Plan:  Per team  Patient/Family in Agreement with the Plan:  Yes    Referrals Placed by CM/SW:  See below  Private pay costs discussed: for private home care services    Additional Information:  Patient is alert and oriented. She is frustrated with incontinence. She lives with  who \"sits in his chair all day\".. Daughter, Jasmyn assists with grocery shopping. Patient does a limited amount of driving. She expects to discharge to home and would be open to home care if recommended. She is not interested in TCU. Daughter will transport.      MARQUISE Puga   "

## 2024-01-18 ENCOUNTER — APPOINTMENT (OUTPATIENT)
Dept: OCCUPATIONAL THERAPY | Facility: HOSPITAL | Age: 89
DRG: 372 | End: 2024-01-18
Payer: COMMERCIAL

## 2024-01-18 LAB
ANION GAP SERPL CALCULATED.3IONS-SCNC: 8 MMOL/L (ref 7–15)
BASOPHILS # BLD AUTO: 0 10E3/UL (ref 0–0.2)
BASOPHILS NFR BLD AUTO: 0 %
BUN SERPL-MCNC: 27.1 MG/DL (ref 8–23)
CALCIUM SERPL-MCNC: 8.1 MG/DL (ref 8.2–9.6)
CHLORIDE SERPL-SCNC: 106 MMOL/L (ref 98–107)
CREAT SERPL-MCNC: 1.26 MG/DL (ref 0.51–0.95)
DEPRECATED HCO3 PLAS-SCNC: 24 MMOL/L (ref 22–29)
EGFRCR SERPLBLD CKD-EPI 2021: 40 ML/MIN/1.73M2
EOSINOPHIL # BLD AUTO: 0 10E3/UL (ref 0–0.7)
EOSINOPHIL NFR BLD AUTO: 0 %
ERYTHROCYTE [DISTWIDTH] IN BLOOD BY AUTOMATED COUNT: 13 % (ref 10–15)
GLUCOSE BLDC GLUCOMTR-MCNC: 115 MG/DL (ref 70–99)
GLUCOSE BLDC GLUCOMTR-MCNC: 143 MG/DL (ref 70–99)
GLUCOSE BLDC GLUCOMTR-MCNC: 189 MG/DL (ref 70–99)
GLUCOSE SERPL-MCNC: 149 MG/DL (ref 70–99)
HCT VFR BLD AUTO: 36.2 % (ref 35–47)
HGB BLD-MCNC: 11.6 G/DL (ref 11.7–15.7)
IMM GRANULOCYTES # BLD: 0 10E3/UL
IMM GRANULOCYTES NFR BLD: 1 %
LYMPHOCYTES # BLD AUTO: 0.8 10E3/UL (ref 0.8–5.3)
LYMPHOCYTES NFR BLD AUTO: 11 %
MCH RBC QN AUTO: 29.3 PG (ref 26.5–33)
MCHC RBC AUTO-ENTMCNC: 32 G/DL (ref 31.5–36.5)
MCV RBC AUTO: 91 FL (ref 78–100)
MONOCYTES # BLD AUTO: 0.6 10E3/UL (ref 0–1.3)
MONOCYTES NFR BLD AUTO: 7 %
NEUTROPHILS # BLD AUTO: 6.1 10E3/UL (ref 1.6–8.3)
NEUTROPHILS NFR BLD AUTO: 81 %
NRBC # BLD AUTO: 0 10E3/UL
NRBC BLD AUTO-RTO: 0 /100
PLATELET # BLD AUTO: 273 10E3/UL (ref 150–450)
POTASSIUM SERPL-SCNC: 4.4 MMOL/L (ref 3.4–5.3)
RBC # BLD AUTO: 3.96 10E6/UL (ref 3.8–5.2)
SODIUM SERPL-SCNC: 138 MMOL/L (ref 135–145)
WBC # BLD AUTO: 7.6 10E3/UL (ref 4–11)

## 2024-01-18 PROCEDURE — 99232 SBSQ HOSP IP/OBS MODERATE 35: CPT | Performed by: STUDENT IN AN ORGANIZED HEALTH CARE EDUCATION/TRAINING PROGRAM

## 2024-01-18 PROCEDURE — 97110 THERAPEUTIC EXERCISES: CPT | Mod: GO

## 2024-01-18 PROCEDURE — 97535 SELF CARE MNGMENT TRAINING: CPT | Mod: GO

## 2024-01-18 PROCEDURE — 36415 COLL VENOUS BLD VENIPUNCTURE: CPT | Performed by: STUDENT IN AN ORGANIZED HEALTH CARE EDUCATION/TRAINING PROGRAM

## 2024-01-18 PROCEDURE — 250N000011 HC RX IP 250 OP 636: Performed by: INTERNAL MEDICINE

## 2024-01-18 PROCEDURE — 85025 COMPLETE CBC W/AUTO DIFF WBC: CPT | Performed by: STUDENT IN AN ORGANIZED HEALTH CARE EDUCATION/TRAINING PROGRAM

## 2024-01-18 PROCEDURE — 80048 BASIC METABOLIC PNL TOTAL CA: CPT | Performed by: STUDENT IN AN ORGANIZED HEALTH CARE EDUCATION/TRAINING PROGRAM

## 2024-01-18 PROCEDURE — 250N000013 HC RX MED GY IP 250 OP 250 PS 637: Performed by: STUDENT IN AN ORGANIZED HEALTH CARE EDUCATION/TRAINING PROGRAM

## 2024-01-18 PROCEDURE — 120N000001 HC R&B MED SURG/OB

## 2024-01-18 PROCEDURE — 250N000013 HC RX MED GY IP 250 OP 250 PS 637: Performed by: INTERNAL MEDICINE

## 2024-01-18 PROCEDURE — 250N000011 HC RX IP 250 OP 636: Performed by: STUDENT IN AN ORGANIZED HEALTH CARE EDUCATION/TRAINING PROGRAM

## 2024-01-18 RX ADMIN — PANTOPRAZOLE SODIUM 40 MG: 40 TABLET, DELAYED RELEASE ORAL at 20:04

## 2024-01-18 RX ADMIN — DICLOFENAC SODIUM 2 G: 10 GEL TOPICAL at 09:22

## 2024-01-18 RX ADMIN — VANCOMYCIN HYDROCHLORIDE 125 MG: KIT at 12:25

## 2024-01-18 RX ADMIN — VANCOMYCIN HYDROCHLORIDE 125 MG: KIT at 20:04

## 2024-01-18 RX ADMIN — DICLOFENAC SODIUM 2 G: 10 GEL TOPICAL at 17:21

## 2024-01-18 RX ADMIN — LEVOTHYROXINE SODIUM 88 MCG: 88 TABLET ORAL at 09:36

## 2024-01-18 RX ADMIN — VANCOMYCIN HYDROCHLORIDE 125 MG: KIT at 09:36

## 2024-01-18 RX ADMIN — GABAPENTIN 300 MG: 300 CAPSULE ORAL at 20:04

## 2024-01-18 RX ADMIN — VANCOMYCIN HYDROCHLORIDE 125 MG: KIT at 17:20

## 2024-01-18 RX ADMIN — LIDOCAINE 1 PATCH: 4 PATCH TOPICAL at 11:24

## 2024-01-18 RX ADMIN — AMLODIPINE BESYLATE 5 MG: 5 TABLET ORAL at 20:04

## 2024-01-18 RX ADMIN — HYDRALAZINE HYDROCHLORIDE 5 MG: 20 INJECTION INTRAMUSCULAR; INTRAVENOUS at 22:34

## 2024-01-18 RX ADMIN — ENOXAPARIN SODIUM 30 MG: 30 INJECTION SUBCUTANEOUS at 09:21

## 2024-01-18 ASSESSMENT — ACTIVITIES OF DAILY LIVING (ADL)
ADLS_ACUITY_SCORE: 33

## 2024-01-18 NOTE — PROGRESS NOTES
Care Management Follow Up    Length of Stay (days): 3    Expected Discharge Date: 01/19/2024     Concerns to be Addressed:       Patient plan of care discussed at interdisciplinary rounds: Yes    Anticipated Discharge Disposition: Home, Home Care     Anticipated Discharge Services: None  Anticipated Discharge DME:      Patient/family educated on Medicare website which has current facility and service quality ratings:    Education Provided on the Discharge Plan: Yes  Patient/Family in Agreement with the Plan: yes    Referrals Placed by CM/SW: Homecare  Private pay costs discussed: Not applicable    Additional Information:  SW placed call to pts daughterJasmyn ([phone]) and provided update on anticipated discharge tomorrow with home care services through [facility]. Jasmyn reports understanding of this plan. She reports she can transport the pt tomorrow and will plan to come around 11 AM. She denies other questions. MD updated on discharge time.    Lucy Malagon, [facility]    Addendum: SW received call from OT noting that pt needing hands on assist with mobility at this time. Concerns if this is available at home. sW met with pt to discuss. Pt uncertain she needs that kind of help and reports she can hang on to things at home. She again declines TCU. SW informed her of plan to update her daughter. Pt agreeable.    GEETA spoke with Jasmyn over the phone and provided update from OT and pt. Jasmyn reports understanding and reports that she will plan to be with the pt at discharge. She denies other questions.  3:51 PM

## 2024-01-18 NOTE — PLAN OF CARE
Goal Outcome Evaluation:      Plan of Care Reviewed With: patient    Overall Patient Progress: improvingOverall Patient Progress: improving    Outcome Evaluation: Pt is doing well.  She has pain in her shoulders, but it is at baseline.  She also has some c/o pain in her abdomen, but they are not too bad.  Pt eats well and makes her needs known.  Pt has not stooled today and states that it has been several days since she has had a BM.  Pt got up to the chair after lunch.  She really wants to go home, but understands that she has to wait until tomorrow.

## 2024-01-18 NOTE — PLAN OF CARE
Goal Outcome Evaluation:    Pt A/O x4, intermittent d/o to time. Denies pain. New purewick put in place. Bottom is red, reminded patient to shift weight and turn. NS infusing 75/hr. HS , no coverage. BP's elevated, PRN hydralazine given with good effect. Low fiber/2gm NA diet. A1 with walker and GB. PT/OT. VSS. Isolation precautions maintained.    Temp: 98  F (36.7  C) Temp src: Oral BP: (!) 147/66 Pulse: 67   Resp: 18 SpO2: 95 % O2 Device: None (Room air)

## 2024-01-18 NOTE — PROGRESS NOTES
[facility]    Medicine Progress Note - Hospitalist Service    Date of Admission:  1/14/2024    Assessment & Plan   Sissy Mccloud is a 89 year old female with PMH of hypothyroidism, HTN, NIDDM, GERD, DJD who was brought to ED for evaluation of fall, right shoulder/hip pain and generalized weakness.  CT imaging reported colitis, UA concerning for infection.  Patient admitted on 1/14/2024 for further management      Left sided colitis  C.diff colitis  -- Patient reported history of constipation but denied abdominal pain, nausea, vomiting prior to admission.  -- CT showed local segment colitis involving descending and sigmoid colon, with extensive diverticulosis no signs of diverticulitis.  There is colitis in the same location as in March 2022.  -- On admission, normal WBC count, normal lactic acid.  -- s/p IV cipro and flagyl  -- 01/16: Discussed with Dr. Mazariegos ([facility]) regarding c.diff result and agreed to start vancomycin po for 10-14 days and discontinue systemic antibiotics. Repeat CT-abd/pelvis by PCP in 2 months.  -- 01/17: Tolerating fluid diet. Advanced to low fiber diet  -- 01/18: tolerating low fiber diet    UTI-resolved  -- Patient denied urinary symptoms, fever but reported generalized weakness  -- U/A : +ve for nitrite. Ucx: >100,000 CFU/mL Mixture of Urogenital Roxann. Considered contaminated  -- Completed 3 days of IV abx- ceftriaxone and cipro    Recurrent falls;  -- Both falls occurred on 1/13/2024.  Likely due to generalized weakness from above issues.  -- On admission, CT head and cervical spine negative for acute issues.  X-ray hip negative for fracture or dislocation.  -- Fall precaution.  PT OT consulted: TCU- Pt declined    Hyponatremia, mild- improved  -- likely 2/2 dehydration  -- IVF    Severe bilateral shoulder pain, right more than left;  -- CT right shoulder negative for acute fracture or dislocation but reported severe end-stage degenerative changes  -- Added to  scheduled Tylenol, added lidocaine patch on right shoulder, added diclofenac gel on both shoulder.  Try to avoid narcotics given her age and risk of delirium.  -- PTA Neurontin  -- Continue PT OT  -- Orthopedics consult appreciated:  No fracture on MRI shoulder. Corticosteroid injection completed. OP follow up. S/o.    Type II DM with hyperglycemia;  -- A1c: 7.3%  -- PTA Glucophage on hold  -- Insulin sliding scale, I:C 1:15, and hypoglycemic protocol    LES on CKD stage III  -- LES resolved  -- On IV fluid.  BMP     Essential hypertension  -- elevated, likely due to pain issues  -- Better pain control.  PTA amlodipine.  As needed hydralazine.    Hypothyroidism;  -- Mild elevated TSH but free T4 on higher normal range.  Continue PTA Synthyroid              Diet: Combination Diet Moderate Consistent Carb (60 g CHO per Meal) Diet; Low Fiber Diet; 2 gm NA Diet    DVT Prophylaxis: Enoxaparin (Lovenox) SQ  Foster Catheter: Not present  Lines: None     Cardiac Monitoring: None  Code Status: No CPR- Do NOT Intubate      Clinically Significant Risk Factors              # Hypoalbuminemia: Lowest albumin = 3.3 g/dL at 1/15/2024  4:50 AM, will monitor as appropriate     # Hypertension: Noted on problem list       # DMII: A1C = 7.3 % (Ref range: <5.7 %) within past 6 months, PRESENT ON ADMISSION        # Financial/Environmental Concerns: none         Disposition Plan      Expected Discharge Date: 01/19/2024                    Emani Layne MD  Hospitalist Service  [facility]  Securely message with [employer] (more info)  Text page via [employer] Paging/Directory   ______________________________________________________________________    Interval History   Patient is new to me today. Chart reviewed.  Patient is seen and examined at bedside.   Abdominal pain is improving.       Physical Exam   Vital Signs: Temp: 97  F (36.1  C) Temp src: Axillary BP: (!) 143/60 Pulse: 70   Resp: 18 SpO2: 94 % O2 Device: None  (Room air)    Weight: 136 lbs 3.2 oz    GEN: Alert and oriented. Not in acute distress.  HEENT: Atraumatic, mucous membrane- moist and pink.  Chest: Bilateral air entry.  CVS: S1S2 regular.   Abdomen: Soft. Non-tender, non-distended. No organomegaly. No guarding or rigidity. Bowel sounds active.   Extremities: No pedal edema.  CNS: No involuntary movements.  Skin: no cyanosis or clubbing.     Medical Decision Making             Data

## 2024-01-18 NOTE — DISCHARGE INSTRUCTIONS
Home care services have been arranged for the patient.  Home Care Agency: [facility]  Home Care Phone Number: [phone]  Services: Physical therapy  Instructions: Home Care will call to arrange first visit.

## 2024-01-18 NOTE — PLAN OF CARE
"  Problem: Adult Inpatient Plan of Care  Goal: Plan of Care Review  Description: The Plan of Care Review/Shift note should be completed every shift.  The Outcome Evaluation is a brief statement about your assessment that the patient is improving, declining, or no change.  This information will be displayed automatically on your shift  note.  Outcome: Progressing   Goal Outcome Evaluation:         Patient A&Ox4, inconsistent with time.   Pleasant, cooperative with cares and assessment.   Patient endorses minimal pain bilaterally in both arms and shoulders.  States it is more \"discomfort\" and that it is their all the time. \"I have bad shoulders and bones\"  Repositioned this am while in bed and Pt was up and ambulating on the unit with therapy and to the bathroom a few times this shift. Pt unit(s) to bathroom this evening to wash up and do evening cares. Pt spent several hours in the chair this afternoon.  Scheduled tylenol, lidocaine patch, and Valteran creams used for discomfort and per pt were effective.  active range of motion promoted, pt has mod decreased ROM in BUE noted   PIV in RUE is P/I and running NS at 75 ml/hr cont per new MD order, NA+ this shift was 140  Pt eating well this shift 100% of breakfast, lunch and dinner    BP this am was elevated and pt was given prn Hydralyzine and BP came down to 133/80                 "

## 2024-01-19 ENCOUNTER — APPOINTMENT (OUTPATIENT)
Dept: PHYSICAL THERAPY | Facility: HOSPITAL | Age: 89
DRG: 372 | End: 2024-01-19
Payer: COMMERCIAL

## 2024-01-19 VITALS
RESPIRATION RATE: 18 BRPM | OXYGEN SATURATION: 97 % | TEMPERATURE: 98.4 F | HEIGHT: 62 IN | WEIGHT: 136.2 LBS | SYSTOLIC BLOOD PRESSURE: 174 MMHG | DIASTOLIC BLOOD PRESSURE: 72 MMHG | HEART RATE: 65 BPM | BODY MASS INDEX: 25.06 KG/M2

## 2024-01-19 LAB
ANION GAP SERPL CALCULATED.3IONS-SCNC: 6 MMOL/L (ref 7–15)
BASOPHILS # BLD AUTO: 0 10E3/UL (ref 0–0.2)
BASOPHILS NFR BLD AUTO: 0 %
BUN SERPL-MCNC: 26.7 MG/DL (ref 8–23)
CALCIUM SERPL-MCNC: 8.4 MG/DL (ref 8.2–9.6)
CHLORIDE SERPL-SCNC: 108 MMOL/L (ref 98–107)
CREAT SERPL-MCNC: 1.27 MG/DL (ref 0.51–0.95)
DEPRECATED HCO3 PLAS-SCNC: 27 MMOL/L (ref 22–29)
EGFRCR SERPLBLD CKD-EPI 2021: 39 ML/MIN/1.73M2
EOSINOPHIL # BLD AUTO: 0.1 10E3/UL (ref 0–0.7)
EOSINOPHIL NFR BLD AUTO: 1 %
ERYTHROCYTE [DISTWIDTH] IN BLOOD BY AUTOMATED COUNT: 13.2 % (ref 10–15)
GLUCOSE BLDC GLUCOMTR-MCNC: 126 MG/DL (ref 70–99)
GLUCOSE SERPL-MCNC: 128 MG/DL (ref 70–99)
HCT VFR BLD AUTO: 36.6 % (ref 35–47)
HGB BLD-MCNC: 11.6 G/DL (ref 11.7–15.7)
IMM GRANULOCYTES # BLD: 0 10E3/UL
IMM GRANULOCYTES NFR BLD: 1 %
LYMPHOCYTES # BLD AUTO: 1.4 10E3/UL (ref 0.8–5.3)
LYMPHOCYTES NFR BLD AUTO: 21 %
MCH RBC QN AUTO: 29.3 PG (ref 26.5–33)
MCHC RBC AUTO-ENTMCNC: 31.7 G/DL (ref 31.5–36.5)
MCV RBC AUTO: 92 FL (ref 78–100)
MONOCYTES # BLD AUTO: 0.6 10E3/UL (ref 0–1.3)
MONOCYTES NFR BLD AUTO: 9 %
NEUTROPHILS # BLD AUTO: 4.6 10E3/UL (ref 1.6–8.3)
NEUTROPHILS NFR BLD AUTO: 68 %
NRBC # BLD AUTO: 0 10E3/UL
NRBC BLD AUTO-RTO: 0 /100
PLATELET # BLD AUTO: 263 10E3/UL (ref 150–450)
POTASSIUM SERPL-SCNC: 4.2 MMOL/L (ref 3.4–5.3)
RBC # BLD AUTO: 3.96 10E6/UL (ref 3.8–5.2)
SODIUM SERPL-SCNC: 141 MMOL/L (ref 135–145)
WBC # BLD AUTO: 6.8 10E3/UL (ref 4–11)

## 2024-01-19 PROCEDURE — 36415 COLL VENOUS BLD VENIPUNCTURE: CPT | Performed by: STUDENT IN AN ORGANIZED HEALTH CARE EDUCATION/TRAINING PROGRAM

## 2024-01-19 PROCEDURE — 250N000013 HC RX MED GY IP 250 OP 250 PS 637: Performed by: INTERNAL MEDICINE

## 2024-01-19 PROCEDURE — 85025 COMPLETE CBC W/AUTO DIFF WBC: CPT | Performed by: STUDENT IN AN ORGANIZED HEALTH CARE EDUCATION/TRAINING PROGRAM

## 2024-01-19 PROCEDURE — 250N000013 HC RX MED GY IP 250 OP 250 PS 637: Performed by: STUDENT IN AN ORGANIZED HEALTH CARE EDUCATION/TRAINING PROGRAM

## 2024-01-19 PROCEDURE — 99239 HOSP IP/OBS DSCHRG MGMT >30: CPT | Performed by: STUDENT IN AN ORGANIZED HEALTH CARE EDUCATION/TRAINING PROGRAM

## 2024-01-19 PROCEDURE — 97116 GAIT TRAINING THERAPY: CPT | Mod: GP

## 2024-01-19 PROCEDURE — 97110 THERAPEUTIC EXERCISES: CPT | Mod: GP

## 2024-01-19 PROCEDURE — 250N000011 HC RX IP 250 OP 636: Performed by: INTERNAL MEDICINE

## 2024-01-19 PROCEDURE — 80048 BASIC METABOLIC PNL TOTAL CA: CPT | Performed by: STUDENT IN AN ORGANIZED HEALTH CARE EDUCATION/TRAINING PROGRAM

## 2024-01-19 RX ORDER — VANCOMYCIN HYDROCHLORIDE 50 MG/ML
125 KIT ORAL 4 TIMES DAILY
Qty: 70 ML | Refills: 0 | Status: SHIPPED | OUTPATIENT
Start: 2024-01-19 | End: 2024-01-26

## 2024-01-19 RX ADMIN — LIDOCAINE 1 PATCH: 4 PATCH TOPICAL at 09:52

## 2024-01-19 RX ADMIN — LEVOTHYROXINE SODIUM 88 MCG: 88 TABLET ORAL at 09:52

## 2024-01-19 RX ADMIN — DICLOFENAC SODIUM 2 G: 10 GEL TOPICAL at 09:53

## 2024-01-19 RX ADMIN — ENOXAPARIN SODIUM 30 MG: 30 INJECTION SUBCUTANEOUS at 09:51

## 2024-01-19 RX ADMIN — VANCOMYCIN HYDROCHLORIDE 125 MG: KIT at 09:50

## 2024-01-19 ASSESSMENT — ACTIVITIES OF DAILY LIVING (ADL)
ADLS_ACUITY_SCORE: 33

## 2024-01-19 NOTE — DISCHARGE SUMMARY
[facility]  Hospitalist Discharge Summary      Date of Admission:  1/14/2024  Date of Discharge:  1/19/2024  Discharging Provider: Emani Layne MD  Discharge Service: Hospitalist Service    Discharge Diagnoses   C.diff colitis    Clinically Significant Risk Factors     # DMII: A1C = 7.3 % (Ref range: <5.7 %) within past 6 months       Follow-ups Needed After Discharge   Follow-up Appointments     Follow-up and recommended labs and tests       Follow up with primary care provider (PCP), Hannah Caballero, within 7   days for hospital follow- up and regarding new diagnosis.  The following   labs/tests are recommended: Repeat CT scan of the abdomen and pelvis in 2   months to confirm complete resolution of the left-sided colitis. Follow   with orthopedics clinic as scheduled.            Unresulted Labs Ordered in the Past 30 Days of this Admission       No orders found from 12/15/2023 to 1/15/2024.            Discharge Disposition   Discharged to home  Condition at discharge: Stable    Hospital Course   Sissy Mccloud is a 89 year old female with PMH of hypothyroidism, HTN, NIDDM, GERD, DJD who was brought to ED for evaluation of fall, right shoulder/hip pain and generalized weakness.  CT imaging reported colitis, UA concerning for infection.  Patient admitted on 1/14/2024 for further management  Left sided colitis  C.diff colitis  -- Patient reported history of constipation but denied abdominal pain, nausea, vomiting prior to admission.  -- CT showed local segment colitis involving descending and sigmoid colon, with extensive diverticulosis no signs of diverticulitis.  There is colitis in the same location as in March 2022.  -- On admission, normal WBC count, normal lactic acid.  -- s/p IV cipro and flagyl  -- 01/16: Discussed with Dr. Mazariegos ([facility]) regarding c.diff result and agreed to start vancomycin po for 10-14 days and discontinue systemic antibiotics. Repeat CT-abd/pelvis by PCP in  2 months.  -- 01/17: Tolerating fluid diet. Advanced to low fiber diet  -- 01/18: tolerating low fiber diet  -- Pt education information printed and given.  UTI-resolved  -- Patient denied urinary symptoms, fever but reported generalized weakness  -- U/A : +ve for nitrite. Ucx: >100,000 CFU/mL Mixture of Urogenital Roxann. Considered contaminated  -- Completed 3 days of IV abx- ceftriaxone and cipro  Recurrent falls;  -- Both falls occurred on 1/13/2024.  Likely due to generalized weakness from above issues.  -- On admission, CT head and cervical spine negative for acute issues.  X-ray hip negative for fracture or dislocation.  -- Fall precaution.  PT OT consulted: TCU- Pt declined  Hyponatremia, mild- resolved  -- likely 2/2 dehydration  -- s/p IVF  Severe bilateral shoulder pain, right more than left;  -- CT right shoulder negative for acute fracture or dislocation but reported severe end-stage degenerative changes  -- Added to scheduled Tylenol, added lidocaine patch on right shoulder, added diclofenac gel on both shoulder.  Try to avoid narcotics given her age and risk of delirium.  -- PTA Neurontin  -- Continue PT OT  -- Orthopedics consult appreciated:  No fracture on MRI shoulder. Corticosteroid injection completed. OP follow up. S/o.  Type II DM with hyperglycemia;  -- A1c: 7.3%  -- PTA Glucophage on hold  -- Insulin sliding scale, I:C 1:15, and hypoglycemic protocol  LES on CKD stage III  -- LES resolved  -- On IV fluid.  BMP   Essential hypertension  -- elevated, likely due to pain issues  -- Better pain control.  PTA amlodipine.  As needed hydralazine.  Hypothyroidism;  -- Mild elevated TSH but free T4 on higher normal range.  Continue PTA Synthyroid  Patient is clinically and hemodynamically stable for discharge to home. Medication reconciliation was done. Medications sent to patient's preferred pharmacy. Follow-up appointments and recommendations as shown below. Patient declined TCU. HC PT/OT/RN ordered.  Patient verbalized understanding and agreed to plan of care. All questions answered.      Consultations This Hospital Stay   GASTROENTEROLOGY IP CONSULT  PHYSICAL THERAPY ADULT IP CONSULT  OCCUPATIONAL THERAPY ADULT IP CONSULT  ORTHOPEDIC SURGERY IP CONSULT  CARE MANAGEMENT / SOCIAL WORK IP CONSULT    Code Status   No CPR- Do NOT Intubate    Time Spent on this Encounter   IEmani MD, personally saw the patient today and spent greater than 30 minutes discharging this patient.       Emani Layne MD  [address] [address]  Phone: [phone]  Fax: [phone]  ______________________________________________________________________    Physical Exam   Vital Signs: Temp: 98.4  F (36.9  C) Temp src: Oral BP: (!) 174/72 Pulse: 65   Resp: 18 SpO2: 97 % O2 Device: None (Room air)    Weight: 136 lbs 3.2 oz  GEN: Alert and oriented. Not in acute distress.  HEENT: Atraumatic, mucous membrane- moist and pink.  Chest: Bilateral air entry.  CVS: S1S2 regular.   Abdomen: Soft. Non-tender, non-distended. No organomegaly. No guarding or rigidity. Bowel sounds active.   Extremities: No pedal edema.  CNS: No involuntary movements.  Skin: no cyanosis or clubbing.        Primary Care Physician   Hannah Caballero    Discharge Orders      Primary Care - Care Coordination Referral      Home Care Referral      Reason for your hospital stay    C.diff colitis     Follow-up and recommended labs and tests     Follow up with primary care provider (PCP), Hannah Caballero, within 7 days for hospital follow- up and regarding new diagnosis.  The following labs/tests are recommended: Repeat CT scan of the abdomen and pelvis in 2 months to confirm complete resolution of the left-sided colitis. Follow with orthopedics clinic as scheduled.     Activity    Your activity upon discharge: activity as tolerated     Discharge Instructions    Maintain adequate hydration.     Diet     Follow this diet upon discharge: Orders Placed This Encounter      Combination Diet Moderate Consistent Carb (60 g CHO per Meal) Diet; Low Fiber Diet; 2 gm NA Diet       Significant Results and Procedures       Discharge Medications   Current Discharge Medication List        START taking these medications    Details   vancomycin (FIRVANQ) 50 MG/ML oral solution Take 2.5 mLs (125 mg) by mouth 4 times daily for 7 days  Qty: 70 mL, Refills: 0    Associated Diagnoses: Colitis           CONTINUE these medications which have NOT CHANGED    Details   amLODIPine (NORVASC) 5 MG tablet Take 5 mg by mouth every evening      gabapentin (NEURONTIN) 300 MG capsule [GABAPENTIN (NEURONTIN) 300 MG CAPSULE] Take 300 mg by mouth daily.      levothyroxine (SYNTHROID/LEVOTHROID) 88 MCG tablet Take 88 mcg by mouth every morning      metFORMIN (GLUCOPHAGE) 500 MG tablet Take 500 mg by mouth daily (with dinner)      pantoprazole (PROTONIX) 40 MG tablet [PANTOPRAZOLE (PROTONIX) 40 MG TABLET] Take 40 mg by mouth daily.           Allergies   Allergies   Allergen Reactions    Ambien [Zolpidem] Unknown    Cleocin [Clindamycin] Unknown    Codeine Unknown

## 2024-01-19 NOTE — PLAN OF CARE
Goal Outcome Evaluation:    Pt A/O x4, forgetful at times. Denies pain. Lidocaine patch in place. Purewick in place. Slept between cares. Plan for discharge home with home care today. VSS.    Temp: 98.2  F (36.8  C) Temp src: Oral BP: (!) 169/67 Pulse: 67   Resp: 18 SpO2: 93 % O2 Device: None (Room air)

## 2024-01-19 NOTE — PROGRESS NOTES
Care Management Discharge Note    Discharge Date: 01/19/2024       Discharge Disposition: Home with Home Care    Discharge Services: Home Care    Discharge DME: None    Discharge Transportation: family or friend will provide    Private pay costs discussed: Not applicable    Does the patient's insurance plan have a 3 day qualifying hospital stay waiver?  No    PAS Confirmation Code: N/A  Patient/family educated on Medicare website which has current facility and service quality ratings: no    Education Provided on the Discharge Plan: Yes  Persons Notified of Discharge Plans: family  Patient/Family in Agreement with the Plan: yes    Handoff Referral Completed: Yes    Additional Information:  8:09 AM  Plan is for Pt discharge back home with family today. Pt lives with her . Pt will start home PT and OT with [facility]. Orders sent. Pt's daughter Jasmyn plans to arrive around 1100 this morning to pick-up Pt for discharge.     Care Team updated with plan for discharge.     No further CM needs requested or anticipated. CM will sign off. Please contact CM if any additional needs arise prior to discharge.      MARQUISE Worthy

## 2024-01-19 NOTE — PLAN OF CARE
Goal Outcome Evaluation:             Pt reports shoulder pain. Lido patch in place.   Oriented X4 but forgetful.   LSC. BS active. Purewick in place.            Problem: Risk for Delirium  Goal: Improved Behavioral Control  Outcome: Progressing

## 2024-01-19 NOTE — PROGRESS NOTES
Occupational Therapy Discharge Summary    Reason for therapy discharge:    Discharged to home with home therapy.    Progress towards therapy goal(s). See goals on Care Plan in [facility] electronic health record for goal details.  Goals partially met.  Barriers to achieving goals:   discharge from facility.    Therapy recommendation(s):    Continued therapy is recommended.  Rationale/Recommendations:  Home OT - 24/7 supervision.

## 2024-01-20 NOTE — PLAN OF CARE
Physical Therapy Discharge Summary    Reason for therapy discharge:    Discharged to home with home therapy.    Progress towards therapy goal(s). See goals on Care Plan in [facility] electronic health record for goal details.  Goals partially met.  Barriers to achieving goals:   Pt was working on the goals.  .    Therapy recommendation(s):    Continued therapy is recommended.  Rationale/Recommendations:  To improve mobility and strength. .

## 2024-01-22 LAB
ATRIAL RATE - MUSE: 66 BPM
DIASTOLIC BLOOD PRESSURE - MUSE: NORMAL MMHG
INTERPRETATION ECG - MUSE: NORMAL
P AXIS - MUSE: 84 DEGREES
PR INTERVAL - MUSE: 184 MS
QRS DURATION - MUSE: 68 MS
QT - MUSE: 460 MS
QTC - MUSE: 482 MS
R AXIS - MUSE: -7 DEGREES
SYSTOLIC BLOOD PRESSURE - MUSE: NORMAL MMHG
T AXIS - MUSE: 109 DEGREES
VENTRICULAR RATE- MUSE: 66 BPM

## 2024-02-02 ENCOUNTER — LAB REQUISITION (OUTPATIENT)
Dept: LAB | Facility: CLINIC | Age: 89
End: 2024-02-02

## 2024-02-02 ENCOUNTER — TRANSFERRED RECORDS (OUTPATIENT)
Dept: HEALTH INFORMATION MANAGEMENT | Facility: CLINIC | Age: 89
End: 2024-02-02

## 2024-02-02 DIAGNOSIS — I10 ESSENTIAL (PRIMARY) HYPERTENSION: ICD-10-CM

## 2024-02-02 PROCEDURE — 80048 BASIC METABOLIC PNL TOTAL CA: CPT | Performed by: PHYSICIAN ASSISTANT

## 2024-02-03 LAB
ANION GAP SERPL CALCULATED.3IONS-SCNC: 14 MMOL/L (ref 7–15)
BUN SERPL-MCNC: 27.5 MG/DL (ref 8–23)
CALCIUM SERPL-MCNC: 9.5 MG/DL (ref 8.2–9.6)
CHLORIDE SERPL-SCNC: 100 MMOL/L (ref 98–107)
CREAT SERPL-MCNC: 1.63 MG/DL (ref 0.51–0.95)
DEPRECATED HCO3 PLAS-SCNC: 24 MMOL/L (ref 22–29)
EGFRCR SERPLBLD CKD-EPI 2021: 29 ML/MIN/1.73M2
GLUCOSE SERPL-MCNC: 160 MG/DL (ref 70–99)
POTASSIUM SERPL-SCNC: 4.5 MMOL/L (ref 3.4–5.3)
SODIUM SERPL-SCNC: 138 MMOL/L (ref 135–145)

## 2024-04-12 ENCOUNTER — OFFICE VISIT (OUTPATIENT)
Dept: CARDIOLOGY | Facility: CLINIC | Age: 89
End: 2024-04-12
Payer: COMMERCIAL

## 2024-04-12 VITALS
BODY MASS INDEX: 22.5 KG/M2 | SYSTOLIC BLOOD PRESSURE: 121 MMHG | RESPIRATION RATE: 16 BRPM | WEIGHT: 123 LBS | HEART RATE: 62 BPM | DIASTOLIC BLOOD PRESSURE: 63 MMHG

## 2024-04-12 DIAGNOSIS — R07.2 PRECORDIAL PAIN: Primary | ICD-10-CM

## 2024-04-12 PROCEDURE — 99204 OFFICE O/P NEW MOD 45 MIN: CPT | Performed by: INTERNAL MEDICINE

## 2024-04-12 NOTE — PROGRESS NOTES
Thank you, Dr. Caballero, for asking [facility] to evaluate Ms. Sissy Mccluod.      Assessment/Recommendations   Assessment:    Dyspnea and weakness, clinically no obvious fluid overload/heart failure  Coronary artery disease, mild nonobstructive in 2015  Weight loss  Hypertension controlled  Hypothyroidism  Chronic kidney disease    Plan:  I am not sure if her shortness of breath and weakness are related to any cardiac condition.  On her exam she was not fluid overloaded, her rhythm was stable and I did not detect any valve abnormalities.  She does not have any classical angina.  She had recent echocardiogram showed preserved LV systolic function.  We can repeat pharmacological stress test to rule out ischemia contributing to her symptoms.        History of Present Illness    Ms. Sissy Mccloud is a 89 year old female who comes in for cardiac evaluation.  She reports that she has been getting weaker and more short of breath lately.  The symptoms progressed over the course of last several months.  She denies any chest pain or heart palpitations.  Her weight has been steadily declining and she is now about 50 pounds less than she was last year.  She has not had pedal edema.  She denies PND and orthopnea.  She has not had fever or sputum production.  She does not have a history of obstructive coronary artery disease.  She did have coronary angiogram in 2015 that showed only mild disease.  She had normal filling pressures.  The evaluation was done at [facility]    ECG: Personally reviewed. .    Echocardiogram: November 2023  at [facility]        Coronary Angiogram: 2015 at [facility]  Mild nonobstructive CAD and normal filling pressures     Physical Examination Review of Systems   /63 (BP Location: Left arm, Patient Position: Sitting, Cuff Size: Adult Small)   Pulse 62   Resp 16   Wt 55.8 kg (123 lb)   LMP  (LMP Unknown)   BMI 22.50 kg/m    Body mass index is 22.5 kg/m .  Wt Readings from  Last 3 Encounters:   04/12/24 55.8 kg (123 lb)   01/17/24 61.8 kg (136 lb 3.2 oz)   03/01/22 71.3 kg (157 lb 1.6 oz)     General Appearance:   Alert, cooperative, no distress, appears stated age   Head/ENT: Normocephalic, without obvious abnormality. Membranes moist      EYES:  no scleral icterus, normal conjunctivae   Neck: Supple, symmetrical, trachea midline, no adenopathy, thyroid: not enlarged, symmetric, no carotid bruit or JVD   Chest/Lungs:   Lungs are clear to auscultation, respirations unlabored. No tenderness or deformity    Cardiovascular:   Regular rhythm, S1, S2 normal, no murmur, rub or gallop.   Abdomen:  Soft, non-tender, bowel sounds active all four quadrants,  no masses, no organomegaly   Extremities: no cyanosis or clubbing. No edema   Skin: Skin color, texture, turgor normal, no rashes or lesions.    Psychiatric: Normal affect, calm   Neurologic: Alert and oriented x 3, moving all four extremities.     Enc Vitals  BP: 121/63  Pulse: 62  Resp: 16  Weight: 55.8 kg (123 lb)                                          Lab Results    Chemistry/lipid CBC Cardiac Enzymes/BNP/TSH/INR   Recent Labs   Lab Test 09/08/23  1157   CHOL 167   HDL 54   LDL 82   TRIG 156*     Recent Labs   Lab Test 09/08/23  1157 06/20/22  1435 07/20/21  1305   LDL 82 81 86     Recent Labs   Lab Test 02/02/24  1431      POTASSIUM 4.5   CHLORIDE 100   CO2 24   *   BUN 27.5*   CR 1.63*   GFRESTIMATED 29*   CRISTY 9.5     Recent Labs   Lab Test 02/02/24  1431 01/19/24  0631 01/18/24  0720   CR 1.63* 1.27* 1.26*     Recent Labs   Lab Test 01/14/24  1746 02/26/22  0918   A1C 7.3* 7.1*          Recent Labs   Lab Test 01/19/24  0631   WBC 6.8   HGB 11.6*   HCT 36.6   MCV 92        Recent Labs   Lab Test 01/19/24  0631 01/18/24  0720 01/17/24  0801   HGB 11.6* 11.6* 12.4    Recent Labs   Lab Test 02/27/22  0552 02/26/22  2354 02/26/22  0918   TROPONINI 0.02 0.02 0.02     Recent Labs   Lab Test 02/27/22  0552   BNP 50      Recent Labs   Lab Test 01/14/24  1746   TSH 7.98*     Recent Labs   Lab Test 02/26/22  0920   INR 1.35*        Medical History  Surgical History Family History Social History   CAD Past Surgical History:   Procedure Laterality Date    CHOLECYSTECTOMY       No premature CAD, SCD,cardiomyopathy   Social History     Socioeconomic History    Marital status:      Spouse name: Not on file    Number of children: Not on file    Years of education: Not on file    Highest education level: Not on file   Occupational History    Not on file   Tobacco Use    Smoking status: Never    Smokeless tobacco: Never   Substance and Sexual Activity    Alcohol use: Not on file    Drug use: Never    Sexual activity: Not on file   Other Topics Concern    Not on file   Social History Narrative    Not on file     Social Determinants of Health     Financial Resource Strain: High Risk (1/1/2022)    Received from [employer]    Financial Resource Strain     Difficulty of Paying Living Expenses: Not on file     Difficulty of Paying Living Expenses: Not on file   Food Insecurity: Not on file   Transportation Needs: Not on file   Physical Activity: Not on file   Stress: Not on file   Social Connections: Unknown (1/3/2024)    Received from [employer]    Social Connections     Frequency of Communication with Friends and Family: Not on file   Interpersonal Safety: Not on file   Housing Stability: Not on file         Medications  Allergies   Current Outpatient Medications   Medication Sig Dispense Refill    amLODIPine (NORVASC) 5 MG tablet Take 5 mg by mouth every evening      gabapentin (NEURONTIN) 300 MG capsule [GABAPENTIN (NEURONTIN) 300 MG CAPSULE] Take 300 mg by mouth daily.      levothyroxine (SYNTHROID/LEVOTHROID) 88 MCG tablet Take 88 mcg by mouth every morning      metFORMIN (GLUCOPHAGE) 500 MG tablet Take 500 mg by mouth daily (with dinner)      pantoprazole  (PROTONIX) 40 MG tablet [PANTOPRAZOLE (PROTONIX) 40 MG TABLET] Take 40 mg by mouth daily.          Allergies   Allergen Reactions    Ambien [Zolpidem] Unknown    Cleocin [Clindamycin] Unknown    Codeine Unknown

## 2024-04-12 NOTE — LETTER
4/12/2024    Hannah Caballero PA-C  [address]  [place] [address]    RE: Sissy Mccloud       Dear Colleague,     I had the pleasure of seeing Sissy Mccloud in the [facility].        Thank you, Dr. Caballero, for asking [facility] to evaluate Ms. Sissy Mccloud.      Assessment/Recommendations   Assessment:    Dyspnea and weakness, clinically no obvious fluid overload/heart failure  Coronary artery disease, mild nonobstructive in 2015  Weight loss  Hypertension controlled  Hypothyroidism  Chronic kidney disease    Plan:  I am not sure if her shortness of breath and weakness are related to any cardiac condition.  On her exam she was not fluid overloaded, her rhythm was stable and I did not detect any valve abnormalities.  She does not have any classical angina.  She had recent echocardiogram showed preserved LV systolic function.  We can repeat pharmacological stress test to rule out ischemia contributing to her symptoms.        History of Present Illness    Ms. Sissy Mccloud is a 89 year old female who comes in for cardiac evaluation.  She reports that she has been getting weaker and more short of breath lately.  The symptoms progressed over the course of last several months.  She denies any chest pain or heart palpitations.  Her weight has been steadily declining and she is now about 50 pounds less than she was last year.  She has not had pedal edema.  She denies PND and orthopnea.  She has not had fever or sputum production.  She does not have a history of obstructive coronary artery disease.  She did have coronary angiogram in 2015 that showed only mild disease.  She had normal filling pressures.  The evaluation was done at [facility]    ECG: Personally reviewed. .    Echocardiogram: November 2023  at [facility]  normal        Coronary Angiogram: 2015 at [place]  Mild nonobstructive CAD and normal filling pressures     Physical Examination Review of Systems   /63 (BP  Location: Left arm, Patient Position: Sitting, Cuff Size: Adult Small)   Pulse 62   Resp 16   Wt 55.8 kg (123 lb)   LMP  (LMP Unknown)   BMI 22.50 kg/m    Body mass index is 22.5 kg/m .  Wt Readings from Last 3 Encounters:   04/12/24 55.8 kg (123 lb)   01/17/24 61.8 kg (136 lb 3.2 oz)   03/01/22 71.3 kg (157 lb 1.6 oz)     General Appearance:   Alert, cooperative, no distress, appears stated age   Head/ENT: Normocephalic, without obvious abnormality. Membranes moist      EYES:  no scleral icterus, normal conjunctivae   Neck: Supple, symmetrical, trachea midline, no adenopathy, thyroid: not enlarged, symmetric, no carotid bruit or JVD   Chest/Lungs:   Lungs are clear to auscultation, respirations unlabored. No tenderness or deformity    Cardiovascular:   Regular rhythm, S1, S2 normal, no murmur, rub or gallop.   Abdomen:  Soft, non-tender, bowel sounds active all four quadrants,  no masses, no organomegaly   Extremities: no cyanosis or clubbing. No edema   Skin: Skin color, texture, turgor normal, no rashes or lesions.    Psychiatric: Normal affect, calm   Neurologic: Alert and oriented x 3, moving all four extremities.     Enc Vitals  BP: 121/63  Pulse: 62  Resp: 16  Weight: 55.8 kg (123 lb)                                          Lab Results    Chemistry/lipid CBC Cardiac Enzymes/BNP/TSH/INR   Recent Labs   Lab Test 09/08/23  1157   CHOL 167   HDL 54   LDL 82   TRIG 156*     Recent Labs   Lab Test 09/08/23  1157 06/20/22  1435 07/20/21  1305   LDL 82 81 86     Recent Labs   Lab Test 02/02/24  1431      POTASSIUM 4.5   CHLORIDE 100   CO2 24   *   BUN 27.5*   CR 1.63*   GFRESTIMATED 29*   CRISTY 9.5     Recent Labs   Lab Test 02/02/24  1431 01/19/24  0631 01/18/24  0720   CR 1.63* 1.27* 1.26*     Recent Labs   Lab Test 01/14/24  1746 02/26/22  0918   A1C 7.3* 7.1*          Recent Labs   Lab Test 01/19/24  0631   WBC 6.8   HGB 11.6*   HCT 36.6   MCV 92        Recent Labs   Lab Test  01/19/24  0631 01/18/24  0720 01/17/24  0801   HGB 11.6* 11.6* 12.4    Recent Labs   Lab Test 02/27/22  0552 02/26/22  2354 02/26/22  0918   TROPONINI 0.02 0.02 0.02     Recent Labs   Lab Test 02/27/22  0552   BNP 50     Recent Labs   Lab Test 01/14/24  1746   TSH 7.98*     Recent Labs   Lab Test 02/26/22  0920   INR 1.35*        Medical History  Surgical History Family History Social History   CAD Past Surgical History:   Procedure Laterality Date    CHOLECYSTECTOMY       No premature CAD, SCD,cardiomyopathy   Social History     Socioeconomic History    Marital status:      Spouse name: Not on file    Number of children: Not on file    Years of education: Not on file    Highest education level: Not on file   Occupational History    Not on file   Tobacco Use    Smoking status: Never    Smokeless tobacco: Never   Substance and Sexual Activity    Alcohol use: Not on file    Drug use: Never    Sexual activity: Not on file   Other Topics Concern    Not on file   Social History Narrative    Not on file     Social Determinants of Health     Financial Resource Strain: High Risk (1/1/2022)    Received from [email or web address]    Financial Resource Strain     Difficulty of Paying Living Expenses: Not on file     Difficulty of Paying Living Expenses: Not on file   Food Insecurity: Not on file   Transportation Needs: Not on file   Physical Activity: Not on file   Stress: Not on file   Social Connections: Unknown (1/3/2024)    Received from [email or web address]    Social Connections     Frequency of Communication with Friends and Family: Not on file   Interpersonal Safety: Not on file   Housing Stability: Not on file         Medications  Allergies   Current Outpatient Medications   Medication Sig Dispense Refill    amLODIPine (NORVASC) 5 MG tablet Take 5 mg by mouth every evening      gabapentin (NEURONTIN) 300 MG capsule [GABAPENTIN (NEURONTIN) 300 MG CAPSULE] Take 300 mg  by mouth daily.      levothyroxine (SYNTHROID/LEVOTHROID) 88 MCG tablet Take 88 mcg by mouth every morning      metFORMIN (GLUCOPHAGE) 500 MG tablet Take 500 mg by mouth daily (with dinner)      pantoprazole (PROTONIX) 40 MG tablet [PANTOPRAZOLE (PROTONIX) 40 MG TABLET] Take 40 mg by mouth daily.          Allergies   Allergen Reactions    Ambien [Zolpidem] Unknown    Cleocin [Clindamycin] Unknown    Codeine Unknown                        Thank you for allowing me to participate in the care of your patient.      Sincerely,     Gera Carter MD     [facility]  cc:   Hannah Caballero PA-C  [address] [place] [address]

## 2024-04-16 ENCOUNTER — TELEPHONE (OUTPATIENT)
Dept: CARDIOLOGY | Facility: HOSPITAL | Age: 89
End: 2024-04-16
Payer: COMMERCIAL

## 2024-04-17 ENCOUNTER — HOSPITAL ENCOUNTER (OUTPATIENT)
Dept: NUCLEAR MEDICINE | Facility: HOSPITAL | Age: 89
Discharge: HOME | End: 2024-04-17
Attending: INTERNAL MEDICINE
Payer: COMMERCIAL

## 2024-04-17 ENCOUNTER — HOSPITAL ENCOUNTER (OUTPATIENT)
Dept: CARDIOLOGY | Facility: HOSPITAL | Age: 89
Discharge: HOME | End: 2024-04-17
Attending: INTERNAL MEDICINE
Payer: COMMERCIAL

## 2024-04-17 DIAGNOSIS — R07.2 PRECORDIAL PAIN: ICD-10-CM

## 2024-04-17 LAB
CV STRESS CURRENT BP HE: NORMAL
CV STRESS CURRENT HR HE: 66
CV STRESS CURRENT HR HE: 66
CV STRESS CURRENT HR HE: 68
CV STRESS CURRENT HR HE: 70
CV STRESS CURRENT HR HE: 70
CV STRESS CURRENT HR HE: 71
CV STRESS CURRENT HR HE: 76
CV STRESS CURRENT HR HE: 77
CV STRESS CURRENT HR HE: 78
CV STRESS CURRENT HR HE: 79
CV STRESS CURRENT HR HE: 80
CV STRESS CURRENT HR HE: 81
CV STRESS DEVIATION TIME HE: NORMAL
CV STRESS ECHO PERCENT HR HE: NORMAL
CV STRESS EXERCISE STAGE HE: NORMAL
CV STRESS FINAL RESTING BP HE: NORMAL
CV STRESS FINAL RESTING HR HE: 77
CV STRESS MAX HR HE: 83
CV STRESS MAX TREADMILL GRADE HE: 0
CV STRESS MAX TREADMILL SPEED HE: 0
CV STRESS PEAK DIA BP HE: NORMAL
CV STRESS PEAK SYS BP HE: NORMAL
CV STRESS PHASE HE: NORMAL
CV STRESS PROTOCOL HE: NORMAL
CV STRESS RESTING PT POSITION HE: NORMAL
CV STRESS RESTING PT POSITION HE: NORMAL
CV STRESS ST DEVIATION AMOUNT HE: NORMAL
CV STRESS ST DEVIATION ELEVATION HE: NORMAL
CV STRESS ST EVELATION AMOUNT HE: NORMAL
CV STRESS TEST TYPE HE: NORMAL
CV STRESS TOTAL STAGE TIME MIN 1 HE: NORMAL
RATE PRESSURE PRODUCT: NORMAL
STRESS ECHO BASELINE DIASTOLIC HE: 76
STRESS ECHO BASELINE HR: 70
STRESS ECHO BASELINE SYSTOLIC BP: 151
STRESS ECHO CALCULATED PERCENT HR: 65 %
STRESS ECHO LAST STRESS DIASTOLIC BP: 70
STRESS ECHO LAST STRESS HR: 80
STRESS ECHO LAST STRESS SYSTOLIC BP: 161
STRESS ECHO TARGET HR: 128

## 2024-04-17 PROCEDURE — 93018 CV STRESS TEST I&R ONLY: CPT | Performed by: INTERNAL MEDICINE

## 2024-04-17 PROCEDURE — 93017 CV STRESS TEST TRACING ONLY: CPT

## 2024-04-17 PROCEDURE — 78452 HT MUSCLE IMAGE SPECT MULT: CPT

## 2024-04-17 PROCEDURE — 343N000001 HC RX 343: Performed by: INTERNAL MEDICINE

## 2024-04-17 PROCEDURE — 78452 HT MUSCLE IMAGE SPECT MULT: CPT | Mod: 26 | Performed by: INTERNAL MEDICINE

## 2024-04-17 PROCEDURE — A9500 TC99M SESTAMIBI: HCPCS | Performed by: INTERNAL MEDICINE

## 2024-04-17 PROCEDURE — 250N000011 HC RX IP 250 OP 636: Performed by: INTERNAL MEDICINE

## 2024-04-17 PROCEDURE — 93016 CV STRESS TEST SUPVJ ONLY: CPT | Performed by: INTERNAL MEDICINE

## 2024-04-17 RX ORDER — AMINOPHYLLINE 25 MG/ML
50 INJECTION, SOLUTION INTRAVENOUS
Status: DISCONTINUED | OUTPATIENT
Start: 2024-04-17 | End: 2024-04-17 | Stop reason: HOSPADM

## 2024-04-17 RX ORDER — REGADENOSON 0.08 MG/ML
0.4 INJECTION, SOLUTION INTRAVENOUS ONCE
Status: COMPLETED | OUTPATIENT
Start: 2024-04-17 | End: 2024-04-17

## 2024-04-17 RX ORDER — CAFFEINE 200 MG
200 TABLET ORAL
Status: DISCONTINUED | OUTPATIENT
Start: 2024-04-17 | End: 2024-04-17 | Stop reason: HOSPADM

## 2024-04-17 RX ORDER — CAFFEINE CITRATE 20 MG/ML
60 SOLUTION INTRAVENOUS
Status: DISCONTINUED | OUTPATIENT
Start: 2024-04-17 | End: 2024-04-17 | Stop reason: HOSPADM

## 2024-04-17 RX ORDER — ALBUTEROL SULFATE 0.83 MG/ML
2.5 SOLUTION RESPIRATORY (INHALATION)
Status: DISCONTINUED | OUTPATIENT
Start: 2024-04-17 | End: 2024-04-17 | Stop reason: HOSPADM

## 2024-04-17 RX ADMIN — REGADENOSON 0.4 MG: 0.08 INJECTION, SOLUTION INTRAVENOUS at 14:15

## 2024-04-17 RX ADMIN — Medication 8.4 MILLICURIE: at 12:45

## 2024-04-17 RX ADMIN — Medication 32.9 MILLICURIE: at 14:10

## 2024-04-25 ENCOUNTER — LAB REQUISITION (OUTPATIENT)
Dept: LAB | Facility: CLINIC | Age: 89
End: 2024-04-25

## 2024-04-25 DIAGNOSIS — I10 ESSENTIAL (PRIMARY) HYPERTENSION: ICD-10-CM

## 2024-04-25 PROCEDURE — 80048 BASIC METABOLIC PNL TOTAL CA: CPT | Performed by: PHYSICIAN ASSISTANT

## 2024-04-27 LAB
ANION GAP SERPL CALCULATED.3IONS-SCNC: 15 MMOL/L (ref 7–15)
BUN SERPL-MCNC: 31.9 MG/DL (ref 8–23)
CALCIUM SERPL-MCNC: 9.4 MG/DL (ref 8.2–9.6)
CHLORIDE SERPL-SCNC: 104 MMOL/L (ref 98–107)
CREAT SERPL-MCNC: 1.51 MG/DL (ref 0.51–0.95)
DEPRECATED HCO3 PLAS-SCNC: 22 MMOL/L (ref 22–29)
EGFRCR SERPLBLD CKD-EPI 2021: 32 ML/MIN/1.73M2
GLUCOSE SERPL-MCNC: 182 MG/DL (ref 70–99)
POTASSIUM SERPL-SCNC: 4.8 MMOL/L (ref 3.4–5.3)
SODIUM SERPL-SCNC: 141 MMOL/L (ref 135–145)

## 2024-06-06 ENCOUNTER — HOSPITAL ENCOUNTER (EMERGENCY)
Facility: HOSPITAL | Age: 89
Discharge: HOME | End: 2024-06-06
Attending: EMERGENCY MEDICINE | Admitting: EMERGENCY MEDICINE
Payer: COMMERCIAL

## 2024-06-06 VITALS
HEART RATE: 58 BPM | DIASTOLIC BLOOD PRESSURE: 92 MMHG | RESPIRATION RATE: 18 BRPM | OXYGEN SATURATION: 96 % | SYSTOLIC BLOOD PRESSURE: 212 MMHG | TEMPERATURE: 97.8 F

## 2024-06-06 DIAGNOSIS — R53.1 WEAKNESS GENERALIZED: ICD-10-CM

## 2024-06-06 DIAGNOSIS — N30.00 ACUTE CYSTITIS WITHOUT HEMATURIA: ICD-10-CM

## 2024-06-06 LAB
ALBUMIN SERPL BCG-MCNC: 3.7 G/DL (ref 3.5–5.2)
ALBUMIN UR-MCNC: 20 MG/DL
ALP SERPL-CCNC: 66 U/L (ref 40–150)
ALT SERPL W P-5'-P-CCNC: 7 U/L (ref 0–50)
ANION GAP SERPL CALCULATED.3IONS-SCNC: 13 MMOL/L (ref 7–15)
APPEARANCE UR: ABNORMAL
AST SERPL W P-5'-P-CCNC: 14 U/L (ref 0–45)
BACTERIA #/AREA URNS HPF: ABNORMAL /HPF
BILIRUB SERPL-MCNC: 0.3 MG/DL
BILIRUB UR QL STRIP: NEGATIVE
BUN SERPL-MCNC: 25.2 MG/DL (ref 8–23)
CALCIUM SERPL-MCNC: 8.7 MG/DL (ref 8.2–9.6)
CHLORIDE SERPL-SCNC: 99 MMOL/L (ref 98–107)
COLOR UR AUTO: YELLOW
CREAT SERPL-MCNC: 1.26 MG/DL (ref 0.51–0.95)
DEPRECATED HCO3 PLAS-SCNC: 27 MMOL/L (ref 22–29)
EGFRCR SERPLBLD CKD-EPI 2021: 40 ML/MIN/1.73M2
ERYTHROCYTE [DISTWIDTH] IN BLOOD BY AUTOMATED COUNT: 13 % (ref 10–15)
GLUCOSE SERPL-MCNC: 251 MG/DL (ref 70–99)
GLUCOSE UR STRIP-MCNC: 300 MG/DL
HCT VFR BLD AUTO: 40.1 % (ref 35–47)
HGB BLD-MCNC: 12.9 G/DL (ref 11.7–15.7)
HGB UR QL STRIP: NEGATIVE
HYALINE CASTS: 1 /LPF
KETONES UR STRIP-MCNC: NEGATIVE MG/DL
LEUKOCYTE ESTERASE UR QL STRIP: ABNORMAL
MAGNESIUM SERPL-MCNC: 1.9 MG/DL (ref 1.7–2.3)
MCH RBC QN AUTO: 30.4 PG (ref 26.5–33)
MCHC RBC AUTO-ENTMCNC: 32.2 G/DL (ref 31.5–36.5)
MCV RBC AUTO: 94 FL (ref 78–100)
MUCOUS THREADS #/AREA URNS LPF: PRESENT /LPF
NITRATE UR QL: POSITIVE
PH UR STRIP: 6.5 [PH] (ref 5–7)
PLATELET # BLD AUTO: 176 10E3/UL (ref 150–450)
POTASSIUM SERPL-SCNC: 4 MMOL/L (ref 3.4–5.3)
PROT SERPL-MCNC: 6.8 G/DL (ref 6.4–8.3)
RBC # BLD AUTO: 4.25 10E6/UL (ref 3.8–5.2)
RBC URINE: 1 /HPF
SODIUM SERPL-SCNC: 139 MMOL/L (ref 135–145)
SP GR UR STRIP: 1.02 (ref 1–1.03)
SQUAMOUS EPITHELIAL: 1 /HPF
TRANSITIONAL EPI: <1 /HPF
TROPONIN T SERPL HS-MCNC: 20 NG/L
TROPONIN T SERPL HS-MCNC: 27 NG/L
UROBILINOGEN UR STRIP-MCNC: <2 MG/DL
WBC # BLD AUTO: 5.9 10E3/UL (ref 4–11)
WBC URINE: 24 /HPF

## 2024-06-06 PROCEDURE — 85027 COMPLETE CBC AUTOMATED: CPT | Performed by: EMERGENCY MEDICINE

## 2024-06-06 PROCEDURE — 87186 SC STD MICRODIL/AGAR DIL: CPT | Performed by: EMERGENCY MEDICINE

## 2024-06-06 PROCEDURE — 82374 ASSAY BLOOD CARBON DIOXIDE: CPT | Performed by: EMERGENCY MEDICINE

## 2024-06-06 PROCEDURE — 250N000013 HC RX MED GY IP 250 OP 250 PS 637: Performed by: EMERGENCY MEDICINE

## 2024-06-06 PROCEDURE — 93005 ELECTROCARDIOGRAM TRACING: CPT

## 2024-06-06 PROCEDURE — 84484 ASSAY OF TROPONIN QUANT: CPT | Mod: 91 | Performed by: EMERGENCY MEDICINE

## 2024-06-06 PROCEDURE — 87086 URINE CULTURE/COLONY COUNT: CPT | Performed by: EMERGENCY MEDICINE

## 2024-06-06 PROCEDURE — 36415 COLL VENOUS BLD VENIPUNCTURE: CPT | Performed by: EMERGENCY MEDICINE

## 2024-06-06 PROCEDURE — 83735 ASSAY OF MAGNESIUM: CPT | Performed by: EMERGENCY MEDICINE

## 2024-06-06 PROCEDURE — 93005 ELECTROCARDIOGRAM TRACING: CPT | Performed by: EMERGENCY MEDICINE

## 2024-06-06 PROCEDURE — 81001 URINALYSIS AUTO W/SCOPE: CPT | Performed by: EMERGENCY MEDICINE

## 2024-06-06 PROCEDURE — 84460 ALANINE AMINO (ALT) (SGPT): CPT | Performed by: EMERGENCY MEDICINE

## 2024-06-06 PROCEDURE — 99284 EMERGENCY DEPT VISIT MOD MDM: CPT

## 2024-06-06 RX ORDER — CEPHALEXIN 500 MG/1
500 CAPSULE ORAL ONCE
Status: COMPLETED | OUTPATIENT
Start: 2024-06-06 | End: 2024-06-06

## 2024-06-06 RX ADMIN — CEPHALEXIN 500 MG: 500 CAPSULE ORAL at 16:50

## 2024-06-06 ASSESSMENT — ACTIVITIES OF DAILY LIVING (ADL)
ADLS_ACUITY_SCORE: 38

## 2024-06-06 ASSESSMENT — COLUMBIA-SUICIDE SEVERITY RATING SCALE - C-SSRS
1. IN THE PAST MONTH, HAVE YOU WISHED YOU WERE DEAD OR WISHED YOU COULD GO TO SLEEP AND NOT WAKE UP?: NO
6. HAVE YOU EVER DONE ANYTHING, STARTED TO DO ANYTHING, OR PREPARED TO DO ANYTHING TO END YOUR LIFE?: NO
2. HAVE YOU ACTUALLY HAD ANY THOUGHTS OF KILLING YOURSELF IN THE PAST MONTH?: NO

## 2024-06-06 NOTE — ED TRIAGE NOTES
"Pt arrives via [facility] from home with complaints of generalized weakness and feeling dizzy and \"not well.\" Per EMS report, patient's neighbors came to check on her and noted she looked diaphoretic, pale, and was reporting feeling off. When EMS arrived, the did an orthostati BP and it went fro 150/80 while sitting to 90/50 when she stood. Pt endorsed feeling dizzy at that time. Pt's appears dry - dry lips, tenting skin. EMS gave 500ml bolus of NS     Triage Assessment (Adult)       Row Name 06/06/24 1334          Triage Assessment    Airway WDL WDL        Respiratory WDL    Respiratory WDL WDL        Skin Circulation/Temperature WDL    Skin Circulation/Temperature WDL WDL        Cardiac WDL    Cardiac WDL WDL        Peripheral/Neurovascular WDL    Peripheral Neurovascular WDL WDL        Cognitive/Neuro/Behavioral WDL    Cognitive/Neuro/Behavioral WDL WDL                     "

## 2024-06-06 NOTE — DISCHARGE INSTRUCTIONS
Your blood pressure today was intermittently elevated.  Have this rechecked by your doctor on Monday

## 2024-06-06 NOTE — ED PROVIDER NOTES
"EMERGENCY DEPARTMENT ENCOUNTER      NAME: Sissy Mccloud  AGE: 89 year old female  YOB: 1931  MRN: [id number]  EVALUATION DATE & TIME: 6/6/2024  1:26 PM    PCP: Hannah Caballero    ED PROVIDER: Michael Thurston M.D.      Chief Complaint   Patient presents with    Generalized Weakness    Dizziness    Hypotension         FINAL IMPRESSION:  UTI  Weakness  Hypertension  ED COURSE & MEDICAL DECISION MAKING:    Pertinent Labs & Imaging studies reviewed. (See chart for details)  89 year old female presents to the Emergency Department for evaluation of \"dehydration\".  Patient reports she was up using the bathroom extensively last night not drinking enough fluids.  Today she feels slightly weak and and is worried about being dehydrated.  Denies any fevers or chills.  No cough or chest pain.  No shortness of breath.  Exam reveals an elderly female appears younger than stated age.  Heart and lungs unremarkable.  Abdomen soft and nontender.  Patient essentially normal exam.  Given reports will assess for potential dehydration/electrolyte imbalance.  Urinalysis will also be obtained given her urinary frequency.. Patient appears non toxic with stable vitals signs. Overall exam is benign.        1:32 PM I met with the patient for the initial interview and physical examination. Discussed plan for treatment and workup in the ED.    3 PM.  Laboratory findings unremarkable other than slightly elevated troponin.  The need for delta troponin discussed with patient and her daughter.  Minimal renal insufficiency improved compared to most recent value on 4/24/2024  4:30 PM.  Urine with evidence of infection.  Will initiate antibiotics, cephalexin  4:46 PM.  Delta troponin actually reduced.  Will proceed with outpatient management.  At the conclusion of the encounter I discussed the results of all of the tests and the disposition.   The questions were answered and return precautions provided. The patient or family acknowledged " "understanding and was agreeable with the care plan.       PPE: Provider wore gloves, N95 mask, eye protection, surgical cap, and paper mask.     MEDICATIONS GIVEN IN THE EMERGENCY:  Medications - No data to display    NEW PRESCRIPTIONS STARTED AT TODAY'S ER VISIT  Current Discharge Medication List        START taking these medications    Details   cephALEXin (KEFLEX) 250 MG capsule Take 1 capsule (250 mg) by mouth 4 times daily for 7 days  Qty: 28 capsule, Refills: 0                 =================================================================    HPI    Patient information was obtained from: Patient     Use of Intrepreter: N/A      Sissy Mccloud is a 89 year old female with a pertient medical history of  CKD, HTN,  type 2 diabetes, non-insulin dependent, and sepsis who presents to the ED for evaluation of generalized weakness and dizziness.     The patient reports generalized weakness and feeling dizzy today. Patient states she is \"not feeling well\" and \"was not behaving\" because she did not drink enough water. Patient states she kept urinating last night and is currently weak. When standing up or sitting up on her bed, she would get nauseous.  Patient states her neighbor came to check on her this afternoon and called the EMS for her. Denies any vomiting, fevers or chills. No other complaints at this time.       REVIEW OF SYSTEMS   Constitutional:  Denies fever, chills  Respiratory:  Denies productive cough or increased work of breathing  Cardiovascular:  Denies chest pain, palpitations  GI:  Denies abdominal pain, nausea, vomiting, or change in bowel or bladder habits   Musculoskeletal:  Denies any new muscle/joint swelling  Skin:  Denies rash   Neurologic:  Denies focal weakness  All systems negative except as marked.     PAST MEDICAL HISTORY:  History reviewed. No pertinent past medical history.    PAST SURGICAL HISTORY:  Past Surgical History:   Procedure Laterality Date    CHOLECYSTECTOMY           CURRENT " MEDICATIONS:    No current facility-administered medications for this encounter.    Current Outpatient Medications:     amLODIPine (NORVASC) 5 MG tablet, Take 5 mg by mouth every evening, Disp: , Rfl:     gabapentin (NEURONTIN) 300 MG capsule, [GABAPENTIN (NEURONTIN) 300 MG CAPSULE] Take 300 mg by mouth daily., Disp: , Rfl:     levothyroxine (SYNTHROID/LEVOTHROID) 88 MCG tablet, Take 88 mcg by mouth every morning, Disp: , Rfl:     metFORMIN (GLUCOPHAGE) 500 MG tablet, Take 500 mg by mouth daily (with dinner), Disp: , Rfl:     pantoprazole (PROTONIX) 40 MG tablet, [PANTOPRAZOLE (PROTONIX) 40 MG TABLET] Take 40 mg by mouth daily., Disp: , Rfl:     ALLERGIES:  Allergies   Allergen Reactions    Ambien [Zolpidem] Unknown    Cleocin [Clindamycin] Unknown    Codeine Unknown       FAMILY HISTORY:  No family history on file.    SOCIAL HISTORY:   Social History     Socioeconomic History    Marital status:    Tobacco Use    Smoking status: Never    Smokeless tobacco: Never   Substance and Sexual Activity    Drug use: Never     Social Determinants of Health      Received from [employer], [employer] [facility]    Financial Resource Strain    [facility], [employer] [facility]    Social Connections       VITALS:  Patient Vitals for the past 24 hrs:   BP Temp Temp src Pulse Resp SpO2   06/06/24 1334 -- -- -- -- 20 --   06/06/24 1330 (!) 180/75 97.8  F (36.6  C) Oral 64 -- 96 %        PHYSICAL EXAM    Constitutional:  Awake, alert, in no apparent distress  HENT:  Normocephalic, Atraumatic. Bilateral external ears normal. Oropharynx moist. Nose normal. Neck- Normal range of motion with no guarding, No midline cervical tenderness, Supple, No stridor.   Eyes:  PERRL, EOMI with no signs of entrapment, Conjunctiva normal, No discharge.   Respiratory:  Normal breath sounds, No respiratory distress, No  wheezing.    Cardiovascular:  Normal heart rate, Normal rhythm, No appreciable rubs or gallops.   GI:  Soft, No tenderness, No distension, No palpable masses  Musculoskeletal:  Intact distal pulses, No edema. Good range of motion in all major joints. No tenderness to palpation or major deformities noted.  Integument:  Warm, Dry, No erythema, No rash.   Neurologic:  Alert & oriented, Normal motor function, Normal sensory function, No focal deficits noted.   Psychiatric:  Affect normal, Judgment normal, Mood normal.     LAB:  All pertinent labs reviewed and interpreted.     Results for orders placed or performed during the hospital encounter of 06/06/24   Comprehensive metabolic panel     Status: Abnormal   Result Value Ref Range    Sodium 139 135 - 145 mmol/L    Potassium 4.0 3.4 - 5.3 mmol/L    Carbon Dioxide (CO2) 27 22 - 29 mmol/L    Anion Gap 13 7 - 15 mmol/L    Urea Nitrogen 25.2 (H) 8.0 - 23.0 mg/dL    Creatinine 1.26 (H) 0.51 - 0.95 mg/dL    GFR Estimate 40 (L) >60 mL/min/1.73m2    Calcium 8.7 8.2 - 9.6 mg/dL    Chloride 99 98 - 107 mmol/L    Glucose 251 (H) 70 - 99 mg/dL    Alkaline Phosphatase 66 40 - 150 U/L    AST 14 0 - 45 U/L    ALT 7 0 - 50 U/L    Protein Total 6.8 6.4 - 8.3 g/dL    Albumin 3.7 3.5 - 5.2 g/dL    Bilirubin Total 0.3 <=1.2 mg/dL   Magnesium     Status: Normal   Result Value Ref Range    Magnesium 1.9 1.7 - 2.3 mg/dL   Troponin T, High Sensitivity     Status: Abnormal   Result Value Ref Range    Troponin T, High Sensitivity 27 (H) <=14 ng/L   CBC (+ platelets, no diff)     Status: Normal   Result Value Ref Range    WBC Count 5.9 4.0 - 11.0 10e3/uL    RBC Count 4.25 3.80 - 5.20 10e6/uL    Hemoglobin 12.9 11.7 - 15.7 g/dL    Hematocrit 40.1 35.0 - 47.0 %    MCV 94 78 - 100 fL    MCH 30.4 26.5 - 33.0 pg    MCHC 32.2 31.5 - 36.5 g/dL    RDW 13.0 10.0 - 15.0 %    Platelet Count 176 150 - 450 10e3/uL   UA with Microscopic reflex to Culture     Status: Abnormal    Specimen: Urine, Clean Catch    Result Value Ref Range    Color Urine Yellow Colorless, Straw, Light Yellow, Yellow    Appearance Urine Turbid (A) Clear    Glucose Urine 300 (A) Negative mg/dL    Bilirubin Urine Negative Negative    Ketones Urine Negative Negative mg/dL    Specific Gravity Urine 1.016 1.001 - 1.030    Blood Urine Negative Negative    pH Urine 6.5 5.0 - 7.0    Protein Albumin Urine 20 (A) Negative mg/dL    Urobilinogen Urine <2.0 <2.0 mg/dL    Nitrite Urine Positive (A) Negative    Leukocyte Esterase Urine 75 Susi/uL (A) Negative    Bacteria Urine Few (A) None Seen /HPF    Mucus Urine Present (A) None Seen /LPF    RBC Urine 1 <=2 /HPF    WBC Urine 24 (H) <=5 /HPF    Squamous Epithelials Urine 1 <=1 /HPF    Transitional Epithelials Urine <1 <=1 /HPF    Hyaline Casts Urine 1 <=2 /LPF    Narrative    Urine Culture ordered based on laboratory criteria   Troponin T, High Sensitivity (now)     Status: Abnormal   Result Value Ref Range    Troponin T, High Sensitivity 20 (H) <=14 ng/L      RADIOLOGY:  Reviewed all pertinent imaging. Please see official radiology report.  No orders to display       EKG:    Sinus bradycardia.  Rate of 57.  Normal QRS.  Diffuse ST segment flattening anterior leads.  Unchanged compared to January 14, 2024  I have independently reviewed and interpreted the EKG(s) documented above.    PROCEDURES:          I, Cynthia Marie, am serving as a scribe to document services personally performed by Michael Thurston MD, based on my observation and the provider's statements to me. I, Michael Thurston MD attest that Cynthia Marie is acting in a scribe capacity, has observed my performance of the services and has documented them in accordance with my direction.    Michael Thurston M.D.  Emergency Medicine  [facility] EMERGENCY DEPARTMENT     Michael Thurston MD  06/06/24 6545       Michael Thurston MD  06/06/24 5210

## 2024-06-06 NOTE — ED NOTES
Bed: [facility]  Expected date:   Expected time:   Means of arrival: Ambulance  Comments:  Sawyer 89F weak- orthostatic hypotension

## 2024-06-07 LAB — BACTERIA UR CULT: ABNORMAL

## 2024-06-08 ENCOUNTER — TELEPHONE (OUTPATIENT)
Dept: NURSING | Facility: CLINIC | Age: 89
End: 2024-06-08
Payer: COMMERCIAL

## 2024-06-08 NOTE — TELEPHONE ENCOUNTER
[facility]    Reason for call: Lab Result Notification     Lab Result (including Rx patient on, if applicable).  If culture, copy of lab report at bottom.  Lab Result: See below    Prescribed cephALEXin (KEFLEX) 250 MG capsule QID x 7 days (SUSCEPTIBLE)    Creatinine Level (mg/dl)   Creatinine   Date Value Ref Range Status   06/06/2024 1.26 (H) 0.51 - 0.95 mg/dL Final    Creatinine clearance (ml/min), if applicable    Serum creatinine: 1.26 mg/dL (H) 06/06/24 1414  Estimated creatinine clearance: 25.1 mL/min (A)     Patient's current Symptoms:   Denies frequency, still with dysuria but only on the antibiotics for 2 days. States that she just woke up and had slept for 12 hours.  Denies any new or worsening symptoms.      RN Recommendations/Instructions per [place] ED lab result protocol:   [facility] ED lab result protocol utilized: Urine culture    Patient/care giver notified to contact your PCP clinic or return to the Emergency department if your:  Symptoms do not improve after 3 days on antibiotic.  Symptoms do not resolve after completing antibiotic.  Symptoms worsen or other concerning symptoms.    LASHONDA COOK RN

## 2024-06-09 ENCOUNTER — HOSPITAL ENCOUNTER (EMERGENCY)
Facility: HOSPITAL | Age: 89
Discharge: HOME | End: 2024-06-09
Attending: EMERGENCY MEDICINE | Admitting: EMERGENCY MEDICINE
Payer: COMMERCIAL

## 2024-06-09 VITALS
RESPIRATION RATE: 20 BRPM | SYSTOLIC BLOOD PRESSURE: 119 MMHG | WEIGHT: 125 LBS | TEMPERATURE: 97.8 F | DIASTOLIC BLOOD PRESSURE: 56 MMHG | OXYGEN SATURATION: 95 % | BODY MASS INDEX: 22.86 KG/M2 | HEART RATE: 58 BPM

## 2024-06-09 DIAGNOSIS — R53.1 WEAKNESS: ICD-10-CM

## 2024-06-09 LAB
ANION GAP SERPL CALCULATED.3IONS-SCNC: 13 MMOL/L (ref 7–15)
ATRIAL RATE - MUSE: 57 BPM
BUN SERPL-MCNC: 22.1 MG/DL (ref 8–23)
CALCIUM SERPL-MCNC: 8.8 MG/DL (ref 8.2–9.6)
CHLORIDE SERPL-SCNC: 100 MMOL/L (ref 98–107)
CREAT SERPL-MCNC: 1.35 MG/DL (ref 0.51–0.95)
DEPRECATED HCO3 PLAS-SCNC: 25 MMOL/L (ref 22–29)
DIASTOLIC BLOOD PRESSURE - MUSE: 84 MMHG
EGFRCR SERPLBLD CKD-EPI 2021: 37 ML/MIN/1.73M2
ERYTHROCYTE [DISTWIDTH] IN BLOOD BY AUTOMATED COUNT: 12.6 % (ref 10–15)
GLUCOSE SERPL-MCNC: 212 MG/DL (ref 70–99)
HCT VFR BLD AUTO: 37.4 % (ref 35–47)
HGB BLD-MCNC: 12.2 G/DL (ref 11.7–15.7)
INTERPRETATION ECG - MUSE: NORMAL
MCH RBC QN AUTO: 30.4 PG (ref 26.5–33)
MCHC RBC AUTO-ENTMCNC: 32.6 G/DL (ref 31.5–36.5)
MCV RBC AUTO: 93 FL (ref 78–100)
P AXIS - MUSE: 73 DEGREES
PLATELET # BLD AUTO: 219 10E3/UL (ref 150–450)
POTASSIUM SERPL-SCNC: 4 MMOL/L (ref 3.4–5.3)
PR INTERVAL - MUSE: 180 MS
QRS DURATION - MUSE: 68 MS
QT - MUSE: 476 MS
QTC - MUSE: 463 MS
R AXIS - MUSE: -3 DEGREES
RBC # BLD AUTO: 4.01 10E6/UL (ref 3.8–5.2)
SODIUM SERPL-SCNC: 138 MMOL/L (ref 135–145)
SYSTOLIC BLOOD PRESSURE - MUSE: 191 MMHG
T AXIS - MUSE: 158 DEGREES
VENTRICULAR RATE- MUSE: 57 BPM
WBC # BLD AUTO: 7.9 10E3/UL (ref 4–11)

## 2024-06-09 PROCEDURE — 36415 COLL VENOUS BLD VENIPUNCTURE: CPT | Performed by: EMERGENCY MEDICINE

## 2024-06-09 PROCEDURE — 80048 BASIC METABOLIC PNL TOTAL CA: CPT | Performed by: EMERGENCY MEDICINE

## 2024-06-09 PROCEDURE — 93005 ELECTROCARDIOGRAM TRACING: CPT | Performed by: EMERGENCY MEDICINE

## 2024-06-09 PROCEDURE — 85027 COMPLETE CBC AUTOMATED: CPT | Performed by: EMERGENCY MEDICINE

## 2024-06-09 PROCEDURE — 99284 EMERGENCY DEPT VISIT MOD MDM: CPT

## 2024-06-09 ASSESSMENT — ACTIVITIES OF DAILY LIVING (ADL)
ADLS_ACUITY_SCORE: 38
ADLS_ACUITY_SCORE: 38

## 2024-06-09 ASSESSMENT — COLUMBIA-SUICIDE SEVERITY RATING SCALE - C-SSRS
2. HAVE YOU ACTUALLY HAD ANY THOUGHTS OF KILLING YOURSELF IN THE PAST MONTH?: NO
6. HAVE YOU EVER DONE ANYTHING, STARTED TO DO ANYTHING, OR PREPARED TO DO ANYTHING TO END YOUR LIFE?: NO
1. IN THE PAST MONTH, HAVE YOU WISHED YOU WERE DEAD OR WISHED YOU COULD GO TO SLEEP AND NOT WAKE UP?: NO

## 2024-06-10 LAB
ATRIAL RATE - MUSE: 58 BPM
DIASTOLIC BLOOD PRESSURE - MUSE: 56 MMHG
INTERPRETATION ECG - MUSE: NORMAL
P AXIS - MUSE: 62 DEGREES
PR INTERVAL - MUSE: 196 MS
QRS DURATION - MUSE: 68 MS
QT - MUSE: 442 MS
QTC - MUSE: 433 MS
R AXIS - MUSE: 2 DEGREES
SYSTOLIC BLOOD PRESSURE - MUSE: 119 MMHG
T AXIS - MUSE: 124 DEGREES
VENTRICULAR RATE- MUSE: 58 BPM

## 2024-06-10 NOTE — ED NOTES
Bed: [facility]  Expected date: 6/9/24  Expected time: 7:29 PM  Means of arrival: Ambulance  Comments:  Sawyer  89F  Generalized Weakness

## 2024-06-10 NOTE — ED PROVIDER NOTES
EMERGENCY DEPARTMENT ENCOUnter      NAME: Sissy Mccloud  AGE: 89 year old female  YOB: 1931  MRN: [id number]  EVALUATION DATE & TIME: 2024  7:51 PM    PCP: Hannah Caballero    ED PROVIDER: Kingsley Owen DO      Chief Complaint   Patient presents with    Generalized Weakness         FINAL IMPRESSION:  1. Weakness          ED COURSE & MEDICAL DECISION MAKIN:13 PM I met with the patient, obtained history, performed an initial exam, and discussed options and plan for diagnostics and treatment here in the ED.  9:31 PM Reevaluated and updated the patient with findings. We discussed the plan for discharge and the patient is agreeable. Reviewed supportive cares, symptomatic treatment, outpatient follow up, and reasons to return to the Emergency Department. Patient to be discharged by ED RN.       The patient presented to the emergency department today complaining of generalized weakness.  She was recently diagnosed with a urinary tract infection and is on antibiotics.  She clinically appears well here.  Vital signs are stable.  Laboratory testing is unremarkable.  She was given IV fluids and feels significantly better.  She was able to safely ambulate in the emergency department.  Her daughter is here at the bedside and we discussed disposition options.  Daughter feels comfortable with the patient going home.  She will be staying with her tonight.  I do not see any need for admission but have encouraged her to return right away for any worsening symptoms or other concerns.        Medical Decision Making  Obtained supplemental history:Supplemental history obtained?: No  Reviewed external records: External records reviewed?: Documented in chart  Care impacted by chronic illness:Diabetes and Hypertension  Care significantly affected by social determinants of health:N/A  Did you consider but not order tests?: Work up considered but not performed and documented in chart, if applicable  Did you  "interpret images independently?: Independent interpretation of ECG and images noted in documentation, when applicable.  Consultation discussion with other provider:Did you involve another provider (consultant, , pharmacy, etc.)?: No  Discharge. No recommendations on prescription strength medication(s). See documentation for any additional details.    At the conclusion of the encounter I discussed the results of all of the tests and the disposition. The questions were answered. The patient or family acknowledged understanding and was agreeable with the care plan.         =================================================================    HPI        Sissy Mccloud is a 89 year old female with a pertinent history of recurrent falls, HTN, DM2, who presents to this ED via private car driven by her daughter for evaluation of a syncopal episode.    Patient presented with a syncopal episode at home. She mentioned that around noon today, she woke up and walked around the front of the bed when she noticed she was light headed and \"going to pass out\". She managed to throw herself onto the bed before she fell. She reported LOC for a few seconds but was laying on the bed for a while afterwards. She denies any head trauma. She is not anticoagulated. She called her daughter and was able to get back up with assistance. Her daughter and her friend encouraged her to go to the ED for evaluation. She otherwise feels fine and just generally weak due to \"being hungry.\"    She otherwise denies associating abdominal pain. Of note, she was recently diagnosed with a UTI and is currently on Keflex. There were no other concerns/complaints at this time.      PAST MEDICAL HISTORY:  History reviewed. No pertinent past medical history.    PAST SURGICAL HISTORY:  Past Surgical History:   Procedure Laterality Date    CHOLECYSTECTOMY             CURRENT MEDICATIONS:    amLODIPine (NORVASC) 5 MG tablet  cephALEXin (KEFLEX) 250 MG " capsule  gabapentin (NEURONTIN) 300 MG capsule  levothyroxine (SYNTHROID/LEVOTHROID) 88 MCG tablet  metFORMIN (GLUCOPHAGE) 500 MG tablet  pantoprazole (PROTONIX) 40 MG tablet        ALLERGIES:  Allergies   Allergen Reactions    Ambien [Zolpidem] Unknown    Cleocin [Clindamycin] Unknown    Codeine Unknown       FAMILY HISTORY:  History reviewed. No pertinent family history.    SOCIAL HISTORY:   Social History     Socioeconomic History    Marital status:      Spouse name: None    Number of children: None    Years of education: None    Highest education level: None   Tobacco Use    Smoking status: Never    Smokeless tobacco: Never   Substance and Sexual Activity    Drug use: Never     Social Determinants of Health      Received from [employer], [employer]    Financial Resource Strain    Received from [employer]    Social Connections       VITALS:  Patient Vitals for the past 24 hrs:   BP Temp Temp src Pulse Resp SpO2 Weight   06/09/24 2045 119/56 -- -- 58 -- 95 % --   06/09/24 2035 113/58 -- -- 58 -- 95 % --   06/09/24 2015 123/58 -- -- 58 -- 96 % --   06/09/24 2000 117/59 -- -- 58 -- 97 % --   06/09/24 1959 (!) 144/67 97.8  F (36.6  C) Oral 61 20 97 % 56.7 kg (125 lb)       PHYSICAL EXAM    Constitutional:  Well developed, Well nourished,  HENT:  Normocephalic, Atraumatic, Oropharynx moist, Nose normal.   Eyes:  EOMI, Conjunctiva normal, No discharge.   Respiratory:  Normal breath sounds, No respiratory distress, No wheezing, No chest tenderness.   Cardiovascular:  Normal heart rate, Normal rhythm, No murmurs  GI:  Soft, No tenderness, No guarding, No CVA tenderness.   Musculoskeletal:  No tenderness to palpation or major deformities noted.   Extremities: No lower extremity edema.  Neurologic:  Alert & oriented x 3, No focal deficits noted.   Psychiatric:  Affect  normal, Judgment normal, Mood normal.        LAB:  All pertinent labs reviewed and interpreted.  Results for orders placed or performed during the hospital encounter of 06/09/24   CBC with platelets   Result Value Ref Range    WBC Count 7.9 4.0 - 11.0 10e3/uL    RBC Count 4.01 3.80 - 5.20 10e6/uL    Hemoglobin 12.2 11.7 - 15.7 g/dL    Hematocrit 37.4 35.0 - 47.0 %    MCV 93 78 - 100 fL    MCH 30.4 26.5 - 33.0 pg    MCHC 32.6 31.5 - 36.5 g/dL    RDW 12.6 10.0 - 15.0 %    Platelet Count 219 150 - 450 10e3/uL   Basic metabolic panel   Result Value Ref Range    Sodium 138 135 - 145 mmol/L    Potassium 4.0 3.4 - 5.3 mmol/L    Chloride 100 98 - 107 mmol/L    Carbon Dioxide (CO2) 25 22 - 29 mmol/L    Anion Gap 13 7 - 15 mmol/L    Urea Nitrogen 22.1 8.0 - 23.0 mg/dL    Creatinine 1.35 (H) 0.51 - 0.95 mg/dL    GFR Estimate 37 (L) >60 mL/min/1.73m2    Calcium 8.8 8.2 - 9.6 mg/dL    Glucose 212 (H) 70 - 99 mg/dL         I, Austen Acevedo , am serving as a scribe to document services personally performed by Dr. Owen based on my observation and the provider's statements to me. I, Kingsley Owen, DO attest that Austen Acevedo  is acting in a scribe capacity, has observed my performance of the services and has documented them in accordance with my direction.    Kingsley Owen DO  Emergency Medicine  [facility] EMERGENCY DEPARTMENT  [address] [place] [id number] [phone]  Dept: [phone]     Kingsley Owen DO  06/09/24 [id number]

## 2024-06-10 NOTE — ED TRIAGE NOTES
Patient arrives via [facility] from home where she lives independently.  Family called due to patient being weak.  Patient states she felt weak in her knees earlier today which caused her to lay in bed for a few hours but states this has resolved.  Denies any discomfort or injuries.  Of note, patient on Keflex for UTI and states still having urinary frequency.  Patient is not concerned about going home and needing to care for self.

## 2024-06-10 NOTE — DISCHARGE INSTRUCTIONS
Your symptoms today are likely related to your recently diagnosed urinary tract infection.  Continue your previously prescribed antibiotics and follow-up closely with your primary care doctor.  Return to the emergency department for any worsening symptoms or other concerns.

## 2024-06-10 NOTE — ED NOTES
Follow Up Office Visit      Date of Visit:  2023   Patient Name: Reuben Templeton  : 1962   MRN: [id number]     Chief Complaint:    Chief Complaint   Patient presents with   • Diabetes   • Med Refill       History of Present Illness: Reuben Templeton is a 60 y.o. male who is here today for follow up.  Patient comes in today for medication recheck.  Overall conditions are stable.  Patient doing very well overall.  Due for blood work and medication refills        Subjective      Review of Systems:   Review of Systems   Constitutional: Negative for fatigue and fever.   HENT: Negative for congestion and ear pain.    Respiratory: Negative for apnea, cough, chest tightness and shortness of breath.    Cardiovascular: Negative for chest pain.   Gastrointestinal: Negative for abdominal pain, constipation, diarrhea and nausea.   Musculoskeletal: Negative for arthralgias.   Psychiatric/Behavioral: Negative for depressed mood and stress.       Past Medical History:   Past Medical History:   Diagnosis Date   • Acquired hypothyroidism    • CAD (coronary artery disease)     STENT 90% BLOCKAGE ANGIOPLASTY   • Chronic low back pain    • Chronic pain    • Coronary arteriosclerosis    • Essential hypertension    • High risk medication use    • Intermittent claudication (HCC)    • Long term current use of anticoagulant    • Mixed hyperlipidemia    • Myocardial infarction (HCC)     INFERIOR WALL   • Neurological disorder     WITH TYPE 2 DIABETES MELLITUS   • Neuropathy    • Personal history of nicotine dependence    • PVD (peripheral vascular disease) (HCC)    • Type 2 diabetes mellitus (HCC)        Past Surgical History:   Past Surgical History:   Procedure Laterality Date   • ANGIOPLASTY      STENT       Family History: No family history on file.    Social History:   Social History     Socioeconomic History   • Marital status:    Tobacco Use   • Smoking status: Every Day     Packs/day: 1.00     Types: Cigarettes  Pt ambulated in hallway to use restroom with one assist and with walker. Pt stated still have dizzy feeling.      • Smokeless tobacco: Never   Vaping Use   • Vaping Use: Never used   Substance and Sexual Activity   • Alcohol use: Not Currently   • Drug use: Not Currently   • Sexual activity: Defer       Medications:     Current Outpatient Medications:   •  clopidogrel (PLAVIX) 75 MG tablet, Take 1 tablet by mouth Daily., Disp: 90 tablet, Rfl: 0  •  HYDROcodone-acetaminophen (NORCO)  MG per tablet, Take 1 tablet by mouth Every 6 (Six) Hours As Needed for Moderate Pain., Disp: 90 tablet, Rfl: 0  •  levothyroxine (SYNTHROID, LEVOTHROID) 88 MCG tablet, Take 1 tablet by mouth Daily., Disp: 90 tablet, Rfl: 1  •  lisinopril (PRINIVIL,ZESTRIL) 2.5 MG tablet, Take 1 tablet by mouth Daily., Disp: 90 tablet, Rfl: 1  •  sildenafil (Viagra) 100 MG tablet, Take 1 tablet by mouth Daily As Needed for Erectile Dysfunction., Disp: 30 tablet, Rfl: 2  •  Xarelto 20 MG tablet, Take 1 tablet by mouth Daily With Dinner., Disp: 90 tablet, Rfl: 1  •  carvedilol (COREG) 3.125 MG tablet, Take 1 tablet by mouth 2 (Two) Times a Day With Meals., Disp: 180 tablet, Rfl: 1  •  cetirizine (zyrTEC) 10 MG tablet, Take 1 tablet by mouth Daily., Disp: , Rfl:   •  metFORMIN (GLUCOPHAGE) 1000 MG tablet, TAKE 1 TABLET BY MOUTH TWICE DAILY WITH THE MORNING AND EVENING MEAL, Disp: 180 tablet, Rfl: 0    Allergies:   No Known Allergies    Objective     Physical Exam:  Vital Signs:   Vitals:    02/03/23 0843   BP: 130/68   Pulse: (!) 48   SpO2: 99%   Weight: 69.4 kg (153 lb)     Body mass index is 23.96 kg/m².     Physical Exam  Vitals and nursing note reviewed.   Constitutional:       General: He is not in acute distress.     Appearance: Normal appearance. He is not ill-appearing.   HENT:      Head: Normocephalic and atraumatic.      Right Ear: Tympanic membrane and ear canal normal.      Left Ear: Tympanic membrane and ear canal normal.      Nose: Nose normal.   Cardiovascular:      Rate and Rhythm: Normal rate and regular rhythm.      Heart sounds: Normal heart  sounds.   Pulmonary:      Effort: Pulmonary effort is normal.      Breath sounds: Normal breath sounds.   Neurological:      Mental Status: He is alert and oriented to person, place, and time. Mental status is at baseline.   Psychiatric:         Mood and Affect: Mood normal.         Procedures      Assessment / Plan      Assessment/Plan:   Diagnoses and all orders for this visit:    1. Essential hypertension (Primary)  -     Comprehensive Metabolic Panel; Future  -     CBC Auto Differential; Future  -     Lipid Panel; Future  -     TSH; Future    2. Mixed hyperlipidemia  -     CBC Auto Differential; Future  -     Lipid Panel; Future    3. Acquired hypothyroidism  -     TSH; Future    4. Type 2 diabetes mellitus with hyperglycemia, with long-term current use of insulin (HCC)  -     Hemoglobin A1c; Future  -     POCT microalbumin  -     POC Urine Drug Screen, Triage    5. Neuropathy  -     HYDROcodone-acetaminophen (NORCO)  MG per tablet; Take 1 tablet by mouth Every 6 (Six) Hours As Needed for Moderate Pain.  Dispense: 90 tablet; Refill: 0  -     POCT microalbumin  -     POC Urine Drug Screen, Triage    6. Drug therapy  -     POC Urine Drug Screen, Triage    Other orders  -     Xarelto 20 MG tablet; Take 1 tablet by mouth Daily With Dinner.  Dispense: 90 tablet; Refill: 1  -     lisinopril (PRINIVIL,ZESTRIL) 2.5 MG tablet; Take 1 tablet by mouth Daily.  Dispense: 90 tablet; Refill: 1  -     levothyroxine (SYNTHROID, LEVOTHROID) 88 MCG tablet; Take 1 tablet by mouth Daily.  Dispense: 90 tablet; Refill: 1  -     clopidogrel (PLAVIX) 75 MG tablet; Take 1 tablet by mouth Daily.  Dispense: 90 tablet; Refill: 0  -     Discontinue: sildenafil (Viagra) 100 MG tablet; Take 1 tablet by mouth Daily As Needed for Erectile Dysfunction.  Dispense: 10 tablet; Refill: 0  -     sildenafil (Viagra) 100 MG tablet; Take 1 tablet by mouth Daily As Needed for Erectile Dysfunction.  Dispense: 30 tablet; Refill: 2         Continue all  current medication.  Dharmesh report appropriate.  Taking medications as directed.  Patient due for blood work for his chronic medical conditions.  His hypertension hyperlipidemia hypothyroidism and diabetes have been under well control.    Follow Up:   No follow-ups on file.    Cy Blue  [facility] [place]

## 2024-06-14 ENCOUNTER — APPOINTMENT (OUTPATIENT)
Dept: CT IMAGING | Facility: HOSPITAL | Age: 89
DRG: 392 | End: 2024-06-14
Attending: STUDENT IN AN ORGANIZED HEALTH CARE EDUCATION/TRAINING PROGRAM
Payer: COMMERCIAL

## 2024-06-14 ENCOUNTER — APPOINTMENT (OUTPATIENT)
Dept: PHYSICAL THERAPY | Facility: HOSPITAL | Age: 89
DRG: 392 | End: 2024-06-14
Attending: HOSPITALIST
Payer: COMMERCIAL

## 2024-06-14 ENCOUNTER — APPOINTMENT (OUTPATIENT)
Dept: RADIOLOGY | Facility: HOSPITAL | Age: 89
DRG: 392 | End: 2024-06-14
Attending: INTERNAL MEDICINE
Payer: COMMERCIAL

## 2024-06-14 ENCOUNTER — HOSPITAL ENCOUNTER (INPATIENT)
Facility: HOSPITAL | Age: 89
LOS: 4 days | Discharge: NURSING, REHAB OR LONG-TERM CARE | DRG: 392 | End: 2024-06-18
Attending: STUDENT IN AN ORGANIZED HEALTH CARE EDUCATION/TRAINING PROGRAM | Admitting: HOSPITALIST
Payer: COMMERCIAL

## 2024-06-14 ENCOUNTER — APPOINTMENT (OUTPATIENT)
Dept: OCCUPATIONAL THERAPY | Facility: HOSPITAL | Age: 89
DRG: 392 | End: 2024-06-14
Attending: HOSPITALIST
Payer: COMMERCIAL

## 2024-06-14 DIAGNOSIS — I10 PRIMARY HYPERTENSION: Primary | ICD-10-CM

## 2024-06-14 DIAGNOSIS — K52.9 COLITIS: ICD-10-CM

## 2024-06-14 LAB
ALBUMIN SERPL BCG-MCNC: 3.9 G/DL (ref 3.5–5.2)
ALP SERPL-CCNC: 82 U/L (ref 40–150)
ALT SERPL W P-5'-P-CCNC: 14 U/L (ref 0–50)
ANION GAP SERPL CALCULATED.3IONS-SCNC: 14 MMOL/L (ref 7–15)
AST SERPL W P-5'-P-CCNC: 26 U/L (ref 0–45)
BASOPHILS # BLD AUTO: 0 10E3/UL (ref 0–0.2)
BASOPHILS NFR BLD AUTO: 0 %
BILIRUB SERPL-MCNC: 0.4 MG/DL
BUN SERPL-MCNC: 29.2 MG/DL (ref 8–23)
CALCIUM SERPL-MCNC: 9.7 MG/DL (ref 8.2–9.6)
CHLORIDE SERPL-SCNC: 102 MMOL/L (ref 98–107)
CREAT SERPL-MCNC: 1.23 MG/DL (ref 0.51–0.95)
DEPRECATED HCO3 PLAS-SCNC: 25 MMOL/L (ref 22–29)
EGFRCR SERPLBLD CKD-EPI 2021: 41 ML/MIN/1.73M2
EOSINOPHIL # BLD AUTO: 0 10E3/UL (ref 0–0.7)
EOSINOPHIL NFR BLD AUTO: 0 %
ERYTHROCYTE [DISTWIDTH] IN BLOOD BY AUTOMATED COUNT: 12.9 % (ref 10–15)
GLUCOSE BLDC GLUCOMTR-MCNC: 119 MG/DL (ref 70–99)
GLUCOSE BLDC GLUCOMTR-MCNC: 175 MG/DL (ref 70–99)
GLUCOSE BLDC GLUCOMTR-MCNC: 195 MG/DL (ref 70–99)
GLUCOSE BLDC GLUCOMTR-MCNC: 204 MG/DL (ref 70–99)
GLUCOSE BLDC GLUCOMTR-MCNC: 208 MG/DL (ref 70–99)
GLUCOSE SERPL-MCNC: 208 MG/DL (ref 70–99)
HBA1C MFR BLD: 7.2 %
HCT VFR BLD AUTO: 43.7 % (ref 35–47)
HGB BLD-MCNC: 13.9 G/DL (ref 11.7–15.7)
IMM GRANULOCYTES # BLD: 0 10E3/UL
IMM GRANULOCYTES NFR BLD: 0 %
LYMPHOCYTES # BLD AUTO: 0.5 10E3/UL (ref 0.8–5.3)
LYMPHOCYTES NFR BLD AUTO: 4 %
MCH RBC QN AUTO: 29.8 PG (ref 26.5–33)
MCHC RBC AUTO-ENTMCNC: 31.8 G/DL (ref 31.5–36.5)
MCV RBC AUTO: 94 FL (ref 78–100)
MONOCYTES # BLD AUTO: 0.4 10E3/UL (ref 0–1.3)
MONOCYTES NFR BLD AUTO: 3 %
NEUTROPHILS # BLD AUTO: 12.3 10E3/UL (ref 1.6–8.3)
NEUTROPHILS NFR BLD AUTO: 92 %
NRBC # BLD AUTO: 0 10E3/UL
NRBC BLD AUTO-RTO: 0 /100
PLATELET # BLD AUTO: 268 10E3/UL (ref 150–450)
POTASSIUM SERPL-SCNC: 4.6 MMOL/L (ref 3.4–5.3)
PROT SERPL-MCNC: 7.5 G/DL (ref 6.4–8.3)
RBC # BLD AUTO: 4.67 10E6/UL (ref 3.8–5.2)
SODIUM SERPL-SCNC: 141 MMOL/L (ref 135–145)
WBC # BLD AUTO: 13.3 10E3/UL (ref 4–11)

## 2024-06-14 PROCEDURE — 85025 COMPLETE CBC W/AUTO DIFF WBC: CPT | Performed by: STUDENT IN AN ORGANIZED HEALTH CARE EDUCATION/TRAINING PROGRAM

## 2024-06-14 PROCEDURE — 120N000001 HC R&B MED SURG/OB

## 2024-06-14 PROCEDURE — 97530 THERAPEUTIC ACTIVITIES: CPT | Mod: GP | Performed by: PHYSICAL THERAPIST

## 2024-06-14 PROCEDURE — 74018 RADEX ABDOMEN 1 VIEW: CPT

## 2024-06-14 PROCEDURE — 99222 1ST HOSP IP/OBS MODERATE 55: CPT | Performed by: HOSPITALIST

## 2024-06-14 PROCEDURE — 250N000011 HC RX IP 250 OP 636: Mod: JZ | Performed by: STUDENT IN AN ORGANIZED HEALTH CARE EDUCATION/TRAINING PROGRAM

## 2024-06-14 PROCEDURE — 36415 COLL VENOUS BLD VENIPUNCTURE: CPT | Performed by: STUDENT IN AN ORGANIZED HEALTH CARE EDUCATION/TRAINING PROGRAM

## 2024-06-14 PROCEDURE — 97162 PT EVAL MOD COMPLEX 30 MIN: CPT | Mod: GP | Performed by: PHYSICAL THERAPIST

## 2024-06-14 PROCEDURE — 258N000003 HC RX IP 258 OP 636: Mod: JZ | Performed by: STUDENT IN AN ORGANIZED HEALTH CARE EDUCATION/TRAINING PROGRAM

## 2024-06-14 PROCEDURE — 250N000013 HC RX MED GY IP 250 OP 250 PS 637: Performed by: HOSPITALIST

## 2024-06-14 PROCEDURE — 99285 EMERGENCY DEPT VISIT HI MDM: CPT | Mod: 25

## 2024-06-14 PROCEDURE — 96361 HYDRATE IV INFUSION ADD-ON: CPT

## 2024-06-14 PROCEDURE — 74177 CT ABD & PELVIS W/CONTRAST: CPT

## 2024-06-14 PROCEDURE — 83036 HEMOGLOBIN GLYCOSYLATED A1C: CPT | Performed by: HOSPITALIST

## 2024-06-14 PROCEDURE — 80053 COMPREHEN METABOLIC PANEL: CPT | Performed by: STUDENT IN AN ORGANIZED HEALTH CARE EDUCATION/TRAINING PROGRAM

## 2024-06-14 PROCEDURE — 82962 GLUCOSE BLOOD TEST: CPT

## 2024-06-14 PROCEDURE — 97165 OT EVAL LOW COMPLEX 30 MIN: CPT | Mod: GO

## 2024-06-14 PROCEDURE — 97535 SELF CARE MNGMENT TRAINING: CPT | Mod: GO

## 2024-06-14 PROCEDURE — 250N000011 HC RX IP 250 OP 636: Mod: JZ | Performed by: HOSPITALIST

## 2024-06-14 PROCEDURE — 96374 THER/PROPH/DIAG INJ IV PUSH: CPT | Mod: 59

## 2024-06-14 PROCEDURE — 250N000012 HC RX MED GY IP 250 OP 636 PS 637: Performed by: HOSPITALIST

## 2024-06-14 RX ORDER — HYDRALAZINE HYDROCHLORIDE 20 MG/ML
10 INJECTION INTRAMUSCULAR; INTRAVENOUS EVERY 4 HOURS PRN
Status: DISCONTINUED | OUTPATIENT
Start: 2024-06-14 | End: 2024-06-18 | Stop reason: HOSPADM

## 2024-06-14 RX ORDER — SALIVA STIMULANT COMB. NO.3
1 SPRAY, NON-AEROSOL (ML) MUCOUS MEMBRANE 4 TIMES DAILY PRN
Status: DISCONTINUED | OUTPATIENT
Start: 2024-06-14 | End: 2024-06-18 | Stop reason: HOSPADM

## 2024-06-14 RX ORDER — IOPAMIDOL 755 MG/ML
62 INJECTION, SOLUTION INTRAVASCULAR ONCE
Status: COMPLETED | OUTPATIENT
Start: 2024-06-14 | End: 2024-06-14

## 2024-06-14 RX ORDER — NALOXONE HYDROCHLORIDE 0.4 MG/ML
0.2 INJECTION, SOLUTION INTRAMUSCULAR; INTRAVENOUS; SUBCUTANEOUS
Status: DISCONTINUED | OUTPATIENT
Start: 2024-06-14 | End: 2024-06-18 | Stop reason: HOSPADM

## 2024-06-14 RX ORDER — NALOXONE HYDROCHLORIDE 0.4 MG/ML
0.4 INJECTION, SOLUTION INTRAMUSCULAR; INTRAVENOUS; SUBCUTANEOUS
Status: DISCONTINUED | OUTPATIENT
Start: 2024-06-14 | End: 2024-06-18 | Stop reason: HOSPADM

## 2024-06-14 RX ORDER — HYDROMORPHONE HCL IN WATER/PF 6 MG/30 ML
0.2 PATIENT CONTROLLED ANALGESIA SYRINGE INTRAVENOUS
Status: ACTIVE | OUTPATIENT
Start: 2024-06-14 | End: 2024-06-14

## 2024-06-14 RX ORDER — PROCHLORPERAZINE MALEATE 5 MG
5 TABLET ORAL EVERY 6 HOURS PRN
Status: DISCONTINUED | OUTPATIENT
Start: 2024-06-14 | End: 2024-06-18 | Stop reason: HOSPADM

## 2024-06-14 RX ORDER — AMOXICILLIN 250 MG
1 CAPSULE ORAL 2 TIMES DAILY PRN
Status: DISCONTINUED | OUTPATIENT
Start: 2024-06-14 | End: 2024-06-15

## 2024-06-14 RX ORDER — PIPERACILLIN SODIUM, TAZOBACTAM SODIUM 3; .375 G/15ML; G/15ML
3.38 INJECTION, POWDER, LYOPHILIZED, FOR SOLUTION INTRAVENOUS ONCE
Status: COMPLETED | OUTPATIENT
Start: 2024-06-14 | End: 2024-06-14

## 2024-06-14 RX ORDER — PIPERACILLIN SODIUM, TAZOBACTAM SODIUM 3; .375 G/15ML; G/15ML
3.38 INJECTION, POWDER, LYOPHILIZED, FOR SOLUTION INTRAVENOUS EVERY 8 HOURS
Status: DISCONTINUED | OUTPATIENT
Start: 2024-06-14 | End: 2024-06-17

## 2024-06-14 RX ORDER — LEVOTHYROXINE SODIUM 88 UG/1
88 TABLET ORAL EVERY MORNING
Status: DISCONTINUED | OUTPATIENT
Start: 2024-06-14 | End: 2024-06-18 | Stop reason: HOSPADM

## 2024-06-14 RX ORDER — ONDANSETRON 2 MG/ML
4 INJECTION INTRAMUSCULAR; INTRAVENOUS EVERY 6 HOURS PRN
Status: DISCONTINUED | OUTPATIENT
Start: 2024-06-14 | End: 2024-06-18 | Stop reason: HOSPADM

## 2024-06-14 RX ORDER — GABAPENTIN 300 MG/1
300 CAPSULE ORAL DAILY
Status: DISCONTINUED | OUTPATIENT
Start: 2024-06-14 | End: 2024-06-18 | Stop reason: HOSPADM

## 2024-06-14 RX ORDER — AMOXICILLIN 250 MG
2 CAPSULE ORAL 2 TIMES DAILY PRN
Status: DISCONTINUED | OUTPATIENT
Start: 2024-06-14 | End: 2024-06-15

## 2024-06-14 RX ORDER — NICOTINE POLACRILEX 4 MG
15-30 LOZENGE BUCCAL
Status: DISCONTINUED | OUTPATIENT
Start: 2024-06-14 | End: 2024-06-18 | Stop reason: HOSPADM

## 2024-06-14 RX ORDER — LIDOCAINE 40 MG/G
CREAM TOPICAL
Status: DISCONTINUED | OUTPATIENT
Start: 2024-06-14 | End: 2024-06-18 | Stop reason: HOSPADM

## 2024-06-14 RX ORDER — AMLODIPINE BESYLATE 5 MG/1
5 TABLET ORAL EVERY EVENING
Status: DISCONTINUED | OUTPATIENT
Start: 2024-06-14 | End: 2024-06-17

## 2024-06-14 RX ORDER — ACETAMINOPHEN 650 MG/1
650 SUPPOSITORY RECTAL EVERY 4 HOURS PRN
Status: DISCONTINUED | OUTPATIENT
Start: 2024-06-14 | End: 2024-06-18 | Stop reason: HOSPADM

## 2024-06-14 RX ORDER — ACETAMINOPHEN 325 MG/1
650 TABLET ORAL EVERY 4 HOURS PRN
Status: DISCONTINUED | OUTPATIENT
Start: 2024-06-14 | End: 2024-06-18 | Stop reason: HOSPADM

## 2024-06-14 RX ORDER — PROCHLORPERAZINE 25 MG
12.5 SUPPOSITORY, RECTAL RECTAL EVERY 12 HOURS PRN
Status: DISCONTINUED | OUTPATIENT
Start: 2024-06-14 | End: 2024-06-18 | Stop reason: HOSPADM

## 2024-06-14 RX ORDER — CALCIUM CARBONATE 500 MG/1
1000 TABLET, CHEWABLE ORAL 4 TIMES DAILY PRN
Status: DISCONTINUED | OUTPATIENT
Start: 2024-06-14 | End: 2024-06-18 | Stop reason: HOSPADM

## 2024-06-14 RX ORDER — SODIUM CHLORIDE, SODIUM LACTATE, POTASSIUM CHLORIDE, CALCIUM CHLORIDE 600; 310; 30; 20 MG/100ML; MG/100ML; MG/100ML; MG/100ML
INJECTION, SOLUTION INTRAVENOUS ONCE
Status: COMPLETED | OUTPATIENT
Start: 2024-06-14 | End: 2024-06-14

## 2024-06-14 RX ORDER — ONDANSETRON 4 MG/1
4 TABLET, ORALLY DISINTEGRATING ORAL EVERY 6 HOURS PRN
Status: DISCONTINUED | OUTPATIENT
Start: 2024-06-14 | End: 2024-06-18 | Stop reason: HOSPADM

## 2024-06-14 RX ORDER — HYDRALAZINE HYDROCHLORIDE 10 MG/1
10 TABLET, FILM COATED ORAL EVERY 4 HOURS PRN
Status: DISCONTINUED | OUTPATIENT
Start: 2024-06-14 | End: 2024-06-18 | Stop reason: HOSPADM

## 2024-06-14 RX ORDER — DEXTROSE MONOHYDRATE 25 G/50ML
25-50 INJECTION, SOLUTION INTRAVENOUS
Status: DISCONTINUED | OUTPATIENT
Start: 2024-06-14 | End: 2024-06-18 | Stop reason: HOSPADM

## 2024-06-14 RX ADMIN — GABAPENTIN 300 MG: 300 CAPSULE ORAL at 08:15

## 2024-06-14 RX ADMIN — SODIUM CHLORIDE, POTASSIUM CHLORIDE, SODIUM LACTATE AND CALCIUM CHLORIDE 1000 ML: 600; 310; 30; 20 INJECTION, SOLUTION INTRAVENOUS at 01:51

## 2024-06-14 RX ADMIN — AMLODIPINE BESYLATE 5 MG: 5 TABLET ORAL at 20:10

## 2024-06-14 RX ADMIN — PIPERACILLIN AND TAZOBACTAM 3.38 G: 3; .375 INJECTION, POWDER, FOR SOLUTION INTRAVENOUS at 18:31

## 2024-06-14 RX ADMIN — HYDRALAZINE HYDROCHLORIDE 10 MG: 10 TABLET ORAL at 08:23

## 2024-06-14 RX ADMIN — IOPAMIDOL 62 ML: 755 INJECTION, SOLUTION INTRAVENOUS at 03:53

## 2024-06-14 RX ADMIN — INSULIN ASPART 1 UNITS: 100 INJECTION, SOLUTION INTRAVENOUS; SUBCUTANEOUS at 21:42

## 2024-06-14 RX ADMIN — ACETAMINOPHEN 650 MG: 325 TABLET ORAL at 21:45

## 2024-06-14 RX ADMIN — LEVOTHYROXINE SODIUM 88 MCG: 88 TABLET ORAL at 08:15

## 2024-06-14 RX ADMIN — SODIUM CHLORIDE, POTASSIUM CHLORIDE, SODIUM LACTATE AND CALCIUM CHLORIDE: 600; 310; 30; 20 INJECTION, SOLUTION INTRAVENOUS at 05:05

## 2024-06-14 RX ADMIN — PIPERACILLIN AND TAZOBACTAM 3.38 G: 3; .375 INJECTION, POWDER, FOR SOLUTION INTRAVENOUS at 10:45

## 2024-06-14 RX ADMIN — PIPERACILLIN AND TAZOBACTAM 3.38 G: 3; .375 INJECTION, POWDER, FOR SOLUTION INTRAVENOUS at 04:28

## 2024-06-14 ASSESSMENT — ACTIVITIES OF DAILY LIVING (ADL)
ADLS_ACUITY_SCORE: 45
ADLS_ACUITY_SCORE: 40
ADLS_ACUITY_SCORE: 38
ADLS_ACUITY_SCORE: 40
ADLS_ACUITY_SCORE: 38
ADLS_ACUITY_SCORE: 45
ADLS_ACUITY_SCORE: 38
ADLS_ACUITY_SCORE: 45
ADLS_ACUITY_SCORE: 40
ADLS_ACUITY_SCORE: 51
ADLS_ACUITY_SCORE: 38
ADLS_ACUITY_SCORE: 40
ADLS_ACUITY_SCORE: 45
ADLS_ACUITY_SCORE: 45
ADLS_ACUITY_SCORE: 47
ADLS_ACUITY_SCORE: 38
ADLS_ACUITY_SCORE: 45
ADLS_ACUITY_SCORE: 45
DEPENDENT_IADLS:: COOKING;LAUNDRY;TRANSPORTATION

## 2024-06-14 ASSESSMENT — COLUMBIA-SUICIDE SEVERITY RATING SCALE - C-SSRS
6. HAVE YOU EVER DONE ANYTHING, STARTED TO DO ANYTHING, OR PREPARED TO DO ANYTHING TO END YOUR LIFE?: NO
2. HAVE YOU ACTUALLY HAD ANY THOUGHTS OF KILLING YOURSELF IN THE PAST MONTH?: NO
1. IN THE PAST MONTH, HAVE YOU WISHED YOU WERE DEAD OR WISHED YOU COULD GO TO SLEEP AND NOT WAKE UP?: NO

## 2024-06-14 NOTE — CONSULTS
Care Management Follow Up    Length of Stay (days): 0    Expected Discharge Date: 06/16/2024    Anticipated Discharge Plan:   TCU    Transportation: TBD    PT Recommendations: Transitional Care Facility  OT Recommendations:        Barriers to Discharge: medical stability, placement    Prior Living Situation: house with spouse (Spouse is currently in a TCU)    Advanced Directive on File: no concerns identified     Patient/Spokesperson Updated: Yes. Who? Left VM for daughter    Additional Information:  Per chart review, pt was assessed.   Patient's daughter would like patient at same TCU as her spouse.   SW called to leave VM with patient's daughter.   Referral sent to Erica Luisito Ly.     ALEXANDRIA Callaway

## 2024-06-14 NOTE — ED NOTES
Bed: [place] [address]  Expected date: 6/14/24  Expected time:   Means of arrival:   Comments:  [place]  89 female   Nausea, abdominal pain

## 2024-06-14 NOTE — H&P
[facility]    History and Physical - Hospitalist Service       Date of Admission:  6/14/2024    Assessment & Plan      Sissy Mccloud is a 89 year old female admitted on 6/14/2024. She was brought to the ED by ambulance from home for evaluation of abdominal pain, nausea, vomiting    #Acute short segment colitis versus diverticulitis  -Continue IV Zosyn  -Pain management    #Chronic kidney disease stage IIIb  -Avoid nephrotoxins    #Type 2 diabetes mellitus with peripheral neuropathy without long-term insulin use, diet controlled  -Medium insulin resistance scale  -Hypoglycemia protocol    #Chronic heart failure with preserved ejection fraction  -No signs or symptoms of acute CHF  -Monitor daily weights    #Essential hypertension  -Elevated blood pressure likely secondary to pain  -Continue PTA amlodipine  -IV hydralazine as needed    #Hypothyroidism  -Continue PTA levothyroxine    #Radiographic findings  -A 1.7 cm right breast nodule  -Outpatient follow-up with primary provider        Diet: Combination Diet Moderate Consistent Carb (60 g CHO per Meal) Diet; Low Saturated Fat Na <2400mg Diet    DVT Prophylaxis: Pneumatic Compression Devices  Foster Catheter: Not present  Lines: None     Cardiac Monitoring: None  Code Status: No CPR- Do NOT Intubate          Disposition Plan     Medically Ready for Discharge: Anticipated in 2-4 Days           Mars Hernandez MD  Hospitalist Service  [facility]  Securely message with [employer] (more info)  Text page via [employer] Paging/Directory     ______________________________________________________________________    Chief Complaint   Abdominal pain, nausea, vomiting    History is obtained from the patient, electronic health record, and emergency department physician    History of Present Illness   Sissy Mccloud is a 89 year old female who was brought to the ED by ambulance from home for evaluation of abdominal pain, nausea and  vomiting.  Patient was seen in the ED on 6/6/2024 for generalized weakness and dizziness secondary to UTI and was prescribed cephalexin x 7 days.  She returned to the ED on 6/9/2024 secondary to generalized weakness and was treated with IV fluids.  Patient lives by herself, her  is currently in nursing home over the last 3 weeks.  She uses a walker for ambulation and falls frequently hitting her head but denies loss of consciousness.  She complains of a left-sided abdominal pain with associated nausea and vomiting.  She reports not moving her bowels on a daily basis and occasional black stools.      Past Medical History    No past medical history on file.    Past Surgical History   Past Surgical History:   Procedure Laterality Date    CHOLECYSTECTOMY         Prior to Admission Medications   Prior to Admission Medications   Prescriptions Last Dose Informant Patient Reported? Taking?   amLODIPine (NORVASC) 5 MG tablet 6/12/2024 at PM  Yes Yes   Sig: Take 5 mg by mouth every evening   gabapentin (NEURONTIN) 300 MG capsule 6/12/2024 at AM  Yes Yes   Sig: [GABAPENTIN (NEURONTIN) 300 MG CAPSULE] Take 300 mg by mouth daily.   levothyroxine (SYNTHROID/LEVOTHROID) 88 MCG tablet 6/12/2024 at AM  Yes Yes   Sig: Take 88 mcg by mouth every morning      Facility-Administered Medications: None           Physical Exam   Vital Signs: Temp: 98.3  F (36.8  C) Temp src: Oral BP: (!) 151/85 Pulse: 77   Resp: 18 SpO2: 94 % O2 Device: None (Room air)    Weight: 0 lbs 0 oz    Constitutional: no apparent distress  Respiratory: no increased work of breathing  Cardiovascular: regular rate and rhythm  GI: normal bowel sounds and tenderness noted in the left lower quadrant  Skin: no bruising or bleeding  Musculoskeletal: no lower extremity pitting edema present  Neurologic: Mental Status Exam:  Level of Alertness:   awake    Medical Decision Making       55 MINUTES SPENT BY ME on the date of service doing chart review, history, exam,  documentation & further activities per the note.  MANAGEMENT DISCUSSED with the following over the past 24 hours: Patient       Data     I have personally reviewed the following data over the past 24 hrs:    13.3 (H)  \   13.9   / 268     141 102 29.2 (H) /  208 (H)   4.6 25 1.23 (H) \     ALT: 14 AST: 26 AP: 82 TBILI: 0.4   ALB: 3.9 TOT PROTEIN: 7.5 LIPASE: N/A       Imaging results reviewed over the past 24 hrs:   Recent Results (from the past 24 hour(s))   CT Abdomen Pelvis w Contrast    Narrative    EXAM: CT ABDOMEN PELVIS W CONTRAST  LOCATION: [facility]  DATE: 6/14/2024    INDICATION: mid abd pain  COMPARISON: CT from 1/14/2024.  TECHNIQUE: CT scan of the abdomen and pelvis was performed following injection of IV contrast. Multiplanar reformats were obtained. Dose reduction techniques were used.  CONTRAST: isovue 370 62ml    FINDINGS:   LOWER CHEST: Normal.    HEPATOBILIARY: Absent gallbladder. Normal liver.    PANCREAS: Normal.    SPLEEN: Normal.    ADRENAL GLANDS: Normal.    KIDNEYS/BLADDER: Normal.    BOWEL: Normal appendix. Normal caliber small bowel. Ileal diverticulosis. Diffuse colonic diverticulosis. There is focal wall thickening of the distal descending colon with associated pericolonic edema.    LYMPH NODES: Normal.    VASCULATURE: Mild atherosclerotic calcification.    PELVIC ORGANS: Small amount of free fluid in the pelvis. Absent uterus.    MUSCULOSKELETAL: Osteopenia. Lower lumbar spine degenerative disc change. Stable chronic T12 compression fracture. Fixation screw in the left acetabular roof. There is a 1.7 cm circumscribed right breast nodule on image 6 of series 3.      Impression    IMPRESSION:   1.  Short segment colitis versus diverticulitis of the distal descending colon. No free air. Consider colonoscopic evaluation in follow-up if not recently performed.    2.  1.7 cm right breast nodule for which dedicated mammographic and/or sonographic evaluation is  recommended if not performed recently.

## 2024-06-14 NOTE — PROGRESS NOTES
[facility] ED Handoff Report    ED Chief Complaint: Nausea, vomiting diarrhea    ED Diagnosis:  (K52.9) Colitis  Comment: no nausea or vomiting today.  Plan: iv abx       PMH:  No past medical history on file.     Code Status:  No CPR- Do NOT Intubate     Falls Risk: Yes Band: Applied    Current Living Situation/Residence: lives alone     Elimination Status: Continent: No     Activity Level: SBA w/ walker    Patients Preferred Language:  English     Needed: No    Vital Signs:  BP (!) 152/65 (BP Location: Left arm)   Pulse 63   Temp 98.2  F (36.8  C) (Oral)   Resp 18   LMP  (LMP Unknown)   SpO2 94%      Cardiac Rhythm: NA    Pain Score: 3/10    Is the Patient Confused:  yes very forgetful at times    Last Food or Drink: 6/13/24 at 1200    Focused Assessment:  abdomen tender LLQ, incontinent of stool and urine, sleepy, very little oral solid intake.    Tests Performed: Done: Labs    Treatments Provided:  incontinent care, PT/OT    Family Dynamics/Concerns: No    Family Updated On Visitor Policy: Yes    Plan of Care Communicated to Family: Yes        Belongings Checklist Done and Signed by Patient: Yes    Belongings Sent with Patient: no    Medications sent with patient: no    Covid: asymptomatic , na    Additional Information: Needs daily weights and assist with meals  DM2.     RN: Jacque Dalton RN  . 6/14/2024 2:13 PM

## 2024-06-14 NOTE — ED TRIAGE NOTES
Pt arrives via [facility] with complaint of nausea, vomiting, and generalized abdominal pain at home. Pt reports vomiting once today. Was seen earlier this week and given Keflex rx for UTI. Pt lives independently at home. 4mg Zofran given en route by EMS.      Triage Assessment (Adult)       Row Name 06/14/24 0101          Triage Assessment    Airway WDL WDL        Respiratory WDL    Respiratory WDL WDL        Skin Circulation/Temperature WDL    Skin Circulation/Temperature WDL WDL        Cardiac WDL    Cardiac WDL WDL        Peripheral/Neurovascular WDL    Peripheral Neurovascular WDL WDL        Cognitive/Neuro/Behavioral WDL    Cognitive/Neuro/Behavioral WDL WDL

## 2024-06-14 NOTE — ED PROVIDER NOTES
Emergency Department Encounter         FINAL IMPRESSION:  Diverticulitis        ED COURSE AND MEDICAL DECISION MAKING            Additional ED Course Timeline:  1:23 AM I met with the patient, obtained history, performed an initial exam, and discussed options and plan for diagnostics and treatment here in the ED.     89-year-old here with vomiting fatigue weakness and abdominal pain.  Second time she is been seen a few weeks.    Worsening symptoms the last 24 hours.  Difficulty tolerating p.o.  Increased weakness.  Increased abdominal pain.  No black or bloody stools.  Abdominal pain on examination.  Normal heart and lungs.  No chest pain or trouble breathing.    - CT showing diverticulitis versus colitis.  Mild white count.  Hemodynamically stable.  Plan for admission.  Discussed case with hospitalist.                   Medical Decision Making  Obtained supplemental history:Supplemental history obtained?: Documented in chart and Family Member/Significant Other  Reviewed external records: External records reviewed?: Documented in chart  Care impacted by chronic illness:Chronic Kidney Disease, Diabetes, Heart Disease, and Hypertension  Care significantly affected by social determinants of health:Access to Medical Care  Did you consider but not order tests?: Work up considered but not performed and documented in chart, if applicable  Did you interpret images independently?: Independent interpretation of ECG and images noted in documentation, when applicable.  Consultation discussion with other provider:Did you involve another provider (consultant, , pharmacy, etc.)?: No  Admit.              Critical Care     Performed by: Gilbert Lozano or    Authorized by: Gilbert Lozano  Total critical care time:  minutes  Critical care was necessary to treat or prevent imminent or life-threatening deterioration of the following conditions:   Critical care was time spent personally by me on the following activities: development of treatment  plan with patient or surrogate, discussions with consultants, examination of patient, evaluation of patient's response to treatment, obtaining history from patient or surrogate, ordering and performing treatments and interventions, ordering and review of laboratory studies, ordering and review of radiographic studies, re-evaluation of patient's condition and monitoring for potential decompensation.  Critical care time was exclusive of separately billable procedures and treating other patients.'    At the conclusion of the encounter I discussed the results of all the tests and the disposition. The questions were answered. The patient or family acknowledged understanding and was agreeable with the care plan.                  MEDICATIONS GIVEN IN THE EMERGENCY DEPARTMENT:  Medications   lactated ringers BOLUS 1,000 mL (has no administration in time range)       NEW PRESCRIPTIONS STARTED AT TODAY'S ED VISIT:  New Prescriptions    No medications on file       HPI     Patient information obtained from: The patient    Use of : N/A     Sissy Mccloud is a 89 year old female with a pertinent history of T2DM, HTN, who presents to this ED via walk-in for evaluation of nausea and vomiting.    The patient called her daughter tonight for complaints of abdominal pain, nausea, and dry heaving. She ate well throughout the day. Her daughter called the ambulance and she received a dose of anti-emetics en route to the ER. She notes her nausea has subsided at this time. She endorses feeling constipated and has to self-disimpact her stools at time. She is not on iron pills.     Otherwise, the patient denied having nausea, chest pain, shortness of breath, and any other medical complaints at this time.          REVIEW OF SYSTEMS:  Review of Systems   Constitutional: Negative for fever, malaise  HEENT: Negative runny nose, sore throat, ear pain, neck pain  Respiratory: Negative for shortness of breath, cough,  congestion  Cardiovascular: Negative for chest pain, leg edema  Gastrointestinal: Negative for abdominal distention, diarrhea. Positive for abdominal pain, nausea, vomiting, constipation.  Genitourinary: Negative for dysuria and hematuria.   Integument: Negative for rash, skin breakdown  Neurological: Negative for paresthesias, weakness, headache.  Musculoskeletal: Negative for joint pain, joint swelling      All other systems reviewed and are negative.          MEDICAL HISTORY     No past medical history on file.    Past Surgical History:   Procedure Laterality Date    CHOLECYSTECTOMY         Social History     Tobacco Use    Smoking status: Never    Smokeless tobacco: Never   Substance Use Topics    Drug use: Never       amLODIPine (NORVASC) 5 MG tablet  gabapentin (NEURONTIN) 300 MG capsule  levothyroxine (SYNTHROID/LEVOTHROID) 88 MCG tablet  metFORMIN (GLUCOPHAGE) 500 MG tablet  pantoprazole (PROTONIX) 40 MG tablet            PHYSICAL EXAM     BP (!) 189/79   Pulse 84   Temp 98.3  F (36.8  C) (Oral)   Resp 18   LMP  (LMP Unknown)   SpO2 96%       PHYSICAL EXAM:     General: Patient appears well, nontoxic, comfortable  HEENT: Moist mucous membranes,  No head trauma.    Cardiovascular: Normal rate, normal rhythm, no extremity edema.  No appreciable murmur.  Respiratory: No signs of respiratory distress, lungs are clear to auscultation bilaterally with no wheezes rhonchi or rales.  Abdominal: Soft, generalized tenderness, nondistended, no palpable masses, no guarding, no rebound  Musculoskeletal: Full range of motion of joints, no deformities appreciated.  Neurological: Alert and oriented, grossly neurologically intact.  Psychological: Normal affect and mood.  Integument: No rashes appreciated          RESULTS       Labs Ordered and Resulted from Time of ED Arrival to Time of ED Departure - No data to display    CT Abdomen Pelvis w Contrast    (Results Pending)                      PROCEDURES:  Procedures:  Procedures       I, Geremias Ling am serving as a scribe to document services personally performed by Gilbert Lozano DO, based on my observations and the provider's statements to me.  I, Gilbert Lozano DO, attest that Geremias Ling is acting in a scribe capacity, has observed my performance of the services and has documented them in accordance with my direction.    Gilbert Lozano DO  Emergency Medicine  [facility] EMERGENCY DEPARTMENT       Gilbert Lozano DO  06/14/24 3235

## 2024-06-14 NOTE — PROGRESS NOTES
"   06/14/24 0930   Appointment Info   Signing Clinician's Name / Credentials (PT) Dorothy Ly, PT, DPT   Living Environment   People in Home spouse   Current Living Arrangements house   Self-Care   Equipment Currently Used at Home walker, rolling   Activity/Exercise/Self-Care Comment Above information per patient. Patient disoriented to time, place, situation. Uncertain as to accuracy of PLOF.   General Information   Onset of Illness/Injury or Date of Surgery 06/14/24   Referring Physician Mars Hernandez MD   Patient/Family Therapy Goals Statement (PT) None stated.   Pertinent History of Current Problem (include personal factors and/or comorbidities that impact the POC) Per H&P: \"89 year old female admitted on 6/14/2024. She was brought to the ED by ambulance from home for evaluation of abdominal pain, nausea, vomiting\"   Existing Precautions/Restrictions fall   Cognition   Affect/Mental Status (Cognition) confused   Orientation Status (Cognition) disoriented to;place;situation;time   Range of Motion (ROM)   ROM Comment B LE ROM is WFL. Patient does not allow therapist to move L UE due to pain.   Strength (Manual Muscle Testing)   Strength (Manual Muscle Testing) Deficits observed during functional mobility   Bed Mobility   Bed Mobility supine-sit   Supine-Sit [place] (Bed Mobility) minimum assist (75% patient effort);verbal cues   Bed Mobility Limitations decreased ability to use arms for pushing/pulling;decreased ability to use legs for bridging/pushing;cognitive deficits   Impairments Contributing to Impaired Bed Mobility decreased strength   Assistive Device (Bed Mobility) bed rails;draw sheet   Transfers   Transfers sit-stand transfer   Transfer Safety Concerns Noted decreased weight-shifting ability;losing balance backward   Impairments Contributing to Impaired Transfers impaired balance;decreased strength   Sit-Stand Transfer   Sit-Stand [place] (Transfers) minimum assist (75% patient " effort);verbal cues   Assistive Device (Sit-Stand Transfers) walker, front-wheeled   Gait/Stairs (Locomotion)   Grainger Level (Gait) minimum assist (75% patient effort);verbal cues   Assistive Device (Gait) walker, front-wheeled   Distance in Feet (Gait) 12'   Pattern (Gait) step-to   Deviations/Abnormal Patterns (Gait) rashid decreased;gait speed decreased   Balance   Balance Comments Posterior LOB with initial standing.   Clinical Impression   Criteria for Skilled Therapeutic Intervention Yes, treatment indicated   PT Diagnosis (PT) impaired functional mobility   Influenced by the following impairments decreased strength, impaired balance, decreased ROM, decreased cognition, endurance   Functional limitations due to impairments transfers, ambulation   Clinical Presentation (PT Evaluation Complexity) evolving   Clinical Presentation Rationale Clinical judgment.   Clinical Decision Making (Complexity) moderate complexity   Planned Therapy Interventions (PT) balance training;bed mobility training;gait training;home exercise program;neuromuscular re-education;patient/family education;strengthening;transfer training   Risk & Benefits of therapy have been explained evaluation/treatment results reviewed;participants voiced agreement with care plan;participants included;patient   PT Total Evaluation Time   PT Eval, Moderate Complexity Minutes (02466) 10   Physical Therapy Goals   PT Frequency 5x/week   PT Predicted Duration/Target Date for Goal Attainment 06/21/24   PT Goals Bed Mobility;Transfers;Gait   PT: Bed Mobility Supervision/stand-by assist;Supine to/from sit   PT: Transfers Supervision/stand-by assist;Sit to/from stand;Assistive device   PT: Gait Supervision/stand-by assist;100 feet;Rolling walker   Interventions   Interventions Quick Adds Therapeutic Activity   Therapeutic Activity   Therapeutic Activities: dynamic activities to improve functional performance Minutes (66545) 24   Symptoms Noted  During/After Treatment Fatigue   Treatment Detail/Skilled Intervention Patient supine in bed at start of session. Patient incontinent of stool at start of session. Patient not aware of incontinence. Pt rolls x 1 rep each direction with min to mod assist of 1 and verbal cues for technique. Total assist required for cares and clothing management. Pt requires increased time once in standing to center balance due to heavy posterior lean. Verbal cues for anterior weight shift. Stability improves with increased distance, verbal cues for walker proximity and posture.   PT Discharge Planning   PT Plan transfers, gait with FWW, LE strengthening   PT Discharge Recommendation (DC Rec) Transitional Care Facility   PT Rationale for DC Rec Patient requiring assist of 1 for all mobility. Confused this session and disoriented to time, place, and situation. Recommend 24 hour supervision/assist due to confusion and TCU at discharge.   PT Brief overview of current status Supine <> sit, mod assist of 1. Sit <> stand, mod assist of 1. Ambulates 16' around room with FWW, min assist of 1.   Total Session Time   Timed Code Treatment Minutes 24   Total Session Time (sum of timed and untimed services) 34

## 2024-06-14 NOTE — PLAN OF CARE
Goal Outcome Evaluation:                        Problem: Bowel Disease, Inflammatory (Ulcerative Colitis or Crohn's Disease)  Goal: Diarrhea Symptom Relief  Outcome: Progressing     Problem: Bowel Disease, Inflammatory (Ulcerative Colitis or Crohn's Disease)  Goal: Optimal Pain Control and Function  Outcome: Progressing     Problem: Fall Injury Risk  Goal: Absence of Fall and Fall-Related Injury  Outcome: Progressing     Problem: Comorbidity Management  Goal: Blood Glucose Levels Within Targeted Range  Outcome: Progressing     Problem: Comorbidity Management  Goal: Maintenance of Heart Failure Symptom Control  Outcome: Progressing     Problem: Comorbidity Management  Goal: Blood Pressure in Desired Range  Outcome: Progressing     Assigned to patient at 0530.   Oriented x4.   No complaints of pain.   Had x1 loose stool prior to transfer from ER.  Pure wick in place. Mepilex placed on sacral area.  At baseline uses a walker to ambulate.   No full range of motion on LUE due to previous shoulder injury. Able to move other extremities without difficulty.   LR running continuous,received iv antibiotics in ER.   Patient's daughter Jasmyn called and was given update on plan of care by this writer.

## 2024-06-14 NOTE — PLAN OF CARE
Goal Outcome Evaluation:    Problem: Hypertension Acute  Goal: Blood Pressure Within Desired Range  Outcome: Progressing   Prn hydralazine was given for elevated /84 which was effective. Recheck /65. Will continue to monitor.   BM x2 soft/loose.

## 2024-06-14 NOTE — PROGRESS NOTES
"   06/14/24 1050   Appointment Info   Signing Clinician's Name / Credentials (OT) Chikis Jasmina, OTR/L   Living Environment   People in Home spouse   Current Living Arrangements house   Living Environment Comments Per pt, her  is in a nursing home.   Self-Care   Equipment Currently Used at Home walker, rolling   Activity/Exercise/Self-Care Comment Pt states she is independent with ADLs.   General Information   Onset of Illness/Injury or Date of Surgery 06/14/24   Referring Physician Mars Hernandez MD   Patient/Family Therapy Goal Statement (OT) none stated   Additional Occupational Profile Info/Pertinent History of Current Problem Per chart review, pt \"is a 89 year old female admitted on 6/14/2024. She was brought to the ED by ambulance from home for evaluation of abdominal pain, nausea, vomiting\"   Existing Precautions/Restrictions fall   Limitations/Impairments safety/cognitive   Cognitive Status Examination   Orientation Status person;place;time  (disoriented to situation)   Cognitive Status Comments Easily distracted   Pain Assessment   Patient Currently in Pain Yes, see Vital Sign flowsheet  (abdominal pain)   Range of Motion Comprehensive   General Range of Motion no range of motion deficits identified   Strength Comprehensive (MMT)   Comment, General Manual Muscle Testing (MMT) Assessment Generalized BUE weakness noted functionally.   Bed Mobility   Bed Mobility supine-sit;sit-supine   Supine-Sit [place] (Bed Mobility) moderate assist (50% patient effort)   Sit-Supine [place] (Bed Mobility) minimum assist (75% patient effort)   Assistive Device (Bed Mobility) bed rails;draw sheet   Transfers   Transfers sit-stand transfer;toilet transfer   Sit-Stand Transfer   Sit-Stand [place] (Transfers) minimum assist (75% patient effort)   Assistive Device (Sit-Stand Transfers) walker, front-wheeled   Toilet Transfer   Type (Toilet Transfer) stand pivot/stand step   [place] Level (Toilet " Transfer) minimum assist (75% patient effort)   Assistive Device (Toilet Transfer) walker, front-wheeled   Balance   Balance Comments Min A for balance using FWW   Activities of Daily Living   BADL Assessment/Intervention lower body dressing;toileting;grooming   Lower Body Dressing Assessment/Training   [place] Level (Lower Body Dressing) maximum assist (25% patient effort)   Grooming Assessment/Training   [place] Level (Grooming) not tested   Comment, (Grooming) Per clinical reasoning, anticipate pt would have difficulty tolerating standing at sink for G/H.   Toileting   [place] Level (Toileting) maximum assist (25% patient effort)   Clinical Impression   Criteria for Skilled Therapeutic Interventions Met (OT) Yes, treatment indicated   OT Diagnosis Impaired ability to perform ADLs, IADLs, and functional mobility.   Influenced by the following impairments colitis   OT Problem List-Impairments impacting ADL problems related to;activity tolerance impaired;balance;cognition;mobility;strength   Assessment of Occupational Performance 3-5 Performance Deficits   Identified Performance Deficits toileting, lower body dressing, cognition, grooming/hygiene   Planned Therapy Interventions (OT) ADL retraining;balance training;bed mobility training;strengthening;transfer training;home program guidelines;progressive activity/exercise;risk factor education   Clinical Decision Making Complexity (OT) problem focused assessment/low complexity   Risk & Benefits of therapy have been explained evaluation/treatment results reviewed;care plan/treatment goals reviewed;risks/benefits reviewed;current/potential barriers reviewed;participants voiced agreement with care plan;participants included;patient   OT Total Evaluation Time   OT Eval, Low Complexity Minutes (54546) 10   OT Goals   Therapy Frequency (OT) 5 times/week   OT Predicted Duration/Target Date for Goal Attainment 06/21/24   OT Goals Hygiene/Grooming;Toilet  Transfer/Toileting;Lower Body Dressing;Cognition   OT: Hygiene/Grooming supervision/stand-by assist;using adaptive equipment;while standing   OT: Lower Body Dressing Supervision/stand-by assist;using adaptive equipment   OT: Toilet Transfer/Toileting Supervision/stand-by assist;toilet transfer;cleaning and garment management;using adaptive equipment   OT: Cognitive Patient/caregiver will verbalize understanding of cognitive assessment results/recommendations as needed for safe discharge planning   Self-Care/Home Management   Self-Care/Home Mgmt/ADL, Compensatory, Meal Prep Minutes (90350) 25   Symptoms Noted During/After Treatment (Meal Preparation/Planning Training) fatigue   Treatment Detail/Skilled Intervention Pt completed 2x additional STS from BSC with Min A using FWW. Pt tolerated standing for extended time with seated rest break between with Max A for pericares and changing brief d/t incontinence of stool. Min A to transfer BSC>EOB with FWW. Pt able to complete side scoots seated on EOB x5 with SBA. Pt left in bed at end of session with chair alarm on and call light in reach, RN present in room.   OT Discharge Planning   OT Plan toileting, grooming/hygiene, lower body dressing   OT Discharge Recommendation (DC Rec) Transitional Care Facility   OT Rationale for DC Rec Pt very weak and does not have assistance at home,  is in nursing home. Recommend TCU to progress strength and safety.   OT Brief overview of current status Min A transfers, Max A toileting, impaired cognition   Total Session Time   Timed Code Treatment Minutes 25   Total Session Time (sum of timed and untimed services) 35

## 2024-06-14 NOTE — MEDICATION SCRIBE - ADMISSION MEDICATION HISTORY
Medication Scribe Admission Medication History    Admission medication history is complete. The information provided in this note is only as accurate as the sources available at the time of the update.    Information Source(s): Patient and CareEverywhere/SureScripts via in-person    Pertinent Information: pt manages their own medications     Pt reports completing Cephalexin regimen  6/13/24  Pt reports no longer taking Metformin or Pantroprazole    Changes made to PTA medication list:  Added: None  Deleted: Cephalexin 250mg, Metformin 500mg, Pantoprazole 40mg  Changed: None    Allergies reviewed with patient and updates made in EHR: yes    Medication History Completed By: Hamzah Rodriguez 6/14/2024 3:09 AM    PTA Med List   Medication Sig Last Dose    amLODIPine (NORVASC) 5 MG tablet Take 5 mg by mouth every evening 6/12/2024 at PM    gabapentin (NEURONTIN) 300 MG capsule [GABAPENTIN (NEURONTIN) 300 MG CAPSULE] Take 300 mg by mouth daily. 6/12/2024 at AM    levothyroxine (SYNTHROID/LEVOTHROID) 88 MCG tablet Take 88 mcg by mouth every morning 6/12/2024 at AM

## 2024-06-15 LAB
ANION GAP SERPL CALCULATED.3IONS-SCNC: 12 MMOL/L (ref 7–15)
BASOPHILS # BLD AUTO: 0 10E3/UL (ref 0–0.2)
BASOPHILS NFR BLD AUTO: 0 %
BUN SERPL-MCNC: 25.5 MG/DL (ref 8–23)
CALCIUM SERPL-MCNC: 8.2 MG/DL (ref 8.2–9.6)
CHLORIDE SERPL-SCNC: 105 MMOL/L (ref 98–107)
CREAT SERPL-MCNC: 1.34 MG/DL (ref 0.51–0.95)
DEPRECATED HCO3 PLAS-SCNC: 23 MMOL/L (ref 22–29)
EGFRCR SERPLBLD CKD-EPI 2021: 37 ML/MIN/1.73M2
EOSINOPHIL # BLD AUTO: 0.2 10E3/UL (ref 0–0.7)
EOSINOPHIL NFR BLD AUTO: 2 %
ERYTHROCYTE [DISTWIDTH] IN BLOOD BY AUTOMATED COUNT: 13 % (ref 10–15)
GLUCOSE BLDC GLUCOMTR-MCNC: 104 MG/DL (ref 70–99)
GLUCOSE BLDC GLUCOMTR-MCNC: 114 MG/DL (ref 70–99)
GLUCOSE BLDC GLUCOMTR-MCNC: 119 MG/DL (ref 70–99)
GLUCOSE BLDC GLUCOMTR-MCNC: 122 MG/DL (ref 70–99)
GLUCOSE BLDC GLUCOMTR-MCNC: 159 MG/DL (ref 70–99)
GLUCOSE SERPL-MCNC: 119 MG/DL (ref 70–99)
HCT VFR BLD AUTO: 33.7 % (ref 35–47)
HGB BLD-MCNC: 10.8 G/DL (ref 11.7–15.7)
IMM GRANULOCYTES # BLD: 0 10E3/UL
IMM GRANULOCYTES NFR BLD: 0 %
LYMPHOCYTES # BLD AUTO: 1.9 10E3/UL (ref 0.8–5.3)
LYMPHOCYTES NFR BLD AUTO: 21 %
MCH RBC QN AUTO: 30.2 PG (ref 26.5–33)
MCHC RBC AUTO-ENTMCNC: 32 G/DL (ref 31.5–36.5)
MCV RBC AUTO: 94 FL (ref 78–100)
MONOCYTES # BLD AUTO: 0.6 10E3/UL (ref 0–1.3)
MONOCYTES NFR BLD AUTO: 6 %
NEUTROPHILS # BLD AUTO: 6.3 10E3/UL (ref 1.6–8.3)
NEUTROPHILS NFR BLD AUTO: 70 %
NRBC # BLD AUTO: 0 10E3/UL
NRBC BLD AUTO-RTO: 0 /100
PLATELET # BLD AUTO: 225 10E3/UL (ref 150–450)
POTASSIUM SERPL-SCNC: 4.3 MMOL/L (ref 3.4–5.3)
RBC # BLD AUTO: 3.58 10E6/UL (ref 3.8–5.2)
SODIUM SERPL-SCNC: 140 MMOL/L (ref 135–145)
WBC # BLD AUTO: 9.1 10E3/UL (ref 4–11)

## 2024-06-15 PROCEDURE — 85004 AUTOMATED DIFF WBC COUNT: CPT | Performed by: INTERNAL MEDICINE

## 2024-06-15 PROCEDURE — 250N000013 HC RX MED GY IP 250 OP 250 PS 637: Performed by: INTERNAL MEDICINE

## 2024-06-15 PROCEDURE — 99233 SBSQ HOSP IP/OBS HIGH 50: CPT | Performed by: INTERNAL MEDICINE

## 2024-06-15 PROCEDURE — 250N000011 HC RX IP 250 OP 636: Mod: JZ | Performed by: HOSPITALIST

## 2024-06-15 PROCEDURE — 120N000001 HC R&B MED SURG/OB

## 2024-06-15 PROCEDURE — 250N000013 HC RX MED GY IP 250 OP 250 PS 637: Performed by: HOSPITALIST

## 2024-06-15 PROCEDURE — 80048 BASIC METABOLIC PNL TOTAL CA: CPT | Performed by: INTERNAL MEDICINE

## 2024-06-15 PROCEDURE — 36415 COLL VENOUS BLD VENIPUNCTURE: CPT | Performed by: INTERNAL MEDICINE

## 2024-06-15 RX ORDER — AMOXICILLIN 250 MG
1 CAPSULE ORAL DAILY
Status: DISCONTINUED | OUTPATIENT
Start: 2024-06-15 | End: 2024-06-18 | Stop reason: HOSPADM

## 2024-06-15 RX ADMIN — AMLODIPINE BESYLATE 5 MG: 5 TABLET ORAL at 20:39

## 2024-06-15 RX ADMIN — LEVOTHYROXINE SODIUM 88 MCG: 88 TABLET ORAL at 09:22

## 2024-06-15 RX ADMIN — PIPERACILLIN AND TAZOBACTAM 3.38 G: 3; .375 INJECTION, POWDER, FOR SOLUTION INTRAVENOUS at 18:08

## 2024-06-15 RX ADMIN — GABAPENTIN 300 MG: 300 CAPSULE ORAL at 09:22

## 2024-06-15 RX ADMIN — SENNOSIDES AND DOCUSATE SODIUM 1 TABLET: 50; 8.6 TABLET ORAL at 09:22

## 2024-06-15 RX ADMIN — PIPERACILLIN AND TAZOBACTAM 3.38 G: 3; .375 INJECTION, POWDER, FOR SOLUTION INTRAVENOUS at 01:30

## 2024-06-15 RX ADMIN — PIPERACILLIN AND TAZOBACTAM 3.38 G: 3; .375 INJECTION, POWDER, FOR SOLUTION INTRAVENOUS at 09:21

## 2024-06-15 ASSESSMENT — ACTIVITIES OF DAILY LIVING (ADL)
ADLS_ACUITY_SCORE: 50
ADLS_ACUITY_SCORE: 50
ADLS_ACUITY_SCORE: 47
ADLS_ACUITY_SCORE: 50
ADLS_ACUITY_SCORE: 47
ADLS_ACUITY_SCORE: 44
ADLS_ACUITY_SCORE: 47
ADLS_ACUITY_SCORE: 47
ADLS_ACUITY_SCORE: 50
ADLS_ACUITY_SCORE: 47
ADLS_ACUITY_SCORE: 47
ADLS_ACUITY_SCORE: 44
ADLS_ACUITY_SCORE: 47
ADLS_ACUITY_SCORE: 47
ADLS_ACUITY_SCORE: 44
ADLS_ACUITY_SCORE: 50
ADLS_ACUITY_SCORE: 46
ADLS_ACUITY_SCORE: 50
ADLS_ACUITY_SCORE: 47
ADLS_ACUITY_SCORE: 50
ADLS_ACUITY_SCORE: 47
ADLS_ACUITY_SCORE: 50
ADLS_ACUITY_SCORE: 50

## 2024-06-15 NOTE — PLAN OF CARE
Goal Outcome Evaluation:  Denied pain most of the shift when at rest. Stated that abdomen is tender with palpation. At bedtime patient reported some hip pain and she requested tylenol. Pain went from 4 down to 3.  Diet changed from 60G carb to full liquids late in shift per MD orders. Denies nausea. Ate almost whole meal.  Portable abdominal xr done around 2220. Awaiting results.  On IV Zosyn.  New sacral mepilex applied tonight. Patient has chronic pea size wound on buttocks.  Was up in the chair for supper meal. Turn and repo when in bed.

## 2024-06-15 NOTE — PROGRESS NOTES
CLINICAL NUTRITION SERVICES - ASSESSMENT NOTE     Nutrition Prescription    RECOMMENDATIONS FOR MDs/PROVIDERS TO ORDER:      Malnutrition Status:    Not noted.    Recommendations already ordered by Registered Dietitian (RD):  Ensure Enlive 2x/day    Future/Additional Recommendations:       REASON FOR ASSESSMENT  Sissy Mccloud is a/an 89 year old female assessed by the dietitian for Admission Nutrition Risk Screen for positive    NUTRITION HISTORY  Patient admitted with nausea, vomiting, abdominal pain and found to have diverticulitis.  History of DM, HTN.    Patient reports that she is eating about the same but has lost weight.    CURRENT NUTRITION ORDERS  Diet: Full Liquid  Patient ordering small portions at meals.     LABS  Labs reviewed    MEDICATIONS  Medications reviewed    ANTHROPOMETRICS  Height: 0 cm (Data Unavailable)  Most Recent Weight: 58.8 kg (129 lb 9.6 oz)    BMI: Normal BMI  Weight History:   06/09/24 : 56.7 kg (125 lb)   04/12/24 : 55.8 kg (123 lb)   01/17/24 : 61.8 kg (136 lb 3.2 oz)   03/01/22 : 71.3 kg (157 lb 1.6 oz)   Wt loss not significant per malnutrition criteria.    Dosing Weight: 58.8 kg    ASSESSED NUTRITION NEEDS  Estimated Energy Needs: 1470+ kcals/day (25+ kcals/kg)  Justification: Maintenance  Estimated Protein Needs: 58+ grams protein/day (1+ grams of pro/kg)  Justification: Increased needs  Estimated Fluid Needs: 1470+ mL/day (1 mL/kcal)   Justification: Maintenance    PHYSICAL FINDINGS  See malnutrition section below.  Last BM 6/14/24.     MALNUTRITION:  % Weight Loss:  Weight loss does not meet criteria for malnutrition   % Intake:  <75% for > 7 days (moderate malnutrition)  Subcutaneous Fat Loss:  None observed  Muscle Loss:  None observed  Fluid Retention:  None noted    Malnutrition Diagnosis: Patient does not meet two of the above criteria necessary for diagnosing malnutrition    NUTRITION DIAGNOSIS  No nutrition diagnosis at this time      INTERVENTIONS  Implementation  Nutrition Education: No education needs assessed at this time   Medical food supplement therapy-patient has tried these in the past.     Goals  Patient to consume % of nutritionally adequate meals three times per day, or the equivalent with supplements/snacks.     Monitoring/Evaluation  Progress toward goals will be monitored and evaluated per protocol.

## 2024-06-15 NOTE — PROGRESS NOTES
Post midnight admission :     Admitted with diverticulitis  Still having abdominal discomfort  On iv zosyn for antibiotics  Check KUB  Change regular diet to full liquid diet due to ongoing abdominal discomfort      Not medically ready for discharge at this time.  Awaiting resolution of abdominal symptoms, tolerate diet

## 2024-06-15 NOTE — PROGRESS NOTES
[facility]    Medicine Progress Note - Hospitalist Service    Date of Admission:  6/14/2024    Assessment & Plan    Sissy Mccloud is a 89 year old female admitted on 6/14/2024. She was brought to the ED by ambulance from home for evaluation of abdominal pain, nausea, vomiting.      Acute short segment stercoral colitis versus diverticulitis  -Admits to several days of constipation prior to abdominal pain with nausea and vomiting  -Continue IV Zosyn  -Pain management  -Started on senna-docusate  -Advancing to soft diet     Chronic kidney disease stage IIIb  -Will monitor and avoid nephrotoxins     Type 2 diabetes mellitus with peripheral neuropathy without long-term insulin use, diet controlled  -Medium insulin resistance scale  -Hypoglycemia protocol     Chronic heart failure with preserved ejection fraction  -No signs or symptoms of acute CHF  -Monitor daily weights     Essential hypertension  -Elevated blood pressure likely secondary to pain  -Continue PTA amlodipine  -IV hydralazine as needed     Hypothyroidism  -Continue PTA levothyroxine     Radiographic findings  -A 1.7 cm right breast nodule, on review of prior imaging was also present on CT imaging 7/30/2019, at that time was also being monitored due to numerous bilateral pulmonary nodules present since 2015  -Outpatient follow-up with primary provider        Diet: Full Liquid Diet  Snacks/Supplements Adult: Ensure Enlive; With Meals    DVT Prophylaxis: Pneumatic Compression Devices  Foster Catheter: Not present  Lines: None     Cardiac Monitoring: None  Code Status: No CPR- Do NOT Intubate      Clinically Significant Risk Factors          # Hypocalcemia: Lowest Ca = 8.2 mg/dL in last 2 days, will monitor and replace as appropriate         # Hypertension: Noted on problem list           #Precipitous drop in Hgb/Hct: Lowest Hgb this hospitalization: 10.8 g/dL. Will continue to monitor and treat/transfuse as appropriate.    # DMII: A1C  = 7.2 % (Ref range: <5.7 %) within past 6 months, PRESENT ON ADMISSION      # Financial/Environmental Concerns:           Disposition Plan     Medically Ready for Discharge: Anticipated Tomorrow             Jyoti Niño MD  Hospitalist Service  [facility]  Securely message with [employer] (more info)  Text page via [employer] Paging/Directory   ______________________________________________________________________    Interval History   Patient continues to have some abdominal discomfort but no nausea or vomiting.  She has been constipated prior to arrival.  Started on stool softeners.  Will advance to soft diet for dinner.  Possible discharge home tomorrow.  Has some concerning imaging findings that I will discuss prior to discharge.  Appears she has had stercoral colitis multiple times in the past.  Counseled on prevention.  Daughter does encourage her to drink fluids when she is at home.  Daughter was at bedside during our visit.    Physical Exam   Vital Signs: Temp: 98  F (36.7  C) Temp src: Oral BP: (!) 167/69 Pulse: 54   Resp: 18 SpO2: 99 % O2 Device: None (Room air)    Weight: 129 lbs 9.6 oz    General Appearance: Awake, alert, in no acute distress  Respiratory: CTAB, no wheeze  Cardiovascular: RRR, no murmur noted  GI: soft, nontender, non distended, normal bowel sounds  Skin: no jaundice, no rash      Medical Decision Making       50 MINUTES SPENT BY ME on the date of service doing chart review, history, exam, documentation & further activities per the note.      Data     I have personally reviewed the following data over the past 24 hrs:    9.1  \   10.8 (L)   / 225     140 105 25.5 (H) /  122 (H)   4.3 23 1.34 (H) \       Imaging results reviewed over the past 24 hrs:   Recent Results (from the past 24 hour(s))   XR Abdomen Port 1 View    Narrative    EXAM: XR ABDOMEN PORT 1 VIEW  LOCATION: [facility]  DATE: 6/14/2024    INDICATION: abdominal pain, rule out  perforation  COMPARISON: CT 6/14/2024      Impression    IMPRESSION: Cholecystectomy clips. Nonobstructive bowel gas pattern. No gross evidence of intraperitoneal free air, although evaluation limited due to supine technique.

## 2024-06-15 NOTE — PLAN OF CARE
Problem: Adult Inpatient Plan of Care  Goal: Optimal Comfort and Wellbeing  Outcome: Progressing     Problem: Comorbidity Management  Goal: Blood Glucose Levels Within Targeted Range  Outcome: Progressing  Goal: Blood Pressure in Desired Range  Outcome: Progressing     Problem: Pain Acute  Goal: Optimal Pain Control and Function  Outcome: Progressing      Goal Outcome Evaluation:         Pt denies pain or nausea. Continues on full liquid diet. Purewick in place. . No acute events overnight.

## 2024-06-15 NOTE — PLAN OF CARE
Goal Outcome Evaluation:      Plan of Care Reviewed With: patient    Overall Patient Progress: improvingOverall Patient Progress: improving      Problem: Bowel Disease, Inflammatory (Ulcerative Colitis or Crohn's Disease)  Goal: Diarrhea Symptom Relief  Outcome: Progressing     Problem: Bowel Disease, Inflammatory (Ulcerative Colitis or Crohn's Disease)  Goal: Absence of Infection Signs and Symptoms  Outcome: Progressing     Problem: Bowel Disease, Inflammatory (Ulcerative Colitis or Crohn's Disease)  Goal: Optimal Nutrition Delivery  Outcome: Progressing     Problem: Bowel Disease, Inflammatory (Ulcerative Colitis or Crohn's Disease)  Goal: Optimal Pain Control and Function  Outcome: Progressing     Problem: Fall Injury Risk  Goal: Absence of Fall and Fall-Related Injury  Outcome: Progressing  Intervention: Identify and Manage Contributors  Recent Flowsheet Documentation  Taken 6/15/2024 0900 by Jyoti Friend RN  Medication Review/Management: medications reviewed  Intervention: Promote Injury-Free Environment  Recent Flowsheet Documentation  Taken 6/15/2024 0900 by Jyoti Friend RN  Safety Promotion/Fall Prevention:   activity supervised   assistive device/personal items within reach   clutter free environment maintained   nonskid shoes/slippers when out of bed   patient and family education   room organization consistent   safety round/check completed     No pain reported bedsides discomfort when laying on bottom too long. Patient was repositioned and offloaded with pillows. A new mepliex was also applied. These things were helpful per patient. Diet updated to soft foods-tolerating. Incontinent of bowel and bladder. Patient is anxious about need for TCU as she has a cat she is worried is alone.     Jyoti Friend RN

## 2024-06-16 ENCOUNTER — APPOINTMENT (OUTPATIENT)
Dept: PHYSICAL THERAPY | Facility: HOSPITAL | Age: 89
DRG: 392 | End: 2024-06-16
Payer: COMMERCIAL

## 2024-06-16 LAB
ANION GAP SERPL CALCULATED.3IONS-SCNC: 10 MMOL/L (ref 7–15)
BUN SERPL-MCNC: 18 MG/DL (ref 8–23)
CALCIUM SERPL-MCNC: 8.2 MG/DL (ref 8.2–9.6)
CHLORIDE SERPL-SCNC: 104 MMOL/L (ref 98–107)
CREAT SERPL-MCNC: 1.29 MG/DL (ref 0.51–0.95)
DEPRECATED HCO3 PLAS-SCNC: 24 MMOL/L (ref 22–29)
EGFRCR SERPLBLD CKD-EPI 2021: 39 ML/MIN/1.73M2
ERYTHROCYTE [DISTWIDTH] IN BLOOD BY AUTOMATED COUNT: 13 % (ref 10–15)
GLUCOSE BLDC GLUCOMTR-MCNC: 111 MG/DL (ref 70–99)
GLUCOSE BLDC GLUCOMTR-MCNC: 122 MG/DL (ref 70–99)
GLUCOSE BLDC GLUCOMTR-MCNC: 153 MG/DL (ref 70–99)
GLUCOSE BLDC GLUCOMTR-MCNC: 234 MG/DL (ref 70–99)
GLUCOSE SERPL-MCNC: 137 MG/DL (ref 70–99)
HCT VFR BLD AUTO: 38.3 % (ref 35–47)
HGB BLD-MCNC: 12.3 G/DL (ref 11.7–15.7)
MCH RBC QN AUTO: 29.8 PG (ref 26.5–33)
MCHC RBC AUTO-ENTMCNC: 32.1 G/DL (ref 31.5–36.5)
MCV RBC AUTO: 93 FL (ref 78–100)
PLATELET # BLD AUTO: 239 10E3/UL (ref 150–450)
POTASSIUM SERPL-SCNC: 4.5 MMOL/L (ref 3.4–5.3)
RBC # BLD AUTO: 4.13 10E6/UL (ref 3.8–5.2)
SODIUM SERPL-SCNC: 138 MMOL/L (ref 135–145)
WBC # BLD AUTO: 7.9 10E3/UL (ref 4–11)

## 2024-06-16 PROCEDURE — 85027 COMPLETE CBC AUTOMATED: CPT | Performed by: INTERNAL MEDICINE

## 2024-06-16 PROCEDURE — 97110 THERAPEUTIC EXERCISES: CPT | Mod: GP

## 2024-06-16 PROCEDURE — 99232 SBSQ HOSP IP/OBS MODERATE 35: CPT | Performed by: INTERNAL MEDICINE

## 2024-06-16 PROCEDURE — 250N000013 HC RX MED GY IP 250 OP 250 PS 637: Performed by: INTERNAL MEDICINE

## 2024-06-16 PROCEDURE — 97530 THERAPEUTIC ACTIVITIES: CPT | Mod: GP

## 2024-06-16 PROCEDURE — 250N000013 HC RX MED GY IP 250 OP 250 PS 637: Performed by: HOSPITALIST

## 2024-06-16 PROCEDURE — 36415 COLL VENOUS BLD VENIPUNCTURE: CPT | Performed by: INTERNAL MEDICINE

## 2024-06-16 PROCEDURE — 80048 BASIC METABOLIC PNL TOTAL CA: CPT | Performed by: INTERNAL MEDICINE

## 2024-06-16 PROCEDURE — 120N000001 HC R&B MED SURG/OB

## 2024-06-16 PROCEDURE — 250N000011 HC RX IP 250 OP 636: Mod: JZ | Performed by: HOSPITALIST

## 2024-06-16 RX ORDER — POLYETHYLENE GLYCOL 3350 17 G/17G
17 POWDER, FOR SOLUTION ORAL DAILY
Status: DISCONTINUED | OUTPATIENT
Start: 2024-06-16 | End: 2024-06-18 | Stop reason: HOSPADM

## 2024-06-16 RX ADMIN — PIPERACILLIN AND TAZOBACTAM 3.38 G: 3; .375 INJECTION, POWDER, FOR SOLUTION INTRAVENOUS at 01:40

## 2024-06-16 RX ADMIN — PIPERACILLIN AND TAZOBACTAM 3.38 G: 3; .375 INJECTION, POWDER, FOR SOLUTION INTRAVENOUS at 18:00

## 2024-06-16 RX ADMIN — GABAPENTIN 300 MG: 300 CAPSULE ORAL at 08:33

## 2024-06-16 RX ADMIN — LEVOTHYROXINE SODIUM 88 MCG: 88 TABLET ORAL at 08:41

## 2024-06-16 RX ADMIN — AMLODIPINE BESYLATE 5 MG: 5 TABLET ORAL at 20:52

## 2024-06-16 RX ADMIN — POLYETHYLENE GLYCOL 3350 17 G: 17 POWDER, FOR SOLUTION ORAL at 16:56

## 2024-06-16 RX ADMIN — PIPERACILLIN AND TAZOBACTAM 3.38 G: 3; .375 INJECTION, POWDER, FOR SOLUTION INTRAVENOUS at 10:20

## 2024-06-16 RX ADMIN — SENNOSIDES AND DOCUSATE SODIUM 1 TABLET: 50; 8.6 TABLET ORAL at 08:33

## 2024-06-16 RX ADMIN — ACETAMINOPHEN 650 MG: 325 TABLET ORAL at 12:14

## 2024-06-16 ASSESSMENT — ACTIVITIES OF DAILY LIVING (ADL)
ADLS_ACUITY_SCORE: 46
ADLS_ACUITY_SCORE: 45
ADLS_ACUITY_SCORE: 46
ADLS_ACUITY_SCORE: 45
ADLS_ACUITY_SCORE: 46
ADLS_ACUITY_SCORE: 45
ADLS_ACUITY_SCORE: 46
ADLS_ACUITY_SCORE: 45
ADLS_ACUITY_SCORE: 45
ADLS_ACUITY_SCORE: 46
ADLS_ACUITY_SCORE: 46
ADLS_ACUITY_SCORE: 45
ADLS_ACUITY_SCORE: 45
ADLS_ACUITY_SCORE: 46
ADLS_ACUITY_SCORE: 45

## 2024-06-16 NOTE — PROGRESS NOTES
[facility]    Medicine Progress Note - Hospitalist Service    Date of Admission:  6/14/2024    Assessment & Plan   Sissy Mccloud is a 89 year old female admitted on 6/14/2024. She was brought to the ED by ambulance from home for evaluation of abdominal pain, nausea, vomiting.      Acute short segment stercoral colitis versus diverticulitis  Nausea vomiting-resolved  -Admits to several days of constipation prior to abdominal pain with nausea and vomiting  -Continue IV Zosyn, renal function low, will likely switch to augmentin versus bactrim in AM  -Pain management  -Started on senna-docusate 6/15, started MiraLAX 6/16  -Advancing to regular diet     Chronic kidney disease stage IIIb  -Will monitor and avoid nephrotoxins     Type 2 diabetes mellitus with peripheral neuropathy without long-term insulin use, diet controlled  -Medium insulin resistance scale  -Hypoglycemia protocol     Chronic heart failure with preserved ejection fraction  -No signs or symptoms of acute CHF  -Monitor daily weights     Essential hypertension  -Elevated blood pressure likely secondary to pain  -Continue PTA amlodipine  -IV hydralazine as needed     Hypothyroidism  -Continue PTA levothyroxine     Radiographic findings  -A 1.7 cm right breast nodule, on review of prior imaging was also present on CT imaging 7/30/2019, at that time was also being monitored due to numerous bilateral pulmonary nodules present since 2015  -Outpatient follow-up with primary provider        Diet: Snacks/Supplements Adult: Ensure Enlive; With Meals  Mechanical/Dental Soft Diet    DVT Prophylaxis: Pneumatic Compression Devices  Foster Catheter: Not present  Lines: None     Cardiac Monitoring: None  Code Status: No CPR- Do NOT Intubate      Clinically Significant Risk Factors                  # Hypertension: Noted on problem list           #Precipitous drop in Hgb/Hct: Lowest Hgb this hospitalization: 10.8 g/dL. Will continue to monitor and  treat/transfuse as appropriate.    # DMII: A1C = 7.2 % (Ref range: <5.7 %) within past 6 months, PRESENT ON ADMISSION      # Financial/Environmental Concerns:           Disposition Plan     Medically Ready for Discharge: Ready Now             Jyoti Niño MD  Hospitalist Service  [facility]  Securely message with [employer] (more info)  Text page via [employer] Paging/Directory   ______________________________________________________________________    Interval History   Mrs. Mccloud is doing well.  She has been tolerating a soft diet today.  She is in agreement to trial a regular diet this afternoon for dinner.  She has not had a bowel movement.  She continues on senna and docusate.  Will add MiraLAX and continue until bowel movement and then stop.  PT and OT are recommending Transitional care at discharge.    Physical Exam   Vital Signs: Temp: 98.5  F (36.9  C) Temp src: Oral BP: 118/64 Pulse: 61   Resp: 18 SpO2: 97 % O2 Device: None (Room air)    Weight: 129 lbs 9.6 oz    General Appearance: Awake, alert, in no acute distress  Respiratory: CTAB, no wheeze  Cardiovascular: RRR, no murmur noted  GI: soft, nontender, non distended, normal bowel sounds  Skin: no jaundice, no rash      Medical Decision Making       45 MINUTES SPENT BY ME on the date of service doing chart review, history, exam, documentation & further activities per the note.      Data     I have personally reviewed the following data over the past 24 hrs:    7.9  \   12.3   / 239     138 104 18.0 /  234 (H)   4.5 24 1.29 (H) \       Imaging results reviewed over the past 24 hrs:   No results found for this or any previous visit (from the past 24 hour(s)).

## 2024-06-16 NOTE — PROGRESS NOTES
Care Management Follow Up    Length of Stay (days): 2    Expected Discharge Date: 06/16/2024    Anticipated Discharge Plan:   TCU- [facility]    Transportation: TBD    PT Recommendations: Transitional Care Facility, Per plan established by the PT  OT Recommendations:  Transitional Care Facility     Barriers to Discharge: medical stability, placement    Prior Living Situation: house with spouse (Spouse is currently in a TCU)    Advanced Directive on File: no concerns identified     Patient/Spokesperson Updated: Yes. Who? Daughter, Jasmyn     Additional Information:  SW called patient's daughter, Jasmyn, to discuss discharge plan and update her that a referral was sent to [facility], as that is where patient's  is. Jasmyn states appreciation for the update.     ALEXANDRIA Callaway

## 2024-06-16 NOTE — PLAN OF CARE
Problem: Comorbidity Management  Goal: Blood Glucose Levels Within Targeted Range  Outcome: Progressing  Intervention: Monitor and Manage Glycemia  Recent Flowsheet Documentation  Taken 6/16/2024 0800 by Ariela Mcnamara RN  Medication Review/Management: medications reviewed   Blood sugars were 111 and 234 this shift. Tolerating a soft diet. Pt denies nausea.    Problem: Pain Acute  Goal: Optimal Pain Control and Function  Outcome: Progressing  Intervention: Develop Pain Management Plan  Recent Flowsheet Documentation  Taken 6/16/2024 1214 by Ariela Mcnamara RN  Pain Management Interventions: medication (see MAR)  Taken 6/16/2024 0833 by Ariela Mcnamara RN  Pain Management Interventions: medication (see MAR)  Intervention: Prevent or Manage Pain  Recent Flowsheet Documentation  Taken 6/16/2024 0800 by Ariela Mcnamara RN  Medication Review/Management: medications reviewed  Intervention: Optimize Psychosocial Wellbeing  Recent Flowsheet Documentation  Taken 6/16/2024 0800 by Ariela Mcnamara RN  Supportive Measures: active listening utilized   Goal Outcome Evaluation:    Pt reports that tylenol has been effective in low back pain. Up in the bedside chair for meals. Incontinent of bowel and bladder. IV zosyn infusing.

## 2024-06-16 NOTE — PLAN OF CARE
Problem: Adult Inpatient Plan of Care  Goal: Optimal Comfort and Wellbeing  Outcome: Progressing     Problem: Risk for Delirium  Goal: Optimal Coping  Outcome: Progressing  Goal: Improved Behavioral Control  Outcome: Progressing  Intervention: Minimize Safety Risk  Recent Flowsheet Documentation  Taken 6/16/2024 0130 by Susan Graves RN  Communication Enhancement Strategies:   call light answered in person   communication board used  Enhanced Safety Measures:   pain management   room near unit station   assistive devices when indicated  Taken 6/15/2024 2035 by Susan Graves RN  Communication Enhancement Strategies:   call light answered in person   communication board used  Enhanced Safety Measures:   pain management   room near unit station   assistive devices when indicated     Problem: Bowel Disease, Inflammatory (Ulcerative Colitis or Crohn's Disease)  Goal: Absence of Infection Signs and Symptoms  Outcome: Progressing  Goal: Optimal Pain Control and Function  Outcome: Progressing     Problem: Fall Injury Risk  Goal: Absence of Fall and Fall-Related Injury  Outcome: Progressing  Intervention: Identify and Manage Contributors  Recent Flowsheet Documentation  Taken 6/16/2024 0130 by Ssuan Graves RN  Medication Review/Management: medications reviewed  Taken 6/15/2024 2035 by Susan Graves RN  Medication Review/Management: medications reviewed  Intervention: Promote Injury-Free Environment  Recent Flowsheet Documentation  Taken 6/16/2024 0130 by Susan Graves RN  Safety Promotion/Fall Prevention:   activity supervised   assistive device/personal items within reach   clutter free environment maintained   nonskid shoes/slippers when out of bed   room organization consistent   safety round/check completed   room near nurse's station  Taken 6/15/2024 2035 by Susan Graves, RN  Safety Promotion/Fall Prevention:   activity supervised   assistive device/personal items within reach   clutter free environment maintained    nonskid shoes/slippers when out of bed   room organization consistent   safety round/check completed   room near nurse's station     Problem: Hypertension Acute  Goal: Blood Pressure Within Desired Range  Outcome: Progressing  Intervention: Normalize Blood Pressure  Recent Flowsheet Documentation  Taken 6/16/2024 0130 by Susan Graves RN  Sensory Stimulation Regulation: care clustered  Medication Review/Management: medications reviewed  Taken 6/15/2024 2035 by Susan Graves RN  Sensory Stimulation Regulation: care clustered  Medication Review/Management: medications reviewed     Problem: Diarrhea  Goal: Effective Diarrhea Management  Outcome: Progressing  Intervention: Manage Diarrhea  Recent Flowsheet Documentation  Taken 6/16/2024 0130 by Susan Graves RN  Medication Review/Management: medications reviewed  Taken 6/15/2024 2035 by Susan Graves RN  Medication Review/Management: medications reviewed   Goal Outcome Evaluation:      Pt is A/Ox 3 with intermittent confusion. Denies pain and discomfort. Purewick in place incontinent of bowel; reposition. VSS continue to monitor. Susan Graves RN

## 2024-06-17 ENCOUNTER — APPOINTMENT (OUTPATIENT)
Dept: PHYSICAL THERAPY | Facility: HOSPITAL | Age: 89
DRG: 392 | End: 2024-06-17
Payer: COMMERCIAL

## 2024-06-17 ENCOUNTER — APPOINTMENT (OUTPATIENT)
Dept: OCCUPATIONAL THERAPY | Facility: HOSPITAL | Age: 89
DRG: 392 | End: 2024-06-17
Payer: COMMERCIAL

## 2024-06-17 LAB
ANION GAP SERPL CALCULATED.3IONS-SCNC: 8 MMOL/L (ref 7–15)
BUN SERPL-MCNC: 18.5 MG/DL (ref 8–23)
CALCIUM SERPL-MCNC: 8.2 MG/DL (ref 8.2–9.6)
CHLORIDE SERPL-SCNC: 105 MMOL/L (ref 98–107)
CREAT SERPL-MCNC: 1.4 MG/DL (ref 0.51–0.95)
DEPRECATED HCO3 PLAS-SCNC: 27 MMOL/L (ref 22–29)
EGFRCR SERPLBLD CKD-EPI 2021: 35 ML/MIN/1.73M2
ERYTHROCYTE [DISTWIDTH] IN BLOOD BY AUTOMATED COUNT: 13 % (ref 10–15)
GLUCOSE BLDC GLUCOMTR-MCNC: 104 MG/DL (ref 70–99)
GLUCOSE BLDC GLUCOMTR-MCNC: 115 MG/DL (ref 70–99)
GLUCOSE BLDC GLUCOMTR-MCNC: 145 MG/DL (ref 70–99)
GLUCOSE BLDC GLUCOMTR-MCNC: 162 MG/DL (ref 70–99)
GLUCOSE BLDC GLUCOMTR-MCNC: 182 MG/DL (ref 70–99)
GLUCOSE SERPL-MCNC: 107 MG/DL (ref 70–99)
HCT VFR BLD AUTO: 34.6 % (ref 35–47)
HGB BLD-MCNC: 11.2 G/DL (ref 11.7–15.7)
MCH RBC QN AUTO: 30.1 PG (ref 26.5–33)
MCHC RBC AUTO-ENTMCNC: 32.4 G/DL (ref 31.5–36.5)
MCV RBC AUTO: 93 FL (ref 78–100)
PLATELET # BLD AUTO: 257 10E3/UL (ref 150–450)
POTASSIUM SERPL-SCNC: 4.3 MMOL/L (ref 3.4–5.3)
RBC # BLD AUTO: 3.72 10E6/UL (ref 3.8–5.2)
SODIUM SERPL-SCNC: 140 MMOL/L (ref 135–145)
WBC # BLD AUTO: 6.5 10E3/UL (ref 4–11)

## 2024-06-17 PROCEDURE — 250N000013 HC RX MED GY IP 250 OP 250 PS 637: Performed by: INTERNAL MEDICINE

## 2024-06-17 PROCEDURE — 250N000013 HC RX MED GY IP 250 OP 250 PS 637: Performed by: HOSPITALIST

## 2024-06-17 PROCEDURE — 99233 SBSQ HOSP IP/OBS HIGH 50: CPT | Performed by: INTERNAL MEDICINE

## 2024-06-17 PROCEDURE — 97116 GAIT TRAINING THERAPY: CPT | Mod: GP

## 2024-06-17 PROCEDURE — 85014 HEMATOCRIT: CPT | Performed by: INTERNAL MEDICINE

## 2024-06-17 PROCEDURE — 36415 COLL VENOUS BLD VENIPUNCTURE: CPT | Performed by: INTERNAL MEDICINE

## 2024-06-17 PROCEDURE — 80048 BASIC METABOLIC PNL TOTAL CA: CPT | Performed by: INTERNAL MEDICINE

## 2024-06-17 PROCEDURE — 120N000001 HC R&B MED SURG/OB

## 2024-06-17 PROCEDURE — 250N000011 HC RX IP 250 OP 636: Mod: JZ | Performed by: HOSPITALIST

## 2024-06-17 PROCEDURE — 97110 THERAPEUTIC EXERCISES: CPT | Mod: GP

## 2024-06-17 PROCEDURE — 97535 SELF CARE MNGMENT TRAINING: CPT | Mod: GO

## 2024-06-17 RX ORDER — SULFAMETHOXAZOLE/TRIMETHOPRIM 800-160 MG
1 TABLET ORAL 2 TIMES DAILY
Status: DISCONTINUED | OUTPATIENT
Start: 2024-06-17 | End: 2024-06-17 | Stop reason: DRUGHIGH

## 2024-06-17 RX ORDER — METRONIDAZOLE 500 MG/1
500 TABLET ORAL 3 TIMES DAILY
Status: DISCONTINUED | OUTPATIENT
Start: 2024-06-17 | End: 2024-06-18 | Stop reason: HOSPADM

## 2024-06-17 RX ORDER — SULFAMETHOXAZOLE AND TRIMETHOPRIM 400; 80 MG/1; MG/1
1 TABLET ORAL 2 TIMES DAILY
Status: DISCONTINUED | OUTPATIENT
Start: 2024-06-17 | End: 2024-06-18 | Stop reason: HOSPADM

## 2024-06-17 RX ORDER — AMLODIPINE BESYLATE 5 MG/1
10 TABLET ORAL EVERY EVENING
Status: DISCONTINUED | OUTPATIENT
Start: 2024-06-17 | End: 2024-06-18 | Stop reason: HOSPADM

## 2024-06-17 RX ADMIN — ANORECTAL OINTMENT: 15.7; .44; 24; 20.6 OINTMENT TOPICAL at 20:38

## 2024-06-17 RX ADMIN — METRONIDAZOLE 500 MG: 500 TABLET ORAL at 20:36

## 2024-06-17 RX ADMIN — PIPERACILLIN AND TAZOBACTAM 3.38 G: 3; .375 INJECTION, POWDER, FOR SOLUTION INTRAVENOUS at 01:19

## 2024-06-17 RX ADMIN — GABAPENTIN 300 MG: 300 CAPSULE ORAL at 09:53

## 2024-06-17 RX ADMIN — LEVOTHYROXINE SODIUM 88 MCG: 88 TABLET ORAL at 09:53

## 2024-06-17 RX ADMIN — AMLODIPINE BESYLATE 10 MG: 5 TABLET ORAL at 20:36

## 2024-06-17 RX ADMIN — SULFAMETHOXAZOLE AND TRIMETHOPRIM 1 TABLET: 400; 80 TABLET ORAL at 20:36

## 2024-06-17 ASSESSMENT — ACTIVITIES OF DAILY LIVING (ADL)
ADLS_ACUITY_SCORE: 45

## 2024-06-17 NOTE — PLAN OF CARE
Problem: Adult Inpatient Plan of Care  Goal: Optimal Comfort and Wellbeing  Outcome: Progressing     Problem: Risk for Delirium  Goal: Optimal Coping  Outcome: Progressing     Problem: Bowel Disease, Inflammatory (Ulcerative Colitis or Crohn's Disease)  Goal: Diarrhea Symptom Relief  Outcome: Progressing  Goal: Absence of Infection Signs and Symptoms  Outcome: Progressing     Problem: Fall Injury Risk  Goal: Absence of Fall and Fall-Related Injury  Outcome: Progressing  Intervention: Identify and Manage Contributors  Recent Flowsheet Documentation  Taken 6/17/2024 0136 by Susan Graves RN  Medication Review/Management: medications reviewed  Taken 6/16/2024 2050 by Susan Graves RN  Medication Review/Management: medications reviewed  Intervention: Promote Injury-Free Environment  Recent Flowsheet Documentation  Taken 6/17/2024 0136 by Susan Graves RN  Safety Promotion/Fall Prevention:   activity supervised   assistive device/personal items within reach   clutter free environment maintained   nonskid shoes/slippers when out of bed   room near nurse's station   room organization consistent   safety round/check completed  Taken 6/16/2024 2050 by Susan Graves RN  Safety Promotion/Fall Prevention:   activity supervised   assistive device/personal items within reach   clutter free environment maintained   nonskid shoes/slippers when out of bed   room near nurse's station   room organization consistent   safety round/check completed     Problem: Comorbidity Management  Goal: Blood Glucose Levels Within Targeted Range  Outcome: Progressing  Intervention: Monitor and Manage Glycemia  Recent Flowsheet Documentation  Taken 6/17/2024 0136 by Susan Graves RN  Medication Review/Management: medications reviewed  Taken 6/16/2024 2050 by Susan Graves RN  Medication Review/Management: medications reviewed     Problem: Hypertension Acute  Goal: Blood Pressure Within Desired Range  Outcome: Progressing  Intervention: Normalize  Blood Pressure  Recent Flowsheet Documentation  Taken 6/17/2024 0136 by Susan Graves RN  Medication Review/Management: medications reviewed  Taken 6/16/2024 2050 by Susan Graves RN  Medication Review/Management: medications reviewed     Problem: Pain Acute  Goal: Optimal Pain Control and Function  Outcome: Progressing  Intervention: Prevent or Manage Pain  Recent Flowsheet Documentation  Taken 6/17/2024 0136 by Susan Graves RN  Medication Review/Management: medications reviewed  Taken 6/16/2024 2050 by Susan Graves RN  Medication Review/Management: medications reviewed   Goal Outcome Evaluation:          Pt is A/Ox 4. Denies pain and discomfort. Purewick in place incontinent of bowel; reposition. VSS continue to monitor. Susan Graves RN

## 2024-06-17 NOTE — PROGRESS NOTES
[facility]    Medicine Progress Note - Hospitalist Service    Date of Admission:  6/14/2024    Assessment & Plan   Sissy Mccloud is a 89 year old female admitted on 6/14/2024. She was brought to the ED by ambulance from home for evaluation of abdominal pain, nausea, vomiting.      Acute short segment stercoral colitis versus diverticulitis  Nausea vomiting-resolved  -Admits to several days of constipation prior to abdominal pain with nausea and vomiting  -Discontinue IV Zosyn, will start on Bactrim and metronidazole as she may have had some redness after the most recent Zosyn dose  -Pain management  -Started on senna-docusate 6/15, started MiraLAX 6/16  -Advancing to regular diet     Chronic kidney disease stage IIIb  -Baseline hemoglobin appears to be 1.2-1.4, currently at baseline  -Will monitor and avoid nephrotoxins     Type 2 diabetes mellitus with peripheral neuropathy without long-term insulin use, diet controlled  -Medium insulin resistance scale  -Hypoglycemia protocol     Chronic heart failure with preserved ejection fraction  -No signs or symptoms of acute CHF  -Monitor daily weights     Essential hypertension  -Elevated blood pressure likely secondary to pain  -increased PTA amlodipine to 10 mg  -IV hydralazine as needed     Hypothyroidism  -Continue PTA levothyroxine     Radiographic findings  -A 1.7 cm right breast nodule, on review of prior imaging was also present on CT imaging 7/30/2019, at that time was also being monitored due to numerous bilateral pulmonary nodules present since 2015  -Outpatient follow-up with primary provider          Diet: Snacks/Supplements Adult: Ensure Enlive; With Meals  Regular Diet Adult    DVT Prophylaxis: Pneumatic Compression Devices  Foster Catheter: Not present  Lines: None     Cardiac Monitoring: None  Code Status: No CPR- Do NOT Intubate      Clinically Significant Risk Factors                  # Hypertension: Noted on problem list            #Precipitous drop in Hgb/Hct: Lowest Hgb this hospitalization: 10.8 g/dL. Will continue to monitor and treat/transfuse as appropriate.    # DMII: A1C = 7.2 % (Ref range: <5.7 %) within past 6 months, PRESENT ON ADMISSION      # Financial/Environmental Concerns:           Disposition Plan     Medically Ready for Discharge: Ready Now             Jyoti Niño MD  Hospitalist Service  [facility]  Securely message with [employer] (more info)  Text page via [employer] Paging/Directory   ______________________________________________________________________    Interval History   Mrs. Mccloud has been accepted to a TCU that her  is at for tomorrow.  She is medically ready for discharge.  She had some redness after her most recent Zosyn dose so have changed her antibiotics to oral today and will monitor. Does not appear to be hives and was only on the hands. By the time of exam there was minimal redness without intervention.  She is not having any new complaints today. BP has been elevated. Increased amlodipine to 10 mg daily. Will need to monitor outpatient.    Physical Exam   Vital Signs: Temp: 98.2  F (36.8  C) Temp src: Oral BP: (!) 140/64 Pulse: 61   Resp: 20 SpO2: 96 % O2 Device: None (Room air)    Weight: 129 lbs 9.6 oz    General Appearance: Awake, alert, in no acute distress  Respiratory: CTAB, no wheeze  Cardiovascular: RRR  GI: soft, nontender, non distended, normal bowel sounds  Skin: no jaundice, no rash    Medical Decision Making       50 MINUTES SPENT BY ME on the date of service doing chart review, history, exam, documentation & further activities per the note.      Data     I have personally reviewed the following data over the past 24 hrs:    6.5  \   11.2 (L)   / 257     140 105 18.5 /  162 (H)   4.3 27 1.40 (H) \       Imaging results reviewed over the past 24 hrs:   No results found for this or any previous visit (from the past 24 hour(s)).

## 2024-06-17 NOTE — PROVIDER NOTIFICATION
Pt is having c/o of itching and a slight rash noted on her back and left hand. Amelia held, awaiting new orders.

## 2024-06-17 NOTE — PLAN OF CARE
Problem: Adult Inpatient Plan of Care  Goal: Plan of Care Review  Description: The Plan of Care Review/Shift note should be completed every shift.  The Outcome Evaluation is a brief statement about your assessment that the patient is improving, declining, or no change.  This information will be displayed automatically on your shift  note.  Outcome: Progressing   Goal Outcome Evaluation:       Pt is alert and oriented, forgetful at times. Hard of hearing. Incontinent of a large soft bm this am. Had episode of itching and redness on left hand and back. Changes made to antibiotics. Plan for discharge to tcu tomorrow.

## 2024-06-17 NOTE — PROGRESS NOTES
Care Management Follow Up    Length of Stay (days): 3    Expected Discharge Date: 06/16/2024    Anticipated Discharge Plan:       Transportation: Confirmed Wheelchair. Transportation costs discussed? Yes. Discussed with DaughterJasmyn    PT Recommendations: Transitional Care Facility  OT Recommendations:  Transitional Care Facility     Barriers to Discharge: placement    Prior Living Situation: house with spouse (Spouse is currently in a TCU)    Advanced Directive on File: no concerns identified     Patient/Spokesperson Updated: Yes. Who? DaughterJasmyn     Additional Information:  [facility] discussed patient's med readiness with hospitalist. SW called accepting TCU. They are able to take her tomorrow, after 2pm. SW discussed patient's discharge plan with patient's daughter, Jasmyn. [facility] discussed transport options and costs. Jasmyn agreeable to costs of wheelchair transport. [facility] arranged [facility] Wheelchair transport picking patient up between 1:35pm and 2:25pm on 6/18. Jasmyn would like update ONLY if there are changes to plan, if no contact she will assume no news is good news.     ALEXANDRIA Callaway      None

## 2024-06-18 ENCOUNTER — DOCUMENTATION ONLY (OUTPATIENT)
Dept: GERIATRICS | Facility: CLINIC | Age: 89
End: 2024-06-18
Payer: COMMERCIAL

## 2024-06-18 VITALS
TEMPERATURE: 98.6 F | HEART RATE: 55 BPM | BODY MASS INDEX: 23.7 KG/M2 | RESPIRATION RATE: 17 BRPM | OXYGEN SATURATION: 97 % | WEIGHT: 129.6 LBS | SYSTOLIC BLOOD PRESSURE: 130 MMHG | DIASTOLIC BLOOD PRESSURE: 59 MMHG

## 2024-06-18 PROBLEM — K52.9 COLITIS: Status: RESOLVED | Noted: 2024-01-15 | Resolved: 2024-06-18

## 2024-06-18 LAB
ANION GAP SERPL CALCULATED.3IONS-SCNC: 10 MMOL/L (ref 7–15)
BUN SERPL-MCNC: 20.7 MG/DL (ref 8–23)
CALCIUM SERPL-MCNC: 8.8 MG/DL (ref 8.2–9.6)
CHLORIDE SERPL-SCNC: 103 MMOL/L (ref 98–107)
CREAT SERPL-MCNC: 1.22 MG/DL (ref 0.51–0.95)
DEPRECATED HCO3 PLAS-SCNC: 26 MMOL/L (ref 22–29)
EGFRCR SERPLBLD CKD-EPI 2021: 41 ML/MIN/1.73M2
GLUCOSE BLDC GLUCOMTR-MCNC: 114 MG/DL (ref 70–99)
GLUCOSE BLDC GLUCOMTR-MCNC: 136 MG/DL (ref 70–99)
GLUCOSE BLDC GLUCOMTR-MCNC: 145 MG/DL (ref 70–99)
GLUCOSE SERPL-MCNC: 126 MG/DL (ref 70–99)
POTASSIUM SERPL-SCNC: 4.2 MMOL/L (ref 3.4–5.3)
SODIUM SERPL-SCNC: 139 MMOL/L (ref 135–145)

## 2024-06-18 PROCEDURE — 250N000013 HC RX MED GY IP 250 OP 250 PS 637: Performed by: INTERNAL MEDICINE

## 2024-06-18 PROCEDURE — 36415 COLL VENOUS BLD VENIPUNCTURE: CPT | Performed by: INTERNAL MEDICINE

## 2024-06-18 PROCEDURE — 99239 HOSP IP/OBS DSCHRG MGMT >30: CPT | Performed by: INTERNAL MEDICINE

## 2024-06-18 PROCEDURE — 250N000013 HC RX MED GY IP 250 OP 250 PS 637: Performed by: HOSPITALIST

## 2024-06-18 PROCEDURE — 80048 BASIC METABOLIC PNL TOTAL CA: CPT | Performed by: INTERNAL MEDICINE

## 2024-06-18 RX ORDER — SULFAMETHOXAZOLE AND TRIMETHOPRIM 400; 80 MG/1; MG/1
1 TABLET ORAL 2 TIMES DAILY
DISCHARGE
Start: 2024-06-18 | End: 2024-06-25

## 2024-06-18 RX ORDER — METRONIDAZOLE 500 MG/1
500 TABLET ORAL 3 TIMES DAILY
DISCHARGE
Start: 2024-06-18 | End: 2024-06-25

## 2024-06-18 RX ORDER — AMLODIPINE BESYLATE 10 MG/1
10 TABLET ORAL EVERY EVENING
Qty: 30 TABLET | Refills: 0 | Status: SHIPPED | OUTPATIENT
Start: 2024-06-18 | End: 2024-06-21

## 2024-06-18 RX ADMIN — METRONIDAZOLE 500 MG: 500 TABLET ORAL at 08:31

## 2024-06-18 RX ADMIN — SULFAMETHOXAZOLE AND TRIMETHOPRIM 1 TABLET: 400; 80 TABLET ORAL at 08:31

## 2024-06-18 RX ADMIN — GABAPENTIN 300 MG: 300 CAPSULE ORAL at 08:37

## 2024-06-18 RX ADMIN — LEVOTHYROXINE SODIUM 88 MCG: 88 TABLET ORAL at 08:37

## 2024-06-18 RX ADMIN — ANORECTAL OINTMENT: 15.7; .44; 24; 20.6 OINTMENT TOPICAL at 08:31

## 2024-06-18 RX ADMIN — POLYETHYLENE GLYCOL 3350 17 G: 17 POWDER, FOR SOLUTION ORAL at 08:31

## 2024-06-18 RX ADMIN — SENNOSIDES AND DOCUSATE SODIUM 1 TABLET: 50; 8.6 TABLET ORAL at 08:31

## 2024-06-18 RX ADMIN — ANORECTAL OINTMENT: 15.7; .44; 24; 20.6 OINTMENT TOPICAL at 00:25

## 2024-06-18 ASSESSMENT — ACTIVITIES OF DAILY LIVING (ADL)
ADLS_ACUITY_SCORE: 45
ADLS_ACUITY_SCORE: 50
ADLS_ACUITY_SCORE: 45
ADLS_ACUITY_SCORE: 48
ADLS_ACUITY_SCORE: 50
ADLS_ACUITY_SCORE: 50
ADLS_ACUITY_SCORE: 48
ADLS_ACUITY_SCORE: 49
ADLS_ACUITY_SCORE: 49
ADLS_ACUITY_SCORE: 48
ADLS_ACUITY_SCORE: 49
ADLS_ACUITY_SCORE: 50
ADLS_ACUITY_SCORE: 48

## 2024-06-18 NOTE — PLAN OF CARE
Problem: Bowel Disease, Inflammatory (Ulcerative Colitis or Crohn's Disease)  Goal: Diarrhea Symptom Relief  Outcome: Progressing     Problem: Fall Injury Risk  Goal: Absence of Fall and Fall-Related Injury  Outcome: Progressing     Problem: Comorbidity Management  Goal: Blood Glucose Levels Within Targeted Range  Outcome: Progressing     Problem: Diarrhea  Goal: Effective Diarrhea Management  Outcome: Progressing   Goal Outcome Evaluation:    Patient slept between cares. Oriented x4, forgetful. Denies pain. Up to the bathroom with assist of 1 with walker and gait belt. No loose stools during the shift. Plan for TCU discharge today in the afternoon.

## 2024-06-18 NOTE — PLAN OF CARE
Occupational Therapy Discharge Summary    Reason for therapy discharge:    Discharged to transitional care facility.    Progress towards therapy goal(s). See goals on Care Plan in [facility] electronic health record for goal details.  Goals not met.  Barriers to achieving goals:   discharge from facility.    Therapy recommendation(s):    Continued therapy is recommended.  Rationale/Recommendations:  Pt is going to a TCU for further therapy.

## 2024-06-18 NOTE — DISCHARGE SUMMARY
[facility]  Hospitalist Discharge Summary      Date of Admission:  6/14/2024  Date of Discharge:  6/18/2024  1:54 PM  Discharging Provider: Jyoti Niño MD  Discharge Service: Hospitalist Service    Discharge Diagnoses   Acute short segment stercoral colitis versus diverticulitis  Nausea vomiting-resolved  Chronic kidney disease stage IIIb  Type 2 diabetes mellitus with peripheral neuropathy without long-term insulin use, diet controlled  Chronic heart failure with preserved ejection fraction  Essential hypertension  Hypothyroidism    Clinically Significant Risk Factors     # DMII: A1C = 7.2 % (Ref range: <5.7 %) within past 6 months       Follow-ups Needed After Discharge   Follow-up Appointments     Follow Up and recommended labs and tests      Follow up with Nursing home physician.  No follow up labs or test are   needed.            Unresulted Labs Ordered in the Past 30 Days of this Admission       No orders found from 5/15/2024 to 6/15/2024.        These results will be followed up by Jyoti Niño    Discharge Disposition   Discharged to short-term care facility  Condition at discharge: Stable    Hospital Course   Sissy Mccloud is a 89 year old female admitted on 6/14/2024. She was brought to the ED by ambulance from home for evaluation of abdominal pain, nausea, vomiting.      Acute short segment stercoral colitis versus diverticulitis  Nausea vomiting-resolved  -Admits to several days of constipation prior to abdominal pain with nausea and vomiting  -Discontinue IV Zosyn, finished course with Bactrim and metronidazole  -Started on senna-docusate 6/15, started MiraLAX 6/16-had bowel movements starting 6/17  -Tolerated regular diet     Chronic kidney disease stage IIIb  -Baseline hemoglobin appears to be 1.2-1.4, currently at baseline     Type 2 diabetes mellitus with peripheral neuropathy without long-term insulin use, diet controlled  -Recommend continue controlling with  diet outpatient     Chronic heart failure with preserved ejection fraction  -No signs or symptoms of acute CHF     Essential hypertension  -increased PTA amlodipine to 10 mg     Hypothyroidism  -Continue PTA levothyroxine     Radiographic findings  -A 1.7 cm right breast nodule, on review of prior imaging was also present on CT imaging 7/30/2019, at that time was also being monitored due to numerous bilateral pulmonary nodules present since 2015  -Outpatient follow-up with primary provider    Consultations This Hospital Stay   CARE MANAGEMENT / SOCIAL WORK IP CONSULT  PHYSICAL THERAPY ADULT IP CONSULT  OCCUPATIONAL THERAPY ADULT IP CONSULT  PHARMACY IP CONSULT  PHYSICAL THERAPY ADULT IP CONSULT  OCCUPATIONAL THERAPY ADULT IP CONSULT    Code Status   No CPR- Do NOT Intubate    Time Spent on this Encounter   I, Jyoti Niño MD, personally saw the patient today and spent greater than 30 minutes discharging this patient.       Jyoti Niño MD  [address] [address]  Phone: [phone]  Fax: [phone]  ______________________________________________________________________    Physical Exam   Vital Signs: Temp: 98.6  F (37  C) Temp src: Oral BP: 130/59 Pulse: 55   Resp: 17 SpO2: 97 % O2 Device: None (Room air)    Weight: 129 lbs 9.6 oz  General Appearance: Awake, alert, in no acute distress  Respiratory: CTAB, no wheeze  Cardiovascular: Mild bradycardia  GI: soft, nontender, non distended, normal bowel sounds  Skin: no jaundice, no rash         Primary Care Physician   Hannah Caballero    Discharge Orders      General info for SNF    Length of Stay Estimate: Short Term Care: Estimated # of Days <30  Condition at Discharge: Improving  Level of care:skilled   Rehabilitation Potential: Good  Admission H&P remains valid and up-to-date: Yes  Recent Chemotherapy: N/A  Use Nursing Home Standing Orders: Yes     Mantoux instructions    Give two-step  Santino (PPD) Per Facility Policy Yes     Follow Up and recommended labs and tests    Follow up with Nursing home physician.  No follow up labs or test are needed.     Reason for your hospital stay    Colitis: Stercoral colitis (most likely) versus diverticulitis     Activity - Up with assistive device     Activity - Up with nursing assistance     Physical Therapy Adult Consult    Evaluate and treat as clinically indicated.    Reason:  gait, mobility, therapeutic exercise     Occupational Therapy Adult Consult    Evaluate and treat as clinically indicated.    Reason:  ADLs, transfers     Fall precautions     Diet    Follow this diet upon discharge:  Snacks/Supplements Adult: Ensure Enlive; With Meals      Regular Diet Adult       Significant Results and Procedures   Most Recent 3 CBC's:  Recent Labs   Lab Test 06/17/24  0711 06/16/24  0859 06/15/24  0705   WBC 6.5 7.9 9.1   HGB 11.2* 12.3 10.8*   MCV 93 93 94    239 225     Most Recent 3 BMP's:  Recent Labs   Lab Test 06/18/24  1219 06/18/24  0753 06/18/24  0619 06/17/24  0836 06/17/24  0711 06/16/24  1212 06/16/24  0859   NA  --   --  139  --  140  --  138   POTASSIUM  --   --  4.2  --  4.3  --  4.5   CHLORIDE  --   --  103  --  105  --  104   CO2  --   --  26  --  27  --  24   BUN  --   --  20.7  --  18.5  --  18.0   CR  --   --  1.22*  --  1.40*  --  1.29*   ANIONGAP  --   --  10  --  8  --  10   CRISTY  --   --  8.8  --  8.2  --  8.2   * 114* 126*   < > 107*   < > 137*    < > = values in this interval not displayed.     7-Day Micro Results       No results found for the last 168 hours.        ,   Results for orders placed or performed during the hospital encounter of 06/14/24   CT Abdomen Pelvis w Contrast    Narrative    EXAM: CT ABDOMEN PELVIS W CONTRAST  LOCATION: [facility]  DATE: 6/14/2024    INDICATION: mid abd pain  COMPARISON: CT from 1/14/2024.  TECHNIQUE: CT scan of the abdomen and pelvis was performed following  injection of IV contrast. Multiplanar reformats were obtained. Dose reduction techniques were used.  CONTRAST: isovue 370 62ml    FINDINGS:   LOWER CHEST: Normal.    HEPATOBILIARY: Absent gallbladder. Normal liver.    PANCREAS: Normal.    SPLEEN: Normal.    ADRENAL GLANDS: Normal.    KIDNEYS/BLADDER: Normal.    BOWEL: Normal appendix. Normal caliber small bowel. Ileal diverticulosis. Diffuse colonic diverticulosis. There is focal wall thickening of the distal descending colon with associated pericolonic edema.    LYMPH NODES: Normal.    VASCULATURE: Mild atherosclerotic calcification.    PELVIC ORGANS: Small amount of free fluid in the pelvis. Absent uterus.    MUSCULOSKELETAL: Osteopenia. Lower lumbar spine degenerative disc change. Stable chronic T12 compression fracture. Fixation screw in the left acetabular roof. There is a 1.7 cm circumscribed right breast nodule on image 6 of series 3.      Impression    IMPRESSION:   1.  Short segment colitis versus diverticulitis of the distal descending colon. No free air. Consider colonoscopic evaluation in follow-up if not recently performed.    2.  1.7 cm right breast nodule for which dedicated mammographic and/or sonographic evaluation is recommended if not performed recently.   XR Abdomen Port 1 View    Narrative    EXAM: XR ABDOMEN PORT 1 VIEW  LOCATION: [facility]  DATE: 6/14/2024    INDICATION: abdominal pain, rule out perforation  COMPARISON: CT 6/14/2024      Impression    IMPRESSION: Cholecystectomy clips. Nonobstructive bowel gas pattern. No gross evidence of intraperitoneal free air, although evaluation limited due to supine technique.       Discharge Medications   Current Discharge Medication List        START taking these medications    Details   metroNIDAZOLE (FLAGYL) 500 MG tablet Take 1 tablet (500 mg) by mouth 3 times daily for 17 doses    Associated Diagnoses: Colitis      sulfamethoxazole-trimethoprim (BACTRIM) 400-80 MG tablet  Take 1 tablet by mouth 2 times daily for 11 doses    Associated Diagnoses: Colitis           CONTINUE these medications which have CHANGED    Details   amLODIPine (NORVASC) 10 MG tablet Take 1 tablet (10 mg) by mouth every evening for 30 days  Qty: 30 tablet, Refills: 0    Associated Diagnoses: Primary hypertension           CONTINUE these medications which have NOT CHANGED    Details   gabapentin (NEURONTIN) 300 MG capsule [GABAPENTIN (NEURONTIN) 300 MG CAPSULE] Take 300 mg by mouth daily.      levothyroxine (SYNTHROID/LEVOTHROID) 88 MCG tablet Take 88 mcg by mouth every morning           Allergies   Allergies   Allergen Reactions    Ambien [Zolpidem] Unknown    Cleocin [Clindamycin] Unknown    Codeine Unknown

## 2024-06-18 NOTE — PLAN OF CARE
"  Problem: Adult Inpatient Plan of Care  Goal: Plan of Care Review  Description: The Plan of Care Review/Shift note should be completed every shift.  The Outcome Evaluation is a brief statement about your assessment that the patient is improving, declining, or no change.  This information will be displayed automatically on your shift  note.  Outcome: Adequate for Care Transition  Flowsheets (Taken 6/18/2024 1002)  Plan of Care Reviewed With: patient  Goal: Patient-Specific Goal (Individualized)  Description: You can add care plan individualizations to a care plan. Examples of Individualization might be:  \"Parent requests to be called daily at 9am for status\", \"I have a hard time hearing out of my right ear\", or \"Do not touch me to wake me up as it startles  me\".  Outcome: Adequate for Care Transition  Goal: Absence of Hospital-Acquired Illness or Injury  Outcome: Adequate for Care Transition  Intervention: Identify and Manage Fall Risk  Recent Flowsheet Documentation  Taken 6/18/2024 0800 by Олег Napoles RN  Safety Promotion/Fall Prevention:   activity supervised   clutter free environment maintained   nonskid shoes/slippers when out of bed   patient and family education   safety round/check completed   assistive device/personal items within reach  Intervention: Prevent Skin Injury  Recent Flowsheet Documentation  Taken 6/18/2024 0800 by Олег Napoles RN  Body Position: position changed independently  Intervention: Prevent and Manage VTE (Venous Thromboembolism) Risk  Recent Flowsheet Documentation  Taken 6/18/2024 0800 by Олег Napoles RN  VTE Prevention/Management: SCDs (sequential compression devices) on  Intervention: Prevent Infection  Recent Flowsheet Documentation  Taken 6/18/2024 0800 by Олег Napoles RN  Infection Prevention:   hand hygiene promoted   rest/sleep promoted  Goal: Optimal Comfort and Wellbeing  Outcome: Adequate for Care Transition  Goal: Readiness for Transition of Care  Outcome: " Adequate for Care Transition     Problem: Risk for Delirium  Goal: Optimal Coping  Outcome: Adequate for Care Transition  Intervention: Optimize Psychosocial Adjustment to Delirium  Recent Flowsheet Documentation  Taken 6/18/2024 0800 by Олег Napoles RN  Supportive Measures: active listening utilized  Goal: Improved Behavioral Control  Outcome: Adequate for Care Transition  Intervention: Prevent and Manage Agitation  Recent Flowsheet Documentation  Taken 6/18/2024 0800 by Олег Napoles RN  Environment Familiarity/Consistency: daily routine followed  Intervention: Minimize Safety Risk  Recent Flowsheet Documentation  Taken 6/18/2024 0800 by Олег Napoles RN  Communication Enhancement Strategies: call light answered in person  Enhanced Safety Measures:   patient/family teach back on injury risk   review medications for side effects with activity   pain management   assistive devices when indicated  Goal: Improved Attention and Thought Clarity  Outcome: Adequate for Care Transition  Intervention: Maximize Cognitive Function  Recent Flowsheet Documentation  Taken 6/18/2024 0800 by Олег Napoles RN  Reorientation Measures: calendar in view  Goal: Improved Sleep  Outcome: Adequate for Care Transition     Problem: Bowel Disease, Inflammatory (Ulcerative Colitis or Crohn's Disease)  Goal: Diarrhea Symptom Relief  Outcome: Adequate for Care Transition  Intervention: Manage Diarrhea  Recent Flowsheet Documentation  Taken 6/18/2024 0800 by Олег Napoles RN  Perineal Care: absorbent brief/pad changed  Goal: Absence of Infection Signs and Symptoms  Outcome: Adequate for Care Transition  Goal: Optimal Nutrition Delivery  Outcome: Adequate for Care Transition  Goal: Optimal Pain Control and Function  Outcome: Adequate for Care Transition     Problem: Fall Injury Risk  Goal: Absence of Fall and Fall-Related Injury  Outcome: Adequate for Care Transition  Intervention: Identify and Manage Contributors  Recent Flowsheet  Documentation  Taken 6/18/2024 0800 by Олег Napoles RN  Medication Review/Management: medications reviewed  Intervention: Promote Injury-Free Environment  Recent Flowsheet Documentation  Taken 6/18/2024 0800 by Олег Napoles RN  Safety Promotion/Fall Prevention:   activity supervised   clutter free environment maintained   nonskid shoes/slippers when out of bed   patient and family education   safety round/check completed   assistive device/personal items within reach     Problem: Comorbidity Management  Goal: Blood Glucose Levels Within Targeted Range  Outcome: Adequate for Care Transition  Intervention: Monitor and Manage Glycemia  Recent Flowsheet Documentation  Taken 6/18/2024 0800 by Олег Napoles RN  Medication Review/Management: medications reviewed  Goal: Maintenance of Heart Failure Symptom Control  Outcome: Adequate for Care Transition  Intervention: Maintain Heart Failure Management  Recent Flowsheet Documentation  Taken 6/18/2024 0800 by Олег Napoles RN  Medication Review/Management: medications reviewed  Goal: Blood Pressure in Desired Range  Outcome: Adequate for Care Transition  Intervention: Maintain Blood Pressure Management  Recent Flowsheet Documentation  Taken 6/18/2024 0800 by Олег Napoles RN  Medication Review/Management: medications reviewed     Problem: Hypertension Acute  Goal: Blood Pressure Within Desired Range  Outcome: Adequate for Care Transition  Intervention: Normalize Blood Pressure  Recent Flowsheet Documentation  Taken 6/18/2024 0800 by Олег Napoles RN  Medication Review/Management: medications reviewed     Problem: Diarrhea  Goal: Effective Diarrhea Management  Outcome: Adequate for Care Transition  Intervention: Manage Diarrhea  Recent Flowsheet Documentation  Taken 6/18/2024 0800 by Олег Napoles RN  Medication Review/Management: medications reviewed  Perineal Care: absorbent brief/pad changed     Problem: Pain Acute  Goal: Optimal Pain Control and  Function  Outcome: Adequate for Care Transition  Intervention: Prevent or Manage Pain  Recent Flowsheet Documentation  Taken 6/18/2024 0800 by Олег Napoles RN  Medication Review/Management: medications reviewed  Intervention: Optimize Psychosocial Wellbeing  Recent Flowsheet Documentation  Taken 6/18/2024 0800 by Олег Napoles RN  Supportive Measures: active listening utilized   Goal Outcome Evaluation:      Plan of Care Reviewed With: patient  Pt alert and oriented x 3 forgetful to situation. Up with assist of one to a chair with meals. Denies pain. VSS on room air. Plan to discharge to TCU via W/C ride around 2 pm.

## 2024-06-18 NOTE — PROGRESS NOTES
Care Management Discharge Note    Discharge Date: 06/18/2024       Discharge Disposition: Transitional Care    Discharge Services: None    Discharge DME: None    Discharge Transportation: agency, family or friend will provide    Private pay costs discussed: transportation costs, per previous CM    Does the patient's insurance plan have a 3 day qualifying hospital stay waiver?  No    PAS Confirmation Code: [id number]  Patient/family educated on Medicare website which has current facility and service quality ratings:      Education Provided on the Discharge Plan: Yes  Persons Notified of Discharge Plans: patient, family, TCU, RN, [facility], [facility]  Patient/Family in Agreement with the Plan: yes    Handoff Referral Completed: Yes    Additional Information:  [facility].      [facility] Wheelchair transport picking patient up between 1:35pm and 2:25pm.    CM confirmed with TCU and MD.  CM updated RN.    No additional CM need identified.       Kimberly Harvey, RN

## 2024-06-20 VITALS
DIASTOLIC BLOOD PRESSURE: 69 MMHG | TEMPERATURE: 97.6 F | HEART RATE: 66 BPM | BODY MASS INDEX: 23.7 KG/M2 | SYSTOLIC BLOOD PRESSURE: 131 MMHG | RESPIRATION RATE: 18 BRPM | OXYGEN SATURATION: 98 % | WEIGHT: 129.6 LBS

## 2024-06-21 ENCOUNTER — TRANSITIONAL CARE UNIT VISIT (OUTPATIENT)
Dept: GERIATRICS | Facility: CLINIC | Age: 89
End: 2024-06-21
Payer: COMMERCIAL

## 2024-06-21 DIAGNOSIS — I50.30 DIASTOLIC HEART FAILURE, UNSPECIFIED HF CHRONICITY (H): ICD-10-CM

## 2024-06-21 DIAGNOSIS — E03.9 HYPOTHYROIDISM, UNSPECIFIED TYPE: ICD-10-CM

## 2024-06-21 DIAGNOSIS — M62.81 GENERALIZED MUSCLE WEAKNESS: ICD-10-CM

## 2024-06-21 DIAGNOSIS — K51.50 LEFT SIDED COLITIS WITHOUT COMPLICATIONS (H): Primary | ICD-10-CM

## 2024-06-21 DIAGNOSIS — R11.0 NAUSEA: ICD-10-CM

## 2024-06-21 DIAGNOSIS — E11.8 CONTROLLED TYPE 2 DIABETES MELLITUS WITH COMPLICATION, WITHOUT LONG-TERM CURRENT USE OF INSULIN (H): ICD-10-CM

## 2024-06-21 DIAGNOSIS — I10 HYPERTENSION, UNSPECIFIED TYPE: ICD-10-CM

## 2024-06-21 DIAGNOSIS — Z71.89 ACP (ADVANCE CARE PLANNING): ICD-10-CM

## 2024-06-21 PROCEDURE — 99497 ADVNCD CARE PLAN 30 MIN: CPT | Performed by: NURSE PRACTITIONER

## 2024-06-21 PROCEDURE — 99309 SBSQ NF CARE MODERATE MDM 30: CPT | Performed by: NURSE PRACTITIONER

## 2024-06-21 NOTE — LETTER
6/21/2024      Sissy Mccloud  [address] [address]        [facility]    PRIMARY CARE PROVIDER AND CLINIC:  Hannah Caballero PA-C, [address] / [place] [address]  Chief Complaint   Patient presents with     Hospital F/U      [facility] Medical Record Number:  [id number]  Place of Service where encounter took place:  [facility] [[id number]]    Sissy Mccloud  is a 89 year old  (8/24/1931), admitted to the above facility from  [facility]. Hospital stay 6/14/2024 through 6/18/2024..   HPI:    Acute short segment stercoral colitis versus diverticulitis  Nausea vomiting-resolved  Chronic kidney disease stage IIIb  Type 2 diabetes mellitus with peripheral neuropathy without long-term insulin use, diet controlled  Chronic heart failure with preserved ejection fraction  Essential hypertension  Hypothyroidism     Hospital Course  Sissy Mccloud is a 89 year old female admitted on 6/14/2024. She was brought to the ED by ambulance from home for evaluation of abdominal pain, nausea, vomiting.      Acute short segment stercoral colitis versus diverticulitis  Nausea vomiting-resolved  -Admits to several days of constipation prior to abdominal pain with nausea and vomiting  -Discontinue IV Zosyn, finished course with Bactrim and metronidazole  -Started on senna-docusate 6/15, started MiraLAX 6/16-had bowel movements starting 6/17  -Tolerated regular diet     Chronic kidney disease stage IIIb  -Baseline hemoglobin appears to be 1.2-1.4, currently at baseline     Type 2 diabetes mellitus with peripheral neuropathy without long-term insulin use, diet controlled  -Recommend continue controlling with diet outpatient     Chronic heart failure with preserved ejection fraction  -No signs or symptoms of acute CHF     Essential hypertension  -increased PTA amlodipine to 10 mg     Hypothyroidism  -Continue PTA levothyroxine     Radiographic findings  -A  1.7 cm right breast nodule, on review of prior imaging was also present on CT imaging 7/30/2019, at that time was also being monitored due to numerous bilateral pulmonary nodules present since 2015  -Outpatient follow-up with primary provider    Patient seen for follow up, moderate historian, pleasant, I&O adequate, good appetite, denies gut issues, BS+, soft BP's with dizziness, overall appears healthy.    Advance Care Planning Goals of Care Discussion 6/21/2024  Goals of care discussed with Sissy Mccloud on 6/21. Present at discussion: patient. Questions discussed and patient response:  What is your understanding now of where you are with your illness?: good. How much information about what is likely to be ahead with your illness would you like to have?: all. As the clinician I communicated the following to the patient regarding their prognosis: good. If your health situation worsens, what are your most important goals?: get home. What are your biggest fears and worries about the future with your health?: hospital stays. Unacceptable function : NA. What abilities are so critical to your life that you cannot imagine living without them?: independence. Pt does NOT want to: die. If you become sicker, how much are you willing to go through for the possibility of gaining more time?: maybe. Would this change if these were permanent states, if they did not get better?: yes. How much does your agent and/or family know about your priorities and wishes?: everything. Added by EFRAIN Hines CNP         CODE STATUS/ADVANCE DIRECTIVES DISCUSSION:  Prior  DNR / DNI  ALLERGIES:   Allergies   Allergen Reactions     Ambien [Zolpidem] Unknown     Cleocin [Clindamycin] Unknown     Codeine Unknown      PAST MEDICAL HISTORY: No past medical history on file.   PAST SURGICAL HISTORY:   has a past surgical history that includes Cholecystectomy.  FAMILY HISTORY: family history is not on file.  SOCIAL HISTORY:   reports  that she has never smoked. She has never used smokeless tobacco. She reports that she does not use drugs.  Patient's living condition: lives with spouse    Post Discharge Medication Reconciliation Status:   MED REC REQUIRED  Post Medication Reconciliation Status: discharge medications reconciled and changed, per note/orders       Current Outpatient Medications   Medication Sig Dispense Refill     gabapentin (NEURONTIN) 300 MG capsule [GABAPENTIN (NEURONTIN) 300 MG CAPSULE] Take 300 mg by mouth daily.       levothyroxine (SYNTHROID/LEVOTHROID) 88 MCG tablet Take 88 mcg by mouth every morning       metroNIDAZOLE (FLAGYL) 500 MG tablet Take 1 tablet (500 mg) by mouth 3 times daily for 17 doses       sulfamethoxazole-trimethoprim (BACTRIM) 400-80 MG tablet Take 1 tablet by mouth 2 times daily for 11 doses       amLODIPine (NORVASC) 10 MG tablet Take 1 tablet (10 mg) by mouth every evening for 30 days 30 tablet 0     No current facility-administered medications for this visit.       ROS:  4 point ROS including Respiratory, CV, GI and , other than that noted in the HPI,  is negative    Vitals:  /69   Pulse 66   Temp 97.6  F (36.4  C)   Resp 18   Wt 58.8 kg (129 lb 9.6 oz)   LMP  (LMP Unknown)   SpO2 98%   BMI 23.70 kg/m    Exam:  GENERAL APPEARANCE:  in no distress, appears healthy  ENT:  Mouth and posterior oropharynx normal, moist mucous membranes  RESP:  lungs clear to auscultation , no respiratory distress  CV:  regular rate and rhythm, no murmur, rub, or gallop, no edema  ABDOMEN:  no guarding or rebound  M/S:   Gait and station abnormal unsteady  SKIN:  Inspection of skin and subcutaneous tissue baseline  NEURO:   Examination of sensation by touch normal  PSYCH:  affect and mood normal    Lab/Diagnostic data:  Recent labs in [facility] reviewed by me today.     ASSESSMENT/PLAN:    (K51.50) Left sided colitis without complications (H)  (primary encounter diagnosis)  (R11.0) Nausea  (M62.81) Generalized  muscle weakness  Comment: bowels moving, denies pain, eating well, deconditioned  Plan:   -PT/OT eval and treat  -discontinue amlodipine  -boost BID  CBC, BMP, A1C, TSH on 6/24    (I50.30) Diastolic heart failure, unspecified HF chronicity (H)  (I10) Hypertension, unspecified type  Comment: soft BP's in the 100 range, dizzy, denies CP  Plan: discontinue amlodipine  -daily vitals  -consider intervention if SBP's <110    (E11.8) Controlled type 2 diabetes mellitus with complication, without long-term current use of insulin (H)  Comment: recent A1C 7.2  Plan: control with diet  -follow up A1C on 6/24    (E03.9) Hypothyroidism, unspecified type  Comment: recent TSH 7.98  Plan: continue levothyroxine 88mcg  -repeat TSH on 6/24    (Z71.89) ACP (advance care planning)  Comment: code DNR  Plan: [place] ADVANCE CARE PLANNING FIRST 30 MINS          I spent 17 minutes F2F with patient in addition to todays visit completing a POLST form.        Electronically signed by:  EFRAIN Hines CNP                    Sincerely,        EFRAIN Hines CNP

## 2024-06-21 NOTE — PROGRESS NOTES
[facility] GERIATRICS    PRIMARY CARE PROVIDER AND CLINIC:  Hannah Caballero PA-C, [address] / [place] [address]  Chief Complaint   Patient presents with    Hospital /[facility] Medical Record Number:  [id number]  Place of Service where encounter took place:  [facility]  AT [facility] ([facility]) [[id number]]    Sissy Mccloud  is a 89 year old  (8/24/1931), admitted to the above facility from  [facility]. Hospital stay 6/14/2024 through 6/18/2024..   HPI:    Acute short segment stercoral colitis versus diverticulitis  Nausea vomiting-resolved  Chronic kidney disease stage IIIb  Type 2 diabetes mellitus with peripheral neuropathy without long-term insulin use, diet controlled  Chronic heart failure with preserved ejection fraction  Essential hypertension  Hypothyroidism     Hospital Course  Sissy Mccloud is a 89 year old female admitted on 6/14/2024. She was brought to the ED by ambulance from home for evaluation of abdominal pain, nausea, vomiting.      Acute short segment stercoral colitis versus diverticulitis  Nausea vomiting-resolved  -Admits to several days of constipation prior to abdominal pain with nausea and vomiting  -Discontinue IV Zosyn, finished course with Bactrim and metronidazole  -Started on senna-docusate 6/15, started MiraLAX 6/16-had bowel movements starting 6/17  -Tolerated regular diet     Chronic kidney disease stage IIIb  -Baseline hemoglobin appears to be 1.2-1.4, currently at baseline     Type 2 diabetes mellitus with peripheral neuropathy without long-term insulin use, diet controlled  -Recommend continue controlling with diet outpatient     Chronic heart failure with preserved ejection fraction  -No signs or symptoms of acute CHF     Essential hypertension  -increased PTA amlodipine to 10 mg     Hypothyroidism  -Continue PTA levothyroxine     Radiographic findings  -A 1.7 cm right breast nodule, on review of prior imaging was also present on CT  imaging 7/30/2019, at that time was also being monitored due to numerous bilateral pulmonary nodules present since 2015  -Outpatient follow-up with primary provider    Patient seen for follow up, moderate historian, pleasant, I&O adequate, good appetite, denies gut issues, BS+, soft BP's with dizziness, overall appears healthy.    Advance Care Planning Goals of Care Discussion 6/21/2024  Goals of care discussed with Sissy Mccloud on 6/21. Present at discussion: patient. Questions discussed and patient response:  What is your understanding now of where you are with your illness?: good. How much information about what is likely to be ahead with your illness would you like to have?: all. As the clinician I communicated the following to the patient regarding their prognosis: good. If your health situation worsens, what are your most important goals?: get home. What are your biggest fears and worries about the future with your health?: hospital stays. Unacceptable function : NA. What abilities are so critical to your life that you cannot imagine living without them?: independence. Pt does NOT want to: die. If you become sicker, how much are you willing to go through for the possibility of gaining more time?: maybe. Would this change if these were permanent states, if they did not get better?: yes. How much does your agent and/or family know about your priorities and wishes?: everything. Added by EFRAIN Hines CNP         CODE STATUS/ADVANCE DIRECTIVES DISCUSSION:  Prior  DNR / DNI  ALLERGIES:   Allergies   Allergen Reactions    Ambien [Zolpidem] Unknown    Cleocin [Clindamycin] Unknown    Codeine Unknown      PAST MEDICAL HISTORY: No past medical history on file.   PAST SURGICAL HISTORY:   has a past surgical history that includes Cholecystectomy.  FAMILY HISTORY: family history is not on file.  SOCIAL HISTORY:   reports that she has never smoked. She has never used smokeless tobacco. She reports that  she does not use drugs.  Patient's living condition: lives with spouse    Post Discharge Medication Reconciliation Status:   MED REC REQUIRED  Post Medication Reconciliation Status: discharge medications reconciled and changed, per note/orders       Current Outpatient Medications   Medication Sig Dispense Refill    gabapentin (NEURONTIN) 300 MG capsule [GABAPENTIN (NEURONTIN) 300 MG CAPSULE] Take 300 mg by mouth daily.      levothyroxine (SYNTHROID/LEVOTHROID) 88 MCG tablet Take 88 mcg by mouth every morning      metroNIDAZOLE (FLAGYL) 500 MG tablet Take 1 tablet (500 mg) by mouth 3 times daily for 17 doses      sulfamethoxazole-trimethoprim (BACTRIM) 400-80 MG tablet Take 1 tablet by mouth 2 times daily for 11 doses      amLODIPine (NORVASC) 10 MG tablet Take 1 tablet (10 mg) by mouth every evening for 30 days 30 tablet 0     No current facility-administered medications for this visit.       ROS:  4 point ROS including Respiratory, CV, GI and , other than that noted in the HPI,  is negative    Vitals:  /69   Pulse 66   Temp 97.6  F (36.4  C)   Resp 18   Wt 58.8 kg (129 lb 9.6 oz)   LMP  (LMP Unknown)   SpO2 98%   BMI 23.70 kg/m    Exam:  GENERAL APPEARANCE:  in no distress, appears healthy  ENT:  Mouth and posterior oropharynx normal, moist mucous membranes  RESP:  lungs clear to auscultation , no respiratory distress  CV:  regular rate and rhythm, no murmur, rub, or gallop, no edema  ABDOMEN:  no guarding or rebound  M/S:   Gait and station abnormal unsteady  SKIN:  Inspection of skin and subcutaneous tissue baseline  NEURO:   Examination of sensation by touch normal  PSYCH:  affect and mood normal    Lab/Diagnostic data:  Recent labs in [facility] reviewed by me today.     ASSESSMENT/PLAN:    (K51.50) Left sided colitis without complications (H)  (primary encounter diagnosis)  (R11.0) Nausea  (M62.81) Generalized muscle weakness  Comment: bowels moving, denies pain, eating well, deconditioned  Plan:    -PT/OT eval and treat  -discontinue amlodipine  -boost BID  CBC, BMP, A1C, TSH on 6/24    (I50.30) Diastolic heart failure, unspecified HF chronicity (H)  (I10) Hypertension, unspecified type  Comment: soft BP's in the 100 range, dizzy, denies CP  Plan: discontinue amlodipine  -daily vitals  -consider intervention if SBP's <110    (E11.8) Controlled type 2 diabetes mellitus with complication, without long-term current use of insulin (H)  Comment: recent A1C 7.2  Plan: control with diet  -follow up A1C on 6/24    (E03.9) Hypothyroidism, unspecified type  Comment: recent TSH 7.98  Plan: continue levothyroxine 88mcg  -repeat TSH on 6/24    (Z71.89) ACP (advance care planning)  Comment: code DNR  Plan: ND ADVANCE CARE PLANNING FIRST 30 MINS          I spent 17 minutes F2F with patient in addition to todays visit completing a POLST form.        Electronically signed by:  EFRAIN Hines CNP

## 2024-06-24 ENCOUNTER — LAB REQUISITION (OUTPATIENT)
Dept: LAB | Facility: CLINIC | Age: 89
End: 2024-06-24

## 2024-06-24 DIAGNOSIS — E03.9 HYPOTHYROIDISM, UNSPECIFIED: ICD-10-CM

## 2024-06-24 LAB
ANION GAP SERPL CALCULATED.3IONS-SCNC: 11 MMOL/L (ref 7–15)
BUN SERPL-MCNC: 20.4 MG/DL (ref 8–23)
CALCIUM SERPL-MCNC: 9 MG/DL (ref 8.2–9.6)
CHLORIDE SERPL-SCNC: 102 MMOL/L (ref 98–107)
CREAT SERPL-MCNC: 1.68 MG/DL (ref 0.51–0.95)
DEPRECATED HCO3 PLAS-SCNC: 26 MMOL/L (ref 22–29)
EGFRCR SERPLBLD CKD-EPI 2021: 28 ML/MIN/1.73M2
ERYTHROCYTE [DISTWIDTH] IN BLOOD BY AUTOMATED COUNT: 13.7 % (ref 10–15)
GLUCOSE SERPL-MCNC: 147 MG/DL (ref 70–99)
HBA1C MFR BLD: 7.2 %
HCT VFR BLD AUTO: 37.5 % (ref 35–47)
HGB BLD-MCNC: 11.9 G/DL (ref 11.7–15.7)
MCH RBC QN AUTO: 30.4 PG (ref 26.5–33)
MCHC RBC AUTO-ENTMCNC: 31.7 G/DL (ref 31.5–36.5)
MCV RBC AUTO: 96 FL (ref 78–100)
PLATELET # BLD AUTO: 270 10E3/UL (ref 150–450)
POTASSIUM SERPL-SCNC: 4.8 MMOL/L (ref 3.4–5.3)
RBC # BLD AUTO: 3.92 10E6/UL (ref 3.8–5.2)
SODIUM SERPL-SCNC: 139 MMOL/L (ref 135–145)
TSH SERPL DL<=0.005 MIU/L-ACNC: 34.8 UIU/ML (ref 0.3–4.2)
WBC # BLD AUTO: 8.2 10E3/UL (ref 4–11)

## 2024-06-24 PROCEDURE — 83036 HEMOGLOBIN GLYCOSYLATED A1C: CPT | Performed by: NURSE PRACTITIONER

## 2024-06-24 PROCEDURE — 84443 ASSAY THYROID STIM HORMONE: CPT | Performed by: NURSE PRACTITIONER

## 2024-06-24 PROCEDURE — 85027 COMPLETE CBC AUTOMATED: CPT | Performed by: NURSE PRACTITIONER

## 2024-06-24 PROCEDURE — 80048 BASIC METABOLIC PNL TOTAL CA: CPT | Performed by: NURSE PRACTITIONER

## 2024-06-25 ENCOUNTER — TRANSITIONAL CARE UNIT VISIT (OUTPATIENT)
Dept: GERIATRICS | Facility: CLINIC | Age: 89
End: 2024-06-25
Payer: COMMERCIAL

## 2024-06-25 VITALS
TEMPERATURE: 97.6 F | RESPIRATION RATE: 18 BRPM | DIASTOLIC BLOOD PRESSURE: 84 MMHG | HEART RATE: 64 BPM | BODY MASS INDEX: 23.23 KG/M2 | OXYGEN SATURATION: 96 % | WEIGHT: 127 LBS | SYSTOLIC BLOOD PRESSURE: 152 MMHG

## 2024-06-25 DIAGNOSIS — E03.9 HYPOTHYROIDISM, UNSPECIFIED TYPE: ICD-10-CM

## 2024-06-25 DIAGNOSIS — N18.30 STAGE 3 CHRONIC KIDNEY DISEASE, UNSPECIFIED WHETHER STAGE 3A OR 3B CKD (H): Primary | ICD-10-CM

## 2024-06-25 DIAGNOSIS — K51.50 LEFT SIDED COLITIS WITHOUT COMPLICATIONS (H): ICD-10-CM

## 2024-06-25 PROCEDURE — 99309 SBSQ NF CARE MODERATE MDM 30: CPT | Performed by: NURSE PRACTITIONER

## 2024-06-25 RX ORDER — LEVOTHYROXINE SODIUM 100 UG/1
100 TABLET ORAL DAILY
Status: SHIPPED
Start: 2024-06-25

## 2024-06-25 NOTE — PROGRESS NOTES
[facility] GERIATRICS    Chief Complaint   Patient presents with    RECHECK     HPI:  Sissy Mccloud is a 89 year old  (8/24/1931), who is being seen today for an episodic care visit at: [facility] AT [facility] ([facility]) [[id number]]. Today's concern is:   1. Stage 3 chronic kidney disease, unspecified whether stage 3a or 3b CKD (H)    2. Hypothyroidism, unspecified type    3. Left sided colitis without complications (H)      Patient seen for follow up, recent labs creat 1.68, GFR 28, TSH 34.8, appears healthy, in hospital for colitis and now completed ABX, denies gut issues, regular BM's, no distress.    Allergies, and PMH/PSH reviewed in EPIC today.  REVIEW OF SYSTEMS:  4 point ROS including Respiratory, CV, GI and , other than that noted in the HPI,  is negative    Objective:   BP (!) 152/84   Pulse 64   Temp 97.6  F (36.4  C)   Resp 18   Wt 57.6 kg (127 lb)   LMP  (LMP Unknown)   SpO2 96%   BMI 23.23 kg/m    GENERAL APPEARANCE:  in no distress, appears healthy  ENT:  Mouth and posterior oropharynx normal, moist mucous membranes  RESP:  lungs clear to auscultation , no respiratory distress  CV:  no edema, rate-normal  ABDOMEN:  bowel sounds normal  M/S:   Gait and station abnormal unsteady  SKIN:  Inspection of skin and subcutaneous tissue baseline  NEURO:   Examination of sensation by touch normal  PSYCH:  affect and mood normal    Labs done in SNF are in [facility]. Please refer to them using [facility]/Care Everywhere.    Assessment/Plan:  (N18.30) Stage 3 chronic kidney disease, unspecified whether stage 3a or 3b CKD (H)  (primary encounter diagnosis)  Comment: creat 1.68, GFR 28  Plan: dose meds renally  -encourage fluids  -repeat BMP in 2-3 months    (E03.9) Hypothyroidism, unspecified type  Comment: TDH elevated at 34.8  Plan: increase levothyroxine from 88 to 100mcg  -repeat TSH in 2-3 months    (K51.50) Left sided colitis without complications (H)  Comment: denies gut issues, normal appetite and  stool  Plan:   -PT/OT working with  -plans to discharge to LTC when done in TCU  -ABX completed 6/24, no residual effects  -monitor gut/stool    MED REC REQUIRED  Post Medication Reconciliation Status: medication reconcilation previously completed during another office visit        Electronically signed by: EFRAIN Hines CNP

## 2024-06-25 NOTE — LETTER
6/25/2024      Sissy Mccloud  [address] [address]        [facility] GERIATRICS    Chief Complaint   Patient presents with     RECHECK     HPI:  Sissy Mccloud is a 89 year old  (8/24/1931), who is being seen today for an episodic care visit at: [facility] AT [facility] [[id number]]. Today's concern is:   1. Stage 3 chronic kidney disease, unspecified whether stage 3a or 3b CKD (H)    2. Hypothyroidism, unspecified type    3. Left sided colitis without complications (H)      Patient seen for follow up, recent labs creat 1.68, GFR 28, TSH 34.8, appears healthy, in hospital for colitis and now completed ABX, denies gut issues, regular BM's, no distress.    Allergies, and PMH/PSH reviewed in [facility] today.  REVIEW OF SYSTEMS:  4 point ROS including Respiratory, CV, GI and , other than that noted in the HPI,  is negative    Objective:   BP (!) 152/84   Pulse 64   Temp 97.6  F (36.4  C)   Resp 18   Wt 57.6 kg (127 lb)   LMP  (LMP Unknown)   SpO2 96%   BMI 23.23 kg/m    GENERAL APPEARANCE:  in no distress, appears healthy  ENT:  Mouth and posterior oropharynx normal, moist mucous membranes  RESP:  lungs clear to auscultation , no respiratory distress  CV:  no edema, rate-normal  ABDOMEN:  bowel sounds normal  M/S:   Gait and station abnormal unsteady  SKIN:  Inspection of skin and subcutaneous tissue baseline  NEURO:   Examination of sensation by touch normal  PSYCH:  affect and mood normal    Labs done in SNF are in [facility]. Please refer to them using [facility]/Care Everywhere.    Assessment/Plan:  (N18.30) Stage 3 chronic kidney disease, unspecified whether stage 3a or 3b CKD (H)  (primary encounter diagnosis)  Comment: creat 1.68, GFR 28  Plan: dose meds renally  -encourage fluids  -repeat BMP in 2-3 months    (E03.9) Hypothyroidism, unspecified type  Comment: TDH elevated at 34.8  Plan: increase levothyroxine from 88 to 100mcg  -repeat TSH in 2-3 months    (K51.50) Left sided colitis  without complications (H)  Comment: denies gut issues, normal appetite and stool  Plan:   -PT/OT working with  -plans to discharge to LTC when done in TCU  -ABX completed 6/24, no residual effects  -monitor gut/stool    MED REC REQUIRED  Post Medication Reconciliation Status: medication reconcilation previously completed during another office visit        Electronically signed by: EFRAIN Hines CNP          Sincerely,        EFRAIN Hines CNP

## 2024-06-28 ENCOUNTER — TRANSITIONAL CARE UNIT VISIT (OUTPATIENT)
Dept: GERIATRICS | Facility: CLINIC | Age: 89
End: 2024-06-28
Payer: COMMERCIAL

## 2024-06-28 VITALS
WEIGHT: 127 LBS | RESPIRATION RATE: 17 BRPM | DIASTOLIC BLOOD PRESSURE: 94 MMHG | OXYGEN SATURATION: 90 % | SYSTOLIC BLOOD PRESSURE: 183 MMHG | TEMPERATURE: 97.8 F | BODY MASS INDEX: 23.23 KG/M2 | HEART RATE: 88 BPM

## 2024-06-28 DIAGNOSIS — I95.1 ORTHOSTATIC HYPOTENSION: ICD-10-CM

## 2024-06-28 DIAGNOSIS — I50.30 DIASTOLIC HEART FAILURE, UNSPECIFIED HF CHRONICITY (H): ICD-10-CM

## 2024-06-28 DIAGNOSIS — N18.30 STAGE 3 CHRONIC KIDNEY DISEASE, UNSPECIFIED WHETHER STAGE 3A OR 3B CKD (H): Primary | ICD-10-CM

## 2024-06-28 PROCEDURE — 99309 SBSQ NF CARE MODERATE MDM 30: CPT | Performed by: NURSE PRACTITIONER

## 2024-06-28 NOTE — PROGRESS NOTES
[facility] GERIATRICS    Chief Complaint   Patient presents with    RECHECK     HPI:  Sissy Mccloud is a 89 year old  (8/24/1931), who is being seen today for an episodic care visit at: [facility] AT [facility] ([facility]) [[id number]]. Today's concern is:   1. Stage 3 chronic kidney disease, unspecified whether stage 3a or 3b CKD (H)    2. Diastolic heart failure, unspecified HF chronicity (H)    3. Orthostatic hypotension      Patient seen for follow up, denies gut issues, pleasant, creat 1.68, GFR 28, deconditioned, no overt s/s CHF exacerbation without edema lungs clear, mild SOB with activity plus activity intolerance, working with therapy today and BP sit to stand was 72/46, sitting SBP's range 130-180, asymptomatic.    Allergies, and PMH/PSH reviewed in EPIC today.  REVIEW OF SYSTEMS:  4 point ROS including Respiratory, CV, GI and , other than that noted in the HPI,  is negative    Objective:   BP (!) 183/94   Pulse 88   Temp 97.8  F (36.6  C)   Resp 17   Wt 57.6 kg (127 lb)   LMP  (LMP Unknown)   SpO2 90%   BMI 23.23 kg/m    GENERAL APPEARANCE:  in no distress, appears healthy  ENT:  Mouth and posterior oropharynx normal, moist mucous membranes  RESP:  lungs clear to auscultation , no respiratory distress  CV:  no edema, rate-normal  ABDOMEN:  bowel sounds normal  M/S:   Gait and station abnormal unsteady  SKIN:  Inspection of skin and subcutaneous tissue baseline  NEURO:   Examination of sensation by touch normal  PSYCH:  affect and mood normal    Labs done in SNF are in [facility]. Please refer to them using [employer]/Care Everywhere.    Assessment/Plan:  (N18.30) Stage 3 chronic kidney disease, unspecified whether stage 3a or 3b CKD (H)  (primary encounter diagnosis)  Comment: creat 1.68, GFR 28  Plan: dose meds renally  -repeat BMP in 1-2 weeks    (I50.30) Diastolic heart failure, unspecified HF chronicity (H)  (I95.1) Orthostatic hypotension  Comment: appears euvolemic, activity intolerance, SBP  down to 72/46 stand, rebounds rapidly when sit/lie  Plan: PT/OT working with  -start midodrine 2.5mg TID  -vitals daily plus therapy to check PRN  -continue gabapentin and levothyroxine (her only other medications)  -plan to follow up next week    MED REC REQUIRED  Post Medication Reconciliation Status: medication reconcilation previously completed during another office visit      Electronically signed by: EFRAIN Hines CNP

## 2024-06-28 NOTE — LETTER
6/28/2024      Sissy Mccloud  [address] [address]        [facility] GERIATRICS    Chief Complaint   Patient presents with     RECHECK     HPI:  Sissy Mccloud is a 89 year old  (8/24/1931), who is being seen today for an episodic care visit at: [facility] AT [facility] ([facility]) [[id number]]. Today's concern is:   1. Stage 3 chronic kidney disease, unspecified whether stage 3a or 3b CKD (H)    2. Diastolic heart failure, unspecified HF chronicity (H)    3. Orthostatic hypotension      Patient seen for follow up, denies gut issues, pleasant, creat 1.68, GFR 28, deconditioned, no overt s/s CHF exacerbation without edema lungs clear, mild SOB with activity plus activity intolerance, working with therapy today and BP sit to stand was 72/46, sitting SBP's range 130-180, asymptomatic.    Allergies, and PMH/PSH reviewed in EPIC today.  REVIEW OF SYSTEMS:  4 point ROS including Respiratory, CV, GI and , other than that noted in the HPI,  is negative    Objective:   BP (!) 183/94   Pulse 88   Temp 97.8  F (36.6  C)   Resp 17   Wt 57.6 kg (127 lb)   LMP  (LMP Unknown)   SpO2 90%   BMI 23.23 kg/m    GENERAL APPEARANCE:  in no distress, appears healthy  ENT:  Mouth and posterior oropharynx normal, moist mucous membranes  RESP:  lungs clear to auscultation , no respiratory distress  CV:  no edema, rate-normal  ABDOMEN:  bowel sounds normal  M/S:   Gait and station abnormal unsteady  SKIN:  Inspection of skin and subcutaneous tissue baseline  NEURO:   Examination of sensation by touch normal  PSYCH:  affect and mood normal    Labs done in SNF are in [facility]. Please refer to them using [employer]/Care Everywhere.    Assessment/Plan:  (N18.30) Stage 3 chronic kidney disease, unspecified whether stage 3a or 3b CKD (H)  (primary encounter diagnosis)  Comment: creat 1.68, GFR 28  Plan: dose meds renally  -repeat BMP in 1-2 weeks    (I50.30) Diastolic heart failure, unspecified HF chronicity (H)  (I95.1)  Orthostatic hypotension  Comment: appears euvolemic, activity intolerance, SBP down to 72/46 stand, rebounds rapidly when sit/lie  Plan: PT/OT working with  -start midodrine 2.5mg TID  -vitals daily plus therapy to check PRN  -continue gabapentin and levothyroxine (her only other medications)  -plan to follow up next week    MED REC REQUIRED  Post Medication Reconciliation Status: medication reconcilation previously completed during another office visit      Electronically signed by: EFRAIN Hines CNP          Sincerely,        EFRAIN Hines CNP

## 2024-07-01 VITALS
HEART RATE: 62 BPM | OXYGEN SATURATION: 98 % | DIASTOLIC BLOOD PRESSURE: 86 MMHG | RESPIRATION RATE: 17 BRPM | SYSTOLIC BLOOD PRESSURE: 152 MMHG | BODY MASS INDEX: 23.23 KG/M2 | WEIGHT: 127 LBS | TEMPERATURE: 97.3 F

## 2024-07-02 ENCOUNTER — TRANSITIONAL CARE UNIT VISIT (OUTPATIENT)
Dept: GERIATRICS | Facility: CLINIC | Age: 89
End: 2024-07-02
Payer: COMMERCIAL

## 2024-07-02 DIAGNOSIS — I50.30 DIASTOLIC HEART FAILURE, UNSPECIFIED HF CHRONICITY (H): ICD-10-CM

## 2024-07-02 DIAGNOSIS — N18.30 STAGE 3 CHRONIC KIDNEY DISEASE, UNSPECIFIED WHETHER STAGE 3A OR 3B CKD (H): Primary | ICD-10-CM

## 2024-07-02 DIAGNOSIS — I95.1 ORTHOSTATIC HYPOTENSION: ICD-10-CM

## 2024-07-02 PROCEDURE — 99309 SBSQ NF CARE MODERATE MDM 30: CPT | Performed by: NURSE PRACTITIONER

## 2024-07-02 RX ORDER — MIDODRINE HYDROCHLORIDE 2.5 MG/1
2.5 TABLET ORAL 3 TIMES DAILY
Status: SHIPPED
Start: 2024-07-02 | End: 2024-07-16

## 2024-07-02 NOTE — PROGRESS NOTES
[facility] GERIATRICS    Chief Complaint   Patient presents with    RECHECK     HPI:  Sissy Mccloud is a 89 year old  (8/24/1931), who is being seen today for an episodic care visit at: [facility] AT [facility] ([facility]) [[id number]]. Today's concern is:   1. Stage 3 chronic kidney disease, unspecified whether stage 3a or 3b CKD (H)    2. Diastolic heart failure, unspecified HF chronicity (H)    3. Orthostatic hypotension      Patient seen for follow up, recent creat 1.68, GFR 28, borderline to stage 4 but probably slightly dry, thin habitus, no s/s CHF exacerbation and no edema but orthostatics positive and is fall risk, SBP range , unsteady, activity intolerance, started midodrine but causing BP elevations, overall appears healthy for age.    Allergies, and PMH/PSH reviewed in EPIC today.  REVIEW OF SYSTEMS:  4 point ROS including Respiratory, CV, GI and , other than that noted in the HPI,  is negative    Objective:   BP (!) 152/86   Pulse 62   Temp 97.3  F (36.3  C)   Resp 17   Wt 57.6 kg (127 lb)   LMP  (LMP Unknown)   SpO2 98%   BMI 23.23 kg/m    GENERAL APPEARANCE:  in no distress, appears healthy  ENT:  Mouth and posterior oropharynx normal, moist mucous membranes  RESP:  lungs clear to auscultation , no respiratory distress  CV:  no edema, rate-normal  ABDOMEN:  bowel sounds normal  M/S:   Gait and station abnormal unsteady  SKIN:  Inspection of skin and subcutaneous tissue baseline  NEURO:   Examination of sensation by touch normal  PSYCH:  affect and mood normal    Labs done in SNF are in [facility]. Please refer to them using [employer]/Care Everywhere.    Assessment/Plan:  (N18.30) Stage 3 chronic kidney disease, unspecified whether stage 3a or 3b CKD (H)  (primary encounter diagnosis)  Comment: creat 1.68, GFR 28  Plan: dose meds renally  -repeat BMP in 1-2 months    (I50.30) Diastolic heart failure, unspecified HF chronicity (H)  (I95.1) Orthostatic hypotension  Comment: activity  intolerance, SBP range   Plan: continue midodrine 2.5mg TID, place parameters to hold SBP <130, give if over 130  -vitals TID for now  -monitor renal function (only taking 3 meds)    MED REC REQUIRED  Post Medication Reconciliation Status: medication reconcilation previously completed during another office visit          Electronically signed by: EFRAIN Hines CNP

## 2024-07-02 NOTE — LETTER
7/2/2024      Sissy Mccloud  [address] [address]        [facility] GERIATRICS    Chief Complaint   Patient presents with     RECHECK     HPI:  Sissy Mccloud is a 89 year old  (8/24/1931), who is being seen today for an episodic care visit at: [facility] AT [facility] ([facility]) [[id number]]. Today's concern is:   1. Stage 3 chronic kidney disease, unspecified whether stage 3a or 3b CKD (H)    2. Diastolic heart failure, unspecified HF chronicity (H)    3. Orthostatic hypotension      Patient seen for follow up, recent creat 1.68, GFR 28, borderline to stage 4 but probably slightly dry, thin habitus, no s/s CHF exacerbation and no edema but orthostatics positive and is fall risk, SBP range , unsteady, activity intolerance, started midodrine but causing BP elevations, overall appears healthy for age.    Allergies, and PMH/PSH reviewed in EPIC today.  REVIEW OF SYSTEMS:  4 point ROS including Respiratory, CV, GI and , other than that noted in the HPI,  is negative    Objective:   BP (!) 152/86   Pulse 62   Temp 97.3  F (36.3  C)   Resp 17   Wt 57.6 kg (127 lb)   LMP  (LMP Unknown)   SpO2 98%   BMI 23.23 kg/m    GENERAL APPEARANCE:  in no distress, appears healthy  ENT:  Mouth and posterior oropharynx normal, moist mucous membranes  RESP:  lungs clear to auscultation , no respiratory distress  CV:  no edema, rate-normal  ABDOMEN:  bowel sounds normal  M/S:   Gait and station abnormal unsteady  SKIN:  Inspection of skin and subcutaneous tissue baseline  NEURO:   Examination of sensation by touch normal  PSYCH:  affect and mood normal    Labs done in SNF are in [facility]. Please refer to them using [employer]/Care Everywhere.    Assessment/Plan:  (N18.30) Stage 3 chronic kidney disease, unspecified whether stage 3a or 3b CKD (H)  (primary encounter diagnosis)  Comment: creat 1.68, GFR 28  Plan: dose meds renally  -repeat BMP in 1-2 months    (I50.30) Diastolic heart failure, unspecified HF  chronicity (H)  (I95.1) Orthostatic hypotension  Comment: activity intolerance, SBP range   Plan: continue midodrine 2.5mg TID, place parameters to hold SBP <130, give if over 130  -vitals TID for now  -monitor renal function (only taking 3 meds)    MED REC REQUIRED  Post Medication Reconciliation Status: medication reconcilation previously completed during another office visit          Electronically signed by: EFRAIN Hines CNP          Sincerely,        EFRAIN Hines CNP

## 2024-07-10 ENCOUNTER — DOCUMENTATION ONLY (OUTPATIENT)
Dept: GERIATRICS | Facility: CLINIC | Age: 89
End: 2024-07-10

## 2024-07-15 VITALS
OXYGEN SATURATION: 98 % | WEIGHT: 125 LBS | RESPIRATION RATE: 16 BRPM | HEART RATE: 61 BPM | BODY MASS INDEX: 22.86 KG/M2 | TEMPERATURE: 97.7 F | SYSTOLIC BLOOD PRESSURE: 111 MMHG | DIASTOLIC BLOOD PRESSURE: 72 MMHG

## 2024-07-16 ENCOUNTER — NURSING HOME VISIT (OUTPATIENT)
Dept: GERIATRICS | Facility: CLINIC | Age: 89
End: 2024-07-16
Payer: COMMERCIAL

## 2024-07-16 DIAGNOSIS — I10 HYPERTENSION, UNSPECIFIED TYPE: ICD-10-CM

## 2024-07-16 DIAGNOSIS — I95.1 ORTHOSTATIC HYPOTENSION: ICD-10-CM

## 2024-07-16 DIAGNOSIS — I50.30 DIASTOLIC HEART FAILURE, UNSPECIFIED HF CHRONICITY (H): Primary | ICD-10-CM

## 2024-07-16 PROCEDURE — 99309 SBSQ NF CARE MODERATE MDM 30: CPT | Performed by: NURSE PRACTITIONER

## 2024-07-16 RX ORDER — HYDRALAZINE HYDROCHLORIDE 10 MG/1
10 TABLET, FILM COATED ORAL 3 TIMES DAILY
Status: SHIPPED
Start: 2024-07-16

## 2024-07-16 NOTE — LETTER
7/16/2024      Sissy Mccloud  [address] [facility] [address]        [facility] GERIATRICS    Chief Complaint   Patient presents with     RECHECK     HPI:  Sissy Mccloud is a 89 year old  (8/24/1931), who is being seen today for an episodic care visit at: [facility] AT [facility] () [[id number]]. Today's concern is:   1. Diastolic heart failure, unspecified HF chronicity (H)    2. Orthostatic hypotension    3. Hypertension, unspecified type      Patient seen for follow up, now has transferred from TCU to LTC due to care needs, appears euvolemic with no edema nor SOB, previous SBP's in the  range and was on midodrine, now SBP's in the 150-180 range consistently, is asymptomatic, pleasant, spends most of time in WC, potential fall risk with lower SBP's, overall appears healthy for age.    Allergies, and PMH/PSH reviewed in EPIC today.  REVIEW OF SYSTEMS:  4 point ROS including Respiratory, CV, GI and , other than that noted in the HPI,  is negative    Objective:   /72   Pulse 61   Temp 97.7  F (36.5  C)   Resp 16   Wt 56.7 kg (125 lb)   LMP  (LMP Unknown)   SpO2 98%   BMI 22.86 kg/m    GENERAL APPEARANCE:  in no distress, appears healthy  ENT:  Mouth and posterior oropharynx normal, moist mucous membranes  RESP:  lungs clear to auscultation , no respiratory distress  CV:  no edema, rate-normal  ABDOMEN:  bowel sounds normal  M/S:   Gait and station abnormal unsteady  SKIN:  Inspection of skin and subcutaneous tissue baseline  NEURO:   Examination of sensation by touch normal  PSYCH:  affect and mood normal    Labs done in SNF are in [facility]. Please refer to them using [employer]/Care Everywhere.    Assessment/Plan:  (I50.30) Diastolic heart failure, unspecified HF chronicity (H)  (primary encounter diagnosis)  (I95.1) Orthostatic hypotension  (I10) Hypertension, unspecified type  Comment: euvolemic, SBP change from 100 to 150+, probable improved hydration  Plan:   -discontinue  midodrine  -start hydralazine 10mg TID, give for SBP>140  -vitals TID  -monitor SBP's as they may drop or increase  -BMP, BNP recheck in 2-3 months, earlier if having BP issues due to renal clearance (GFR 28)    MED REC REQUIRED  Post Medication Reconciliation Status: medication reconcilation previously completed during another office visit          Electronically signed by: EFRAIN Hines CNP          Sincerely,        EFRAIN Hines CNP

## 2024-07-16 NOTE — PROGRESS NOTES
[facility] GERIATRICS    Chief Complaint   Patient presents with    RECHECK     HPI:  Sissy Mccloud is a 89 year old  (8/24/1931), who is being seen today for an episodic care visit at: [facility] AT [facility] () [[id number]]. Today's concern is:   1. Diastolic heart failure, unspecified HF chronicity (H)    2. Orthostatic hypotension    3. Hypertension, unspecified type      Patient seen for follow up, now has transferred from TCU to LTC due to care needs, appears euvolemic with no edema nor SOB, previous SBP's in the  range and was on midodrine, now SBP's in the 150-180 range consistently, is asymptomatic, pleasant, spends most of time in WC, potential fall risk with lower SBP's, overall appears healthy for age.    Allergies, and PMH/PSH reviewed in EPIC today.  REVIEW OF SYSTEMS:  4 point ROS including Respiratory, CV, GI and , other than that noted in the HPI,  is negative    Objective:   /72   Pulse 61   Temp 97.7  F (36.5  C)   Resp 16   Wt 56.7 kg (125 lb)   LMP  (LMP Unknown)   SpO2 98%   BMI 22.86 kg/m    GENERAL APPEARANCE:  in no distress, appears healthy  ENT:  Mouth and posterior oropharynx normal, moist mucous membranes  RESP:  lungs clear to auscultation , no respiratory distress  CV:  no edema, rate-normal  ABDOMEN:  bowel sounds normal  M/S:   Gait and station abnormal unsteady  SKIN:  Inspection of skin and subcutaneous tissue baseline  NEURO:   Examination of sensation by touch normal  PSYCH:  affect and mood normal    Labs done in SNF are in [facility]. Please refer to them using [employer]/Care Everywhere.    Assessment/Plan:  (I50.30) Diastolic heart failure, unspecified HF chronicity (H)  (primary encounter diagnosis)  (I95.1) Orthostatic hypotension  (I10) Hypertension, unspecified type  Comment: euvolemic, SBP change from 100 to 150+, probable improved hydration  Plan:   -discontinue midodrine  -start hydralazine 10mg TID, give for SBP>140  -vitals TID  -monitor SBP's  as they may drop or increase  -BMP, BNP recheck in 2-3 months, earlier if having BP issues due to renal clearance (GFR 28)    MED REC REQUIRED  Post Medication Reconciliation Status: medication reconcilation previously completed during another office visit          Electronically signed by: EFRAIN Hines CNP

## 2024-07-18 NOTE — PROGRESS NOTES
[facility] GERIATRICS    PRIMARY CARE PROVIDER AND CLINIC:  Dayron Mann, EFRAIN CNP, [address] [address]  Chief Complaint   Patient presents with    [facility] Medical Record Number:  [id number]  Place of Service where encounter took place:  [facility] () [[id number]]    Sissy Mccloud  is a 89 year old  (8/24/1931), admitted to the above facility from  [facility]. Hospital stay 6/14/24 through 6/18/24..   HPI:    Pt hospitalized with  acute short segment stercoral colitis vs diverticulitis, treated with abx. Transitioned to TCU for rehab, and now is an LTC Resident due to ongoing cares need.     Today:  - HF; denies chest pain. Sob. Keeps the headside slightly elevated and sleeps on two pillow.   -Frailty- self wheel, needs assistance with other ADLs.   - Pain: reports shoulders are bone one bone, at home was taking tylenol, but here no one has given her any thing for pain.   ===============================================================================  CODE STATUS/ADVANCE DIRECTIVES DISCUSSION:  Prior  DNR/DNI  ALLERGIES:   Allergies   Allergen Reactions    Ambien [Zolpidem] Unknown    Cleocin [Clindamycin] Unknown    Codeine Unknown      PAST MEDICAL HISTORY: No past medical history on file.   PAST SURGICAL HISTORY:   has a past surgical history that includes Cholecystectomy.  FAMILY HISTORY: family history is not on file.  SOCIAL HISTORY:   reports that she has never smoked. She has never used smokeless tobacco. She reports that she does not use drugs.  Patient's living condition: lives with spouse    Post Discharge Medication Reconciliation Status:   MED REC REQUIRED  Post Medication Reconciliation Status: medication reconcilation previously completed during another office visit       Current Outpatient Medications   Medication Sig Dispense Refill    gabapentin (NEURONTIN) 300 MG capsule [GABAPENTIN (NEURONTIN)  "300 MG CAPSULE] Take 300 mg by mouth daily.      hydrALAZINE (APRESOLINE) 10 MG tablet Take 1 tablet (10 mg) by mouth 3 times daily For SBP>140      levothyroxine (SYNTHROID/LEVOTHROID) 100 MCG tablet Take 1 tablet (100 mcg) by mouth daily       No current facility-administered medications for this visit.       ROS:10 point ROS of systems including Constitutional, Eyes, Respiratory, Cardiovascular, Gastroenterology, Genitourinary, Integumentary, Musculoskeletal, Psychiatric were all negative except for pertinent positives noted in my HPI.    Vitals:  BP 98/64   Pulse 65   Temp 97.9  F (36.6  C)   Resp 18   Ht 1.575 m (5' 2\")   Wt 56.8 kg (125 lb 3.2 oz)   LMP  (LMP Unknown)   SpO2 100%   BMI 22.90 kg/m    Exam:  GENERAL APPEARANCE:  in no distress,   RESP:  Unlabored breathing. CTA b/l but diminished over the bases.   CV:  S1S2 audible, regular HR, no murmur appreciated.  No peripheral edema.   ABDOMEN:  soft, NT/ND, BS audible.   M/S:   no joint deformity noted on observation.   SKIN:  No rash noted on observation  NEURO:   No NFD appreciated on observation.   PSYCH:  affect and mood normal    Lab/Diagnostic data: Reviewed in the chart and EHR.      ASSESSMENT/PLAN:  -----------------------------  Diastolic heart failure, unspecified HF chronicity (H)  Orthostatic hypotension  Primary hypertension  -developed hypotension, amlodipine discontinued, and started on midodrine, however, resulted in hypertension, midodrine stopped and started on prn hydralazine 10 mg tid for SBP > 140 mmHg/  - If resume  midodrine, hold for supine  mmHg and greater.   BP Readings from Last 3 Encounters:   07/22/24 98/64   07/15/24 111/72   07/01/24 (!) 152/86       Stage 4 chronic kidney disease (H)  -- Avoid nephrotoxic  medications. Renally dose medications. Monitor electrolytes, and dehydration status    Controlled type 2 diabetes mellitus with complication, without long-term current use of insulin (H)    Chronic " shoulder joint pain, bilateral:  - Tylenol  1000 mg at HS scheduled  -Tylenol 100 mg bid as needed for pain.     Frailty:  -Significant  Deficits requiring NH placement. Requiring extensive assistance from nursing.       Orders:  Tylenol  1000 mg at HS scheduled  Tylenol 100 mg bid as needed for pain.     Electronically signed by:  Marek Orellana MD

## 2024-07-22 ENCOUNTER — NURSING HOME VISIT (OUTPATIENT)
Dept: GERIATRICS | Facility: CLINIC | Age: 89
End: 2024-07-22
Payer: COMMERCIAL

## 2024-07-22 VITALS
BODY MASS INDEX: 23.04 KG/M2 | HEART RATE: 65 BPM | DIASTOLIC BLOOD PRESSURE: 64 MMHG | RESPIRATION RATE: 18 BRPM | TEMPERATURE: 97.9 F | WEIGHT: 125.2 LBS | HEIGHT: 62 IN | OXYGEN SATURATION: 100 % | SYSTOLIC BLOOD PRESSURE: 98 MMHG

## 2024-07-22 DIAGNOSIS — I50.30 DIASTOLIC HEART FAILURE, UNSPECIFIED HF CHRONICITY (H): Primary | ICD-10-CM

## 2024-07-22 DIAGNOSIS — M25.511 CHRONIC PAIN OF BOTH SHOULDERS: ICD-10-CM

## 2024-07-22 DIAGNOSIS — I95.1 ORTHOSTATIC HYPOTENSION: ICD-10-CM

## 2024-07-22 DIAGNOSIS — N18.4 STAGE 4 CHRONIC KIDNEY DISEASE (H): ICD-10-CM

## 2024-07-22 DIAGNOSIS — M25.512 CHRONIC PAIN OF BOTH SHOULDERS: ICD-10-CM

## 2024-07-22 DIAGNOSIS — E11.8 CONTROLLED TYPE 2 DIABETES MELLITUS WITH COMPLICATION, WITHOUT LONG-TERM CURRENT USE OF INSULIN (H): ICD-10-CM

## 2024-07-22 DIAGNOSIS — I10 PRIMARY HYPERTENSION: ICD-10-CM

## 2024-07-22 DIAGNOSIS — G89.29 CHRONIC PAIN OF BOTH SHOULDERS: ICD-10-CM

## 2024-07-22 PROCEDURE — 99305 1ST NF CARE MODERATE MDM 35: CPT | Performed by: FAMILY MEDICINE

## 2024-07-22 NOTE — LETTER
7/22/2024      Sissy Mccloud  [address] [facility] [address]        [facility] GERIATRICS    PRIMARY CARE PROVIDER AND CLINIC:  Dayron Mann, EFRAIN CNP, [address] [facility] [address]  Chief Complaint   Patient presents with     [facility] Medical Record Number:  [id number]  Place of Service where encounter took place:  [facility]  AT [facility] () [[id number]]    Sissy Mccloud  is a 89 year old  (8/24/1931), admitted to the above facility from  [facility]. Hospital stay 6/14/24 through 6/18/24..   HPI:    Pt hospitalized with  acute short segment stercoral colitis vs diverticulitis, treated with abx. Transitioned to TCU for rehab, and now is an LTC Resident due to ongoing cares need.     Today:  - HF; denies chest pain. Sob. Keeps the headside slightly elevated and sleeps on two pillow.   -Frailty- self wheel, needs assistance with other ADLs.   - Pain: reports shoulders are bone one bone, at home was taking tylenol, but here no one has given her any thing for pain.   ===============================================================================  CODE STATUS/ADVANCE DIRECTIVES DISCUSSION:  Prior  DNR/DNI  ALLERGIES:   Allergies   Allergen Reactions     Ambien [Zolpidem] Unknown     Cleocin [Clindamycin] Unknown     Codeine Unknown      PAST MEDICAL HISTORY: No past medical history on file.   PAST SURGICAL HISTORY:   has a past surgical history that includes Cholecystectomy.  FAMILY HISTORY: family history is not on file.  SOCIAL HISTORY:   reports that she has never smoked. She has never used smokeless tobacco. She reports that she does not use drugs.  Patient's living condition: lives with spouse    Post Discharge Medication Reconciliation Status:   MED REC REQUIRED  Post Medication Reconciliation Status: medication reconcilation previously completed during another office visit       Current Outpatient Medications   Medication Sig  "Dispense Refill     gabapentin (NEURONTIN) 300 MG capsule [GABAPENTIN (NEURONTIN) 300 MG CAPSULE] Take 300 mg by mouth daily.       hydrALAZINE (APRESOLINE) 10 MG tablet Take 1 tablet (10 mg) by mouth 3 times daily For SBP>140       levothyroxine (SYNTHROID/LEVOTHROID) 100 MCG tablet Take 1 tablet (100 mcg) by mouth daily       No current facility-administered medications for this visit.       ROS:10 point ROS of systems including Constitutional, Eyes, Respiratory, Cardiovascular, Gastroenterology, Genitourinary, Integumentary, Musculoskeletal, Psychiatric were all negative except for pertinent positives noted in my HPI.    Vitals:  BP 98/64   Pulse 65   Temp 97.9  F (36.6  C)   Resp 18   Ht 1.575 m (5' 2\")   Wt 56.8 kg (125 lb 3.2 oz)   LMP  (LMP Unknown)   SpO2 100%   BMI 22.90 kg/m    Exam:  GENERAL APPEARANCE:  in no distress,   RESP:  Unlabored breathing. CTA b/l but diminished over the bases.   CV:  S1S2 audible, regular HR, no murmur appreciated.  No peripheral edema.   ABDOMEN:  soft, NT/ND, BS audible.   M/S:   no joint deformity noted on observation.   SKIN:  No rash noted on observation  NEURO:   No NFD appreciated on observation.   PSYCH:  affect and mood normal    Lab/Diagnostic data: Reviewed in the chart and EHR.      ASSESSMENT/PLAN:  -----------------------------  Diastolic heart failure, unspecified HF chronicity (H)  Orthostatic hypotension  Primary hypertension  -developed hypotension, amlodipine discontinued, and started on midodrine, however, resulted in hypertension, midodrine stopped and started on prn hydralazine 10 mg tid for SBP > 140 mmHg/  - If resume  midodrine, hold for supine  mmHg and greater.   BP Readings from Last 3 Encounters:   07/22/24 98/64   07/15/24 111/72   07/01/24 (!) 152/86       Stage 4 chronic kidney disease (H)  -- Avoid nephrotoxic  medications. Renally dose medications. Monitor electrolytes, and dehydration status    Controlled type 2 diabetes " mellitus with complication, without long-term current use of insulin (H)    Chronic shoulder joint pain, bilateral:  - Tylenol  1000 mg at HS scheduled  -Tylenol 100 mg bid as needed for pain.     Frailty:  -Significant  Deficits requiring NH placement. Requiring extensive assistance from nursing.       Orders:  Tylenol  1000 mg at HS scheduled  Tylenol 100 mg bid as needed for pain.     Electronically signed by:  Marek Orellana MD                     Sincerely,        Marek Orellana MD

## 2024-08-06 ENCOUNTER — NURSING HOME VISIT (OUTPATIENT)
Dept: GERIATRICS | Facility: CLINIC | Age: 89
End: 2024-08-06
Payer: COMMERCIAL

## 2024-08-06 VITALS
HEART RATE: 61 BPM | SYSTOLIC BLOOD PRESSURE: 176 MMHG | WEIGHT: 125.2 LBS | TEMPERATURE: 97.9 F | DIASTOLIC BLOOD PRESSURE: 84 MMHG | BODY MASS INDEX: 22.9 KG/M2 | RESPIRATION RATE: 20 BRPM | OXYGEN SATURATION: 96 %

## 2024-08-06 DIAGNOSIS — N18.4 STAGE 4 CHRONIC KIDNEY DISEASE (H): Primary | ICD-10-CM

## 2024-08-06 DIAGNOSIS — G89.29 CHRONIC PAIN OF BOTH SHOULDERS: ICD-10-CM

## 2024-08-06 DIAGNOSIS — M25.511 CHRONIC PAIN OF BOTH SHOULDERS: ICD-10-CM

## 2024-08-06 DIAGNOSIS — M25.512 CHRONIC PAIN OF BOTH SHOULDERS: ICD-10-CM

## 2024-08-06 DIAGNOSIS — I10 PRIMARY HYPERTENSION: ICD-10-CM

## 2024-08-06 PROCEDURE — 99309 SBSQ NF CARE MODERATE MDM 30: CPT | Performed by: NURSE PRACTITIONER

## 2024-08-06 RX ORDER — ACETAMINOPHEN 500 MG
1000 TABLET ORAL 3 TIMES DAILY
Status: SHIPPED
Start: 2024-08-06

## 2024-08-06 NOTE — PROGRESS NOTES
[facility] GERIATRICS  Chief Complaint   Patient presents with    group home Regulatory    FVP Care Coordination - Health Plan or Product Change     [place] Medical Record Number:  [id number]  Place of Service where encounter took place:  [facility] () [[id number]]    HPI:    Sissy Mccloud  is 89 year old (8/24/1931), who is being seen today for a federally mandated E/M visit. Today's concerns are:     Stage 4 chronic kidney disease (H)  Primary hypertension  Chronic pain of both shoulders    Patient seen for follow up, creat 1.68, GFR 28, TSH 34.8, levothyroxine increased at this time, also SBP's wide range now 150-170 with hydralazine on board, asymptomatic, no recent falls, also main complaint of bilateral shoulder pain only when moving/using, no pain at rest, and intermittent headache in evening at bedtime unknown etiology, overall appears healthy for age.    ALLERGIES:Ambien [zolpidem], Cleocin [clindamycin], and Codeine  PAST MEDICAL HISTORY: No past medical history on file.  PAST SURGICAL HISTORY:   has a past surgical history that includes Cholecystectomy.  FAMILY HISTORY: family history is not on file.  SOCIAL HISTORY:  reports that she has never smoked. She has never used smokeless tobacco. She reports that she does not use drugs.    MEDICATIONS:  MED REC REQUIRED  Post Medication Reconciliation Status: medication reconcilation previously completed during another office visit         Review of your medicines            Accurate as of August 6, 2024  1:33 PM. If you have any questions, ask your nurse or doctor.                START taking        Dose / Directions   acetaminophen 500 MG tablet  Commonly known as: TYLENOL  Used for: Chronic pain of both shoulders  Started by: Dayron Mann      Dose: 1,000 mg  Take 2 tablets (1,000 mg) by mouth 3 times daily And 1000mg daily PRN  Refills: 0            CONTINUE these medicines which have NOT CHANGED        Dose / Directions    gabapentin 300 MG capsule  Commonly known as: NEURONTIN      Dose: 300 mg  [GABAPENTIN (NEURONTIN) 300 MG CAPSULE] Take 300 mg by mouth daily.  Refills: 0     hydrALAZINE 10 MG tablet  Commonly known as: APRESOLINE  Used for: Hypertension, unspecified type      Dose: 10 mg  Take 1 tablet (10 mg) by mouth 3 times daily For SBP>140  Refills: 0     levothyroxine 100 MCG tablet  Commonly known as: SYNTHROID/LEVOTHROID  Used for: Hypothyroidism, unspecified type      Dose: 100 mcg  Take 1 tablet (100 mcg) by mouth daily  Refills: 0               Case Management:  I have reviewed the care plan and MDS and do agree with the plan. Patient's desire to return to the community is present, but is not able due to care needs . Information reviewed:  Medications, vital signs, orders, and nursing notes.    ROS:  4 point ROS including Respiratory, CV, GI and , other than that noted in the HPI,  is negative    Vitals:  BP (!) 176/84   Pulse 61   Temp 97.9  F (36.6  C)   Resp 20   Wt 56.8 kg (125 lb 3.2 oz)   LMP  (LMP Unknown)   SpO2 96%   BMI 22.90 kg/m    Body mass index is 22.9 kg/m .  Exam:  GENERAL APPEARANCE:  in no distress, appears healthy  ENT:  Mouth and posterior oropharynx normal, moist mucous membranes  RESP:  lungs clear to auscultation , no respiratory distress  CV:  no edema, rate-normal  ABDOMEN:  no guarding or rebound  M/S:   Gait and station abnormal unsteady  SKIN:  Inspection of skin and subcutaneous tissue baseline  NEURO:   Examination of sensation by touch normal  PSYCH:  affect and mood normal    Lab/Diagnostic data:   Labs done in SNF are in [facility]. Please refer to them using [employer]/Care Everywhere.    ASSESSMENT/PLAN  (N18.4) Stage 4 chronic kidney disease (H)  (primary encounter diagnosis)  Comment: creat 1.68, GFR 28  Plan: dose meds renally  -BMP, BNP, TSH on 8/12    (I10) Primary hypertension  Comment: SBP's 150-170  Plan: continue hydralazine TID PRN for SBP>140   -vitals  TID    (M25.511,  G89.29,  M25.512) Chronic pain of both shoulders  Comment: pain with any movement, intermittent headaches  Plan:   -change APAP to TID scheduled, Qday PRN  -continue gabapentin daily        Electronically signed by:  EFRAIN Hines CNP      The health plan new enrollment has happened. I have reviewed the  MDS, the preventative needs,  and facility care plan. The level of care is appropriate. I have reviewed the code status/advanced directives.

## 2024-08-06 NOTE — LETTER
8/6/2024      Sissy Mccloud  [address] [address]        [facility] GERIATRICS  Chief Complaint   Patient presents with     shelter Regulatory     FVP Care Coordination - Health Plan or Product Change     [place] Medical Record Number:  [id number]  Place of Service where encounter took place:  [facility] () [[id number]]    HPI:    Sissy Mccloud  is 89 year old (8/24/1931), who is being seen today for a federally mandated E/M visit. Today's concerns are:     Stage 4 chronic kidney disease (H)  Primary hypertension  Chronic pain of both shoulders    Patient seen for follow up, creat 1.68, GFR 28, TSH 34.8, levothyroxine increased at this time, also SBP's wide range now 150-170 with hydralazine on board, asymptomatic, no recent falls, also main complaint of bilateral shoulder pain only when moving/using, no pain at rest, and intermittent headache in evening at bedtime unknown etiology, overall appears healthy for age.    ALLERGIES:Ambien [zolpidem], Cleocin [clindamycin], and Codeine  PAST MEDICAL HISTORY: No past medical history on file.  PAST SURGICAL HISTORY:   has a past surgical history that includes Cholecystectomy.  FAMILY HISTORY: family history is not on file.  SOCIAL HISTORY:  reports that she has never smoked. She has never used smokeless tobacco. She reports that she does not use drugs.    MEDICATIONS:  MED REC REQUIRED  Post Medication Reconciliation Status: medication reconcilation previously completed during another office visit         Review of your medicines            Accurate as of August 6, 2024  1:33 PM. If you have any questions, ask your nurse or doctor.                START taking        Dose / Directions   acetaminophen 500 MG tablet  Commonly known as: TYLENOL  Used for: Chronic pain of both shoulders  Started by: Dayron Mann      Dose: 1,000 mg  Take 2 tablets (1,000 mg) by mouth 3 times daily And 1000mg daily PRN  Refills: 0             CONTINUE these medicines which have NOT CHANGED        Dose / Directions   gabapentin 300 MG capsule  Commonly known as: NEURONTIN      Dose: 300 mg  [GABAPENTIN (NEURONTIN) 300 MG CAPSULE] Take 300 mg by mouth daily.  Refills: 0     hydrALAZINE 10 MG tablet  Commonly known as: APRESOLINE  Used for: Hypertension, unspecified type      Dose: 10 mg  Take 1 tablet (10 mg) by mouth 3 times daily For SBP>140  Refills: 0     levothyroxine 100 MCG tablet  Commonly known as: SYNTHROID/LEVOTHROID  Used for: Hypothyroidism, unspecified type      Dose: 100 mcg  Take 1 tablet (100 mcg) by mouth daily  Refills: 0               Case Management:  I have reviewed the care plan and MDS and do agree with the plan. Patient's desire to return to the community is present, but is not able due to care needs . Information reviewed:  Medications, vital signs, orders, and nursing notes.    ROS:  4 point ROS including Respiratory, CV, GI and , other than that noted in the HPI,  is negative    Vitals:  BP (!) 176/84   Pulse 61   Temp 97.9  F (36.6  C)   Resp 20   Wt 56.8 kg (125 lb 3.2 oz)   LMP  (LMP Unknown)   SpO2 96%   BMI 22.90 kg/m    Body mass index is 22.9 kg/m .  Exam:  GENERAL APPEARANCE:  in no distress, appears healthy  ENT:  Mouth and posterior oropharynx normal, moist mucous membranes  RESP:  lungs clear to auscultation , no respiratory distress  CV:  no edema, rate-normal  ABDOMEN:  no guarding or rebound  M/S:   Gait and station abnormal unsteady  SKIN:  Inspection of skin and subcutaneous tissue baseline  NEURO:   Examination of sensation by touch normal  PSYCH:  affect and mood normal    Lab/Diagnostic data:   Labs done in SNF are in [facility]. Please refer to them using [employer]/Care Everywhere.    ASSESSMENT/PLAN  (N18.4) Stage 4 chronic kidney disease (H)  (primary encounter diagnosis)  Comment: creat 1.68, GFR 28  Plan: dose meds renally  -BMP, BNP, TSH on 8/12    (I10) Primary hypertension  Comment: SBP's  150-170  Plan: continue hydralazine TID PRN for SBP>140   -vitals TID    (M25.511,  G89.29,  M25.512) Chronic pain of both shoulders  Comment: pain with any movement, intermittent headaches  Plan:   -change APAP to TID scheduled, Qday PRN  -continue gabapentin daily        Electronically signed by:  EFRAIN Hines CNP      The health plan new enrollment has happened. I have reviewed the  MDS, the preventative needs,  and facility care plan. The level of care is appropriate. I have reviewed the code status/advanced directives.        Sincerely,        EFRAIN Hines CNP

## 2024-08-08 ENCOUNTER — PATIENT OUTREACH (OUTPATIENT)
Dept: GERIATRIC MEDICINE | Facility: CLINIC | Age: 89
End: 2024-08-08
Payer: COMMERCIAL

## 2024-08-08 NOTE — LETTER
August 8, 2024      ALLIE EDWARDS EVARISTO  C/O DAYTON EVARISTO  [address] [facility] [address]      Dear Allie:    Diana, my name is TIANNA Haines, RN, PHN, [facility]. You are eligible for [facility] Case Management program through your enrollment in [employer]. We think you may benefit from this program. I am writing to invite you to be in our Case Management program.     The following are a few things the Case Management program can help you with:  Select or change your primary care doctor or primary care clinic  Find a specialist, if needed, near your home  Receive preventive care, such as flu shots  Join programs offered by [facility] that interest you, like wellness programs    As your , I will do the following to enroll you in the Case Management Program:  Schedule a telephone call with you to answer any questions you may have about case management  Conduct an assessment by phone to identify needs case management can help you with  Develop a care plan to address those needs  Help you obtain available care and resources as needed    I will call you soon to discuss your interest in this program and your health care needs.    Being in the Case Management program is voluntary and offered to you at no cost. If you accept being in the Case Management program, you can stop any time by calling me at [phone].    Sincerely,      TIANNA Haines, RN, PHN, [facility]  [phone]  [email or web address]    [id number]                                     (01/2020)          [address] [place] [address]  [phone]  fax [phone]  [facility]

## 2024-08-08 NOTE — PROGRESS NOTES
[facility] Care Coordination Contact    Member became effective with [facility] on 8/1/2024 with [employer].     Welcome letter sent to spouse Kareem todd FERNANDO.     Myla Humphrey  Care Management Specialist   [facility]   [phone]

## 2024-08-12 ENCOUNTER — LAB REQUISITION (OUTPATIENT)
Dept: LAB | Facility: CLINIC | Age: 89
End: 2024-08-12
Payer: COMMERCIAL

## 2024-08-12 DIAGNOSIS — I50.32 CHRONIC DIASTOLIC (CONGESTIVE) HEART FAILURE (H): ICD-10-CM

## 2024-08-12 DIAGNOSIS — N18.32 CHRONIC KIDNEY DISEASE, STAGE 3B (H): ICD-10-CM

## 2024-08-12 DIAGNOSIS — E03.9 HYPOTHYROIDISM, UNSPECIFIED: ICD-10-CM

## 2024-08-12 LAB
ANION GAP SERPL CALCULATED.3IONS-SCNC: 10 MMOL/L (ref 7–15)
BASOPHILS # BLD AUTO: 0 10E3/UL (ref 0–0.2)
BASOPHILS NFR BLD AUTO: 0 %
BUN SERPL-MCNC: 34.5 MG/DL (ref 8–23)
CALCIUM SERPL-MCNC: 9.1 MG/DL (ref 8.8–10.4)
CHLORIDE SERPL-SCNC: 106 MMOL/L (ref 98–107)
CREAT SERPL-MCNC: 1.07 MG/DL (ref 0.51–0.95)
EGFRCR SERPLBLD CKD-EPI 2021: 48 ML/MIN/1.73M2
EOSINOPHIL # BLD AUTO: 0.2 10E3/UL (ref 0–0.7)
EOSINOPHIL NFR BLD AUTO: 3 %
ERYTHROCYTE [DISTWIDTH] IN BLOOD BY AUTOMATED COUNT: 12.5 % (ref 10–15)
GLUCOSE SERPL-MCNC: 125 MG/DL (ref 70–99)
HCO3 SERPL-SCNC: 25 MMOL/L (ref 22–29)
HCT VFR BLD AUTO: 38.7 % (ref 35–47)
HGB BLD-MCNC: 12 G/DL (ref 11.7–15.7)
HOLD SPECIMEN: NORMAL
IMM GRANULOCYTES # BLD: 0 10E3/UL
IMM GRANULOCYTES NFR BLD: 0 %
LYMPHOCYTES # BLD AUTO: 1.5 10E3/UL (ref 0.8–5.3)
LYMPHOCYTES NFR BLD AUTO: 21 %
MCH RBC QN AUTO: 30 PG (ref 26.5–33)
MCHC RBC AUTO-ENTMCNC: 31 G/DL (ref 31.5–36.5)
MCV RBC AUTO: 97 FL (ref 78–100)
MONOCYTES # BLD AUTO: 0.5 10E3/UL (ref 0–1.3)
MONOCYTES NFR BLD AUTO: 7 %
NEUTROPHILS # BLD AUTO: 4.8 10E3/UL (ref 1.6–8.3)
NEUTROPHILS NFR BLD AUTO: 69 %
NRBC # BLD AUTO: 0 10E3/UL
NRBC BLD AUTO-RTO: 0 /100
NT-PROBNP SERPL-MCNC: 626 PG/ML (ref 0–1800)
PLATELET # BLD AUTO: 221 10E3/UL (ref 150–450)
POTASSIUM SERPL-SCNC: 4.8 MMOL/L (ref 3.4–5.3)
RBC # BLD AUTO: 4 10E6/UL (ref 3.8–5.2)
SODIUM SERPL-SCNC: 141 MMOL/L (ref 135–145)
TSH SERPL DL<=0.005 MIU/L-ACNC: 1.97 UIU/ML (ref 0.3–4.2)
WBC # BLD AUTO: 7 10E3/UL (ref 4–11)

## 2024-08-12 PROCEDURE — 80048 BASIC METABOLIC PNL TOTAL CA: CPT | Mod: ORL | Performed by: NURSE PRACTITIONER

## 2024-08-12 PROCEDURE — 85025 COMPLETE CBC W/AUTO DIFF WBC: CPT | Mod: ORL | Performed by: NURSE PRACTITIONER

## 2024-08-12 PROCEDURE — 84443 ASSAY THYROID STIM HORMONE: CPT | Mod: ORL | Performed by: NURSE PRACTITIONER

## 2024-08-12 PROCEDURE — 83880 ASSAY OF NATRIURETIC PEPTIDE: CPT | Mod: ORL | Performed by: NURSE PRACTITIONER

## 2024-08-15 VITALS
SYSTOLIC BLOOD PRESSURE: 178 MMHG | HEART RATE: 62 BPM | DIASTOLIC BLOOD PRESSURE: 62 MMHG | WEIGHT: 126.4 LBS | BODY MASS INDEX: 23.12 KG/M2 | OXYGEN SATURATION: 93 % | RESPIRATION RATE: 16 BRPM | TEMPERATURE: 98.2 F

## 2024-08-16 ENCOUNTER — NURSING HOME VISIT (OUTPATIENT)
Dept: GERIATRICS | Facility: CLINIC | Age: 89
End: 2024-08-16
Payer: COMMERCIAL

## 2024-08-16 DIAGNOSIS — N18.30 STAGE 3 CHRONIC KIDNEY DISEASE, UNSPECIFIED WHETHER STAGE 3A OR 3B CKD (H): ICD-10-CM

## 2024-08-16 DIAGNOSIS — E03.9 HYPOTHYROIDISM, UNSPECIFIED TYPE: ICD-10-CM

## 2024-08-16 DIAGNOSIS — F03.90 DEMENTIA WITHOUT BEHAVIORAL DISTURBANCE (H): Primary | ICD-10-CM

## 2024-08-16 PROCEDURE — 99309 SBSQ NF CARE MODERATE MDM 30: CPT | Performed by: NURSE PRACTITIONER

## 2024-08-16 NOTE — PROGRESS NOTES
[facility] GERIATRICS    Chief Complaint   Patient presents with    RECHECK     HPI:  Sissy Mccloud is a 89 year old  (1931), who is being seen today for an episodic care visit at: [facility] AT [facility] () [[id number]]. Today's concern is:   1. Dementia without behavioral disturbance (H)    2. Stage 3 chronic kidney disease, unspecified whether stage 3a or 3b CKD (H)    3. Hypothyroidism, unspecified type      Patient seen for follow up, cognitive limitations, pleasant, was in TCU but unable to return home due to care needs, no overt s/s depression, misses her cat and spouse recently , labs on  with creat 1.07, GFR 48, both improved over past few months with better intake and fluids, also TSH 1.97, overall appears healthy.    Allergies, and PMH/PSH reviewed in EPIC today.  REVIEW OF SYSTEMS:  4 point ROS including Respiratory, CV, GI and , other than that noted in the HPI,  is negative    Objective:   BP (!) 178/62   Pulse 62   Temp 98.2  F (36.8  C)   Resp 16   Wt 57.3 kg (126 lb 6.4 oz)   LMP  (LMP Unknown)   SpO2 93%   BMI 23.12 kg/m    GENERAL APPEARANCE:  in no distress, appears healthy  ENT:  Mouth and posterior oropharynx normal, moist mucous membranes  RESP:  lungs clear to auscultation , no respiratory distress  CV:  no edema, rate-normal  ABDOMEN:  bowel sounds normal  M/S:   Gait and station abnormal unsteady  SKIN:  Inspection of skin and subcutaneous tissue baseline  NEURO:   Examination of sensation by touch normal  PSYCH:  affect and mood normal    Labs done in SNF are in [employer]. Please refer to them using [employer]/Care Everywhere.    Assessment/Plan:  (F03.90) Dementia without behavioral disturbance (H)  (primary encounter diagnosis)  Comment: cognitive limitations, pleasant  Plan: staff to support as indicated  -continue without medication intervention    (N18.30) Stage 3 chronic kidney disease, unspecified whether stage 3a or 3b CKD (H)  Comment: creat was 1.68, now  1.07 with GFR 48  Plan: dose meds renally  -encourage fluids  -repeat BMP in 3-6 months    (E03.9) Hypothyroidism, unspecified type  Comment: TSH was 1.97, goal 2-6, was 34.8 on 6/24  Plan: continue levothyroxine 100mcg  -repeat TSH on 3-6 months    MED REC REQUIRED  Post Medication Reconciliation Status: medication reconcilation previously completed during another office visit        Electronically signed by: EFRAIN Hines CNP

## 2024-08-16 NOTE — LETTER
2024      Sissy Mccloud  C/o Kareem Mccloud  [address] [address] [facility] [address] [facility] [address]        [facility] GERIATRICS    Chief Complaint   Patient presents with     RECHECK     HPI:  Sissy Mccloud is a 89 year old  (1931), who is being seen today for an episodic care visit at: [facility] AT [facility] () [[id number]]. Today's concern is:   1. Dementia without behavioral disturbance (H)    2. Stage 3 chronic kidney disease, unspecified whether stage 3a or 3b CKD (H)    3. Hypothyroidism, unspecified type      Patient seen for follow up, cognitive limitations, pleasant, was in TCU but unable to return home due to care needs, no overt s/s depression, misses her cat and spouse recently , labs on  with creat 1.07, GFR 48, both improved over past few months with better intake and fluids, also TSH 1.97, overall appears healthy.    Allergies, and PMH/PSH reviewed in EPIC today.  REVIEW OF SYSTEMS:  4 point ROS including Respiratory, CV, GI and , other than that noted in the HPI,  is negative    Objective:   BP (!) 178/62   Pulse 62   Temp 98.2  F (36.8  C)   Resp 16   Wt 57.3 kg (126 lb 6.4 oz)   LMP  (LMP Unknown)   SpO2 93%   BMI 23.12 kg/m    GENERAL APPEARANCE:  in no distress, appears healthy  ENT:  Mouth and posterior oropharynx normal, moist mucous membranes  RESP:  lungs clear to auscultation , no respiratory distress  CV:  no edema, rate-normal  ABDOMEN:  bowel sounds normal  M/S:   Gait and station abnormal unsteady  SKIN:  Inspection of skin and subcutaneous tissue baseline  NEURO:   Examination of sensation by touch normal  PSYCH:  affect and mood normal    Labs done in SNF are in [facility]. Please refer to them using [employer]/Care Everywhere.    Assessment/Plan:  (F03.90) Dementia without behavioral disturbance (H)  (primary encounter diagnosis)  Comment: cognitive limitations, pleasant  Plan: staff to support as indicated  -continue without medication intervention    (N18.30) Stage  3 chronic kidney disease, unspecified whether stage 3a or 3b CKD (H)  Comment: creat was 1.68, now 1.07 with GFR 48  Plan: dose meds renally  -encourage fluids  -repeat BMP in 3-6 months    (E03.9) Hypothyroidism, unspecified type  Comment: TSH was 1.97, goal 2-6, was 34.8 on 6/24  Plan: continue levothyroxine 100mcg  -repeat TSH on 3-6 months    MED REC REQUIRED  Post Medication Reconciliation Status: medication reconcilation previously completed during another office visit        Electronically signed by: EFRAIN Hines CNP          Sincerely,        EFRAIN Hines CNP

## 2024-08-26 ENCOUNTER — PATIENT OUTREACH (OUTPATIENT)
Dept: GERIATRIC MEDICINE | Facility: CLINIC | Age: 89
End: 2024-08-26
Payer: COMMERCIAL

## 2024-08-26 NOTE — PROGRESS NOTES
[employer] enrollment date: 8/26/2024      Received new [employer] member enrollment, reviewed EPIC notes. Member was hospitalized 6-14-24/6-18-24 for abdominal pain, nausea and vomiting.and Essential hypertension. She was then discharged to TCU for rehab.  She completed her rehab stay day and was transferred to long-term care where she now resides.  According to notes she is doing ok. She now has 24/7 care and support. Member will not be opened to active case management at this time. CM will follow up as needed.    TRINO HainesN, RN, PHN, [facility]  Care Coordinator-Long Term Care  [address] [address]  [email or web address]   [email or web address]     Office: [phone]   Fax: [phone]

## 2024-09-20 NOTE — PROGRESS NOTES
Health Maintenance Due   Topic Date Due   • COVID-19 Vaccine (1) Never done   • Hepatitis C Screening  Never done   • Influenza Vaccine (1) 09/01/2022       Patient is due for topics as listed above but is not proceeding with Immunization(s) COVID-19 and Influenza at this time. Education provided for Hepatitis C Screening.   [facility] GERIATRICS  Chief Complaint   Patient presents with    assisted Regulatory     [place] Medical Record Number:  [id number]  Place of Service where encounter took place:  [facility] () [[id number]]    HPI:    Sissy Mccloud  is 89 year old (8/24/1931), who is being seen today for a federally mandated E/M visit.     Today's concerns are:  -HF: Reports retrosternal hurt with exertion that subsides with rest.  Denies wheezing, cough.  No swelling in the legs.    -Frailty: completed TCU rehab program, due to ongoing increased cares need, transitioned to LTC wing.     -Pain: Endorses pain is controlled with the current regimen.    ====================================================================================  MEDICATIONS:  MED REC REQUIRED  Post Medication Reconciliation Status: medication reconcilation previously completed during another office visit         Review of your medicines            Accurate as of September 22, 2024  5:45 PM. If you have any questions, ask your nurse or doctor.                CONTINUE these medicines which have NOT CHANGED        Dose / Directions   acetaminophen 500 MG tablet  Commonly known as: TYLENOL  Used for: Chronic pain of both shoulders      Dose: 1,000 mg  Take 2 tablets (1,000 mg) by mouth 3 times daily And 1000mg daily PRN  Refills: 0     gabapentin 300 MG capsule  Commonly known as: NEURONTIN      Dose: 300 mg  [GABAPENTIN (NEURONTIN) 300 MG CAPSULE] Take 300 mg by mouth daily.  Refills: 0     hydrALAZINE 10 MG tablet  Commonly known as: APRESOLINE  Used for: Hypertension, unspecified type      Dose: 10 mg  Take 1 tablet (10 mg) by mouth 3 times daily For SBP>140  Refills: 0     levothyroxine 100 MCG tablet  Commonly known as: SYNTHROID/LEVOTHROID  Used for: Hypothyroidism, unspecified type      Dose: 100 mcg  Take 1 tablet (100 mcg) by mouth daily  Refills: 0            ROS:  4 point ROS including Respiratory, CV, GI and , other than that  "noted in the HPI,  is negative    Vitals:  /64   Pulse 68   Temp 98  F (36.7  C)   Resp 18   Ht 1.575 m (5' 2\")   Wt 57.1 kg (125 lb 12.8 oz)   LMP  (LMP Unknown)   SpO2 94%   BMI 23.01 kg/m    Body mass index is 23.01 kg/m .  Exam:  GENERAL APPEARANCE:  in no distress,   RESP:  Unlabored breathing. CTA b/l but diminished over the bases with some coarse sounds.  CV:  S1S2 audible, regular HR, no murmur appreciated.  No peripheral edema.   ABDOMEN:  soft, NT/ND, BS audible.   M/S:   no joint deformity noted on observation.   SKIN:  No rash noted on observation  NEURO:   No NFD appreciated on observation.   PSYCH:  affect and mood normal    Lab/Diagnostic data: Reviewed in the chart and EHR.        ASSESSMENT/PLAN  ------------------------------  Diastolic heart failure, unspecified HF chronicity (H)  Primary hypertension  -Appears compensated today.  Continue current regimen.  Schedule routine follow-up.      Stage 3a chronic kidney disease (H)  -- Avoid nephrotoxic  medications. Renally dose medications. Monitor electrolytes, and dehydration status     Diet controlled diabetes (H)   A1C 7.2 06/24/2024    A1C 7.2 06/14/2024   -Controlled.  Goal is around 8%.    Chronic shoulder joint pain, bilateral:  - analgesia optimal with the current regimen    Hypothyroidism, unspecified type   TSH   Date Value Ref Range Status   08/12/2024 1.97 0.30 - 4.20 uIU/mL Final   01/31/2022 2.42 0.30 - 5.00 uIU/mL Final   -  On Levothyroxine. In frail Elderly adult, recommended TSH level is around 6 providing patient is asymptomatic and FT4 is wnl- preferably on the lower normal level.        Frailty:  -Uses wheelchair for mobility in the hallway.  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 Requiring extensive assistance from nursing.        Order:  - NNO      Electronically signed by:  Marek Orellana MD        "

## 2024-09-22 VITALS
SYSTOLIC BLOOD PRESSURE: 128 MMHG | HEIGHT: 62 IN | DIASTOLIC BLOOD PRESSURE: 64 MMHG | OXYGEN SATURATION: 94 % | WEIGHT: 125.8 LBS | TEMPERATURE: 98 F | RESPIRATION RATE: 18 BRPM | BODY MASS INDEX: 23.15 KG/M2 | HEART RATE: 68 BPM

## 2024-09-23 ENCOUNTER — NURSING HOME VISIT (OUTPATIENT)
Dept: GERIATRICS | Facility: CLINIC | Age: 89
End: 2024-09-23
Payer: COMMERCIAL

## 2024-09-23 DIAGNOSIS — M25.512 CHRONIC PAIN OF BOTH SHOULDERS: ICD-10-CM

## 2024-09-23 DIAGNOSIS — N18.31 STAGE 3A CHRONIC KIDNEY DISEASE (H): ICD-10-CM

## 2024-09-23 DIAGNOSIS — I10 PRIMARY HYPERTENSION: ICD-10-CM

## 2024-09-23 DIAGNOSIS — I50.30 DIASTOLIC HEART FAILURE, UNSPECIFIED HF CHRONICITY (H): Primary | ICD-10-CM

## 2024-09-23 DIAGNOSIS — E11.9 DIET-CONTROLLED DIABETES MELLITUS (H): ICD-10-CM

## 2024-09-23 DIAGNOSIS — G89.29 CHRONIC PAIN OF BOTH SHOULDERS: ICD-10-CM

## 2024-09-23 DIAGNOSIS — E03.9 HYPOTHYROIDISM, UNSPECIFIED TYPE: ICD-10-CM

## 2024-09-23 DIAGNOSIS — M25.511 CHRONIC PAIN OF BOTH SHOULDERS: ICD-10-CM

## 2024-09-23 PROCEDURE — 99309 SBSQ NF CARE MODERATE MDM 30: CPT | Performed by: FAMILY MEDICINE

## 2024-09-23 NOTE — LETTER
9/23/2024      Sissy Mccloud  C/o Kareem Mccloud  [address] [facility] [address]        [facility] GERIATRICS  Chief Complaint   Patient presents with     [facility] Regulatory     [place] Medical Record Number:  [id number]  Place of Service where encounter took place:  [facility] () [[id number]]    HPI:    Sissy Mccloud  is 89 year old (8/24/1931), who is being seen today for a federally mandated E/M visit.     Today's concerns are:  -HF: Reports retrosternal hurt with exertion that subsides with rest.  Denies wheezing, cough.  No swelling in the legs.    -Frailty: completed TCU rehab program, due to ongoing increased cares need, transitioned to LTC wing.     -Pain: Endorses pain is controlled with the current regimen.    ====================================================================================  MEDICATIONS:  MED REC REQUIRED  Post Medication Reconciliation Status: medication reconcilation previously completed during another office visit         Review of your medicines            Accurate as of September 22, 2024  5:45 PM. If you have any questions, ask your nurse or doctor.                CONTINUE these medicines which have NOT CHANGED        Dose / Directions   acetaminophen 500 MG tablet  Commonly known as: TYLENOL  Used for: Chronic pain of both shoulders      Dose: 1,000 mg  Take 2 tablets (1,000 mg) by mouth 3 times daily And 1000mg daily PRN  Refills: 0     gabapentin 300 MG capsule  Commonly known as: NEURONTIN      Dose: 300 mg  [GABAPENTIN (NEURONTIN) 300 MG CAPSULE] Take 300 mg by mouth daily.  Refills: 0     hydrALAZINE 10 MG tablet  Commonly known as: APRESOLINE  Used for: Hypertension, unspecified type      Dose: 10 mg  Take 1 tablet (10 mg) by mouth 3 times daily For SBP>140  Refills: 0     levothyroxine 100 MCG tablet  Commonly known as: SYNTHROID/LEVOTHROID  Used for: Hypothyroidism, unspecified type      Dose: 100 mcg  Take 1 tablet (100 mcg) by mouth  "daily  Refills: 0            ROS:  4 point ROS including Respiratory, CV, GI and , other than that noted in the HPI,  is negative    Vitals:  /64   Pulse 68   Temp 98  F (36.7  C)   Resp 18   Ht 1.575 m (5' 2\")   Wt 57.1 kg (125 lb 12.8 oz)   LMP  (LMP Unknown)   SpO2 94%   BMI 23.01 kg/m    Body mass index is 23.01 kg/m .  Exam:  GENERAL APPEARANCE:  in no distress,   RESP:  Unlabored breathing. CTA b/l but diminished over the bases with some coarse sounds.  CV:  S1S2 audible, regular HR, no murmur appreciated.  No peripheral edema.   ABDOMEN:  soft, NT/ND, BS audible.   M/S:   no joint deformity noted on observation.   SKIN:  No rash noted on observation  NEURO:   No NFD appreciated on observation.   PSYCH:  affect and mood normal    Lab/Diagnostic data: Reviewed in the chart and EHR.        ASSESSMENT/PLAN  ------------------------------  Diastolic heart failure, unspecified HF chronicity (H)  Primary hypertension  -Appears compensated today.  Continue current regimen.  Schedule routine follow-up.      Stage 3a chronic kidney disease (H)  -- Avoid nephrotoxic  medications. Renally dose medications. Monitor electrolytes, and dehydration status     Diet controlled diabetes (H)   A1C 7.2 06/24/2024    A1C 7.2 06/14/2024   -Controlled.  Goal is around 8%.    Chronic shoulder joint pain, bilateral:  - analgesia optimal with the current regimen    Hypothyroidism, unspecified type   TSH   Date Value Ref Range Status   08/12/2024 1.97 0.30 - 4.20 uIU/mL Final   01/31/2022 2.42 0.30 - 5.00 uIU/mL Final   -  On Levothyroxine. In frail Elderly adult, recommended TSH level is around 6 providing patient is asymptomatic and FT4 is wnl- preferably on the lower normal level.        Frailty:  -Uses wheelchair for mobility in the hallway.  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 Requiring extensive assistance from " nursing.        Order:  - NNO      Electronically signed by:  Marek Orellana MD            Sincerely,        Marek Orellana MD

## 2024-10-31 ENCOUNTER — CLINICAL UPDATE (OUTPATIENT)
Dept: PHARMACY | Facility: CLINIC | Age: 89
End: 2024-10-31
Payer: COMMERCIAL

## 2024-10-31 DIAGNOSIS — M62.81 GENERALIZED MUSCLE WEAKNESS: ICD-10-CM

## 2024-10-31 DIAGNOSIS — F03.90 DEMENTIA WITHOUT BEHAVIORAL DISTURBANCE (H): ICD-10-CM

## 2024-10-31 DIAGNOSIS — I10 HYPERTENSION: Primary | ICD-10-CM

## 2024-10-31 DIAGNOSIS — G62.9 PERIPHERAL NEUROPATHY: ICD-10-CM

## 2024-10-31 DIAGNOSIS — E55.9 VITAMIN D DEFICIENCY: ICD-10-CM

## 2024-10-31 PROCEDURE — 99207 PR NO CHARGE LOS: CPT | Performed by: PHARMACIST

## 2024-10-31 NOTE — PROGRESS NOTES
This patient's medication list and chart were reviewed as part of the service provided by [facility].    Assessment/Recommendations:    Could consider addition of vitamin D3 25mcg daily; noted to have low vitamin D level in past (11/2022 = 17), and may contribute to weakness, fatigue, low mood, etc.    No other recommendations for changes at this time.  On prn hydralazine due to history of difficult to control hypertension given orthostatic hypotension.  Couldn't tolerate amlodipine in the past, noted history of hyperkalemia which is likely why no longer on ACEi in addition to orthostasis history.  Heart rates also appear to be on lower end.    Estimated Creatinine Clearance: 29.6 mL/min (A) (based on SCr of 1.07 mg/dL (H)).      Kinsey Novak, Pharm.D.,[facility] [place] [facility]  Board Certified Geriatric Pharmacist  Medication Therapy Management Pharmacist  [phone]

## 2024-11-11 VITALS
DIASTOLIC BLOOD PRESSURE: 63 MMHG | BODY MASS INDEX: 23.78 KG/M2 | HEART RATE: 66 BPM | TEMPERATURE: 98 F | SYSTOLIC BLOOD PRESSURE: 139 MMHG | WEIGHT: 130 LBS | RESPIRATION RATE: 16 BRPM | OXYGEN SATURATION: 97 %

## 2024-11-12 ENCOUNTER — NURSING HOME VISIT (OUTPATIENT)
Dept: GERIATRICS | Facility: CLINIC | Age: 89
End: 2024-11-12
Payer: COMMERCIAL

## 2024-11-12 DIAGNOSIS — N18.31 STAGE 3A CHRONIC KIDNEY DISEASE (H): ICD-10-CM

## 2024-11-12 DIAGNOSIS — I10 PRIMARY HYPERTENSION: ICD-10-CM

## 2024-11-12 DIAGNOSIS — F03.90 DEMENTIA WITHOUT BEHAVIORAL DISTURBANCE (H): Primary | ICD-10-CM

## 2024-11-12 PROCEDURE — 99309 SBSQ NF CARE MODERATE MDM 30: CPT | Performed by: NURSE PRACTITIONER

## 2024-11-12 RX ORDER — VITAMIN B COMPLEX
25 TABLET ORAL DAILY
Status: SHIPPED
Start: 2024-11-12

## 2024-11-12 NOTE — PROGRESS NOTES
[facility] GERIATRICS  Chief Complaint   Patient presents with    [facility] Medical Record Number:  [id number]  Place of Service where encounter took place:  [facility] () [[id number]]    HPI:    Sissy Mccloud  is 89 year old (8/24/1931), who is being seen today for a federally mandated E/M visit. Today's concerns are:     Dementia without behavioral disturbance (H)  Stage 3a chronic kidney disease (H)  Primary hypertension    Patient seen for follow up, cognitive limitations, very focused on staff fighting and being not able to walk yet, wants therapy again, also creat 1.07 and GFR 48, also SBP's in the 130 range, overall appears healthy for age.    ALLERGIES:Ambien [zolpidem], Cleocin [clindamycin], and Codeine  PAST MEDICAL HISTORY: No past medical history on file.  PAST SURGICAL HISTORY:   has a past surgical history that includes Cholecystectomy.  FAMILY HISTORY: family history is not on file.  SOCIAL HISTORY:  reports that she has never smoked. She has never used smokeless tobacco. She reports that she does not use drugs.    MEDICATIONS:  MED REC REQUIRED  Post Medication Reconciliation Status: medication reconcilation previously completed during another office visit         Review of your medicines            Accurate as of November 12, 2024  3:49 PM. If you have any questions, ask your nurse or doctor.                START taking        Dose / Directions   Vitamin D3 25 mcg (1000 units) tablet  Commonly known as: CHOLECALCIFEROL  Used for: Dementia without behavioral disturbance (H)  Started by: Dayron Mann      Dose: 25 mcg  Take 1 tablet (25 mcg) by mouth daily.  Refills: 0            CONTINUE these medicines which have NOT CHANGED        Dose / Directions   acetaminophen 500 MG tablet  Commonly known as: TYLENOL  Used for: Chronic pain of both shoulders      Dose: 1,000 mg  Take 2 tablets (1,000 mg) by mouth 3 times daily And 1000mg daily  PRN  Refills: 0     gabapentin 300 MG capsule  Commonly known as: NEURONTIN      Dose: 300 mg  [GABAPENTIN (NEURONTIN) 300 MG CAPSULE] Take 300 mg by mouth daily.  Refills: 0     hydrALAZINE 10 MG tablet  Commonly known as: APRESOLINE  Used for: Hypertension, unspecified type      Dose: 10 mg  Take 1 tablet (10 mg) by mouth 3 times daily For SBP>140  Refills: 0     levothyroxine 100 MCG tablet  Commonly known as: SYNTHROID/LEVOTHROID  Used for: Hypothyroidism, unspecified type      Dose: 100 mcg  Take 1 tablet (100 mcg) by mouth daily  Refills: 0               Case Management:  I have reviewed the care plan and MDS and do agree with the plan. Patient's desire to return to the community is present, but is not able due to care needs . Information reviewed:  Medications, vital signs, orders, and nursing notes.    ROS:  4 point ROS including Respiratory, CV, GI and , other than that noted in the HPI,  is negative    Vitals:  /63   Pulse 66   Temp 98  F (36.7  C)   Resp 16   Wt 59 kg (130 lb)   LMP  (LMP Unknown)   SpO2 97%   BMI 23.78 kg/m    Body mass index is 23.78 kg/m .  Exam:  GENERAL APPEARANCE:  in no distress, appears healthy  ENT:  Mouth and posterior oropharynx normal, moist mucous membranes  RESP:  lungs clear to auscultation , no respiratory distress  CV:  no edema, rate-normal  ABDOMEN:  no guarding or rebound  M/S:   Gait and station abnormal unsteady  SKIN:  Inspection of skin and subcutaneous tissue baseline  NEURO:   Examination of sensation by touch normal  PSYCH:  affect and mood normal    Lab/Diagnostic data:   Labs done in SNF are in [facility]. Please refer to them using [employer]/Care Everywhere.    ASSESSMENT/PLAN  (F03.90) Dementia without behavioral disturbance (H)  (primary encounter diagnosis)  Comment: cognitive limitations, mild anxiety component  Plan:   -continue without medication intervention unless causing distress  -staff to support as possible  -requesting chocolate  boost PRN    (N18.31) Stage 3a chronic kidney disease (H)  Comment: creat 1.07, GFR 48  Plan: dose meds renally  -repeat BMP in 3-6 months    (I10) Primary hypertension  Comment: SBP's in the 130 range  Plan:   -continue hydralazine TID PRN for SBP >140  -check BP BID  -discontinue hydralazine if SBP's never >140        Electronically signed by:  EFRAIN Hines CNP

## 2024-11-12 NOTE — LETTER
11/12/2024      Sissy Mccloud  C/o Kareem Mccloud  [address] [facility] [address]        [facility] GERIATRICS  Chief Complaint   Patient presents with     [facility] Regulatory     [place] Medical Record Number:  [id number]  Place of Service where encounter took place:  [facility] () [[id number]]    HPI:    Sissy Mccloud  is 89 year old (8/24/1931), who is being seen today for a federally mandated E/M visit. Today's concerns are:     Dementia without behavioral disturbance (H)  Stage 3a chronic kidney disease (H)  Primary hypertension    Patient seen for follow up, cognitive limitations, very focused on staff fighting and being not able to walk yet, wants therapy again, also creat 1.07 and GFR 48, also SBP's in the 130 range, overall appears healthy for age.    ALLERGIES:Ambien [zolpidem], Cleocin [clindamycin], and Codeine  PAST MEDICAL HISTORY: No past medical history on file.  PAST SURGICAL HISTORY:   has a past surgical history that includes Cholecystectomy.  FAMILY HISTORY: family history is not on file.  SOCIAL HISTORY:  reports that she has never smoked. She has never used smokeless tobacco. She reports that she does not use drugs.    MEDICATIONS:  MED REC REQUIRED  Post Medication Reconciliation Status: medication reconcilation previously completed during another office visit         Review of your medicines            Accurate as of November 12, 2024  3:49 PM. If you have any questions, ask your nurse or doctor.                START taking        Dose / Directions   Vitamin D3 25 mcg (1000 units) tablet  Commonly known as: CHOLECALCIFEROL  Used for: Dementia without behavioral disturbance (H)  Started by: Dayron Mann      Dose: 25 mcg  Take 1 tablet (25 mcg) by mouth daily.  Refills: 0            CONTINUE these medicines which have NOT CHANGED        Dose / Directions   acetaminophen 500 MG tablet  Commonly known as: TYLENOL  Used for: Chronic pain of both  shoulders      Dose: 1,000 mg  Take 2 tablets (1,000 mg) by mouth 3 times daily And 1000mg daily PRN  Refills: 0     gabapentin 300 MG capsule  Commonly known as: NEURONTIN      Dose: 300 mg  [GABAPENTIN (NEURONTIN) 300 MG CAPSULE] Take 300 mg by mouth daily.  Refills: 0     hydrALAZINE 10 MG tablet  Commonly known as: APRESOLINE  Used for: Hypertension, unspecified type      Dose: 10 mg  Take 1 tablet (10 mg) by mouth 3 times daily For SBP>140  Refills: 0     levothyroxine 100 MCG tablet  Commonly known as: SYNTHROID/LEVOTHROID  Used for: Hypothyroidism, unspecified type      Dose: 100 mcg  Take 1 tablet (100 mcg) by mouth daily  Refills: 0               Case Management:  I have reviewed the care plan and MDS and do agree with the plan. Patient's desire to return to the community is present, but is not able due to care needs . Information reviewed:  Medications, vital signs, orders, and nursing notes.    ROS:  4 point ROS including Respiratory, CV, GI and , other than that noted in the HPI,  is negative    Vitals:  /63   Pulse 66   Temp 98  F (36.7  C)   Resp 16   Wt 59 kg (130 lb)   LMP  (LMP Unknown)   SpO2 97%   BMI 23.78 kg/m    Body mass index is 23.78 kg/m .  Exam:  GENERAL APPEARANCE:  in no distress, appears healthy  ENT:  Mouth and posterior oropharynx normal, moist mucous membranes  RESP:  lungs clear to auscultation , no respiratory distress  CV:  no edema, rate-normal  ABDOMEN:  no guarding or rebound  M/S:   Gait and station abnormal unsteady  SKIN:  Inspection of skin and subcutaneous tissue baseline  NEURO:   Examination of sensation by touch normal  PSYCH:  affect and mood normal    Lab/Diagnostic data:   Labs done in SNF are in [place] EPIC. Please refer to them using [employer] Everywhere.    ASSESSMENT/PLAN  (F03.90) Dementia without behavioral disturbance (H)  (primary encounter diagnosis)  Comment: cognitive limitations, mild anxiety component  Plan:   -continue without  medication intervention unless causing distress  -staff to support as possible  -requesting chocolate boost PRN    (N18.31) Stage 3a chronic kidney disease (H)  Comment: creat 1.07, GFR 48  Plan: dose meds renally  -repeat BMP in 3-6 months    (I10) Primary hypertension  Comment: SBP's in the 130 range  Plan:   -continue hydralazine TID PRN for SBP >140  -check BP BID  -discontinue hydralazine if SBP's never >140        Electronically signed by:  EFRAIN Hines CNP           Sincerely,        EFRAIN Hines CNP

## 2025-01-19 ENCOUNTER — TELEPHONE (OUTPATIENT)
Dept: GERIATRICS | Facility: CLINIC | Age: OVER 89
End: 2025-01-19
Payer: COMMERCIAL

## 2025-01-19 NOTE — TELEPHONE ENCOUNTER
Sissy Mccloud is a 89 year old  (8/24/1931), Nurse called today to report: Nurse called to report patient had 2 loose stools along with 2 emesis.  There is a norovirus outbreak in the facility.  Stool was not foul-smelling, her vital signs are stable.  Zofran 4 mg every 6 hours was ordered along with Imodium 2 mg capsule 4 times daily as needed     Electronically signed by:   Flori Owen CNP

## 2025-01-22 NOTE — PROGRESS NOTES
[facility] GERIATRICS  Chief Complaint   Patient presents with    [facility] Regulatory     [place] Medical Record Number:  [id number]  Place of Service where encounter took place:  [facility] () [[id number]]    HPI:    Sissy Mccloud  is 89 year old (8/24/1931), who is being seen today for a federally mandated E/M visit.     Today's concerns are:  -HF: Reports retrosternal hurt with exertion that subsides with rest.  Denies wheezing, cough.  No swelling in the legs.    -Frailty: self wheel the chair.     -Pain: Endorses pain is controlled with the current regimen.    ====================================================================================  MEDICATIONS:  MED REC REQUIRED  Post Medication Reconciliation Status: medication reconcilation previously completed during another office visit         Review of your medicines            Accurate as of January 26, 2025  3:19 PM. If you have any questions, ask your nurse or doctor.                CONTINUE these medicines which have NOT CHANGED        Dose / Directions   acetaminophen 500 MG tablet  Commonly known as: TYLENOL  Used for: Chronic pain of both shoulders      Dose: 1,000 mg  Take 2 tablets (1,000 mg) by mouth 3 times daily And 1000mg daily PRN  Refills: 0     gabapentin 300 MG capsule  Commonly known as: NEURONTIN      Dose: 300 mg  [GABAPENTIN (NEURONTIN) 300 MG CAPSULE] Take 300 mg by mouth daily.  Refills: 0     hydrALAZINE 10 MG tablet  Commonly known as: APRESOLINE  Used for: Hypertension, unspecified type      Dose: 10 mg  Take 1 tablet (10 mg) by mouth 3 times daily For SBP>140  Refills: 0     levothyroxine 100 MCG tablet  Commonly known as: SYNTHROID/LEVOTHROID  Used for: Hypothyroidism, unspecified type      Dose: 100 mcg  Take 1 tablet (100 mcg) by mouth daily  Refills: 0     Vitamin D3 25 mcg (1000 units) tablet  Commonly known as: CHOLECALCIFEROL  Used for: Dementia without behavioral disturbance (H)      Dose: 25  "mcg  Take 1 tablet (25 mcg) by mouth daily.  Refills: 0            ROS:  4 point ROS including Respiratory, CV, GI and , other than that noted in the HPI,  is negative    Vitals:  BP (!) 170/79   Pulse 59   Temp 97.6  F (36.4  C)   Resp 18   Ht 1.575 m (5' 2\")   Wt 56.9 kg (125 lb 8 oz)   LMP  (LMP Unknown)   SpO2 94%   BMI 22.95 kg/m    Body mass index is 22.95 kg/m .  Exam:  GENERAL APPEARANCE:  in no distress,   RESP:  Unlabored breathing. CTA b/l but diminished over the bases with some coarse sounds.  CV:  S1S2 audible, regular HR, no murmur appreciated.  No peripheral edema.   ABDOMEN:  soft, NT/ND, BS audible.   M/S:   no joint deformity noted on observation.   SKIN:  No rash noted on observation  NEURO:   No NFD appreciated on observation.   PSYCH:  affect and mood normal    Lab/Diagnostic data: Reviewed in the chart and EHR.        ASSESSMENT/PLAN  ------------------------------  Diastolic heart failure, unspecified HF chronicity (H)  Primary hypertension  -Appears compensated today.  Continue current regimen.  Schedule routine follow-up.      Stage 4 chronic kidney disease (H)  - this is based on estimated CrCl: 29.6 ml/min, based on serum creatinine of 1.07 mg/dl. See Gilma Novak's note (10/31)  -- Avoid nephrotoxic  medications. Renally dose medications. Monitor electrolytes, and dehydration status.  -nephrology referral.   - will repeat BMP     Diet controlled diabetes (H)   A1C 7.2 06/24/2024    A1C 7.2 06/14/2024   -Controlled.  Goal is around 8%.    Chronic shoulder joint pain, bilateral:  - analgesia optimal with the current regimen    Hypothyroidism, unspecified type   TSH   Date Value Ref Range Status   08/12/2024 1.97 0.30 - 4.20 uIU/mL Final   01/31/2022 2.42 0.30 - 5.00 uIU/mL Final   -  On Levothyroxine. In frail Elderly adult, recommended TSH level is around 6 providing patient is asymptomatic and FT4 is wnl- preferably on the lower normal level.        Frailty:  -Uses wheelchair " for mobility in the hallway.  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 Requiring extensive assistance from nursing.        Order:   - BMP  dx CKD       Electronically signed by:  Marek Orellana MD

## 2025-01-26 VITALS
RESPIRATION RATE: 18 BRPM | OXYGEN SATURATION: 94 % | WEIGHT: 125.5 LBS | TEMPERATURE: 97.6 F | DIASTOLIC BLOOD PRESSURE: 79 MMHG | SYSTOLIC BLOOD PRESSURE: 170 MMHG | BODY MASS INDEX: 23.1 KG/M2 | HEIGHT: 62 IN | HEART RATE: 59 BPM

## 2025-01-26 PROBLEM — K51.50 LEFT SIDED COLITIS WITHOUT COMPLICATIONS (H): Status: RESOLVED | Noted: 2022-02-26 | Resolved: 2025-01-26

## 2025-01-26 PROBLEM — N18.4 STAGE 4 CHRONIC KIDNEY DISEASE (H): Status: ACTIVE | Noted: 2025-01-26

## 2025-01-26 PROBLEM — F03.90 DEMENTIA WITHOUT BEHAVIORAL DISTURBANCE (H): Status: RESOLVED | Noted: 2024-08-16 | Resolved: 2025-01-26

## 2025-01-27 ENCOUNTER — NURSING HOME VISIT (OUTPATIENT)
Dept: GERIATRICS | Facility: CLINIC | Age: OVER 89
End: 2025-01-27
Payer: COMMERCIAL

## 2025-01-27 DIAGNOSIS — I50.30 DIASTOLIC HEART FAILURE, UNSPECIFIED HF CHRONICITY (H): Primary | ICD-10-CM

## 2025-01-27 DIAGNOSIS — N18.4 STAGE 4 CHRONIC KIDNEY DISEASE (H): ICD-10-CM

## 2025-01-27 DIAGNOSIS — G89.29 CHRONIC PAIN OF BOTH SHOULDERS: ICD-10-CM

## 2025-01-27 DIAGNOSIS — M25.512 CHRONIC PAIN OF BOTH SHOULDERS: ICD-10-CM

## 2025-01-27 DIAGNOSIS — M25.511 CHRONIC PAIN OF BOTH SHOULDERS: ICD-10-CM

## 2025-01-27 DIAGNOSIS — I10 PRIMARY HYPERTENSION: ICD-10-CM

## 2025-01-27 DIAGNOSIS — N18.31 STAGE 3A CHRONIC KIDNEY DISEASE (H): ICD-10-CM

## 2025-01-27 DIAGNOSIS — E03.9 HYPOTHYROIDISM, UNSPECIFIED TYPE: ICD-10-CM

## 2025-01-27 DIAGNOSIS — E11.9 DIET-CONTROLLED DIABETES MELLITUS (H): ICD-10-CM

## 2025-01-27 DIAGNOSIS — R54 FRAILTY: ICD-10-CM

## 2025-01-27 PROCEDURE — 99309 SBSQ NF CARE MODERATE MDM 30: CPT | Performed by: FAMILY MEDICINE

## 2025-01-27 NOTE — LETTER
1/27/2025      Sissy Mccloud  C/o Kareem Mccloud  [address] [facility] [address]        [facility] GERIATRICS  Chief Complaint   Patient presents with     retirement Regulatory     [place] Medical Record Number:  [id number]  Place of Service where encounter took place:  [facility] () [[id number]]    HPI:    Sissy Mccloud  is 89 year old (8/24/1931), who is being seen today for a federally mandated E/M visit.     Today's concerns are:  -HF: Reports retrosternal hurt with exertion that subsides with rest.  Denies wheezing, cough.  No swelling in the legs.    -Frailty: self wheel the chair.     -Pain: Endorses pain is controlled with the current regimen.    ====================================================================================  MEDICATIONS:  MED REC REQUIRED  Post Medication Reconciliation Status: medication reconcilation previously completed during another office visit         Review of your medicines            Accurate as of January 26, 2025  3:19 PM. If you have any questions, ask your nurse or doctor.                CONTINUE these medicines which have NOT CHANGED        Dose / Directions   acetaminophen 500 MG tablet  Commonly known as: TYLENOL  Used for: Chronic pain of both shoulders      Dose: 1,000 mg  Take 2 tablets (1,000 mg) by mouth 3 times daily And 1000mg daily PRN  Refills: 0     gabapentin 300 MG capsule  Commonly known as: NEURONTIN      Dose: 300 mg  [GABAPENTIN (NEURONTIN) 300 MG CAPSULE] Take 300 mg by mouth daily.  Refills: 0     hydrALAZINE 10 MG tablet  Commonly known as: APRESOLINE  Used for: Hypertension, unspecified type      Dose: 10 mg  Take 1 tablet (10 mg) by mouth 3 times daily For SBP>140  Refills: 0     levothyroxine 100 MCG tablet  Commonly known as: SYNTHROID/LEVOTHROID  Used for: Hypothyroidism, unspecified type      Dose: 100 mcg  Take 1 tablet (100 mcg) by mouth daily  Refills: 0     Vitamin D3 25 mcg (1000 units) tablet  Commonly  "known as: CHOLECALCIFEROL  Used for: Dementia without behavioral disturbance (H)      Dose: 25 mcg  Take 1 tablet (25 mcg) by mouth daily.  Refills: 0            ROS:  4 point ROS including Respiratory, CV, GI and , other than that noted in the HPI,  is negative    Vitals:  BP (!) 170/79   Pulse 59   Temp 97.6  F (36.4  C)   Resp 18   Ht 1.575 m (5' 2\")   Wt 56.9 kg (125 lb 8 oz)   LMP  (LMP Unknown)   SpO2 94%   BMI 22.95 kg/m    Body mass index is 22.95 kg/m .  Exam:  GENERAL APPEARANCE:  in no distress,   RESP:  Unlabored breathing. CTA b/l but diminished over the bases with some coarse sounds.  CV:  S1S2 audible, regular HR, no murmur appreciated.  No peripheral edema.   ABDOMEN:  soft, NT/ND, BS audible.   M/S:   no joint deformity noted on observation.   SKIN:  No rash noted on observation  NEURO:   No NFD appreciated on observation.   PSYCH:  affect and mood normal    Lab/Diagnostic data: Reviewed in the chart and EHR.        ASSESSMENT/PLAN  ------------------------------  Diastolic heart failure, unspecified HF chronicity (H)  Primary hypertension  -Appears compensated today.  Continue current regimen.  Schedule routine follow-up.      Stage 4 chronic kidney disease (H)  - this is based on estimated CrCl: 29.6 ml/min, based on serum creatinine of 1.07 mg/dl. See PharmRADHA Novak's note (10/31)  -- Avoid nephrotoxic  medications. Renally dose medications. Monitor electrolytes, and dehydration status.  -nephrology referral.   - will repeat BMP     Diet controlled diabetes (H)   A1C 7.2 06/24/2024    A1C 7.2 06/14/2024   -Controlled.  Goal is around 8%.    Chronic shoulder joint pain, bilateral:  - analgesia optimal with the current regimen    Hypothyroidism, unspecified type   TSH   Date Value Ref Range Status   08/12/2024 1.97 0.30 - 4.20 uIU/mL Final   01/31/2022 2.42 0.30 - 5.00 uIU/mL Final   -  On Levothyroxine. In frail Elderly adult, recommended TSH level is around 6 providing patient is " asymptomatic and FT4 is wnl- preferably on the lower normal level.        Frailty:  -Uses wheelchair for mobility in the hallway.  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 Requiring extensive assistance from nursing.        Order:   - BMP  dx CKD       Electronically signed by:  Marek Orellana MD          Sincerely,        Marek Orellana MD    Electronically signed

## 2025-01-28 ENCOUNTER — LAB REQUISITION (OUTPATIENT)
Dept: LAB | Facility: CLINIC | Age: OVER 89
End: 2025-01-28
Payer: COMMERCIAL

## 2025-01-28 DIAGNOSIS — N18.32 CHRONIC KIDNEY DISEASE, STAGE 3B (H): ICD-10-CM

## 2025-01-28 PROCEDURE — 36415 COLL VENOUS BLD VENIPUNCTURE: CPT | Mod: ORL | Performed by: FAMILY MEDICINE

## 2025-01-28 PROCEDURE — 80048 BASIC METABOLIC PNL TOTAL CA: CPT | Mod: ORL | Performed by: FAMILY MEDICINE

## 2025-01-28 PROCEDURE — P9604 ONE-WAY ALLOW PRORATED TRIP: HCPCS | Mod: ORL | Performed by: FAMILY MEDICINE

## 2025-01-29 LAB
ANION GAP SERPL CALCULATED.3IONS-SCNC: 14 MMOL/L (ref 7–15)
BUN SERPL-MCNC: 28.6 MG/DL (ref 8–23)
CALCIUM SERPL-MCNC: 9.4 MG/DL (ref 8.8–10.4)
CHLORIDE SERPL-SCNC: 100 MMOL/L (ref 98–107)
CREAT SERPL-MCNC: 1.05 MG/DL (ref 0.51–0.95)
EGFRCR SERPLBLD CKD-EPI 2021: 49 ML/MIN/1.73M2
GLUCOSE SERPL-MCNC: 163 MG/DL (ref 70–99)
HCO3 SERPL-SCNC: 25 MMOL/L (ref 22–29)
POTASSIUM SERPL-SCNC: 4.7 MMOL/L (ref 3.4–5.3)
SODIUM SERPL-SCNC: 139 MMOL/L (ref 135–145)

## 2025-03-11 ENCOUNTER — NURSING HOME VISIT (OUTPATIENT)
Dept: GERIATRICS | Facility: CLINIC | Age: OVER 89
End: 2025-03-11
Payer: COMMERCIAL

## 2025-03-11 VITALS
DIASTOLIC BLOOD PRESSURE: 86 MMHG | RESPIRATION RATE: 20 BRPM | SYSTOLIC BLOOD PRESSURE: 128 MMHG | BODY MASS INDEX: 23.05 KG/M2 | WEIGHT: 126 LBS | HEART RATE: 76 BPM | OXYGEN SATURATION: 97 % | TEMPERATURE: 98 F

## 2025-03-11 DIAGNOSIS — N18.30 STAGE 3 CHRONIC KIDNEY DISEASE, UNSPECIFIED WHETHER STAGE 3A OR 3B CKD (H): ICD-10-CM

## 2025-03-11 DIAGNOSIS — F03.A4 MILD DEMENTIA WITH ANXIETY, UNSPECIFIED DEMENTIA TYPE (H): Primary | ICD-10-CM

## 2025-03-11 DIAGNOSIS — K59.01 SLOW TRANSIT CONSTIPATION: ICD-10-CM

## 2025-03-11 PROCEDURE — 99309 SBSQ NF CARE MODERATE MDM 30: CPT | Performed by: NURSE PRACTITIONER

## 2025-03-11 RX ORDER — AMOXICILLIN 250 MG
2 CAPSULE ORAL DAILY
Status: SHIPPED
Start: 2025-03-11

## 2025-03-11 NOTE — PROGRESS NOTES
[facility] GERIATRICS    Chief Complaint   Patient presents with    RECHECK     HPI:  Sissy Mccloud is a 89 year old  (8/24/1931), who is being seen today for an episodic care visit at: [facility] AT [facility] () [[id number]]. Today's concern is:   1. Mild dementia with anxiety, unspecified dementia type (H)    2. Slow transit constipation    3. Stage 3 chronic kidney disease, unspecified whether stage 3a or 3b CKD (H)      Patient seen on request, cognitive limitations, pleasant, having firm bm's every 3-4 days, not on bowel regimen, reports needing to manually dig her stool out of rectum using fingers on a regular basis, unsure if this information has been told to staff, rectal pain at times, denies bleeding, states she has always felt 'plugged' and has done digital extraction regularly, also renal status has improved, creat 1.05, GFR 49, overall appears healthy for age.    Allergies, and PMH/PSH reviewed in EPIC today.  REVIEW OF SYSTEMS:  4 point ROS including Respiratory, CV, GI and , other than that noted in the HPI,  is negative    Objective:   /86   Pulse 76   Temp 98  F (36.7  C)   Resp 20   Wt 57.2 kg (126 lb)   LMP  (LMP Unknown)   SpO2 97%   BMI 23.05 kg/m    GENERAL APPEARANCE:  in no distress, appears healthy  ENT:  Mouth and posterior oropharynx normal, moist mucous membranes  RESP:  lungs clear to auscultation , no respiratory distress  CV:  no edema, rate-normal  ABDOMEN:  no guarding or rebound  M/S:   Gait and station abnormal unsteady  SKIN:  Inspection of skin and subcutaneous tissue baseline  NEURO:   Examination of sensation by touch normal  PSYCH:  affect and mood normal    Labs done in SNF are in [facility]. Please refer to them using [employer] Everywhere.    Assessment/Plan:  (F03.A4) Mild dementia with anxiety, unspecified dementia type (H)  (primary encounter diagnosis)  (K59.01) Slow transit constipation  Comment: cognitive limitations with digital extraction of  stool  Plan:   -start senokot 2 tabs Qam  -encourage fluid intake  -consider trial prune juice  -staff to support as indicated  -if stool not soft/formed then plan to double senokot  -staff to monitor output    (N18.30) Stage 3 chronic kidney disease, unspecified whether stage 3a or 3b CKD (H)  Comment: creat 1.05, GFR 49, best results since admission  Plan:   -dose meds renally  -encourage fluids  -repeat BMP in 3-6 months    MED REC REQUIRED  Post Medication Reconciliation Status: medication reconcilation previously completed during another office visit        Electronically signed by: EFRAIN Hines CNP

## 2025-03-11 NOTE — LETTER
3/11/2025      Sissy Mccloud  C/o Kareem Mccloud  [address] [facility] [address]        [facility] GERIATRICS    Chief Complaint   Patient presents with     RECHECK     HPI:  Sissy Mccloud is a 89 year old  (8/24/1931), who is being seen today for an episodic care visit at: [facility] AT [facility] () [[id number]]. Today's concern is:   1. Mild dementia with anxiety, unspecified dementia type (H)    2. Slow transit constipation    3. Stage 3 chronic kidney disease, unspecified whether stage 3a or 3b CKD (H)      Patient seen on request, cognitive limitations, pleasant, having firm bm's every 3-4 days, not on bowel regimen, reports needing to manually dig her stool out of rectum using fingers on a regular basis, unsure if this information has been told to staff, rectal pain at times, denies bleeding, states she has always felt 'plugged' and has done digital extraction regularly, also renal status has improved, creat 1.05, GFR 49, overall appears healthy for age.    Allergies, and PMH/PSH reviewed in EPIC today.  REVIEW OF SYSTEMS:  4 point ROS including Respiratory, CV, GI and , other than that noted in the HPI,  is negative    Objective:   /86   Pulse 76   Temp 98  F (36.7  C)   Resp 20   Wt 57.2 kg (126 lb)   LMP  (LMP Unknown)   SpO2 97%   BMI 23.05 kg/m    GENERAL APPEARANCE:  in no distress, appears healthy  ENT:  Mouth and posterior oropharynx normal, moist mucous membranes  RESP:  lungs clear to auscultation , no respiratory distress  CV:  no edema, rate-normal  ABDOMEN:  no guarding or rebound  M/S:   Gait and station abnormal unsteady  SKIN:  Inspection of skin and subcutaneous tissue baseline  NEURO:   Examination of sensation by touch normal  PSYCH:  affect and mood normal    Labs done in SNF are in [facility]. Please refer to them using [employer]Care Everywhere.    Assessment/Plan:  (F03.A4) Mild dementia with anxiety, unspecified dementia type (H)  (primary encounter  diagnosis)  (K59.01) Slow transit constipation  Comment: cognitive limitations with digital extraction of stool  Plan:   -start senokot 2 tabs Qam  -encourage fluid intake  -consider trial prune juice  -staff to support as indicated  -if stool not soft/formed then plan to double senokot  -staff to monitor output    (N18.30) Stage 3 chronic kidney disease, unspecified whether stage 3a or 3b CKD (H)  Comment: creat 1.05, GFR 49, best results since admission  Plan:   -dose meds renally  -encourage fluids  -repeat BMP in 3-6 months    MED REC REQUIRED  Post Medication Reconciliation Status: medication reconcilation previously completed during another office visit        Electronically signed by: EFRAIN Hines CNP          Sincerely,        EFRAIN Hines CNP    Electronically signed

## 2025-03-19 VITALS
OXYGEN SATURATION: 94 % | TEMPERATURE: 96.7 F | SYSTOLIC BLOOD PRESSURE: 190 MMHG | RESPIRATION RATE: 18 BRPM | WEIGHT: 124 LBS | BODY MASS INDEX: 22.68 KG/M2 | HEART RATE: 62 BPM | DIASTOLIC BLOOD PRESSURE: 72 MMHG

## 2025-03-20 ENCOUNTER — NURSING HOME VISIT (OUTPATIENT)
Dept: GERIATRICS | Facility: CLINIC | Age: OVER 89
End: 2025-03-20
Payer: COMMERCIAL

## 2025-03-20 DIAGNOSIS — K59.01 SLOW TRANSIT CONSTIPATION: ICD-10-CM

## 2025-03-20 DIAGNOSIS — F03.A4 MILD DEMENTIA WITH ANXIETY, UNSPECIFIED DEMENTIA TYPE (H): Primary | ICD-10-CM

## 2025-03-20 DIAGNOSIS — N18.30 STAGE 3 CHRONIC KIDNEY DISEASE, UNSPECIFIED WHETHER STAGE 3A OR 3B CKD (H): ICD-10-CM

## 2025-03-20 NOTE — PROGRESS NOTES
[facility] GERIATRICS  Chief Complaint   Patient presents with    [facility] Medical Record Number:  [id number]  Place of Service where encounter took place:  [facility] () [[id number]]    HPI:    Sissy Mccluod  is 89 year old (8/24/1931), who is being seen today for a federally mandated E/M visit. Today's concerns are:     Mild dementia with anxiety, unspecified dementia type (H)  Slow transit constipation  Stage 3 chronic kidney disease, unspecified whether stage 3a or 3b CKD (H)    Patient seen for follow up, cognitive limitations, today talking about multiple subjects, verbal clear, content altered, able to make needs known, recent report of digitally cleaning out rectum of firm stool, started on softeners, BM today formed/soft, also recent creat 1.05, GFR 49, overall appears healthy for age.    ALLERGIES:Ambien [zolpidem], Cleocin [clindamycin], and Codeine  PAST MEDICAL HISTORY: No past medical history on file.  PAST SURGICAL HISTORY:   has a past surgical history that includes Cholecystectomy.  FAMILY HISTORY: family history is not on file.  SOCIAL HISTORY:  reports that she has never smoked. She has never used smokeless tobacco. She reports that she does not use drugs.    MEDICATIONS:  MED REC REQUIRED  Post Medication Reconciliation Status: medication reconcilation previously completed during another office visit         Review of your medicines            Accurate as of March 20, 2025  2:12 PM. If you have any questions, ask your nurse or doctor.                CONTINUE these medicines which have NOT CHANGED        Dose / Directions   acetaminophen 500 MG tablet  Commonly known as: TYLENOL  Used for: Chronic pain of both shoulders      Dose: 1,000 mg  Take 2 tablets (1,000 mg) by mouth 3 times daily And 1000mg daily PRN  Refills: 0     gabapentin 300 MG capsule  Commonly known as: NEURONTIN      Dose: 300 mg  [GABAPENTIN (NEURONTIN) 300 MG CAPSULE] Take 300 mg  by mouth daily.  Refills: 0     hydrALAZINE 10 MG tablet  Commonly known as: APRESOLINE  Used for: Hypertension, unspecified type      Dose: 10 mg  Take 1 tablet (10 mg) by mouth 3 times daily For SBP>140  Refills: 0     levothyroxine 100 MCG tablet  Commonly known as: SYNTHROID/LEVOTHROID  Used for: Hypothyroidism, unspecified type      Dose: 100 mcg  Take 1 tablet (100 mcg) by mouth daily  Refills: 0     senna-docusate 8.6-50 MG tablet  Commonly known as: SENOKOT-S/PERICOLACE  Used for: Slow transit constipation      Dose: 2 tablet  Take 2 tablets by mouth daily.  Refills: 0     Vitamin D3 25 mcg (1000 units) tablet  Commonly known as: CHOLECALCIFEROL  Used for: Dementia without behavioral disturbance (H)      Dose: 25 mcg  Take 1 tablet (25 mcg) by mouth daily.  Refills: 0               Case Management:  I have reviewed the care plan and MDS and do agree with the plan. Patient's desire to return to the community is present, but is not able due to care needs . Information reviewed:  Medications, vital signs, orders, and nursing notes.    ROS:  4 point ROS including Respiratory, CV, GI and , other than that noted in the HPI,  is negative    Vitals:  BP (!) 190/72   Pulse 62   Temp (!) 96.7  F (35.9  C)   Resp 18   Wt 56.2 kg (124 lb)   LMP  (LMP Unknown)   SpO2 94%   BMI 22.68 kg/m    Body mass index is 22.68 kg/m .  Exam:  GENERAL APPEARANCE:  in no distress, appears healthy  ENT:  Mouth and posterior oropharynx normal, moist mucous membranes  RESP:  lungs clear to auscultation , no respiratory distress  CV:  peripheral edema mild+ in lower legs, rate-normal  ABDOMEN:  bowel sounds normal  M/S:   Gait and station abnormal unsteady  SKIN:  Inspection of skin and subcutaneous tissue baseline  NEURO:   Examination of sensation by touch normal  PSYCH:  affect and mood normal    Lab/Diagnostic data:   Labs done in SNF are in [place] EPIC. Please refer to them using [employer]Care  Everywhere.    ASSESSMENT/PLAN  (F03.A4) Mild dementia with anxiety, unspecified dementia type (H)  (primary encounter diagnosis)  Comment: cognitive limitations, pleasant  Plan:   -staff to support as indicated  -continue without medication intervention    (K59.01) Slow transit constipation  Comment: stools now softer  Plan:   -continue newer senokot 2 tabs Qam  -staff to monitor output  -increase senokot if stools are firm    (N18.30) Stage 3 chronic kidney disease, unspecified whether stage 3a or 3b CKD (H)  Comment: creat 1.05, GFR 49  Plan:   -dose meds renally  -encourage fluids  -repeat BMP in 3-6 months      Electronically signed by:  EFRAIN Hines CNP

## 2025-03-20 NOTE — LETTER
3/20/2025      Sissy Mccloud  C/o Kareem Mccloud  [address] [facility] [address] [address]        [facility] GERIATRICS  Chief Complaint   Patient presents with     [facility] Medical Record Number:  [id number]  Place of Service where encounter took place:  [facility] () [[id number]]    HPI:    Sissy Mccloud  is 89 year old (8/24/1931), who is being seen today for a federally mandated E/M visit. Today's concerns are:     Mild dementia with anxiety, unspecified dementia type (H)  Slow transit constipation  Stage 3 chronic kidney disease, unspecified whether stage 3a or 3b CKD (H)    Patient seen for follow up, cognitive limitations, today talking about multiple subjects, verbal clear, content altered, able to make needs known, recent report of digitally cleaning out rectum of firm stool, started on softeners, BM today formed/soft, also recent creat 1.05, GFR 49, overall appears healthy for age.    ALLERGIES:Ambien [zolpidem], Cleocin [clindamycin], and Codeine  PAST MEDICAL HISTORY: No past medical history on file.  PAST SURGICAL HISTORY:   has a past surgical history that includes Cholecystectomy.  FAMILY HISTORY: family history is not on file.  SOCIAL HISTORY:  reports that she has never smoked. She has never used smokeless tobacco. She reports that she does not use drugs.    MEDICATIONS:  MED REC REQUIRED  Post Medication Reconciliation Status: medication reconcilation previously completed during another office visit         Review of your medicines            Accurate as of March 20, 2025  2:12 PM. If you have any questions, ask your nurse or doctor.                CONTINUE these medicines which have NOT CHANGED        Dose / Directions   acetaminophen 500 MG tablet  Commonly known as: TYLENOL  Used for: Chronic pain of both shoulders      Dose: 1,000 mg  Take 2 tablets (1,000 mg) by mouth 3 times daily And 1000mg daily PRN  Refills: 0     gabapentin 300 MG  capsule  Commonly known as: NEURONTIN      Dose: 300 mg  [GABAPENTIN (NEURONTIN) 300 MG CAPSULE] Take 300 mg by mouth daily.  Refills: 0     hydrALAZINE 10 MG tablet  Commonly known as: APRESOLINE  Used for: Hypertension, unspecified type      Dose: 10 mg  Take 1 tablet (10 mg) by mouth 3 times daily For SBP>140  Refills: 0     levothyroxine 100 MCG tablet  Commonly known as: SYNTHROID/LEVOTHROID  Used for: Hypothyroidism, unspecified type      Dose: 100 mcg  Take 1 tablet (100 mcg) by mouth daily  Refills: 0     senna-docusate 8.6-50 MG tablet  Commonly known as: SENOKOT-S/PERICOLACE  Used for: Slow transit constipation      Dose: 2 tablet  Take 2 tablets by mouth daily.  Refills: 0     Vitamin D3 25 mcg (1000 units) tablet  Commonly known as: CHOLECALCIFEROL  Used for: Dementia without behavioral disturbance (H)      Dose: 25 mcg  Take 1 tablet (25 mcg) by mouth daily.  Refills: 0               Case Management:  I have reviewed the care plan and MDS and do agree with the plan. Patient's desire to return to the community is present, but is not able due to care needs . Information reviewed:  Medications, vital signs, orders, and nursing notes.    ROS:  4 point ROS including Respiratory, CV, GI and , other than that noted in the HPI,  is negative    Vitals:  BP (!) 190/72   Pulse 62   Temp (!) 96.7  F (35.9  C)   Resp 18   Wt 56.2 kg (124 lb)   LMP  (LMP Unknown)   SpO2 94%   BMI 22.68 kg/m    Body mass index is 22.68 kg/m .  Exam:  GENERAL APPEARANCE:  in no distress, appears healthy  ENT:  Mouth and posterior oropharynx normal, moist mucous membranes  RESP:  lungs clear to auscultation , no respiratory distress  CV:  peripheral edema mild+ in lower legs, rate-normal  ABDOMEN:  bowel sounds normal  M/S:   Gait and station abnormal unsteady  SKIN:  Inspection of skin and subcutaneous tissue baseline  NEURO:   Examination of sensation by touch normal  PSYCH:  affect and mood normal    Lab/Diagnostic data:    Labs done in SNF are in [facility]. Please refer to them using EPIC/Care Everywhere.    ASSESSMENT/PLAN  (F03.A4) Mild dementia with anxiety, unspecified dementia type (H)  (primary encounter diagnosis)  Comment: cognitive limitations, pleasant  Plan:   -staff to support as indicated  -continue without medication intervention    (K59.01) Slow transit constipation  Comment: stools now softer  Plan:   -continue newer senokot 2 tabs Qam  -staff to monitor output  -increase senokot if stools are firm    (N18.30) Stage 3 chronic kidney disease, unspecified whether stage 3a or 3b CKD (H)  Comment: creat 1.05, GFR 49  Plan:   -dose meds renally  -encourage fluids  -repeat BMP in 3-6 months      Electronically signed by:  EFRAIN Hines CNP           Sincerely,        EFRAIN Hines CNP    Electronically signed

## 2025-03-29 ENCOUNTER — LAB REQUISITION (OUTPATIENT)
Dept: LAB | Facility: CLINIC | Age: OVER 89
End: 2025-03-29
Payer: COMMERCIAL

## 2025-03-29 DIAGNOSIS — R41.0 DISORIENTATION, UNSPECIFIED: ICD-10-CM

## 2025-03-29 LAB
ALBUMIN UR-MCNC: 30 MG/DL
APPEARANCE UR: ABNORMAL
BACTERIA #/AREA URNS HPF: ABNORMAL /HPF
BILIRUB UR QL STRIP: NEGATIVE
COLOR UR AUTO: YELLOW
GLUCOSE UR STRIP-MCNC: NEGATIVE MG/DL
HGB UR QL STRIP: NEGATIVE
HYALINE CASTS: 2 /LPF
KETONES UR STRIP-MCNC: NEGATIVE MG/DL
LEUKOCYTE ESTERASE UR QL STRIP: ABNORMAL
MUCOUS THREADS #/AREA URNS LPF: PRESENT /LPF
NITRATE UR QL: NEGATIVE
PH UR STRIP: 5.5 [PH] (ref 5–7)
RBC URINE: 5 /HPF
SP GR UR STRIP: 1.02 (ref 1–1.03)
SQUAMOUS EPITHELIAL: 5 /HPF
TRANSITIONAL EPI: 1 /HPF
UROBILINOGEN UR STRIP-MCNC: NORMAL MG/DL
WBC URINE: >182 /HPF

## 2025-03-29 PROCEDURE — 87088 URINE BACTERIA CULTURE: CPT | Mod: ORL | Performed by: NURSE PRACTITIONER

## 2025-03-29 PROCEDURE — 81001 URINALYSIS AUTO W/SCOPE: CPT | Mod: ORL | Performed by: NURSE PRACTITIONER

## 2025-03-30 LAB
BACTERIA UR CULT: ABNORMAL
BACTERIA UR CULT: ABNORMAL

## 2025-04-01 ENCOUNTER — NURSING HOME VISIT (OUTPATIENT)
Dept: GERIATRICS | Facility: CLINIC | Age: OVER 89
End: 2025-04-01
Payer: COMMERCIAL

## 2025-04-01 VITALS
SYSTOLIC BLOOD PRESSURE: 170 MMHG | OXYGEN SATURATION: 96 % | BODY MASS INDEX: 23.05 KG/M2 | TEMPERATURE: 97 F | HEART RATE: 69 BPM | RESPIRATION RATE: 20 BRPM | DIASTOLIC BLOOD PRESSURE: 74 MMHG | WEIGHT: 126 LBS

## 2025-04-01 DIAGNOSIS — N18.30 STAGE 3 CHRONIC KIDNEY DISEASE, UNSPECIFIED WHETHER STAGE 3A OR 3B CKD (H): ICD-10-CM

## 2025-04-01 DIAGNOSIS — F03.A4 MILD DEMENTIA WITH ANXIETY, UNSPECIFIED DEMENTIA TYPE (H): Primary | ICD-10-CM

## 2025-04-01 DIAGNOSIS — N30.00 ACUTE CYSTITIS WITHOUT HEMATURIA: ICD-10-CM

## 2025-04-01 PROCEDURE — 99309 SBSQ NF CARE MODERATE MDM 30: CPT | Performed by: NURSE PRACTITIONER

## 2025-04-01 RX ORDER — CEPHALEXIN 500 MG/1
500 CAPSULE ORAL 3 TIMES DAILY
Status: SHIPPED
Start: 2025-04-01 | End: 2025-04-06

## 2025-04-01 NOTE — LETTER
4/1/2025      Sissy Mcclodu  C/o Kareem Mccloud  [address] [facility] [address]        [facility] GERIATRICS    Chief Complaint   Patient presents with     RECHECK     HPI:  Sissy Mccloud is a 89 year old  (8/24/1931), who is being seen today for an episodic care visit at: [facility] AT [facility] () [[id number]]. Today's concern is:   1. Mild dementia with anxiety, unspecified dementia type (H)    2. Stage 3 chronic kidney disease, unspecified whether stage 3a or 3b CKD (H)    3. Acute cystitis without hematuria      Patient seen for follow up, cognitive limitations, anxiety component, no distress, recent creat 1.07, GFR 48, also having increased confusion and UA returned on 3/29 with >100k aerococcus urinae, some reason was not called in to provider and has not started ABX yet, afebrile, denies pain, still confused.    Allergies, and PMH/PSH reviewed in EPIC today.  REVIEW OF SYSTEMS:  4 point ROS including Respiratory, CV, GI and , other than that noted in the HPI,  is negative    Objective:   BP (!) 170/74   Pulse 69   Temp 97  F (36.1  C)   Resp 20   Wt 57.2 kg (126 lb)   LMP  (LMP Unknown)   SpO2 96%   BMI 23.05 kg/m    GENERAL APPEARANCE:  in no distress, appears healthy  ENT:  Mouth and posterior oropharynx normal, moist mucous membranes  RESP:  lungs clear to auscultation , no respiratory distress  CV:  no edema, rate-normal  ABDOMEN:  no guarding or rebound  M/S:   Gait and station abnormal transfer assist  SKIN:  Inspection of skin and subcutaneous tissue baseline  NEURO:   Examination of sensation by touch normal  PSYCH:  affect and mood normal    Labs done in SNF are in [facility]. Please refer to them using [employer]/Care Everywhere.    Assessment/Plan:  (F03.A4) Mild dementia with anxiety, unspecified dementia type (H)  (primary encounter diagnosis)  Comment: cognitive limitations, anxious, confused  Plan:   -staff to support as indicated  -no medication intervention at this  juncture    (N18.30) Stage 3 chronic kidney disease, unspecified whether stage 3a or 3b CKD (H)  Comment: creat 1.07, GFR 48  Plan:   -dose meds renally  -encourage fluids  -repeat BMP in 3-6 months    (N30.00) Acute cystitis without hematuria  Comment: UA suspect, UC 3/29 >100k aerococcus  Plan:   -start cephalexin TID x5 days  -monitor urine output  -if recurrent plan to start VitaC/Cranberry    MED REC REQUIRED  Post Medication Reconciliation Status: medication reconcilation previously completed during another office visit        Electronically signed by: EFRAIN Hines CNP          Sincerely,        EFRAIN Hines CNP    Electronically signed

## 2025-04-01 NOTE — PROGRESS NOTES
[facility] GERIATRICS    Chief Complaint   Patient presents with    RECHECK     HPI:  Sissy Mccloud is a 89 year old  (8/24/1931), who is being seen today for an episodic care visit at: [facility] AT [facility] () [[id number]]. Today's concern is:   1. Mild dementia with anxiety, unspecified dementia type (H)    2. Stage 3 chronic kidney disease, unspecified whether stage 3a or 3b CKD (H)    3. Acute cystitis without hematuria      Patient seen for follow up, cognitive limitations, anxiety component, no distress, recent creat 1.07, GFR 48, also having increased confusion and UA returned on 3/29 with >100k aerococcus urinae, some reason was not called in to provider and has not started ABX yet, afebrile, denies pain, still confused.    Allergies, and PMH/PSH reviewed in EPIC today.  REVIEW OF SYSTEMS:  4 point ROS including Respiratory, CV, GI and , other than that noted in the HPI,  is negative    Objective:   BP (!) 170/74   Pulse 69   Temp 97  F (36.1  C)   Resp 20   Wt 57.2 kg (126 lb)   LMP  (LMP Unknown)   SpO2 96%   BMI 23.05 kg/m    GENERAL APPEARANCE:  in no distress, appears healthy  ENT:  Mouth and posterior oropharynx normal, moist mucous membranes  RESP:  lungs clear to auscultation , no respiratory distress  CV:  no edema, rate-normal  ABDOMEN:  no guarding or rebound  M/S:   Gait and station abnormal transfer assist  SKIN:  Inspection of skin and subcutaneous tissue baseline  NEURO:   Examination of sensation by touch normal  PSYCH:  affect and mood normal    Labs done in SNF are in [facility]. Please refer to them using [employer]/Care Everywhere.    Assessment/Plan:  (F03.A4) Mild dementia with anxiety, unspecified dementia type (H)  (primary encounter diagnosis)  Comment: cognitive limitations, anxious, confused  Plan:   -staff to support as indicated  -no medication intervention at this juncture    (N18.30) Stage 3 chronic kidney disease, unspecified whether stage 3a or 3b CKD  (H)  Comment: creat 1.07, GFR 48  Plan:   -dose meds renally  -encourage fluids  -repeat BMP in 3-6 months    (N30.00) Acute cystitis without hematuria  Comment: UA suspect, UC 3/29 >100k aerococcus  Plan:   -start cephalexin TID x5 days  -monitor urine output  -if recurrent plan to start VitaC/Cranberry    MED REC REQUIRED  Post Medication Reconciliation Status: medication reconcilation previously completed during another office visit        Electronically signed by: EFRAIN Hines CNP

## 2025-05-06 ENCOUNTER — NURSING HOME VISIT (OUTPATIENT)
Dept: GERIATRICS | Facility: CLINIC | Age: OVER 89
End: 2025-05-06
Payer: COMMERCIAL

## 2025-05-06 VITALS
TEMPERATURE: 97.8 F | DIASTOLIC BLOOD PRESSURE: 75 MMHG | HEART RATE: 69 BPM | SYSTOLIC BLOOD PRESSURE: 128 MMHG | WEIGHT: 127 LBS | BODY MASS INDEX: 23.23 KG/M2 | OXYGEN SATURATION: 95 % | RESPIRATION RATE: 20 BRPM

## 2025-05-06 DIAGNOSIS — I10 PRIMARY HYPERTENSION: ICD-10-CM

## 2025-05-06 DIAGNOSIS — F03.A4 MILD DEMENTIA WITH ANXIETY, UNSPECIFIED DEMENTIA TYPE (H): Primary | ICD-10-CM

## 2025-05-06 DIAGNOSIS — N18.30 STAGE 3 CHRONIC KIDNEY DISEASE, UNSPECIFIED WHETHER STAGE 3A OR 3B CKD (H): ICD-10-CM

## 2025-05-06 PROCEDURE — 99309 SBSQ NF CARE MODERATE MDM 30: CPT | Performed by: NURSE PRACTITIONER

## 2025-05-06 NOTE — PROGRESS NOTES
[facility] GERIATRICS    Chief Complaint   Patient presents with    RECHECK     HPI:  Sissy Mccloud is a 89 year old  (8/24/1931), who is being seen today for an episodic care visit at: [facility] AT [facility] () [[id number]]. Today's concern is:   1. Mild dementia with anxiety, unspecified dementia type (H)    2. Stage 3 chronic kidney disease, unspecified whether stage 3a or 3b CKD (H)    3. Primary hypertension      Patient seen for follow up, talkative, cognitive limitations, states feeling picked on and wants to leave, admits to attempting to get out last week and now needs a wanderguard, creat 1.07 with GFR 48 greatly improved since arrival, SBP's in the 120-130 range, overall appears healthy.    Allergies, and PMH/PSH reviewed in EPIC today.  REVIEW OF SYSTEMS:  4 point ROS including Respiratory, CV, GI and , other than that noted in the HPI,  is negative    Objective:   /75   Pulse 69   Temp 97.8  F (36.6  C)   Resp 20   Wt 57.6 kg (127 lb)   LMP  (LMP Unknown)   SpO2 95%   BMI 23.23 kg/m    GENERAL APPEARANCE:  in no distress, appears healthy  ENT:  Mouth and posterior oropharynx normal, moist mucous membranes  RESP:  lungs clear to auscultation , no respiratory distress  CV:  no edema, rate-normal  ABDOMEN:  no guarding or rebound  M/S:   Gait and station abnormal transfer assist  SKIN:  Inspection of skin and subcutaneous tissue baseline  NEURO:   Examination of sensation by touch normal  PSYCH:  affect and mood normal    Labs done in SNF are in [facility]. Please refer to them using [employer]/Care Everywhere.    Assessment/Plan:  (F03.A4) Mild dementia with anxiety, unspecified dementia type (H)  (primary encounter diagnosis)  Comment: altered thought content, exit seeking  Plan:   -OK to apply wanderguard for elope attempts  -ACP eval for BHT  -consider trial celexa for mood  -staff to support as possible    (N18.30) Stage 3 chronic kidney disease, unspecified whether stage 3a or 3b  CKD (H)  Comment: creat 1.07, GFR 48  Plan:   -dose meds renally  -encourage fluid intake  -repeat BMP in 3-6 months    (I10) Primary hypertension  Comment: SBP's 120-130 range  Plan:   -continue hydralazine TID for SBP>140  -monitor vitals daily  -consider starting losartan in future    MED REC REQUIRED  Post Medication Reconciliation Status: medication reconcilation previously completed during another office visit        Electronically signed by: EFRAIN Hines CNP

## 2025-05-06 NOTE — LETTER
5/6/2025      Sissy Mccloud  C/o Kareem Mccloud  [address] [facility] [address]        [facility] GERIATRICS    Chief Complaint   Patient presents with     RECHECK     HPI:  Sissy Mccloud is a 89 year old  (8/24/1931), who is being seen today for an episodic care visit at: [facility] AT [facility] () [[id number]]. Today's concern is:   1. Mild dementia with anxiety, unspecified dementia type (H)    2. Stage 3 chronic kidney disease, unspecified whether stage 3a or 3b CKD (H)    3. Primary hypertension      Patient seen for follow up, talkative, cognitive limitations, states feeling picked on and wants to leave, admits to attempting to get out last week and now needs a wanderguard, creat 1.07 with GFR 48 greatly improved since arrival, SBP's in the 120-130 range, overall appears healthy.    Allergies, and PMH/PSH reviewed in EPIC today.  REVIEW OF SYSTEMS:  4 point ROS including Respiratory, CV, GI and , other than that noted in the HPI,  is negative    Objective:   /75   Pulse 69   Temp 97.8  F (36.6  C)   Resp 20   Wt 57.6 kg (127 lb)   LMP  (LMP Unknown)   SpO2 95%   BMI 23.23 kg/m    GENERAL APPEARANCE:  in no distress, appears healthy  ENT:  Mouth and posterior oropharynx normal, moist mucous membranes  RESP:  lungs clear to auscultation , no respiratory distress  CV:  no edema, rate-normal  ABDOMEN:  no guarding or rebound  M/S:   Gait and station abnormal transfer assist  SKIN:  Inspection of skin and subcutaneous tissue baseline  NEURO:   Examination of sensation by touch normal  PSYCH:  affect and mood normal    Labs done in SNF are in [facility]. Please refer to them using [employer]/Care Everywhere.    Assessment/Plan:  (F03.A4) Mild dementia with anxiety, unspecified dementia type (H)  (primary encounter diagnosis)  Comment: altered thought content, exit seeking  Plan:   -OK to apply wanderguard for elope attempts  -ACP eval for BHT  -consider trial celexa for mood  -staff to  support as possible    (N18.30) Stage 3 chronic kidney disease, unspecified whether stage 3a or 3b CKD (H)  Comment: creat 1.07, GFR 48  Plan:   -dose meds renally  -encourage fluid intake  -repeat BMP in 3-6 months    (I10) Primary hypertension  Comment: SBP's 120-130 range  Plan:   -continue hydralazine TID for SBP>140  -monitor vitals daily  -consider starting losartan in future    MED REC REQUIRED  Post Medication Reconciliation Status: medication reconcilation previously completed during another office visit        Electronically signed by: EFRAIN Hines CNP          Sincerely,        EFRAIN Hines CNP    Electronically signed

## 2025-05-19 ENCOUNTER — LAB REQUISITION (OUTPATIENT)
Dept: LAB | Facility: CLINIC | Age: OVER 89
End: 2025-05-19
Payer: COMMERCIAL

## 2025-05-19 DIAGNOSIS — N18.32 CHRONIC KIDNEY DISEASE, STAGE 3B (H): ICD-10-CM

## 2025-05-19 LAB
ANION GAP SERPL CALCULATED.3IONS-SCNC: 15 MMOL/L (ref 7–15)
BUN SERPL-MCNC: 32.7 MG/DL (ref 8–23)
CALCIUM SERPL-MCNC: 9.8 MG/DL (ref 8.8–10.4)
CHLORIDE SERPL-SCNC: 103 MMOL/L (ref 98–107)
CREAT SERPL-MCNC: 1.02 MG/DL (ref 0.51–0.95)
EGFRCR SERPLBLD CKD-EPI 2021: 51 ML/MIN/1.73M2
EST. AVERAGE GLUCOSE BLD GHB EST-MCNC: 148 MG/DL
GLUCOSE SERPL-MCNC: 203 MG/DL (ref 70–99)
HBA1C MFR BLD: 6.8 %
HCO3 SERPL-SCNC: 26 MMOL/L (ref 22–29)
POTASSIUM SERPL-SCNC: 4.8 MMOL/L (ref 3.4–5.3)
SODIUM SERPL-SCNC: 144 MMOL/L (ref 135–145)
TSH SERPL DL<=0.005 MIU/L-ACNC: 1.18 UIU/ML (ref 0.3–4.2)

## 2025-05-19 PROCEDURE — 83036 HEMOGLOBIN GLYCOSYLATED A1C: CPT | Mod: ORL | Performed by: NURSE PRACTITIONER

## 2025-05-19 PROCEDURE — 84443 ASSAY THYROID STIM HORMONE: CPT | Mod: ORL | Performed by: NURSE PRACTITIONER

## 2025-05-19 PROCEDURE — 80048 BASIC METABOLIC PNL TOTAL CA: CPT | Mod: ORL | Performed by: NURSE PRACTITIONER

## 2025-05-20 VITALS
WEIGHT: 129 LBS | BODY MASS INDEX: 23.59 KG/M2 | HEART RATE: 66 BPM | OXYGEN SATURATION: 96 % | SYSTOLIC BLOOD PRESSURE: 128 MMHG | DIASTOLIC BLOOD PRESSURE: 79 MMHG | TEMPERATURE: 97.5 F | RESPIRATION RATE: 20 BRPM

## 2025-05-21 ENCOUNTER — NURSING HOME VISIT (OUTPATIENT)
Dept: GERIATRICS | Facility: CLINIC | Age: OVER 89
End: 2025-05-21
Payer: COMMERCIAL

## 2025-05-21 DIAGNOSIS — E11.9 DIET-CONTROLLED DIABETES MELLITUS (H): ICD-10-CM

## 2025-05-21 DIAGNOSIS — N18.30 STAGE 3 CHRONIC KIDNEY DISEASE, UNSPECIFIED WHETHER STAGE 3A OR 3B CKD (H): Primary | ICD-10-CM

## 2025-05-21 DIAGNOSIS — F03.A4 MILD DEMENTIA WITH ANXIETY, UNSPECIFIED DEMENTIA TYPE (H): ICD-10-CM

## 2025-05-21 PROCEDURE — 99309 SBSQ NF CARE MODERATE MDM 30: CPT | Performed by: NURSE PRACTITIONER

## 2025-05-21 NOTE — PROGRESS NOTES
[facility] GERIATRICS    Chief Complaint   Patient presents with    RECHECK     HPI:  Sissy Mccloud is a 89 year old  (8/24/1931), who is being seen today for an episodic care visit at: [facility] AT [facility] () [[id number]]. Today's concern is:   1. Stage 3 chronic kidney disease, unspecified whether stage 3a or 3b CKD (H)    2. Mild dementia with anxiety, unspecified dementia type (H)    3. Diet-controlled diabetes mellitus (H)      Patient seen for follow up, labs 5/19 with creat 1.02, A1C 6.8, cognitive limitations, opinionated, confused, no distress, overall appears healthy, per staff no new concerns.    Allergies, and PMH/PSH reviewed in EPIC today.  REVIEW OF SYSTEMS:  4 point ROS including Respiratory, CV, GI and , other than that noted in the HPI,  is negative    Objective:   /79   Pulse 66   Temp 97.5  F (36.4  C)   Resp 20   Wt 58.5 kg (129 lb)   LMP  (LMP Unknown)   SpO2 96%   BMI 23.59 kg/m    GENERAL APPEARANCE:  in no distress, appears healthy  ENT:  Mouth and posterior oropharynx normal, moist mucous membranes  RESP:  lungs clear to auscultation , no respiratory distress  CV:  no edema, rate-normal  ABDOMEN:  bowel sounds normal  M/S:   Gait and station abnormal transfer assist  SKIN:  Inspection of skin and subcutaneous tissue baseline  NEURO:   Examination of sensation by touch normal  PSYCH:  affect and mood normal    Labs done in SNF are in [facility]. Please refer to them using [facility]/Care Everywhere.    Assessment/Plan:  (N18.30) Stage 3 chronic kidney disease, unspecified whether stage 3a or 3b CKD (H)  (primary encounter diagnosis)  Comment: creat 1.02, GFR 51  Plan:   -dose meds renally  -encourage fluids  -repeat BMP in 3-6 months    (F03.A4) Mild dementia with anxiety, unspecified dementia type (H)  Comment: cognitive limitations, generally pleasant  Plan:   -staff to support as possible  -repeat TSH periodically  -no medication intervention at this  juncture    (E11.9) Diet-controlled diabetes mellitus (H)  Comment: A1C 6.8, [address]  Plan:   -OK regular diet  -no medication intervention  -repeat A1C in 3-6 months  -if A1C >7.5 plan to start metformin regimen    MED REC REQUIRED  Post Medication Reconciliation Status: medication reconcilation previously completed during another office visit        Electronically signed by: EFRAIN Hines CNP

## 2025-05-21 NOTE — LETTER
5/21/2025      Sissy Mccloud  C/o Kareem Mccloud  [address] [facility] [address]        [facility] GERIATRICS    Chief Complaint   Patient presents with     RECHECK     HPI:  Sissy Mccloud is a 89 year old  (8/24/1931), who is being seen today for an episodic care visit at: [facility] AT [facility] () [[id number]]. Today's concern is:   1. Stage 3 chronic kidney disease, unspecified whether stage 3a or 3b CKD (H)    2. Mild dementia with anxiety, unspecified dementia type (H)    3. Diet-controlled diabetes mellitus (H)      Patient seen for follow up, labs 5/19 with creat 1.02, A1C 6.8, cognitive limitations, opinionated, confused, no distress, overall appears healthy, per staff no new concerns.    Allergies, and PMH/PSH reviewed in EPIC today.  REVIEW OF SYSTEMS:  4 point ROS including Respiratory, CV, GI and , other than that noted in the HPI,  is negative    Objective:   /79   Pulse 66   Temp 97.5  F (36.4  C)   Resp 20   Wt 58.5 kg (129 lb)   LMP  (LMP Unknown)   SpO2 96%   BMI 23.59 kg/m    GENERAL APPEARANCE:  in no distress, appears healthy  ENT:  Mouth and posterior oropharynx normal, moist mucous membranes  RESP:  lungs clear to auscultation , no respiratory distress  CV:  no edema, rate-normal  ABDOMEN:  bowel sounds normal  M/S:   Gait and station abnormal transfer assist  SKIN:  Inspection of skin and subcutaneous tissue baseline  NEURO:   Examination of sensation by touch normal  PSYCH:  affect and mood normal    Labs done in SNF are in [facility]. Please refer to them using [employer]/Care Everywhere.    Assessment/Plan:  (N18.30) Stage 3 chronic kidney disease, unspecified whether stage 3a or 3b CKD (H)  (primary encounter diagnosis)  Comment: creat 1.02, GFR 51  Plan:   -dose meds renally  -encourage fluids  -repeat BMP in 3-6 months    (F03.A4) Mild dementia with anxiety, unspecified dementia type (H)  Comment: cognitive limitations, generally pleasant  Plan:   -staff to  support as possible  -repeat TSH periodically  -no medication intervention at this juncture    (E11.9) Diet-controlled diabetes mellitus (H)  Comment: A1C 6.8, [address]  Plan:   -OK regular diet  -no medication intervention  -repeat A1C in 3-6 months  -if A1C >7.5 plan to start metformin regimen    MED REC REQUIRED  Post Medication Reconciliation Status: medication reconcilation previously completed during another office visit        Electronically signed by: EFRAIN Hines CNP          Sincerely,        EFRAIN Hines CNP    Electronically signed

## 2025-05-22 NOTE — PROGRESS NOTES
[facility] GERIATRICS  Chief Complaint   Patient presents with    longterm Regulatory     [place] Medical Record Number:  [id number]  Place of Service where encounter took place:  [facility] () [[id number]]    HPI:    Sissy Mccloud  is 89 year old (8/24/1931), who is being seen today for a federally mandated E/M visit.     Today's concerns are:  -HF: Reports retrosternal pain when she is upset, goes away spontaneously. .  Denies wheezing, cough.  No swelling in the legs.    -Frailty: self wheel the chair.     -Pain: Endorses pain is controlled with the current regimen.    -Dysphagia:   Reports sometimes cannot swallow pills, , seem the medicine scratch her through. Long one can be swallowed. Endorses difficulty with swallowing food, it gags her, feel want to through up. Started two months ago and seems gets worse day after day.     ====================================================================================  MEDICATIONS:  MED REC REQUIRED  Post Medication Reconciliation Status: medication reconcilation previously completed during another office visit         Review of your medicines            Accurate as of May 25, 2025  5:45 PM. If you have any questions, ask your nurse or doctor.                CONTINUE these medicines which have NOT CHANGED        Dose / Directions   acetaminophen 500 MG tablet  Commonly known as: TYLENOL  Used for: Chronic pain of both shoulders      Dose: 1,000 mg  Take 2 tablets (1,000 mg) by mouth 3 times daily And 1000mg daily PRN  Refills: 0     gabapentin 300 MG capsule  Commonly known as: NEURONTIN      Dose: 300 mg  [GABAPENTIN (NEURONTIN) 300 MG CAPSULE] Take 300 mg by mouth daily.  Refills: 0     hydrALAZINE 10 MG tablet  Commonly known as: APRESOLINE  Used for: Hypertension, unspecified type      Dose: 10 mg  Take 1 tablet (10 mg) by mouth 3 times daily For SBP>140  Refills: 0     levothyroxine 100 MCG tablet  Commonly known as:  "SYNTHROID/LEVOTHROID  Used for: Hypothyroidism, unspecified type      Dose: 100 mcg  Take 1 tablet (100 mcg) by mouth daily  Refills: 0     senna-docusate 8.6-50 MG tablet  Commonly known as: SENOKOT-S/PERICOLACE  Used for: Slow transit constipation      Dose: 2 tablet  Take 2 tablets by mouth daily.  Refills: 0     Vitamin D3 25 mcg (1000 units) tablet  Commonly known as: CHOLECALCIFEROL  Used for: Dementia without behavioral disturbance (H)      Dose: 25 mcg  Take 1 tablet (25 mcg) by mouth daily.  Refills: 0            ROS:  4 point ROS including Respiratory, CV, GI and , other than that noted in the HPI,  is negative    Vitals:  /78   Pulse 67   Temp 97.8  F (36.6  C)   Resp 16   Ht 1.575 m (5' 2\")   Wt 58.7 kg (129 lb 8 oz)   LMP  (LMP Unknown)   SpO2 98%   BMI 23.69 kg/m    Body mass index is 23.69 kg/m .  Exam:  GENERAL APPEARANCE:  in no distress,   RESP:  Unlabored breathing. CTA b/l but diminished over the bases with some coarse sounds.  CV:  S1S2 audible, iregular HR, no murmur appreciated.  No peripheral edema.   ABDOMEN:  soft, NT/ND, BS audible.   M/S:   no joint deformity noted on observation.   SKIN:  No rash noted on observation  NEURO:   No NFD appreciated on observation.   PSYCH:  affect and mood normal    Lab/Diagnostic data: Reviewed in the chart and EHR.        ASSESSMENT/PLAN  ------------------------------  Diastolic heart failure, unspecified HF chronicity (H)  Primary hypertension  -Appears compensated today.  Continue current regimen.  Schedule routine follow-up.      Stage 3b chronic kidney disease (H)  Serum creatinine: 1.02 mg/dL (H) 05/19/25 0908  Estimated creatinine clearance: 31.9 mL/min (A) from 29.6 (1/2025)  -- Avoid nephrotoxic  medications. Renally dose medications. Monitor electrolytes, and dehydration status.  -nephrology referral.     Subjective dysphagia, unspecified:       Diet controlled diabetes (H)   A1C 7.2 06/24/2024    A1C 7.2 06/14/2024 "   -Controlled.  Goal is around 8%.      Chronic shoulder joint pain, bilateral:  - analgesia optimal with the current regimen    Hypothyroidism, unspecified type   TSH   Date Value Ref Range Status   05/19/2025 1.18 0.30 - 4.20 uIU/mL Final   01/31/2022 2.42 0.30 - 5.00 uIU/mL Final   -  On Levothyroxine. In frail Elderly adult, recommended TSH level is around 6 providing patient is asymptomatic and FT4 is wnl- preferably on the lower normal level.        Frailty:  -Uses wheelchair for mobility in the hallway.  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 Requiring extensive assistance from nursing.        Order:   -SLP       Electronically signed by:  Marek Orellana MD

## 2025-05-24 VITALS
SYSTOLIC BLOOD PRESSURE: 136 MMHG | BODY MASS INDEX: 23.83 KG/M2 | RESPIRATION RATE: 16 BRPM | DIASTOLIC BLOOD PRESSURE: 78 MMHG | OXYGEN SATURATION: 98 % | TEMPERATURE: 97.8 F | HEART RATE: 67 BPM | WEIGHT: 129.5 LBS | HEIGHT: 62 IN

## 2025-05-26 ENCOUNTER — NURSING HOME VISIT (OUTPATIENT)
Dept: GERIATRICS | Facility: CLINIC | Age: OVER 89
End: 2025-05-26
Payer: COMMERCIAL

## 2025-05-26 DIAGNOSIS — E11.9 DIET-CONTROLLED DIABETES MELLITUS (H): ICD-10-CM

## 2025-05-26 DIAGNOSIS — N18.32 STAGE 3B CHRONIC KIDNEY DISEASE (H): ICD-10-CM

## 2025-05-26 DIAGNOSIS — R54 FRAILTY: ICD-10-CM

## 2025-05-26 DIAGNOSIS — I50.30 DIASTOLIC HEART FAILURE, UNSPECIFIED HF CHRONICITY (H): Primary | ICD-10-CM

## 2025-05-26 DIAGNOSIS — E03.9 HYPOTHYROIDISM, UNSPECIFIED TYPE: ICD-10-CM

## 2025-05-26 DIAGNOSIS — M25.511 CHRONIC PAIN OF BOTH SHOULDERS: ICD-10-CM

## 2025-05-26 DIAGNOSIS — G89.29 CHRONIC PAIN OF BOTH SHOULDERS: ICD-10-CM

## 2025-05-26 DIAGNOSIS — I10 PRIMARY HYPERTENSION: ICD-10-CM

## 2025-05-26 DIAGNOSIS — M25.512 CHRONIC PAIN OF BOTH SHOULDERS: ICD-10-CM

## 2025-05-26 PROCEDURE — 99309 SBSQ NF CARE MODERATE MDM 30: CPT | Performed by: FAMILY MEDICINE

## 2025-05-26 NOTE — LETTER
5/26/2025      Sissy Mccloud  C/o Kareem Mccloud  [address] [address]        [facility] GERIATRICS  Chief Complaint   Patient presents with     USP Regulatory     [place] Medical Record Number:  [id number]  Place of Service where encounter took place:  [facility] () [[id number]]    HPI:    Sissy Mccloud  is 89 year old (8/24/1931), who is being seen today for a federally mandated E/M visit.     Today's concerns are:  -HF: Reports retrosternal pain when she is upset, goes away spontaneously. .  Denies wheezing, cough.  No swelling in the legs.    -Frailty: self wheel the chair.     -Pain: Endorses pain is controlled with the current regimen.    -Dysphagia:   Reports sometimes cannot swallow pills, , seem the medicine scratch her through. Long one can be swallowed. Endorses difficulty with swallowing food, it gags her, feel want to through up. Started two months ago and seems gets worse day after day.     ====================================================================================  MEDICATIONS:  MED REC REQUIRED  Post Medication Reconciliation Status: medication reconcilation previously completed during another office visit         Review of your medicines            Accurate as of May 25, 2025  5:45 PM. If you have any questions, ask your nurse or doctor.                CONTINUE these medicines which have NOT CHANGED        Dose / Directions   acetaminophen 500 MG tablet  Commonly known as: TYLENOL  Used for: Chronic pain of both shoulders      Dose: 1,000 mg  Take 2 tablets (1,000 mg) by mouth 3 times daily And 1000mg daily PRN  Refills: 0     gabapentin 300 MG capsule  Commonly known as: NEURONTIN      Dose: 300 mg  [GABAPENTIN (NEURONTIN) 300 MG CAPSULE] Take 300 mg by mouth daily.  Refills: 0     hydrALAZINE 10 MG tablet  Commonly known as: APRESOLINE  Used for: Hypertension, unspecified type      Dose: 10 mg  Take 1 tablet (10 mg) by mouth 3 times daily  "For SBP>140  Refills: 0     levothyroxine 100 MCG tablet  Commonly known as: SYNTHROID/LEVOTHROID  Used for: Hypothyroidism, unspecified type      Dose: 100 mcg  Take 1 tablet (100 mcg) by mouth daily  Refills: 0     senna-docusate 8.6-50 MG tablet  Commonly known as: SENOKOT-S/PERICOLACE  Used for: Slow transit constipation      Dose: 2 tablet  Take 2 tablets by mouth daily.  Refills: 0     Vitamin D3 25 mcg (1000 units) tablet  Commonly known as: CHOLECALCIFEROL  Used for: Dementia without behavioral disturbance (H)      Dose: 25 mcg  Take 1 tablet (25 mcg) by mouth daily.  Refills: 0            ROS:  4 point ROS including Respiratory, CV, GI and , other than that noted in the HPI,  is negative    Vitals:  /78   Pulse 67   Temp 97.8  F (36.6  C)   Resp 16   Ht 1.575 m (5' 2\")   Wt 58.7 kg (129 lb 8 oz)   LMP  (LMP Unknown)   SpO2 98%   BMI 23.69 kg/m    Body mass index is 23.69 kg/m .  Exam:  GENERAL APPEARANCE:  in no distress,   RESP:  Unlabored breathing. CTA b/l but diminished over the bases with some coarse sounds.  CV:  S1S2 audible, iregular HR, no murmur appreciated.  No peripheral edema.   ABDOMEN:  soft, NT/ND, BS audible.   M/S:   no joint deformity noted on observation.   SKIN:  No rash noted on observation  NEURO:   No NFD appreciated on observation.   PSYCH:  affect and mood normal    Lab/Diagnostic data: Reviewed in the chart and EHR.        ASSESSMENT/PLAN  ------------------------------  Diastolic heart failure, unspecified HF chronicity (H)  Primary hypertension  -Appears compensated today.  Continue current regimen.  Schedule routine follow-up.      Stage 3b chronic kidney disease (H)  Serum creatinine: 1.02 mg/dL (H) 05/19/25 0908  Estimated creatinine clearance: 31.9 mL/min (A) from 29.6 (1/2025)  -- Avoid nephrotoxic  medications. Renally dose medications. Monitor electrolytes, and dehydration status.  -nephrology referral.     Subjective dysphagia, unspecified:       Diet " controlled diabetes (H)   A1C 7.2 06/24/2024    A1C 7.2 06/14/2024   -Controlled.  Goal is around 8%.      Chronic shoulder joint pain, bilateral:  - analgesia optimal with the current regimen    Hypothyroidism, unspecified type   TSH   Date Value Ref Range Status   05/19/2025 1.18 0.30 - 4.20 uIU/mL Final   01/31/2022 2.42 0.30 - 5.00 uIU/mL Final   -  On Levothyroxine. In frail Elderly adult, recommended TSH level is around 6 providing patient is asymptomatic and FT4 is wnl- preferably on the lower normal level.        Frailty:  -Uses wheelchair for mobility in the hallway.  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 Requiring extensive assistance from nursing.        Order:   -SLP       Electronically signed by:  Marek Orellana MD        Sincerely,        Marek Orellana MD    Electronically signed

## 2025-06-04 ENCOUNTER — LAB REQUISITION (OUTPATIENT)
Dept: LAB | Facility: CLINIC | Age: OVER 89
End: 2025-06-04
Payer: COMMERCIAL

## 2025-06-04 ENCOUNTER — NURSING HOME VISIT (OUTPATIENT)
Dept: GERIATRICS | Facility: CLINIC | Age: OVER 89
End: 2025-06-04
Payer: COMMERCIAL

## 2025-06-04 VITALS
TEMPERATURE: 97 F | SYSTOLIC BLOOD PRESSURE: 128 MMHG | DIASTOLIC BLOOD PRESSURE: 76 MMHG | HEART RATE: 72 BPM | WEIGHT: 129 LBS | OXYGEN SATURATION: 96 % | RESPIRATION RATE: 20 BRPM | BODY MASS INDEX: 23.59 KG/M2

## 2025-06-04 DIAGNOSIS — N18.32 STAGE 3B CHRONIC KIDNEY DISEASE (H): Primary | ICD-10-CM

## 2025-06-04 DIAGNOSIS — F03.A4 MILD DEMENTIA WITH ANXIETY, UNSPECIFIED DEMENTIA TYPE (H): ICD-10-CM

## 2025-06-04 DIAGNOSIS — R30.0 DYSURIA: ICD-10-CM

## 2025-06-04 DIAGNOSIS — I10 PRIMARY HYPERTENSION: ICD-10-CM

## 2025-06-04 LAB
ALBUMIN UR-MCNC: 100 MG/DL
APPEARANCE UR: ABNORMAL
BACTERIA #/AREA URNS HPF: ABNORMAL /HPF
BILIRUB UR QL STRIP: NEGATIVE
COLOR UR AUTO: YELLOW
GLUCOSE UR STRIP-MCNC: NEGATIVE MG/DL
HGB UR QL STRIP: ABNORMAL
KETONES UR STRIP-MCNC: NEGATIVE MG/DL
LEUKOCYTE ESTERASE UR QL STRIP: ABNORMAL
NITRATE UR QL: POSITIVE
PH UR STRIP: 8.5 [PH] (ref 5–7)
RBC URINE: 79 /HPF
SP GR UR STRIP: 1.03 (ref 1–1.03)
SQUAMOUS EPITHELIAL: <1 /HPF
UROBILINOGEN UR STRIP-MCNC: NORMAL MG/DL
WBC URINE: >182 /HPF

## 2025-06-04 PROCEDURE — 99309 SBSQ NF CARE MODERATE MDM 30: CPT | Performed by: NURSE PRACTITIONER

## 2025-06-04 PROCEDURE — 81001 URINALYSIS AUTO W/SCOPE: CPT | Mod: ORL | Performed by: NURSE PRACTITIONER

## 2025-06-04 PROCEDURE — 87186 SC STD MICRODIL/AGAR DIL: CPT | Mod: ORL | Performed by: NURSE PRACTITIONER

## 2025-06-04 NOTE — PROGRESS NOTES
[facility] GERIATRICS    Chief Complaint   Patient presents with    RECHECK     HPI:  Sissy Mccloud is a 89 year old  (8/24/1931), who is being seen today for an episodic care visit at: [facility] AT [facility] () [[id number]]. Today's concern is:   1. Stage 3b chronic kidney disease (H)    2. Primary hypertension    3. Mild dementia with anxiety, unspecified dementia type (H)    4. Dysuria      Patient seen for follow up, creat 1.02, GFR 51, SBP's in the 120 range, cognitive limitations with anxiety component, today report of 'terrible kidney infection' and demanding treatment, unable to articulate past burning sensation and increased frequency, per staff voiding well and is toilet assisted, afebrile, appears healthy.    Allergies, and PMH/PSH reviewed in [facility] today.  REVIEW OF SYSTEMS:  4 point ROS including Respiratory, CV, GI and , other than that noted in the HPI,  is negative    Objective:   /76   Pulse 72   Temp 97  F (36.1  C)   Resp 20   Wt 58.5 kg (129 lb)   LMP  (LMP Unknown)   SpO2 96%   BMI 23.59 kg/m    GENERAL APPEARANCE:  in no distress, appears healthy  ENT:  Mouth and posterior oropharynx normal, moist mucous membranes  RESP:  lungs clear to auscultation , no respiratory distress  CV:  no edema, rate-normal  ABDOMEN:  bowel sounds normal  M/S:   Gait and station abnormal transfer assist  SKIN:  Inspection of skin and subcutaneous tissue baseline  NEURO:   Examination of sensation by touch normal  PSYCH:  affect and mood normal    Labs done in SNF are in [facility]. Please refer to them using [facility]/Care Everywhere.    Assessment/Plan:  (N18.32) Stage 3b chronic kidney disease (H)  (primary encounter diagnosis)  Comment: creat 1.02, GFR 51  Plan:   -dose meds renally  -encourage fluids  -repeat BMP in 3-6 months    (I10) Primary hypertension  Comment: SBP's in the 120 range  Plan:   -continue hydralazine TID for SBP >140  -daily vitals    (F03.A4) Mild dementia with anxiety,  unspecified dementia type (H)  Comment: cognitive limitations, high focus on urine and throat today  Plan:   -staff to support as possible  -repeat TSH in 3-6 months    (R30.0) Dysuria  Comment: burning and frequency, afebrile  Plan:   -ordered UA/UC; pending results  -encourage fluids  -no treatment unless UC+    MED REC REQUIRED  Post Medication Reconciliation Status: medication reconcilation previously completed during another office visit        Electronically signed by: EFRAIN Hines CNP

## 2025-06-04 NOTE — LETTER
6/4/2025      Sissy Mccloud  C/o Kareem Mccloud  [address] [address]        [facility] GERIATRICS    Chief Complaint   Patient presents with     RECHECK     HPI:  Sissy Mccloud is a 89 year old  (8/24/1931), who is being seen today for an episodic care visit at: [facility] AT [facility] () [[id number]]. Today's concern is:   1. Stage 3b chronic kidney disease (H)    2. Primary hypertension    3. Mild dementia with anxiety, unspecified dementia type (H)    4. Dysuria      Patient seen for follow up, creat 1.02, GFR 51, SBP's in the 120 range, cognitive limitations with anxiety component, today report of 'terrible kidney infection' and demanding treatment, unable to articulate past burning sensation and increased frequency, per staff voiding well and is toilet assisted, afebrile, appears healthy.    Allergies, and PMH/PSH reviewed in EPIC today.  REVIEW OF SYSTEMS:  4 point ROS including Respiratory, CV, GI and , other than that noted in the HPI,  is negative    Objective:   /76   Pulse 72   Temp 97  F (36.1  C)   Resp 20   Wt 58.5 kg (129 lb)   LMP  (LMP Unknown)   SpO2 96%   BMI 23.59 kg/m    GENERAL APPEARANCE:  in no distress, appears healthy  ENT:  Mouth and posterior oropharynx normal, moist mucous membranes  RESP:  lungs clear to auscultation , no respiratory distress  CV:  no edema, rate-normal  ABDOMEN:  bowel sounds normal  M/S:   Gait and station abnormal transfer assist  SKIN:  Inspection of skin and subcutaneous tissue baseline  NEURO:   Examination of sensation by touch normal  PSYCH:  affect and mood normal    Labs done in SNF are in [facility]. Please refer to them using [employer]/Care Everywhere.    Assessment/Plan:  (N18.32) Stage 3b chronic kidney disease (H)  (primary encounter diagnosis)  Comment: creat 1.02, GFR 51  Plan:   -dose meds renally  -encourage fluids  -repeat BMP in 3-6 months    (I10) Primary hypertension  Comment: SBP's in the 120 range  Plan:    -continue hydralazine TID for SBP >140  -daily vitals    (F03.A4) Mild dementia with anxiety, unspecified dementia type (H)  Comment: cognitive limitations, high focus on urine and throat today  Plan:   -staff to support as possible  -repeat TSH in 3-6 months    (R30.0) Dysuria  Comment: burning and frequency, afebrile  Plan:   -ordered UA/UC; pending results  -encourage fluids  -no treatment unless UC+    MED REC REQUIRED  Post Medication Reconciliation Status: medication reconcilation previously completed during another office visit        Electronically signed by: EFRAIN Hines CNP          Sincerely,        EFRAIN Hines CNP    Electronically signed

## 2025-06-05 ENCOUNTER — RESULTS FOLLOW-UP (OUTPATIENT)
Dept: GERIATRICS | Facility: CLINIC | Age: OVER 89
End: 2025-06-05
Payer: COMMERCIAL

## 2025-06-05 LAB — BACTERIA UR CULT: ABNORMAL

## 2025-06-05 RX ORDER — SULFAMETHOXAZOLE AND TRIMETHOPRIM 800; 160 MG/1; MG/1
1 TABLET ORAL 2 TIMES DAILY
COMMUNITY
Start: 2025-06-05 | End: 2025-06-10

## 2025-07-01 ENCOUNTER — TRANSFERRED RECORDS (OUTPATIENT)
Dept: HEALTH INFORMATION MANAGEMENT | Facility: CLINIC | Age: OVER 89
End: 2025-07-01

## 2025-07-01 ENCOUNTER — NURSING HOME VISIT (OUTPATIENT)
Dept: GERIATRICS | Facility: CLINIC | Age: OVER 89
End: 2025-07-01
Payer: COMMERCIAL

## 2025-07-01 VITALS — BODY MASS INDEX: 23.78 KG/M2 | WEIGHT: 130 LBS | TEMPERATURE: 97 F

## 2025-07-01 DIAGNOSIS — F03.A4 MILD DEMENTIA WITH ANXIETY, UNSPECIFIED DEMENTIA TYPE (H): Primary | ICD-10-CM

## 2025-07-01 DIAGNOSIS — M25.552 HIP PAIN, LEFT: ICD-10-CM

## 2025-07-01 DIAGNOSIS — N18.32 STAGE 3B CHRONIC KIDNEY DISEASE (H): ICD-10-CM
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
## 2025-07-01 PROCEDURE — 99309 SBSQ NF CARE MODERATE MDM 30: CPT | Performed by: NURSE PRACTITIONER

## 2025-07-01 NOTE — LETTER
7/1/2025      Sissy Mccloud  C/o Kareem Mccloud  [address] [address]        [facility] GERIATRICS    Chief Complaint   Patient presents with     RECHECK     HPI:  Sissy Mccloud is a 89 year old  (8/24/1931), who is being seen today for an episodic care visit at: [facility] AT [facility] () [[id number]]. Today's concern is:   1. Mild dementia with anxiety, unspecified dementia type (H)    2. Stage 3b chronic kidney disease (H)    3. Hip pain, left    4. Fall, subsequent encounter      Patient seen for follow up, cognitive limitations with anxiety component, no distress, creat 1.02, GFR 51, other labs WNL, VS WNL, reported fall 2 days ago and now having L hip pain, exam benign with moderate ROM and able to stand/transfer with minimal distress, no bruising noted, requesting XR to follow up.    Allergies, and PMH/PSH reviewed in EPIC today.  REVIEW OF SYSTEMS:  4 point ROS including Respiratory, CV, GI and , other than that noted in the HPI,  is negative    Objective:   Temp 97  F (36.1  C)   Wt 59 kg (130 lb)   LMP  (LMP Unknown)   BMI 23.78 kg/m    GENERAL APPEARANCE:  in no distress, appears healthy  ENT:  Mouth and posterior oropharynx normal, moist mucous membranes  RESP:  lungs clear to auscultation , no respiratory distress  CV:  peripheral edema mild+ in lower legs, rate-normal  ABDOMEN:  bowel sounds normal  M/S:   Gait and station abnormal transfer assist  SKIN:  Inspection of skin and subcutaneous tissue baseline  NEURO:   Examination of sensation by touch normal  PSYCH:  affect and mood normal    Labs done in SNF are in [facility]. Please refer to them using [facility]/Care Everywhere.    Assessment/Plan:  (F03.A4) Mild dementia with anxiety, unspecified dementia type (H)  (primary encounter diagnosis)  Comment: cognitive limitations, anxious+  Plan:   -staff to support as possible  -consider referral to [facility] for BHT in-house    (N18.32) Stage 3b chronic kidney disease  (H)  Comment: creat 1.02, GFR 51  Plan:   -dose meds renally  -encourage fluids  -repeat BMP in 3-6 months    (M25.552) Hip pain, left  (W19.XXXD) Fall, subsequent encounter  Comment: fall 6/29, new pain L hip area, other joints WNL  Plan:   -XR L hip, results pending  -continue APAP TID plus PRN  -continue gabapentin  -refer to ortho pending XR results      MED REC REQUIRED  Post Medication Reconciliation Status: medication reconcilation previously completed during another office visit        Electronically signed by: EFRAIN Hines CNP          Sincerely,        EFRAIN Hines CNP    Electronically signed

## 2025-07-01 NOTE — PROGRESS NOTES
[facility] GERIATRICS    Chief Complaint   Patient presents with    RECHECK     HPI:  Sissy Mccloud is a 89 year old  (8/24/1931), who is being seen today for an episodic care visit at: [facility] AT [facility] () [[id number]]. Today's concern is:   1. Mild dementia with anxiety, unspecified dementia type (H)    2. Stage 3b chronic kidney disease (H)    3. Hip pain, left    4. Fall, subsequent encounter      Patient seen for follow up, cognitive limitations with anxiety component, no distress, creat 1.02, GFR 51, other labs WNL, VS WNL, reported fall 2 days ago and now having L hip pain, exam benign with moderate ROM and able to stand/transfer with minimal distress, no bruising noted, requesting XR to follow up.    Allergies, and PMH/PSH reviewed in EPIC today.  REVIEW OF SYSTEMS:  4 point ROS including Respiratory, CV, GI and , other than that noted in the HPI,  is negative    Objective:   Temp 97  F (36.1  C)   Wt 59 kg (130 lb)   LMP  (LMP Unknown)   BMI 23.78 kg/m    GENERAL APPEARANCE:  in no distress, appears healthy  ENT:  Mouth and posterior oropharynx normal, moist mucous membranes  RESP:  lungs clear to auscultation , no respiratory distress  CV:  peripheral edema mild+ in lower legs, rate-normal  ABDOMEN:  bowel sounds normal  M/S:   Gait and station abnormal transfer assist  SKIN:  Inspection of skin and subcutaneous tissue baseline  NEURO:   Examination of sensation by touch normal  PSYCH:  affect and mood normal    Labs done in SNF are in [facility]. Please refer to them using [facility]/Care Everywhere.    Assessment/Plan:  (F03.A4) Mild dementia with anxiety, unspecified dementia type (H)  (primary encounter diagnosis)  Comment: cognitive limitations, anxious+  Plan:   -staff to support as possible  -consider referral to [facility] for BHT in-house    (N18.32) Stage 3b chronic kidney disease (H)  Comment: creat 1.02, GFR 51  Plan:   -dose meds renally  -encourage fluids  -repeat BMP in 3-6  months    (M25.552) Hip pain, left  (W19.XXXD) Fall, subsequent encounter  Comment: fall 6/29, new pain L hip area, other joints WNL  Plan:   -XR L hip, results pending  -continue APAP TID plus PRN  -continue gabapentin  -refer to ortho pending XR results      MED REC REQUIRED  Post Medication Reconciliation Status: medication reconcilation previously completed during another office visit        Electronically signed by: EFRAIN Hines CNP

## 2025-07-02 ENCOUNTER — LAB REQUISITION (OUTPATIENT)
Dept: LAB | Facility: CLINIC | Age: OVER 89
End: 2025-07-02
Payer: COMMERCIAL

## 2025-07-02 DIAGNOSIS — R30.0 DYSURIA: ICD-10-CM

## 2025-07-02 LAB
ALBUMIN UR-MCNC: 50 MG/DL
APPEARANCE UR: ABNORMAL
BACTERIA #/AREA URNS HPF: ABNORMAL /HPF
BILIRUB UR QL STRIP: NEGATIVE
COLOR UR AUTO: ABNORMAL
GLUCOSE UR STRIP-MCNC: NEGATIVE MG/DL
HGB UR QL STRIP: ABNORMAL
KETONES UR STRIP-MCNC: NEGATIVE MG/DL
LEUKOCYTE ESTERASE UR QL STRIP: ABNORMAL
NITRATE UR QL: POSITIVE
PH UR STRIP: 5.5 [PH] (ref 5–7)
RBC URINE: 75 /HPF
SP GR UR STRIP: 1.02 (ref 1–1.03)
SQUAMOUS EPITHELIAL: 3 /HPF
UROBILINOGEN UR STRIP-MCNC: NORMAL MG/DL
WBC CLUMPS #/AREA URNS HPF: PRESENT /HPF
WBC URINE: >182 /HPF

## 2025-07-02 PROCEDURE — 81001 URINALYSIS AUTO W/SCOPE: CPT | Mod: ORL | Performed by: NURSE PRACTITIONER

## 2025-07-02 PROCEDURE — 87186 SC STD MICRODIL/AGAR DIL: CPT | Mod: ORL | Performed by: NURSE PRACTITIONER

## 2025-07-03 LAB — BACTERIA UR CULT: ABNORMAL

## 2025-07-04 LAB — BACTERIA UR CULT: ABNORMAL

## 2025-07-07 ENCOUNTER — RESULTS FOLLOW-UP (OUTPATIENT)
Dept: GERIATRICS | Facility: CLINIC | Age: OVER 89
End: 2025-07-07
Payer: COMMERCIAL

## 2025-07-07 ENCOUNTER — CLINICAL UPDATE (OUTPATIENT)
Dept: PHARMACY | Facility: CLINIC | Age: OVER 89
End: 2025-07-07
Payer: COMMERCIAL

## 2025-07-07 DIAGNOSIS — G62.9 PERIPHERAL POLYNEUROPATHY: ICD-10-CM

## 2025-07-07 DIAGNOSIS — E03.9 HYPOTHYROIDISM, UNSPECIFIED TYPE: ICD-10-CM

## 2025-07-07 DIAGNOSIS — E55.9 VITAMIN D DEFICIENCY: ICD-10-CM

## 2025-07-07 DIAGNOSIS — I10 HYPERTENSION, UNSPECIFIED TYPE: Primary | ICD-10-CM

## 2025-07-07 DIAGNOSIS — F03.90 DEMENTIA WITHOUT BEHAVIORAL DISTURBANCE (H): ICD-10-CM

## 2025-07-07 PROCEDURE — 99207 PR NO CHARGE LOS: CPT | Performed by: PHARMACIST

## 2025-07-07 NOTE — RESULT ENCOUNTER NOTE
Discontinue Bactrim  Cefdinir 300mg PO BID x 7 day as culture sensitive to cephalosporins    Angela Santamaria, EFRAIN CNP

## 2025-07-07 NOTE — PROGRESS NOTES
This patient's medication list and chart were reviewed as part of the service provided by [facility].    Assessment/Recommendations:    Last TSH on 5/19/25 = 1.18.  Goal TSH in elderly of 4-6 provided free T4 within normal range.  Higher TSH goal in elderly due to higher normal TSH levels in elderly and to reduce risk of side effects of levothyroxine such as low BMD/inc fracture risk, anxiety, afib/arrhythmias, etc.  May benefit from reduction in levothyroxine from 100mcg daily to 88mcg daily (especially if having potential side effects) and follow-up TSH and free T4 in 8 weeks.    Estimated Creatinine Clearance: 32.1 mL/min (A) (based on SCr of 1.02 mg/dL (H)).      Kinsey Novak, Pharm.D.,[facility]  Board Certified Geriatric Pharmacist  Medication Therapy Management Pharmacist  [phone]

## 2025-07-15 ENCOUNTER — NURSING HOME VISIT (OUTPATIENT)
Dept: GERIATRICS | Facility: CLINIC | Age: OVER 89
End: 2025-07-15
Payer: COMMERCIAL

## 2025-07-15 VITALS
HEART RATE: 76 BPM | WEIGHT: 133 LBS | OXYGEN SATURATION: 99 % | BODY MASS INDEX: 24.33 KG/M2 | RESPIRATION RATE: 18 BRPM | TEMPERATURE: 97 F | DIASTOLIC BLOOD PRESSURE: 80 MMHG | SYSTOLIC BLOOD PRESSURE: 176 MMHG

## 2025-07-15 DIAGNOSIS — Z00.00 ENCOUNTER FOR MEDICARE ANNUAL WELLNESS EXAM: ICD-10-CM

## 2025-07-15 DIAGNOSIS — N18.32 STAGE 3B CHRONIC KIDNEY DISEASE (H): ICD-10-CM

## 2025-07-15 DIAGNOSIS — F03.A4 MILD DEMENTIA WITH ANXIETY, UNSPECIFIED DEMENTIA TYPE (H): Primary | ICD-10-CM

## 2025-07-15 DIAGNOSIS — I10 PRIMARY HYPERTENSION: ICD-10-CM

## 2025-07-15 DIAGNOSIS — E03.9 HYPOTHYROIDISM, UNSPECIFIED TYPE: ICD-10-CM

## 2025-07-15 PROCEDURE — G0439 PPPS, SUBSEQ VISIT: HCPCS | Performed by: NURSE PRACTITIONER

## 2025-07-15 PROCEDURE — 99309 SBSQ NF CARE MODERATE MDM 30: CPT | Mod: 25 | Performed by: NURSE PRACTITIONER

## 2025-07-15 RX ORDER — LEVOTHYROXINE SODIUM 88 UG/1
88 TABLET ORAL
Status: SHIPPED
Start: 2025-07-15

## 2025-07-15 NOTE — PATIENT INSTRUCTIONS
Patient Education   Preventive Care Advice   This is general advice given by our system to help you stay healthy. However, your care team may have specific advice just for you. Please talk to your care team about your preventive care needs.  Nutrition  Eat 5 or more servings of fruits and vegetables each day.  Try wheat bread, brown rice and whole grain pasta (instead of white bread, rice, and pasta).  Get enough calcium and vitamin D. Check the label on foods and aim for 100% of the RDA (recommended daily allowance).  Lifestyle  Exercise at least 150 minutes each week  (30 minutes a day, 5 days a week).  Do muscle strengthening activities 2 days a week. These help control your weight and prevent disease.  No smoking.  Wear sunscreen to prevent skin cancer.  Have a dental exam and cleaning every 6 months.  Yearly exams  See your health care team every year to talk about:  Any changes in your health.  Any medicines your care team has prescribed.  Preventive care, family planning, and ways to prevent chronic diseases.  Shots (vaccines)   HPV shots (up to age 26), if you've never had them before.  Hepatitis B shots (up to age 59), if you've never had them before.  COVID-19 shot: Get this shot when it's due.  Flu shot: Get a flu shot every year.  Tetanus shot: Get a tetanus shot every 10 years.  Pneumococcal, hepatitis A, and RSV shots: Ask your care team if you need these based on your risk.  Shingles shot (for age 50 and up)  General health tests  Diabetes screening:  Starting at age 35, Get screened for diabetes at least every 3 years.  If you are younger than age 35, ask your care team if you should be screened for diabetes.  Cholesterol test: At age 39, start having a cholesterol test every 5 years, or more often if advised.  Bone density scan (DEXA): At age 50, ask your care team if you should have this scan for osteoporosis (brittle bones).  Hepatitis C: Get tested at least once in your life.  STIs (sexually  transmitted infections)  Before age 24: Ask your care team if you should be screened for STIs.  After age 24: Get screened for STIs if you're at risk. You are at risk for STIs (including HIV) if:  You are sexually active with more than one person.  You don't use condoms every time.  You or a partner was diagnosed with a sexually transmitted infection.  If you are at risk for HIV, ask about PrEP medicine to prevent HIV.  Get tested for HIV at least once in your life, whether you are at risk for HIV or not.  Cancer screening tests  Cervical cancer screening: If you have a cervix, begin getting regular cervical cancer screening tests starting at age 21.  Breast cancer scan (mammogram): If you've ever had breasts, begin having regular mammograms starting at age 40. This is a scan to check for breast cancer.  Colon cancer screening: It is important to start screening for colon cancer at age 45.  Have a colonoscopy test every 10 years (or more often if you're at risk) Or, ask your provider about stool tests like a FIT test every year or Cologuard test every 3 years.  To learn more about your testing options, visit:   .  For help making a decision, visit:   https://bit.ly/el53026.  Prostate cancer screening test: If you have a prostate, ask your care team if a prostate cancer screening test (PSA) at age 55 is right for you.  Lung cancer screening: If you are a current or former smoker ages 50 to 80, ask your care team if ongoing lung cancer screenings are right for you.  For informational purposes only. Not to replace the advice of your health care provider. Copyright   2023 [facility] [employer]. All rights reserved. Clinically reviewed by the [facility] Transitions Program. [employer] 241033 - REV 01/24.     Patient Education   Preventive Care Advice   This is general advice given by our system to help you stay healthy. However, your care team may have specific advice just for you. Please talk to your care team  about your preventive care needs.  Nutrition  Eat 5 or more servings of fruits and vegetables each day.  Try wheat bread, brown rice and whole grain pasta (instead of white bread, rice, and pasta).  Get enough calcium and vitamin D. Check the label on foods and aim for 100% of the RDA (recommended daily allowance).  Lifestyle  Exercise at least 150 minutes each week  (30 minutes a day, 5 days a week).  Do muscle strengthening activities 2 days a week. These help control your weight and prevent disease.  No smoking.  Wear sunscreen to prevent skin cancer.  Have a dental exam and cleaning every 6 months.  Yearly exams  See your health care team every year to talk about:  Any changes in your health.  Any medicines your care team has prescribed.  Preventive care, family planning, and ways to prevent chronic diseases.  Shots (vaccines)   HPV shots (up to age 26), if you've never had them before.  Hepatitis B shots (up to age 59), if you've never had them before.  COVID-19 shot: Get this shot when it's due.  Flu shot: Get a flu shot every year.  Tetanus shot: Get a tetanus shot every 10 years.  Pneumococcal, hepatitis A, and RSV shots: Ask your care team if you need these based on your risk.  Shingles shot (for age 50 and up)  General health tests  Diabetes screening:  Starting at age 35, Get screened for diabetes at least every 3 years.  If you are younger than age 35, ask your care team if you should be screened for diabetes.  Cholesterol test: At age 39, start having a cholesterol test every 5 years, or more often if advised.  Bone density scan (DEXA): At age 50, ask your care team if you should have this scan for osteoporosis (brittle bones).  Hepatitis C: Get tested at least once in your life.  STIs (sexually transmitted infections)  Before age 24: Ask your care team if you should be screened for STIs.  After age 24: Get screened for STIs if you're at risk. You are at risk for STIs (including HIV) if:  You are  sexually active with more than one person.  You don't use condoms every time.  You or a partner was diagnosed with a sexually transmitted infection.  If you are at risk for HIV, ask about PrEP medicine to prevent HIV.  Get tested for HIV at least once in your life, whether you are at risk for HIV or not.  Cancer screening tests  Cervical cancer screening: If you have a cervix, begin getting regular cervical cancer screening tests starting at age 21.  Breast cancer scan (mammogram): If you've ever had breasts, begin having regular mammograms starting at age 40. This is a scan to check for breast cancer.  Colon cancer screening: It is important to start screening for colon cancer at age 45.  Have a colonoscopy test every 10 years (or more often if you're at risk) Or, ask your provider about stool tests like a FIT test every year or Cologuard test every 3 years.  To learn more about your testing options, visit:   .  For help making a decision, visit:   https://bit.ly/xh57297.  Prostate cancer screening test: If you have a prostate, ask your care team if a prostate cancer screening test (PSA) at age 55 is right for you.  Lung cancer screening: If you are a current or former smoker ages 50 to 80, ask your care team if ongoing lung cancer screenings are right for you.  For informational purposes only. Not to replace the advice of your health care provider. Copyright   2023 [employer]. All rights reserved. Clinically reviewed by the [facility] Transitions Program. elicit [id number] - REV 01/24.

## 2025-07-15 NOTE — LETTER
7/15/2025      Sissy EDWARDS Mccloud  [address]  [address] [address]        Preventive Care Visit  [facility] GERIATRIC SERVICES  EFRAIN Hines CNP, Geriatric Medicine  Jul 15, 2025      Assessment & Plan    (F03.A4) Mild dementia with anxiety, unspecified dementia type (H)  (primary encounter diagnosis)  Comment: cognitive limitations, pleasant  Plan:   -staff to support as possible  -no medication intervention at this juncture    (N18.32) Stage 3b chronic kidney disease (H)  Comment: creat 1.02, GFR 51  Plan:   -dose meds renally  -encourage fluids  -repeat BMP in 2-3 months    (I10) Primary hypertension  Comment: SBP's in the 120-160 range, fall risk  Plan:   -continue hydralazine  -daily vitals  -plan to start losartan if SBP's >150    (E03.9) Hypothyroidism, unspecified type  Comment: TSH 1.18, goal 3-6  Plan:   -decrease levothyroxine to 88mcg  -repeat TSH in 2-3 months    Reviewed preventive health counseling, as reflected in patient instructions      Subjective  Sissy is a 89 year old, presenting for the following:  Wellness Visit           HPI  Healthy appearing 89yo female, recent Hx of UTI and treated, cognitive limitations, labs reviewed and WNL, recent fall without injury, allowing for permissive HTN due to fall risk, overall appears well.           Advance Care Planning    Document on file is a Health Care Directive or POLST.         No data to display                   No data to display                   No data to display                      No data to display                   No data to display                   No data to display                     No data to display                    Today's PHQ-2 Score:       7/15/2025     2:27 PM   PHQ-2 ( 1999 [employer])   Q1: Little interest or pleasure in doing things 0   Q2: Feeling down, depressed or hopeless 0   PHQ-2 Score 0          No data to display              Social History     Tobacco Use     Smoking  status: Never     Smokeless tobacco: Never   Substance Use Topics     Drug use: Never                  Reviewed and updated as needed this visit by Provider                    No past medical history on file.  Labs reviewed in [facility]  Current Outpatient Medications   Medication Sig Dispense Refill     levothyroxine (SYNTHROID/LEVOTHROID) 88 MCG tablet Take 1 tablet (88 mcg) by mouth every morning (before breakfast).       acetaminophen (TYLENOL) 500 MG tablet Take 2 tablets (1,000 mg) by mouth 3 times daily And 1000mg daily PRN       gabapentin (NEURONTIN) 300 MG capsule [GABAPENTIN (NEURONTIN) 300 MG CAPSULE] Take 300 mg by mouth daily.       hydrALAZINE (APRESOLINE) 10 MG tablet Take 1 tablet (10 mg) by mouth 3 times daily For SBP>140       senna-docusate (SENOKOT-S/PERICOLACE) 8.6-50 MG tablet Take 2 tablets by mouth daily.       Vitamin D3 (CHOLECALCIFEROL) 25 mcg (1000 units) tablet Take 1 tablet (25 mcg) by mouth daily.       Allergies   Allergen Reactions     Ambien [Zolpidem] Unknown     Cleocin [Clindamycin] Unknown     Codeine Unknown     Current providers sharing in care for this patient include:  Patient Care Team:  Dayron Mann APRN CNP as PCP - General (Geriatric Medicine)  Brayden Johnson MD as MD (Cardiovascular Disease)  Corey Carter MD as MD (Cardiovascular Disease)  Corey Carter MD as Assigned Heart and Vascular Provider  [facility]  Marek Orellana MD as Hospitalist (Family Medicine)  Darya Madsen RN as Lead Care Coordinator (Primary Care - CC)  Jory Magallanes as Geriatric Services   Keysha Sosa as Case Management Specialist  Dayron Mann APRN CNP as Assigned PCP  Kinsey Novak RPH as Pharmacist (Pharmacist)    The following health maintenance items are reviewed in Epic and correct as of today:  Health Maintenance   Topic Date Due     HF ACTION PLAN  Never done     PARATHYROID  Never done     PHOSPHORUS  " Never done     DIABETIC FOOT EXAM  Never done     ADVANCE CARE PLANNING  Never done     EYE EXAM  Never done     MEDICARE ANNUAL WELLNESS VISIT  11/29/2002     RSV VACCINE (1 - 1-dose 75+ series) Never done     ZOSTER VACCINE (3 of 3) 11/16/2013     MICROALBUMIN  01/21/2022     LIPID  09/08/2024     COVID-19 VACCINE (7 - 2024-25 season) 05/18/2025     ALT  06/14/2025     HEMOGLOBIN  02/12/2025     CBC  08/12/2025     A1C  08/19/2025     BMP  08/19/2025     INFLUENZA VACCINE (1) 09/01/2025     FALL RISK ASSESSMENT  07/15/2026     DTAP/TDAP/TD VACCINE (5 - Td or Tdap) 12/01/2029     TSH W/FREE T4 REFLEX  Completed     PHQ-2 (once per calendar year)  Completed     PNEUMOCOCCAL VACCINE 50+ YEARS  Completed     URINALYSIS  Completed     ALK PHOS  Completed     HPV VACCINE  Aged Out     MENINGITIS VACCINE  Aged Out         Review of Systems  CONSTITUTIONAL: NEGATIVE for fever, chills, change in weight  INTEGUMENTARY/SKIN: NEGATIVE for worrisome rashes, moles or lesions  EYES: NEGATIVE for vision changes or irritation  ENT/MOUTH: NEGATIVE for ear, mouth and throat problems  RESP: NEGATIVE for significant cough or SOB  BREAST: NEGATIVE for masses, tenderness or discharge  CV: NEGATIVE for chest pain, palpitations or peripheral edema  GI: NEGATIVE for nausea, abdominal pain, heartburn, or change in bowel habits  : NEGATIVE for frequency, dysuria, or hematuria  MUSCULOSKELETAL: NEGATIVE for significant arthralgias or myalgia  NEURO: NEGATIVE for weakness, dizziness or paresthesias  ENDOCRINE: NEGATIVE for temperature intolerance, skin/hair changes  HEME: NEGATIVE for bleeding problems  PSYCHIATRIC: NEGATIVE for changes in mood or affect     Objective   Exam  BP (!) 176/80   Pulse 76   Temp 97  F (36.1  C)   Resp 18   Wt 60.3 kg (133 lb)   LMP  (LMP Unknown)   SpO2 99%   BMI 24.33 kg/m     Estimated body mass index is 24.33 kg/m  as calculated from the following:    Height as of 5/24/25: 1.575 m (5' 2\").    Weight as " of this encounter: 60.3 kg (133 lb).    Physical Exam  GENERAL: alert and no distress  EYES: Eyes grossly normal to inspection, PERRL and conjunctivae and sclerae normal  HENT: ear canals and TM's normal, nose and mouth without ulcers or lesions  NECK: no adenopathy, no asymmetry, masses, or scars  RESP: lungs clear to auscultation - no rales, rhonchi or wheezes  CV: regular rate and rhythm, normal S1 S2, no S3 or S4, no murmur, click or rub, no peripheral edema  ABDOMEN: soft, nontender, no hepatosplenomegaly, no masses and bowel sounds normal  MS: no gross musculoskeletal defects noted, no edema  SKIN: no suspicious lesions or rashes  NEURO: Normal strength and tone, mentation intact and speech normal  PSYCH: mentation appears normal, affect normal/bright        7/15/2025   Mini Cog   Mini-Cog Not Completed (choose reason) Known dementia              Signed Electronically by: EFRAIN Hines CNP        Sincerely,        EFRAIN Hines CNP    Electronically signed

## 2025-07-15 NOTE — PROGRESS NOTES
Preventive Care Visit  [facility] GERIATRIC SERVICES  Dayron Mann, EFRAIN CNP, Geriatric Medicine  Jul 15, 2025      Assessment & Plan     (F03.A4) Mild dementia with anxiety, unspecified dementia type (H)  (primary encounter diagnosis)  Comment: cognitive limitations, pleasant  Plan:   -staff to support as possible  -no medication intervention at this juncture    (N18.32) Stage 3b chronic kidney disease (H)  Comment: creat 1.02, GFR 51  Plan:   -dose meds renally  -encourage fluids  -repeat BMP in 2-3 months    (I10) Primary hypertension  Comment: SBP's in the 120-160 range, fall risk  Plan:   -continue hydralazine  -daily vitals  -plan to start losartan if SBP's >150    (E03.9) Hypothyroidism, unspecified type  Comment: TSH 1.18, goal 3-6  Plan:   -decrease levothyroxine to 88mcg  -repeat TSH in 2-3 months    Reviewed preventive health counseling, as reflected in patient instructions      Subjective   Sissy is a 89 year old, presenting for the following:  Wellness Visit           HPI  Healthy appearing 89yo female, recent Hx of UTI and treated, cognitive limitations, labs reviewed and WNL, recent fall without injury, allowing for permissive HTN due to fall risk, overall appears well.           Advance Care Planning    Document on file is a Health Care Directive or POLST.         No data to display                   No data to display                   No data to display                      No data to display                   No data to display                   No data to display                     No data to display                    Today's PHQ-2 Score:       7/15/2025     2:27 PM   PHQ-2 ( 1999 [employer])   Q1: Little interest or pleasure in doing things 0   Q2: Feeling down, depressed or hopeless 0   PHQ-2 Score 0          No data to display              Social History     Tobacco Use    Smoking status: Never    Smokeless tobacco: Never   Substance Use Topics    Drug use: Never                   Reviewed and updated as needed this visit by Provider                    No past medical history on file.  Labs reviewed in [facility]  Current Outpatient Medications   Medication Sig Dispense Refill    levothyroxine (SYNTHROID/LEVOTHROID) 88 MCG tablet Take 1 tablet (88 mcg) by mouth every morning (before breakfast).      acetaminophen (TYLENOL) 500 MG tablet Take 2 tablets (1,000 mg) by mouth 3 times daily And 1000mg daily PRN      gabapentin (NEURONTIN) 300 MG capsule [GABAPENTIN (NEURONTIN) 300 MG CAPSULE] Take 300 mg by mouth daily.      hydrALAZINE (APRESOLINE) 10 MG tablet Take 1 tablet (10 mg) by mouth 3 times daily For SBP>140      senna-docusate (SENOKOT-S/PERICOLACE) 8.6-50 MG tablet Take 2 tablets by mouth daily.      Vitamin D3 (CHOLECALCIFEROL) 25 mcg (1000 units) tablet Take 1 tablet (25 mcg) by mouth daily.       Allergies   Allergen Reactions    Ambien [Zolpidem] Unknown    Cleocin [Clindamycin] Unknown    Codeine Unknown     Current providers sharing in care for this patient include:  Patient Care Team:  Dayron Mann APRN CNP as PCP - General (Geriatric Medicine)  Brayden Johnson MD as MD (Cardiovascular Disease)  Corey Carter MD as MD (Cardiovascular Disease)  Corey Carter MD as Assigned Heart and Vascular Provider  [facility]  Marek Orellana MD as Hospitalist (Family Medicine)  Darya Madsen, RN as Lead Care Coordinator (Primary Care - CC)  Jory Magallanes as Geriatric Services   Keysha Sosa as Case Management Specialist  Dayron Mann APRN CNP as Assigned PCP  Kinsey Novak RPH as Pharmacist (Pharmacist)    The following health maintenance items are reviewed in Epic and correct as of today:  Health Maintenance   Topic Date Due    HF ACTION PLAN  Never done    PARATHYROID  Never done    PHOSPHORUS  Never done    DIABETIC FOOT EXAM  Never done    ADVANCE CARE PLANNING  Never done    EYE EXAM  Never  "done    MEDICARE ANNUAL WELLNESS VISIT  11/29/2002    RSV VACCINE (1 - 1-dose 75+ series) Never done    ZOSTER VACCINE (3 of 3) 11/16/2013    MICROALBUMIN  01/21/2022    LIPID  09/08/2024    COVID-19 VACCINE (7 - 2024-25 season) 05/18/2025    ALT  06/14/2025    HEMOGLOBIN  02/12/2025    CBC  08/12/2025    A1C  08/19/2025    BMP  08/19/2025    INFLUENZA VACCINE (1) 09/01/2025    FALL RISK ASSESSMENT  07/15/2026    DTAP/TDAP/TD VACCINE (5 - Td or Tdap) 12/01/2029    TSH W/FREE T4 REFLEX  Completed    PHQ-2 (once per calendar year)  Completed    PNEUMOCOCCAL VACCINE 50+ YEARS  Completed    URINALYSIS  Completed    ALK PHOS  Completed    HPV VACCINE  Aged Out    MENINGITIS VACCINE  Aged Out         Review of Systems  CONSTITUTIONAL: NEGATIVE for fever, chills, change in weight  INTEGUMENTARY/SKIN: NEGATIVE for worrisome rashes, moles or lesions  EYES: NEGATIVE for vision changes or irritation  ENT/MOUTH: NEGATIVE for ear, mouth and throat problems  RESP: NEGATIVE for significant cough or SOB  BREAST: NEGATIVE for masses, tenderness or discharge  CV: NEGATIVE for chest pain, palpitations or peripheral edema  GI: NEGATIVE for nausea, abdominal pain, heartburn, or change in bowel habits  : NEGATIVE for frequency, dysuria, or hematuria  MUSCULOSKELETAL: NEGATIVE for significant arthralgias or myalgia  NEURO: NEGATIVE for weakness, dizziness or paresthesias  ENDOCRINE: NEGATIVE for temperature intolerance, skin/hair changes  HEME: NEGATIVE for bleeding problems  PSYCHIATRIC: NEGATIVE for changes in mood or affect     Objective    Exam  BP (!) 176/80   Pulse 76   Temp 97  F (36.1  C)   Resp 18   Wt 60.3 kg (133 lb)   LMP  (LMP Unknown)   SpO2 99%   BMI 24.33 kg/m     Estimated body mass index is 24.33 kg/m  as calculated from the following:    Height as of 5/24/25: 1.575 m (5' 2\").    Weight as of this encounter: 60.3 kg (133 lb).    Physical Exam  GENERAL: alert and no distress  EYES: Eyes grossly normal to " inspection, PERRL and conjunctivae and sclerae normal  HENT: ear canals and TM's normal, nose and mouth without ulcers or lesions  NECK: no adenopathy, no asymmetry, masses, or scars  RESP: lungs clear to auscultation - no rales, rhonchi or wheezes  CV: regular rate and rhythm, normal S1 S2, no S3 or S4, no murmur, click or rub, no peripheral edema  ABDOMEN: soft, nontender, no hepatosplenomegaly, no masses and bowel sounds normal  MS: no gross musculoskeletal defects noted, no edema  SKIN: no suspicious lesions or rashes  NEURO: Normal strength and tone, mentation intact and speech normal  PSYCH: mentation appears normal, affect normal/bright        7/15/2025   Mini Cog   Mini-Cog Not Completed (choose reason) Known dementia              Signed Electronically by: EFRAIN Hines CNP

## 2025-07-23 ENCOUNTER — TELEPHONE (OUTPATIENT)
Dept: GERIATRICS | Facility: CLINIC | Age: OVER 89
End: 2025-07-23
Payer: COMMERCIAL

## 2025-07-23 VITALS
TEMPERATURE: 97.5 F | OXYGEN SATURATION: 92 % | SYSTOLIC BLOOD PRESSURE: 145 MMHG | HEART RATE: 67 BPM | DIASTOLIC BLOOD PRESSURE: 74 MMHG | RESPIRATION RATE: 18 BRPM

## 2025-07-23 DIAGNOSIS — R52 PAIN: Primary | ICD-10-CM

## 2025-07-23 RX ORDER — TRAMADOL HYDROCHLORIDE 50 MG/1
25 TABLET ORAL EVERY 4 HOURS PRN
COMMUNITY
End: 2025-07-23

## 2025-07-23 RX ORDER — TRAMADOL HYDROCHLORIDE 50 MG/1
25 TABLET ORAL EVERY 4 HOURS PRN
Qty: 30 TABLET | Refills: 0 | Status: ON HOLD | OUTPATIENT
Start: 2025-07-23

## 2025-07-23 NOTE — TELEPHONE ENCOUNTER
Contact family regarding status and decide if they prefer to send to ER or stay at facility for XR.      If staying in facility then order XR 2-view for fall/pain plus start tramadol 25mg Q4h PRN.  Thank you

## 2025-07-23 NOTE — TELEPHONE ENCOUNTER
Nurse could not get a hold of family.  Order given for x-ray and Tramadol.    Verbal order/direction given to: Gwen Dalton RN

## 2025-07-23 NOTE — TELEPHONE ENCOUNTER
"[facility] [place] Triage Call    Provider: EFRAIN Curtis CNP   Facility: [facility] Facility Type:  LT    Caller: Valorie  Call Back Number: [phone]    Allergies:    Allergies   Allergen Reactions    Ambien [Zolpidem] Unknown    Cleocin [Clindamycin] Unknown    Codeine Unknown          SBAR:     S-(situation): Nurse called to report that patient had a fall this morning and now is complaining of 9/10, right hip pain.      B-(background):     A-(assessment): Patient was noted to be laying on her right side on the floor.  Head was under the bed.  Patient stated she hit her head on the bed frame.  Appears proper assessment was not completed until patient was in bed.  Patient was lifted up with a Romelia and placed in bed.  Pain with palpation to right hip.  No deformities or shortening noted while in bed.  Patient is in a lot of pain.  Staff just have patient laying in bed.  Patient has Tylenol 1000 mg TID and daily PRN.     BP (!) 145/74   Pulse 67   Temp 97.5  F (36.4  C)   Resp 18   LMP  (LMP Unknown)   SpO2 92%      R-(recommendations): Please advise       Telephone encounter sent to:  EFRAIN Curtis CNP     Please send response/orders to \"Geriatrics Nurse Pool\"    Nika Dalton RN      "

## 2025-07-24 ENCOUNTER — APPOINTMENT (OUTPATIENT)
Dept: CT IMAGING | Facility: HOSPITAL | Age: OVER 89
End: 2025-07-24
Attending: STUDENT IN AN ORGANIZED HEALTH CARE EDUCATION/TRAINING PROGRAM
Payer: COMMERCIAL

## 2025-07-24 ENCOUNTER — APPOINTMENT (OUTPATIENT)
Dept: RADIOLOGY | Facility: HOSPITAL | Age: OVER 89
End: 2025-07-24
Attending: STUDENT IN AN ORGANIZED HEALTH CARE EDUCATION/TRAINING PROGRAM
Payer: COMMERCIAL

## 2025-07-24 ENCOUNTER — ANESTHESIA EVENT (OUTPATIENT)
Dept: SURGERY | Facility: HOSPITAL | Age: OVER 89
End: 2025-07-24
Payer: COMMERCIAL

## 2025-07-24 ENCOUNTER — HOSPITAL ENCOUNTER (INPATIENT)
Facility: HOSPITAL | Age: OVER 89
End: 2025-07-24
Attending: STUDENT IN AN ORGANIZED HEALTH CARE EDUCATION/TRAINING PROGRAM
Payer: COMMERCIAL

## 2025-07-24 ENCOUNTER — ANESTHESIA (OUTPATIENT)
Dept: SURGERY | Facility: HOSPITAL | Age: OVER 89
End: 2025-07-24
Payer: COMMERCIAL

## 2025-07-24 ENCOUNTER — PATIENT OUTREACH (OUTPATIENT)
Dept: GERIATRIC MEDICINE | Facility: CLINIC | Age: OVER 89
End: 2025-07-24

## 2025-07-24 DIAGNOSIS — R91.8 PULMONARY NODULES: ICD-10-CM

## 2025-07-24 DIAGNOSIS — N63.0 BREAST NODULE: ICD-10-CM

## 2025-07-24 DIAGNOSIS — S72.001A CLOSED FRACTURE OF NECK OF RIGHT FEMUR, INITIAL ENCOUNTER (H): Primary | ICD-10-CM

## 2025-07-24 DIAGNOSIS — F03.A4 MILD DEMENTIA WITH ANXIETY, UNSPECIFIED DEMENTIA TYPE (H): ICD-10-CM
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
## 2025-07-24 PROBLEM — F03.90 DEMENTIA WITHOUT BEHAVIORAL DISTURBANCE (H): Status: ACTIVE | Noted: 2024-08-16

## 2025-07-24 LAB
ABO + RH BLD: NORMAL
ANION GAP SERPL CALCULATED.3IONS-SCNC: 11 MMOL/L (ref 7–15)
APTT PPP: 29 SECONDS (ref 22–38)
BASOPHILS # BLD AUTO: 0 10E3/UL (ref 0–0.2)
BASOPHILS NFR BLD AUTO: 0 %
BLD GP AB SCN SERPL QL: NEGATIVE
BUN SERPL-MCNC: 34.5 MG/DL (ref 8–23)
CALCIUM SERPL-MCNC: 9 MG/DL (ref 8.8–10.4)
CHLORIDE SERPL-SCNC: 105 MMOL/L (ref 98–107)
CREAT SERPL-MCNC: 1.28 MG/DL (ref 0.51–0.95)
EGFRCR SERPLBLD CKD-EPI 2021: 39 ML/MIN/1.73M2
EOSINOPHIL # BLD AUTO: 0.2 10E3/UL (ref 0–0.7)
EOSINOPHIL NFR BLD AUTO: 3 %
ERYTHROCYTE [DISTWIDTH] IN BLOOD BY AUTOMATED COUNT: 13.1 % (ref 10–15)
GLUCOSE BLDC GLUCOMTR-MCNC: 103 MG/DL (ref 70–99)
GLUCOSE BLDC GLUCOMTR-MCNC: 122 MG/DL (ref 70–99)
GLUCOSE BLDC GLUCOMTR-MCNC: 129 MG/DL (ref 70–99)
GLUCOSE BLDC GLUCOMTR-MCNC: 130 MG/DL (ref 70–99)
GLUCOSE BLDC GLUCOMTR-MCNC: 168 MG/DL (ref 70–99)
GLUCOSE BLDC GLUCOMTR-MCNC: 98 MG/DL (ref 70–99)
GLUCOSE SERPL-MCNC: 127 MG/DL (ref 70–99)
HCO3 SERPL-SCNC: 27 MMOL/L (ref 22–29)
HCT VFR BLD AUTO: 34.5 % (ref 35–47)
HGB BLD-MCNC: 11 G/DL (ref 11.7–15.7)
IMM GRANULOCYTES # BLD: 0 10E3/UL
IMM GRANULOCYTES NFR BLD: 0 %
INR PPP: 1.12 (ref 0.85–1.15)
LYMPHOCYTES # BLD AUTO: 1.3 10E3/UL (ref 0.8–5.3)
LYMPHOCYTES NFR BLD AUTO: 16 %
MCH RBC QN AUTO: 30.6 PG (ref 26.5–33)
MCHC RBC AUTO-ENTMCNC: 31.9 G/DL (ref 31.5–36.5)
MCV RBC AUTO: 96 FL (ref 78–100)
MONOCYTES # BLD AUTO: 0.4 10E3/UL (ref 0–1.3)
MONOCYTES NFR BLD AUTO: 5 %
NEUTROPHILS # BLD AUTO: 6.3 10E3/UL (ref 1.6–8.3)
NEUTROPHILS NFR BLD AUTO: 75 %
NRBC # BLD AUTO: 0 10E3/UL
NRBC BLD AUTO-RTO: 0 /100
PLATELET # BLD AUTO: 203 10E3/UL (ref 150–450)
POTASSIUM SERPL-SCNC: 4.4 MMOL/L (ref 3.4–5.3)
PROTHROMBIN TIME: 14.6 SECONDS (ref 11.8–14.8)
RBC # BLD AUTO: 3.59 10E6/UL (ref 3.8–5.2)
SODIUM SERPL-SCNC: 143 MMOL/L (ref 135–145)
SPECIMEN EXP DATE BLD: NORMAL
WBC # BLD AUTO: 8.3 10E3/UL (ref 4–11)

## 2025-07-24 PROCEDURE — 120N000001 HC R&B MED SURG/OB

## 2025-07-24 PROCEDURE — 710N000009 HC RECOVERY PHASE 1, LEVEL 1, PER MIN: Performed by: ORTHOPAEDIC SURGERY

## 2025-07-24 PROCEDURE — 258N000003 HC RX IP 258 OP 636: Performed by: STUDENT IN AN ORGANIZED HEALTH CARE EDUCATION/TRAINING PROGRAM

## 2025-07-24 PROCEDURE — 258N000003 HC RX IP 258 OP 636: Performed by: ORTHOPAEDIC SURGERY

## 2025-07-24 PROCEDURE — 99285 EMERGENCY DEPT VISIT HI MDM: CPT | Mod: 25 | Performed by: STUDENT IN AN ORGANIZED HEALTH CARE EDUCATION/TRAINING PROGRAM

## 2025-07-24 PROCEDURE — 250N000011 HC RX IP 250 OP 636: Performed by: NURSE ANESTHETIST, CERTIFIED REGISTERED

## 2025-07-24 PROCEDURE — 250N000011 HC RX IP 250 OP 636: Performed by: ORTHOPAEDIC SURGERY

## 2025-07-24 PROCEDURE — 85730 THROMBOPLASTIN TIME PARTIAL: CPT | Performed by: STUDENT IN AN ORGANIZED HEALTH CARE EDUCATION/TRAINING PROGRAM

## 2025-07-24 PROCEDURE — 258N000003 HC RX IP 258 OP 636: Performed by: INTERNAL MEDICINE

## 2025-07-24 PROCEDURE — 250N000011 HC RX IP 250 OP 636: Performed by: ANESTHESIOLOGY

## 2025-07-24 PROCEDURE — 999N000104 CT THORACIC SPINE RECONSTRUCTED

## 2025-07-24 PROCEDURE — 272N000001 HC OR GENERAL SUPPLY STERILE: Performed by: ORTHOPAEDIC SURGERY

## 2025-07-24 PROCEDURE — 86900 BLOOD TYPING SEROLOGIC ABO: CPT | Performed by: PHYSICIAN ASSISTANT

## 2025-07-24 PROCEDURE — 999N000065 XR PELVIS AND HIP RIGHT 1 VIEW

## 2025-07-24 PROCEDURE — 250N000009 HC RX 250: Performed by: NURSE ANESTHETIST, CERTIFIED REGISTERED

## 2025-07-24 PROCEDURE — 250N000011 HC RX IP 250 OP 636: Performed by: STUDENT IN AN ORGANIZED HEALTH CARE EDUCATION/TRAINING PROGRAM

## 2025-07-24 PROCEDURE — 370N000017 HC ANESTHESIA TECHNICAL FEE, PER MIN: Performed by: ORTHOPAEDIC SURGERY

## 2025-07-24 PROCEDURE — 85004 AUTOMATED DIFF WBC COUNT: CPT | Performed by: STUDENT IN AN ORGANIZED HEALTH CARE EDUCATION/TRAINING PROGRAM

## 2025-07-24 PROCEDURE — 250N000011 HC RX IP 250 OP 636: Performed by: INTERNAL MEDICINE

## 2025-07-24 PROCEDURE — 250N000009 HC RX 250: Performed by: ORTHOPAEDIC SURGERY

## 2025-07-24 PROCEDURE — 250N000025 HC SEVOFLURANE, PER MIN: Performed by: ORTHOPAEDIC SURGERY

## 2025-07-24 PROCEDURE — 999N000141 HC STATISTIC PRE-PROCEDURE NURSING ASSESSMENT: Performed by: ORTHOPAEDIC SURGERY

## 2025-07-24 PROCEDURE — 999N000104 CT LUMBAR SPINE RECONSTRUCTED

## 2025-07-24 PROCEDURE — C1713 ANCHOR/SCREW BN/BN,TIS/BN: HCPCS | Performed by: ORTHOPAEDIC SURGERY

## 2025-07-24 PROCEDURE — 360N000077 HC SURGERY LEVEL 4, PER MIN: Performed by: ORTHOPAEDIC SURGERY

## 2025-07-24 PROCEDURE — 0SRR0J9 REPLACEMENT OF RIGHT HIP JOINT, FEMORAL SURFACE WITH SYNTHETIC SUBSTITUTE, CEMENTED, OPEN APPROACH: ICD-10-PCS | Performed by: ORTHOPAEDIC SURGERY

## 2025-07-24 PROCEDURE — 72125 CT NECK SPINE W/O DYE: CPT

## 2025-07-24 PROCEDURE — 80048 BASIC METABOLIC PNL TOTAL CA: CPT | Performed by: STUDENT IN AN ORGANIZED HEALTH CARE EDUCATION/TRAINING PROGRAM

## 2025-07-24 PROCEDURE — 85610 PROTHROMBIN TIME: CPT | Performed by: STUDENT IN AN ORGANIZED HEALTH CARE EDUCATION/TRAINING PROGRAM

## 2025-07-24 PROCEDURE — C1776 JOINT DEVICE (IMPLANTABLE): HCPCS | Performed by: ORTHOPAEDIC SURGERY

## 2025-07-24 PROCEDURE — 71250 CT THORAX DX C-: CPT

## 2025-07-24 PROCEDURE — 70450 CT HEAD/BRAIN W/O DYE: CPT

## 2025-07-24 PROCEDURE — 82962 GLUCOSE BLOOD TEST: CPT

## 2025-07-24 PROCEDURE — 36415 COLL VENOUS BLD VENIPUNCTURE: CPT | Performed by: STUDENT IN AN ORGANIZED HEALTH CARE EDUCATION/TRAINING PROGRAM

## 2025-07-24 PROCEDURE — 93005 ELECTROCARDIOGRAM TRACING: CPT | Performed by: STUDENT IN AN ORGANIZED HEALTH CARE EDUCATION/TRAINING PROGRAM

## 2025-07-24 PROCEDURE — 250N000013 HC RX MED GY IP 250 OP 250 PS 637: Performed by: INTERNAL MEDICINE

## 2025-07-24 PROCEDURE — 99223 1ST HOSP IP/OBS HIGH 75: CPT | Performed by: INTERNAL MEDICINE

## 2025-07-24 PROCEDURE — 73552 X-RAY EXAM OF FEMUR 2/>: CPT | Mod: RT

## 2025-07-24 DEVICE — IMPLANTABLE DEVICE: Type: IMPLANTABLE DEVICE | Site: HIP | Status: FUNCTIONAL

## 2025-07-24 DEVICE — IMP HEAD FEMORAL DEPUY COBALT 28MM +5MM 1365-12-000: Type: IMPLANTABLE DEVICE | Site: HIP | Status: FUNCTIONAL

## 2025-07-24 DEVICE — IMP BONE CEMENT STRK SIMPLEX TOBRAMYCIN 40CC 6197-9-001: Type: IMPLANTABLE DEVICE | Site: HIP | Status: FUNCTIONAL

## 2025-07-24 DEVICE — PLUG CEMENT BUCK W/INSERT 18.5 71279422: Type: IMPLANTABLE DEVICE | Site: HIP | Status: FUNCTIONAL

## 2025-07-24 DEVICE — IMP STEM CENTRALIZER DEPUY 9.25MM 1376-47-000: Type: IMPLANTABLE DEVICE | Site: HIP | Status: FUNCTIONAL

## 2025-07-24 RX ORDER — ACETAMINOPHEN 500 MG
1000 TABLET ORAL DAILY PRN
Status: ON HOLD | COMMUNITY
End: 2025-07-25

## 2025-07-24 RX ORDER — PROPOFOL 10 MG/ML
INJECTION, EMULSION INTRAVENOUS CONTINUOUS PRN
Status: DISCONTINUED | OUTPATIENT
Start: 2025-07-24 | End: 2025-07-24

## 2025-07-24 RX ORDER — DEXAMETHASONE SODIUM PHOSPHATE 10 MG/ML
INJECTION, SOLUTION INTRAMUSCULAR; INTRAVENOUS PRN
Status: DISCONTINUED | OUTPATIENT
Start: 2025-07-24 | End: 2025-07-24

## 2025-07-24 RX ORDER — ACETAMINOPHEN 325 MG/1
975 TABLET ORAL EVERY 8 HOURS
Status: DISCONTINUED | OUTPATIENT
Start: 2025-07-24 | End: 2025-07-28 | Stop reason: HOSPADM

## 2025-07-24 RX ORDER — NALOXONE HYDROCHLORIDE 0.4 MG/ML
0.4 INJECTION, SOLUTION INTRAMUSCULAR; INTRAVENOUS; SUBCUTANEOUS
Status: DISCONTINUED | OUTPATIENT
Start: 2025-07-24 | End: 2025-07-28 | Stop reason: HOSPADM

## 2025-07-24 RX ORDER — VITAMIN B COMPLEX
25 TABLET ORAL DAILY
Status: DISCONTINUED | OUTPATIENT
Start: 2025-07-24 | End: 2025-07-28 | Stop reason: HOSPADM

## 2025-07-24 RX ORDER — LIDOCAINE 40 MG/G
CREAM TOPICAL
Status: DISCONTINUED | OUTPATIENT
Start: 2025-07-24 | End: 2025-07-24 | Stop reason: HOSPADM

## 2025-07-24 RX ORDER — SENNOSIDES 8.6 MG
2 TABLET ORAL DAILY
Status: ON HOLD | COMMUNITY
End: 2025-07-25

## 2025-07-24 RX ORDER — ONDANSETRON 4 MG/1
4 TABLET, ORALLY DISINTEGRATING ORAL EVERY 30 MIN PRN
Status: DISCONTINUED | OUTPATIENT
Start: 2025-07-24 | End: 2025-07-24 | Stop reason: HOSPADM

## 2025-07-24 RX ORDER — HYDROXYZINE HYDROCHLORIDE 10 MG/1
10 TABLET, FILM COATED ORAL EVERY 6 HOURS PRN
Status: DISCONTINUED | OUTPATIENT
Start: 2025-07-24 | End: 2025-07-28 | Stop reason: HOSPADM

## 2025-07-24 RX ORDER — BISACODYL 10 MG
10 SUPPOSITORY, RECTAL RECTAL DAILY PRN
Status: DISCONTINUED | OUTPATIENT
Start: 2025-07-27 | End: 2025-07-28 | Stop reason: HOSPADM

## 2025-07-24 RX ORDER — POLYETHYLENE GLYCOL 3350 17 G/17G
17 POWDER, FOR SOLUTION ORAL 2 TIMES DAILY PRN
Status: DISCONTINUED | OUTPATIENT
Start: 2025-07-24 | End: 2025-07-28 | Stop reason: HOSPADM

## 2025-07-24 RX ORDER — HYDROXYZINE HYDROCHLORIDE 10 MG/1
10 TABLET, FILM COATED ORAL EVERY 6 HOURS PRN
Qty: 30 TABLET | Refills: 0 | Status: SHIPPED | OUTPATIENT
Start: 2025-07-24

## 2025-07-24 RX ORDER — NALOXONE HYDROCHLORIDE 0.4 MG/ML
0.1 INJECTION, SOLUTION INTRAMUSCULAR; INTRAVENOUS; SUBCUTANEOUS
Status: DISCONTINUED | OUTPATIENT
Start: 2025-07-24 | End: 2025-07-24 | Stop reason: HOSPADM

## 2025-07-24 RX ORDER — FLUMAZENIL 0.1 MG/ML
0.2 INJECTION, SOLUTION INTRAVENOUS
Status: DISCONTINUED | OUTPATIENT
Start: 2025-07-24 | End: 2025-07-24 | Stop reason: HOSPADM

## 2025-07-24 RX ORDER — CEFAZOLIN SODIUM/WATER 2 G/20 ML
2 SYRINGE (ML) INTRAVENOUS
Status: COMPLETED | OUTPATIENT
Start: 2025-07-24 | End: 2025-07-24

## 2025-07-24 RX ORDER — LEVOTHYROXINE SODIUM 88 UG/1
88 TABLET ORAL
Status: DISCONTINUED | OUTPATIENT
Start: 2025-07-24 | End: 2025-07-28 | Stop reason: HOSPADM

## 2025-07-24 RX ORDER — AMOXICILLIN 250 MG
1-2 CAPSULE ORAL 2 TIMES DAILY
Qty: 30 TABLET | Refills: 0 | Status: SHIPPED | OUTPATIENT
Start: 2025-07-24

## 2025-07-24 RX ORDER — OLANZAPINE 10 MG/2ML
5 INJECTION, POWDER, FOR SOLUTION INTRAMUSCULAR EVERY 8 HOURS PRN
Status: DISCONTINUED | OUTPATIENT
Start: 2025-07-24 | End: 2025-07-28 | Stop reason: HOSPADM

## 2025-07-24 RX ORDER — LOPERAMIDE HYDROCHLORIDE 2 MG/1
2 CAPSULE ORAL EVERY 4 HOURS PRN
Status: DISCONTINUED | OUTPATIENT
Start: 2025-07-24 | End: 2025-07-28 | Stop reason: HOSPADM

## 2025-07-24 RX ORDER — HYDRALAZINE HYDROCHLORIDE 10 MG/1
10 TABLET, FILM COATED ORAL 3 TIMES DAILY PRN
COMMUNITY

## 2025-07-24 RX ORDER — FENTANYL CITRATE 50 UG/ML
12.5 INJECTION, SOLUTION INTRAMUSCULAR; INTRAVENOUS
Refills: 0 | Status: DISPENSED | OUTPATIENT
Start: 2025-07-24 | End: 2025-07-24

## 2025-07-24 RX ORDER — AMOXICILLIN 250 MG
1 CAPSULE ORAL 2 TIMES DAILY PRN
Status: DISCONTINUED | OUTPATIENT
Start: 2025-07-24 | End: 2025-07-28 | Stop reason: HOSPADM

## 2025-07-24 RX ORDER — NALOXONE HYDROCHLORIDE 0.4 MG/ML
0.2 INJECTION, SOLUTION INTRAMUSCULAR; INTRAVENOUS; SUBCUTANEOUS
Status: DISCONTINUED | OUTPATIENT
Start: 2025-07-24 | End: 2025-07-28 | Stop reason: HOSPADM

## 2025-07-24 RX ORDER — LIDOCAINE 4 G/G
1 PATCH TOPICAL
Status: DISCONTINUED | OUTPATIENT
Start: 2025-07-24 | End: 2025-07-28 | Stop reason: HOSPADM

## 2025-07-24 RX ORDER — METHOCARBAMOL 500 MG/1
250 TABLET ORAL EVERY 6 HOURS PRN
Status: DISCONTINUED | OUTPATIENT
Start: 2025-07-24 | End: 2025-07-28 | Stop reason: HOSPADM

## 2025-07-24 RX ORDER — DEXTROSE MONOHYDRATE 25 G/50ML
25-50 INJECTION, SOLUTION INTRAVENOUS
Status: DISCONTINUED | OUTPATIENT
Start: 2025-07-24 | End: 2025-07-26

## 2025-07-24 RX ORDER — ONDANSETRON 4 MG/1
4 TABLET, ORALLY DISINTEGRATING ORAL EVERY 6 HOURS PRN
Status: DISCONTINUED | OUTPATIENT
Start: 2025-07-24 | End: 2025-07-28 | Stop reason: HOSPADM

## 2025-07-24 RX ORDER — CALCIUM CARBONATE 500 MG/1
1000 TABLET, CHEWABLE ORAL 4 TIMES DAILY PRN
Status: DISCONTINUED | OUTPATIENT
Start: 2025-07-24 | End: 2025-07-28 | Stop reason: HOSPADM

## 2025-07-24 RX ORDER — OXYCODONE HYDROCHLORIDE 5 MG/1
5 TABLET ORAL EVERY 4 HOURS PRN
Status: DISCONTINUED | OUTPATIENT
Start: 2025-07-24 | End: 2025-07-28 | Stop reason: HOSPADM

## 2025-07-24 RX ORDER — HYDRALAZINE HYDROCHLORIDE 20 MG/ML
5 INJECTION INTRAMUSCULAR; INTRAVENOUS ONCE
Status: COMPLETED | OUTPATIENT
Start: 2025-07-24 | End: 2025-07-24

## 2025-07-24 RX ORDER — PROCHLORPERAZINE MALEATE 5 MG/1
5 TABLET ORAL EVERY 6 HOURS PRN
Status: DISCONTINUED | OUTPATIENT
Start: 2025-07-24 | End: 2025-07-28 | Stop reason: HOSPADM

## 2025-07-24 RX ORDER — OXYCODONE HYDROCHLORIDE 5 MG/1
2.5-5 TABLET ORAL EVERY 4 HOURS PRN
Qty: 30 TABLET | Refills: 0 | Status: SHIPPED | OUTPATIENT
Start: 2025-07-24 | End: 2025-07-25

## 2025-07-24 RX ORDER — LIDOCAINE 40 MG/G
CREAM TOPICAL
Status: DISCONTINUED | OUTPATIENT
Start: 2025-07-24 | End: 2025-07-28 | Stop reason: HOSPADM

## 2025-07-24 RX ORDER — PROPOFOL 10 MG/ML
INJECTION, EMULSION INTRAVENOUS PRN
Status: DISCONTINUED | OUTPATIENT
Start: 2025-07-24 | End: 2025-07-24

## 2025-07-24 RX ORDER — CEFAZOLIN SODIUM 1 G/3ML
1 INJECTION, POWDER, FOR SOLUTION INTRAMUSCULAR; INTRAVENOUS EVERY 12 HOURS
Status: COMPLETED | OUTPATIENT
Start: 2025-07-25 | End: 2025-07-25

## 2025-07-24 RX ORDER — FENTANYL CITRATE 50 UG/ML
25-100 INJECTION, SOLUTION INTRAMUSCULAR; INTRAVENOUS
Status: DISCONTINUED | OUTPATIENT
Start: 2025-07-24 | End: 2025-07-24 | Stop reason: HOSPADM

## 2025-07-24 RX ORDER — FENTANYL CITRATE 50 UG/ML
50 INJECTION, SOLUTION INTRAMUSCULAR; INTRAVENOUS EVERY 5 MIN PRN
Status: DISCONTINUED | OUTPATIENT
Start: 2025-07-24 | End: 2025-07-24 | Stop reason: HOSPADM

## 2025-07-24 RX ORDER — VANCOMYCIN HYDROCHLORIDE 1 G/20ML
INJECTION, POWDER, LYOPHILIZED, FOR SOLUTION INTRAVENOUS PRN
Status: DISCONTINUED | OUTPATIENT
Start: 2025-07-24 | End: 2025-07-24 | Stop reason: HOSPADM

## 2025-07-24 RX ORDER — AMOXICILLIN 250 MG
1 CAPSULE ORAL 2 TIMES DAILY
Status: DISCONTINUED | OUTPATIENT
Start: 2025-07-24 | End: 2025-07-28 | Stop reason: HOSPADM

## 2025-07-24 RX ORDER — FENTANYL CITRATE 50 UG/ML
INJECTION, SOLUTION INTRAMUSCULAR; INTRAVENOUS PRN
Status: DISCONTINUED | OUTPATIENT
Start: 2025-07-24 | End: 2025-07-24

## 2025-07-24 RX ORDER — NICOTINE POLACRILEX 4 MG
15-30 LOZENGE BUCCAL
Status: DISCONTINUED | OUTPATIENT
Start: 2025-07-24 | End: 2025-07-26

## 2025-07-24 RX ORDER — ACETAMINOPHEN 325 MG/1
650 TABLET ORAL EVERY 4 HOURS PRN
Status: DISCONTINUED | OUTPATIENT
Start: 2025-07-24 | End: 2025-07-28 | Stop reason: HOSPADM

## 2025-07-24 RX ORDER — HYDROMORPHONE HCL IN WATER/PF 6 MG/30 ML
0.4 PATIENT CONTROLLED ANALGESIA SYRINGE INTRAVENOUS EVERY 5 MIN PRN
Status: DISCONTINUED | OUTPATIENT
Start: 2025-07-24 | End: 2025-07-24 | Stop reason: HOSPADM

## 2025-07-24 RX ORDER — ONDANSETRON 4 MG/1
4 TABLET, FILM COATED ORAL EVERY 6 HOURS PRN
COMMUNITY

## 2025-07-24 RX ORDER — ONDANSETRON 2 MG/ML
4 INJECTION INTRAMUSCULAR; INTRAVENOUS EVERY 6 HOURS PRN
Status: DISCONTINUED | OUTPATIENT
Start: 2025-07-24 | End: 2025-07-28 | Stop reason: HOSPADM

## 2025-07-24 RX ORDER — SODIUM CHLORIDE 9 MG/ML
INJECTION, SOLUTION INTRAVENOUS CONTINUOUS
Status: DISCONTINUED | OUTPATIENT
Start: 2025-07-24 | End: 2025-07-25

## 2025-07-24 RX ORDER — HYDROMORPHONE HCL IN WATER/PF 6 MG/30 ML
0.2 PATIENT CONTROLLED ANALGESIA SYRINGE INTRAVENOUS EVERY 5 MIN PRN
Status: DISCONTINUED | OUTPATIENT
Start: 2025-07-24 | End: 2025-07-24 | Stop reason: HOSPADM

## 2025-07-24 RX ORDER — HYDROMORPHONE HCL IN WATER/PF 6 MG/30 ML
.2-.4 PATIENT CONTROLLED ANALGESIA SYRINGE INTRAVENOUS
Refills: 0 | Status: DISCONTINUED | OUTPATIENT
Start: 2025-07-24 | End: 2025-07-28 | Stop reason: HOSPADM

## 2025-07-24 RX ORDER — ACETAMINOPHEN 650 MG/1
650 SUPPOSITORY RECTAL EVERY 4 HOURS PRN
Status: DISCONTINUED | OUTPATIENT
Start: 2025-07-24 | End: 2025-07-28 | Stop reason: HOSPADM

## 2025-07-24 RX ORDER — ONDANSETRON 2 MG/ML
4 INJECTION INTRAMUSCULAR; INTRAVENOUS EVERY 6 HOURS PRN
Status: DISCONTINUED | OUTPATIENT
Start: 2025-07-24 | End: 2025-07-24

## 2025-07-24 RX ORDER — POLYETHYLENE GLYCOL 3350 17 G/17G
17 POWDER, FOR SOLUTION ORAL DAILY
Status: DISCONTINUED | OUTPATIENT
Start: 2025-07-25 | End: 2025-07-28 | Stop reason: HOSPADM

## 2025-07-24 RX ORDER — ONDANSETRON 2 MG/ML
INJECTION INTRAMUSCULAR; INTRAVENOUS PRN
Status: DISCONTINUED | OUTPATIENT
Start: 2025-07-24 | End: 2025-07-24

## 2025-07-24 RX ORDER — TRANEXAMIC ACID 10 MG/ML
1 INJECTION, SOLUTION INTRAVENOUS ONCE
Status: COMPLETED | OUTPATIENT
Start: 2025-07-24 | End: 2025-07-24

## 2025-07-24 RX ORDER — ACETAMINOPHEN 325 MG/1
975 TABLET ORAL 3 TIMES DAILY
Status: DISCONTINUED | OUTPATIENT
Start: 2025-07-24 | End: 2025-07-24

## 2025-07-24 RX ORDER — SODIUM CHLORIDE, SODIUM LACTATE, POTASSIUM CHLORIDE, CALCIUM CHLORIDE 600; 310; 30; 20 MG/100ML; MG/100ML; MG/100ML; MG/100ML
INJECTION, SOLUTION INTRAVENOUS CONTINUOUS
Status: DISCONTINUED | OUTPATIENT
Start: 2025-07-24 | End: 2025-07-24 | Stop reason: HOSPADM

## 2025-07-24 RX ORDER — GABAPENTIN 300 MG/1
300 CAPSULE ORAL DAILY
Status: DISCONTINUED | OUTPATIENT
Start: 2025-07-24 | End: 2025-07-28 | Stop reason: HOSPADM

## 2025-07-24 RX ORDER — LIDOCAINE HYDROCHLORIDE 10 MG/ML
INJECTION, SOLUTION INFILTRATION; PERINEURAL PRN
Status: DISCONTINUED | OUTPATIENT
Start: 2025-07-24 | End: 2025-07-24

## 2025-07-24 RX ORDER — POLYETHYLENE GLYCOL 3350 17 G/17G
1 POWDER, FOR SOLUTION ORAL DAILY
Qty: 7 PACKET | Refills: 0 | Status: SHIPPED | OUTPATIENT
Start: 2025-07-24

## 2025-07-24 RX ORDER — AMOXICILLIN 250 MG
2 CAPSULE ORAL 2 TIMES DAILY PRN
Status: DISCONTINUED | OUTPATIENT
Start: 2025-07-24 | End: 2025-07-28 | Stop reason: HOSPADM

## 2025-07-24 RX ORDER — SODIUM CHLORIDE, SODIUM LACTATE, POTASSIUM CHLORIDE, AND CALCIUM CHLORIDE .6; .31; .03; .02 G/100ML; G/100ML; G/100ML; G/100ML
IRRIGANT IRRIGATION PRN
Status: DISCONTINUED | OUTPATIENT
Start: 2025-07-24 | End: 2025-07-24 | Stop reason: HOSPADM

## 2025-07-24 RX ORDER — DEXAMETHASONE SODIUM PHOSPHATE 4 MG/ML
4 INJECTION, SOLUTION INTRA-ARTICULAR; INTRALESIONAL; INTRAMUSCULAR; INTRAVENOUS; SOFT TISSUE
Status: DISCONTINUED | OUTPATIENT
Start: 2025-07-24 | End: 2025-07-24 | Stop reason: HOSPADM

## 2025-07-24 RX ORDER — LOPERAMIDE HYDROCHLORIDE 2 MG/1
2 CAPSULE ORAL EVERY 4 HOURS PRN
COMMUNITY

## 2025-07-24 RX ORDER — CEFAZOLIN SODIUM/WATER 2 G/20 ML
2 SYRINGE (ML) INTRAVENOUS SEE ADMIN INSTRUCTIONS
Status: DISCONTINUED | OUTPATIENT
Start: 2025-07-24 | End: 2025-07-24 | Stop reason: HOSPADM

## 2025-07-24 RX ORDER — ONDANSETRON 4 MG/1
4 TABLET, ORALLY DISINTEGRATING ORAL EVERY 6 HOURS PRN
Status: DISCONTINUED | OUTPATIENT
Start: 2025-07-24 | End: 2025-07-24

## 2025-07-24 RX ORDER — ONDANSETRON 2 MG/ML
4 INJECTION INTRAMUSCULAR; INTRAVENOUS EVERY 30 MIN PRN
Status: DISCONTINUED | OUTPATIENT
Start: 2025-07-24 | End: 2025-07-24 | Stop reason: HOSPADM

## 2025-07-24 RX ORDER — ACETAMINOPHEN 325 MG/1
650 TABLET ORAL EVERY 4 HOURS PRN
Qty: 100 TABLET | Refills: 0 | Status: SHIPPED | OUTPATIENT
Start: 2025-07-24 | End: 2025-08-05 | Stop reason: ALTCHOICE

## 2025-07-24 RX ORDER — PROCHLORPERAZINE MALEATE 5 MG/1
5 TABLET ORAL EVERY 6 HOURS PRN
Status: DISCONTINUED | OUTPATIENT
Start: 2025-07-24 | End: 2025-07-24

## 2025-07-24 RX ORDER — FENTANYL CITRATE 50 UG/ML
25 INJECTION, SOLUTION INTRAMUSCULAR; INTRAVENOUS EVERY 5 MIN PRN
Status: DISCONTINUED | OUTPATIENT
Start: 2025-07-24 | End: 2025-07-24 | Stop reason: HOSPADM

## 2025-07-24 RX ORDER — HYDRALAZINE HYDROCHLORIDE 20 MG/ML
10 INJECTION INTRAMUSCULAR; INTRAVENOUS EVERY 4 HOURS PRN
Status: DISCONTINUED | OUTPATIENT
Start: 2025-07-24 | End: 2025-07-28 | Stop reason: HOSPADM

## 2025-07-24 RX ADMIN — DEXAMETHASONE SODIUM PHOSPHATE 10 MG: 10 INJECTION, SOLUTION INTRAMUSCULAR; INTRAVENOUS at 16:53

## 2025-07-24 RX ADMIN — LIDOCAINE HYDROCHLORIDE 5 ML: 10 INJECTION, SOLUTION INFILTRATION; PERINEURAL at 16:36

## 2025-07-24 RX ADMIN — FENTANYL CITRATE 25 MCG: 50 INJECTION, SOLUTION INTRAMUSCULAR; INTRAVENOUS at 17:25

## 2025-07-24 RX ADMIN — HYDROMORPHONE HYDROCHLORIDE 0.2 MG: 0.2 INJECTION, SOLUTION INTRAMUSCULAR; INTRAVENOUS; SUBCUTANEOUS at 21:05

## 2025-07-24 RX ADMIN — SODIUM CHLORIDE: 0.9 INJECTION, SOLUTION INTRAVENOUS at 14:35

## 2025-07-24 RX ADMIN — SUGAMMADEX 150 MG: 100 INJECTION, SOLUTION INTRAVENOUS at 18:36

## 2025-07-24 RX ADMIN — LEVOTHYROXINE SODIUM 88 MCG: 0.09 TABLET ORAL at 08:23

## 2025-07-24 RX ADMIN — ROCURONIUM 40 MG: 50 INJECTION, SOLUTION INTRAVENOUS at 16:37

## 2025-07-24 RX ADMIN — Medication 2 G: at 16:33

## 2025-07-24 RX ADMIN — Medication 25 MCG: at 08:12

## 2025-07-24 RX ADMIN — PROPOFOL 80 MG: 10 INJECTION, EMULSION INTRAVENOUS at 16:36

## 2025-07-24 RX ADMIN — HYDROMORPHONE HYDROCHLORIDE 0.2 MG: 0.2 INJECTION, SOLUTION INTRAMUSCULAR; INTRAVENOUS; SUBCUTANEOUS at 07:52

## 2025-07-24 RX ADMIN — HYDROMORPHONE HYDROCHLORIDE 0.2 MG: 0.2 INJECTION, SOLUTION INTRAMUSCULAR; INTRAVENOUS; SUBCUTANEOUS at 07:51

## 2025-07-24 RX ADMIN — GABAPENTIN 300 MG: 300 CAPSULE ORAL at 08:00

## 2025-07-24 RX ADMIN — HYDRALAZINE HYDROCHLORIDE 10 MG: 20 INJECTION INTRAMUSCULAR; INTRAVENOUS at 22:05

## 2025-07-24 RX ADMIN — PROPOFOL 100 MCG/KG/MIN: 10 INJECTION, EMULSION INTRAVENOUS at 16:53

## 2025-07-24 RX ADMIN — ACETAMINOPHEN 975 MG: 325 TABLET ORAL at 08:11

## 2025-07-24 RX ADMIN — FENTANYL CITRATE 12.5 MCG: 50 INJECTION INTRAMUSCULAR; INTRAVENOUS at 19:35

## 2025-07-24 RX ADMIN — ONDANSETRON 4 MG: 2 INJECTION INTRAMUSCULAR; INTRAVENOUS at 16:53

## 2025-07-24 RX ADMIN — HYDRALAZINE HYDROCHLORIDE 10 MG: 20 INJECTION INTRAMUSCULAR; INTRAVENOUS at 06:49

## 2025-07-24 RX ADMIN — TRANEXAMIC ACID 1 G: 10 INJECTION, SOLUTION INTRAVENOUS at 15:32

## 2025-07-24 RX ADMIN — FENTANYL CITRATE 12.5 MCG: 50 INJECTION, SOLUTION INTRAMUSCULAR; INTRAVENOUS at 15:31

## 2025-07-24 RX ADMIN — FENTANYL CITRATE 12.5 MCG: 50 INJECTION, SOLUTION INTRAMUSCULAR; INTRAVENOUS at 17:43

## 2025-07-24 RX ADMIN — LIDOCAINE 1 PATCH: 4 PATCH TOPICAL at 08:12

## 2025-07-24 RX ADMIN — SODIUM CHLORIDE: 0.9 INJECTION, SOLUTION INTRAVENOUS at 21:10

## 2025-07-24 RX ADMIN — HYDROMORPHONE HYDROCHLORIDE 0.4 MG: 0.2 INJECTION, SOLUTION INTRAMUSCULAR; INTRAVENOUS; SUBCUTANEOUS at 12:14

## 2025-07-24 RX ADMIN — HYDRALAZINE HYDROCHLORIDE 5 MG: 20 INJECTION INTRAMUSCULAR; INTRAVENOUS at 20:11

## 2025-07-24 ASSESSMENT — ACTIVITIES OF DAILY LIVING (ADL)
ADLS_ACUITY_SCORE: 63
ADLS_ACUITY_SCORE: 58
ADLS_ACUITY_SCORE: 61
ADLS_ACUITY_SCORE: 58
ADLS_ACUITY_SCORE: 63
ADLS_ACUITY_SCORE: 58
ADLS_ACUITY_SCORE: 58
ADLS_ACUITY_SCORE: 63
ADLS_ACUITY_SCORE: 61
ADLS_ACUITY_SCORE: 58
ADLS_ACUITY_SCORE: 63
ADLS_ACUITY_SCORE: 58
ADLS_ACUITY_SCORE: 67
ADLS_ACUITY_SCORE: 61
ADLS_ACUITY_SCORE: 58
ADLS_ACUITY_SCORE: 63

## 2025-07-24 ASSESSMENT — COLUMBIA-SUICIDE SEVERITY RATING SCALE - C-SSRS
1. IN THE PAST MONTH, HAVE YOU WISHED YOU WERE DEAD OR WISHED YOU COULD GO TO SLEEP AND NOT WAKE UP?: NO
2. HAVE YOU ACTUALLY HAD ANY THOUGHTS OF KILLING YOURSELF IN THE PAST MONTH?: NO
6. HAVE YOU EVER DONE ANYTHING, STARTED TO DO ANYTHING, OR PREPARED TO DO ANYTHING TO END YOUR LIFE?: NO

## 2025-07-24 NOTE — ANESTHESIA PREPROCEDURE EVALUATION
Anesthesia Pre-Procedure Evaluation    Patient: Sissy Mccloud   MRN: [id number] : 1931          Procedure : Procedure(s):  HEMIARTHROPLASTY, HIP, BIPOLAR         History reviewed. No pertinent past medical history.   Past Surgical History:   Procedure Laterality Date    CHOLECYSTECTOMY        Allergies   Allergen Reactions    Ambien [Zolpidem] Unknown    Cleocin [Clindamycin] Unknown    Codeine Unknown      Social History     Tobacco Use    Smoking status: Never    Smokeless tobacco: Never   Substance Use Topics    Alcohol use: Not on file      Wt Readings from Last 1 Encounters:   07/15/25 60.3 kg (133 lb)        Anesthesia Evaluation            ROS/MED HX  ENT/Pulmonary:     (+) sleep apnea,                                       Neurologic:     (+)   dementia,                             Cardiovascular:     (+)  hypertension- -   -  - -                                      METS/Exercise Tolerance:     Hematologic:       Musculoskeletal:       GI/Hepatic:       Renal/Genitourinary:       Endo:     (+)  type II DM,                    Psychiatric/Substance Use:       Infectious Disease:       Malignancy:       Other:              Physical Exam  Airway  Mallampati: II  TM distance: >3 FB  Neck ROM: limited  Mouth opening: >= 4 cm    Cardiovascular - normal exam   Dental     Pulmonary - normal exam      Neurological   She appears awake and alert.    Other Findings Oriented x2      OUTSIDE LABS:  CBC:   Lab Results   Component Value Date    WBC 8.3 2025    WBC 7.0 2024    HGB 11.0 (L) 2025    HGB 12.0 2024    HCT 34.5 (L) 2025    HCT 38.7 2024     2025     2024     BMP:   Lab Results   Component Value Date     2025     2025    POTASSIUM 4.4 2025    POTASSIUM 4.8 2025    CHLORIDE 105 2025    CHLORIDE 103 2025    CO2 27 2025    CO2 26 2025    BUN 34.5 (H) 2025    BUN 32.7 (H) 2025  "   CR 1.28 (H) 07/24/2025    CR 1.02 (H) 05/19/2025     (H) 07/24/2025     (H) 07/24/2025     COAGS:   Lab Results   Component Value Date    PTT 29 07/24/2025    INR 1.12 07/24/2025     POC: No results found for: \"BGM\", \"HCG\", \"HCGS\"  HEPATIC:   Lab Results   Component Value Date    ALBUMIN 3.9 06/14/2024    PROTTOTAL 7.5 06/14/2024    ALT 14 06/14/2024    AST 26 06/14/2024    ALKPHOS 82 06/14/2024    BILITOTAL 0.4 06/14/2024     OTHER:   Lab Results   Component Value Date    LACT 1.3 01/15/2024    A1C 6.8 (H) 05/19/2025    CRISTY 9.0 07/24/2025    MAG 1.9 06/06/2024    LIPASE 15 01/14/2024    TSH 1.18 05/19/2025    T4 1.57 01/14/2024    CRP 3.6 (H) 03/01/2022       Anesthesia Plan    ASA Status:  4, emergent      NPO Status: NPO Appropriate   Anesthesia Type: General.  Airway: oral.  Induction: intravenous.  Maintenance: Inhalation.   Techniques and Equipment:       - Monitoring Plan: standard ASA monitoring     Consents    Anesthesia Plan(s) and associated risks, benefits, and realistic alternatives discussed. Questions answered and patient/representative(s) expressed understanding.     - Discussed: CRNA     - Discussed with:  Patient, family               Postoperative Care    Pain management: non-narcotic analgesics, plan for postoperative opioid use, peripheral nerve block.     Comments:                   Siddharth Keane MD    I have reviewed the pertinent notes and labs in the chart from the past 30 days and (re)examined the patient.  Any updates or changes from those notes are reflected in this note.    Clinically Significant Risk Factors Present on Admission                   # Hypertension: Noted on problem list     # Dementia: noted on problem list      # DMII: A1C = 6.8 % (Ref range: <5.7 %) within past 6 months        # Financial/Environmental Concerns:                 "

## 2025-07-24 NOTE — PROVIDER NOTIFICATION
Patient experiencing visual hallucinations, but aware these ae hallucinations. Seeing a woman with a big wig on the ceiling and things flying around. Dr. Zhang notified.    Anh Gorman RN

## 2025-07-24 NOTE — ED NOTES
Bed: JNED-03  Expected date: 7/24/25  Expected time: 3:23 AM  Means of arrival: Ambulance  Comments:  Sawyer Mcadams F--fall 0900, right leg injury--right femoral head BUDDY rush, from [facility]

## 2025-07-24 NOTE — PROGRESS NOTES
POST MIDNIGHT ADMISSION :           Sissy Mccloud is a 89 year old female with history of dementia, CKD 3, diet-controlled DM2, chronic HFpEF, hypertension, and hypothyroidism admitted on 7/24/2025 with acute right femoral neck fracture after a mechanical fall.       7/24 :     Conscious with some forgetfulness  Plan for surgery today  No other new issues noted      Not medically ready for discharge at this time.        A/p :      Right femoral neck fracture:  X-ray shows transverse fracture of the right femoral neck  -- Ortho consultation  -- N.p.o., supportive cares, avoid IVF for now in setting of chronic CHF        Right shoulder pain:  CT shows severe changes of bursitis of the right shoulder   -- ortho consultation  -- try lidocaine patch  -- pain control as above for hip fracture  -- continue home gabapentin        CKD 3: GFR within previous baseline  -- trend  -- avoid NSAIDs        HFpEF  HTN  -- prn IV hydralazine ordered in place of home PO PRN hydralazine        H/O diet controlled DM2:  -- monitor for post op hyperglycemia  -- will place on sliding scale insulin for now        Dementia:  Resides in memory care  -- supportive cares  -- prn seroquel and zyprexa ordered            Hypothyroidism: continue home replacement         Incidental findings of bilateral pulmonary nodules concerning for metastatic malignancy and 2 cm nodular focus in the right breast   -- Radiology recommends PET CT in further evaluation and breast center referral for further imaging   -- this was not discussed with patient and family was not present in the ED

## 2025-07-24 NOTE — CONSULTS
ORTHOPEDIC CONSULTATION    Consultation  Sissy Mccloud,  1931, MRN [id number]    Closed fracture of neck of right femur, initial encounter (H) [S72.001A]    PCP: Dayron Mann, [phone]   Code status:  No CPR- Do NOT Intubate       Extended Emergency Contact Information  Primary Emergency Contact: Kareem Mccloud  Address: [address] [place] [address]  Mobile Phone: [phone]  Relation: Spouse  Secondary Emergency Contact: Jasmyn Pineda   [facility]  Home Phone: [phone]  Mobile Phone: [phone]  Relation: Daughter         IMPRESSION:  Right acute closed impacted femoral neck fracture  Dementia     PLAN:  This patient was discussed with Dr. Rodarte, on-call surgeon for [facility] and they are in agreement with the following plan.   - Discussed treatment options including surgical intervention with Dr. Dodson. Patient would likely benefit from a hemiarthroplasty. Discussed risks of procedure including but not limited to, injury to nerves, arteries or veins, malunion, nonunion, periprosthetic fracture, DVT or even death with the patient and they are in agreement with proceeding.  - Will take to the OR today  - NPO   - Type & Screen   - Anesthesia to place block for improved pain control  - Medical optimization per Hospitalist. Hgb 11.0. INR 1.12.  Creatinine 1.28.  Positive for UTI  - Pain control. Currently well controlled at rest  - Bedrest until surgery.    Thank you for including [facility] in the care of Sissy Mccloud. It has been a pleasure participating in their care.      CHIEF COMPLAINT: Right hip fracture      HISTORY OF PRESENT ILLNESS:  The patient is seen in orthopedic consultation at the request of Shlomo Waite DO.  The patient is a 89 year old female with c/o right hip pain.  They presented to the ED with right hip pain following a fall.  Patient does have some dementia reliable history.  I did speak  with the patient's daughter, Jasmyn over the phone who tells me that she had an unwitnessed fall at her nursing facility.  She typically uses a wheelchair at baseline she does not do any ambulation with a walker, she only weightbears to transfer/pivot to and from the bed/chair/bathroom.  She fell out of her wheelchair yesterday and was found down, brought into the emergency department where she was found to have a femoral neck fracture.  During the time my visit patient is comfortable at rest, not noting any pain.  Denies any numbness/tingling in her right lower extremity.    Per chart noted HFpEF.  Patient appears euvolemic.  Denies CAD, CVA, CKD, DM, COPD    PAST MEDICAL HISTORY:   History reviewed. No pertinent past medical history.    ALLERGIES:   Ambien [zolpidem], Cleocin [clindamycin], and Codeine    MEDICATIONS ON ADMISSION:  Medications were reviewed.  They do include:   (Not in a hospital admission)      SOCIAL HISTORY:    reports that she has never smoked. She has never used smokeless tobacco. She reports that she does not use drugs.     FAMILY HISTORY:  History reviewed. No pertinent family history.    REVIEW OF SYSTEMS:   See Admission History and Physical     Clinically Significant Risk Factors Present on Admission        # Dementia: noted on problem list       Risk Factors for Delirium:     #Age: >80   #Cognitive impairment:  Noted on problem list     PHYSICAL EXAMINATION:  General: On examination, the patient is resting comfortably, NAD and awake, lying supine in bed and oriented to person, place and time   SKIN: No ecchymosis noted over right hip, skin intact  Pulses:  Palpable bilateral dorsalis pedis pulses.  Sensation: intact and equal bilaterally to the distal lower extremities, feet and toes.  Tenderness: Right hip tenderness noted. No tenderness noted in calfs bilaterally  ROM: Equal ankle plantar and dorsiflexion, moves all toes bilaterally. Unable to lift leg off bed due to pain  Motor:  +5/5  "ankle DF, PF, EHL bilaterally.  Right hip and knee not assessed due to fracture. Contralateral hip flexion and knee extension +5/5.   Extremity: Right lower extremity externally rotated & shortened, thigh & calf compartments soft    Temp:  [97.5  F (36.4  C)-98.1  F (36.7  C)] 98.1  F (36.7  C)  Pulse:  [59-87] 87  Resp:  [12-30] 20  BP: (129-201)/(63-88) 129/63  SpO2:  [91 %-99 %] 91 %    RADIOGRAPHIC EVALUATION:  Radiographs personally reviewed.  EXAM: RIGHT FEMUR 4 VIEWS  LOCATION: [facility]  DATE/TIME: 7/24/2025 5:20 AM CDT     INDICATION: Fall. Right hip pain.  COMPARISON: 7/24/2025 at 0506 hours - CT chest, abdomen and pelvis.                                                                      IMPRESSION:   1.  An age-indeterminate slightly impacted transverse fracture of the right femoral neck is present, also noted on the recent CT scan. Slight valgus angulation about the fracture.  2.  No other visualized acute fractures of the right femur.   3.  No right knee joint effusion.    LABORATORY DATA:     Lab Results   Component Value Date    INR 1.12 07/24/2025       Lab Results   Component Value Date    WBC 8.3 07/24/2025     Lab Results   Component Value Date    RBC 3.59 07/24/2025     Lab Results   Component Value Date    HGB 11.0 07/24/2025     Lab Results   Component Value Date    HCT 34.5 07/24/2025     No components found for: \"MCT\"  Lab Results   Component Value Date    MCV 96 07/24/2025     Lab Results   Component Value Date    MCH 30.6 07/24/2025     Lab Results   Component Value Date    MCHC 31.9 07/24/2025     Lab Results   Component Value Date    RDW 13.1 07/24/2025     Lab Results   Component Value Date     07/24/2025         Maxim Avery PA-C/Dr. Dodson  [facility]        "

## 2025-07-24 NOTE — PROGRESS NOTES
[employer]-  Care Coordinator Contact:  Deborah Madsen RNCC  Care System: [facility]    RNCC was notified member was admitted to: [facility] from [facility] [place] for fall fracture right femer.      RNCC will follow up as appropriate at discharge.       TIANNA Haines, RN, PHN, [facility]  Care Coordinator-Long Term Care  [address] [address]  [email or web address]   [email or web address]     Office: [phone]   Fax: [phone]

## 2025-07-24 NOTE — OP NOTE
Operative Report    PATIENT Sissy Mccloud   DATE OF SURGERY:  7/24/2025      PREOPERATIVE DIAGNOSIS   Closed fracture of neck of right femur, initial encounter (H) [S72.001A].    POSTOPERATIVE DIAGNOSIS   Closed fracture of neck of right femur, initial encounter (H) [S72.001A].    PROCEDURE PERFORMED   right hip hemiarthroplasty    IMPLANTS  Implant Name Type Inv. Item Serial No.  Lot No. LRB No. Used Action   IMP BONE CEMENT STRK SIMPLEX TOBRAMYCIN 40 6197-9-001 - [id number] Cement, Bone IMP BONE CEMENT STRK SIMPLEX TOBRAMYCIN 40 6197-9-001  [employer] [id number] Right 2 Implanted   IMP STEM CENTRALIZER DEPUY 9.25MM 1376- - [id number] Total Joint Component/Insert IMP STEM CENTRALIZER DEPUY 9.25MM 1376-  [employer] [facility]- [id number] Right 1 Implanted   STEM FEMORAL SUMMIT STD CEMENTED SZ 3 - [id number] Total Joint Component/Insert STEM FEMORAL SUMMIT STD CEMENTED SZ 3  [employer]- [id number] Right 1 Implanted   PLUG CEMENT FLANAGAN W/INSERT 18.5 [id number] - [id number] Total Joint Component/Insert PLUG CEMENT FLANAGAN W/INSERT 18.5 [id number]  [employer]  Right 1 Used as a Supply   IMP HEAD FEMORAL DEPUY [place] 28MM +5MM 1365- - [id number] Total Joint Component/Insert IMP HEAD FEMORAL DEPUY [place] 28MM +5MM 1365-  [employer]- [id number] Right 1 Implanted   IMP HEAD FEMORAL DEPUY BIPOLAR [id number] - [id number] Total Joint Component/Insert IMP HEAD FEMORAL DEPUY BIPOLAR [id number]  [employer]- [id number] Right 1 Implanted       SURGEON  Nitish Dodson MD    ASSISTANT   Veena Hoskins PA-C    A skilled assistant was required for patient positioning, surgical assistance, wound closure and monitoring patient's safety throughout the case.      ANESTHESIA  General      FINDINGS:  [place] anterior, impacted right femoral neck fracture.  Successful cemented right hip hemiarthroplasty with bipolar articulation    SPECIMENS:  none    ESTIMATED  BLOOD LOSS:  200cc    COMPLICATIONS   None.        INDICATION FOR PROCEDURE  Sissy Mccloud is a 89 year old female with a past medical history significant for advanced dementia, CKD 3, diabetes, CHF, hypertension, hypothyroidism, who presents with right hip pain and inability to bear weight after a ground-level fall.  Radiographs obtained in the ED demonstrated a displaced femoral neck fracture.  Considering the degree of fracture displacement and angulation as well as the patient's age, comorbidity profile, and activity level, I recommended a hemiarthroplasty. I believe this to be the most reliable option allowing for early weight bearing and mobilization as well improved pain control. After reviewing the risks, benefits, and alternatives to surgery with the patient's daughter, Jasmyn, we together elected to proceed with a right hip hemiarthroplasty.    INFORMED CONSENT  Sissy Mccloud was identified in the preoperative holding area and was identified using medical record number, name, and date of birth, all of which were confirmed. The operative extremity was marked using an indelible marker. Once again, all risks and benefits as well as alternatives to surgical intervention were discussed with the patient and her daughter in detail and all their questions were answered. Risks discussed included but were not limited to: persistent pain, infection, bleeding, scarring, stiffness, damage to surrounding structures, thromboembolic events, fracture, malalignment/malrotation, leg length inequality, dislocation or instability, implant complications, severe limb dysfunction, loss of limb, and loss of life. The patient's daughter verbalized understanding of the above and together we elected to proceed with surgery as stated above.    DESCRIPTION OF PROCEDURE   Sissy Mccloud was brought back to the operating room.  General anesthetic was administered without complication.  The patient was then transferred to the OR table  and into the lateral decubitus position.  All bony prominences were well-padded and an axillary roll was placed.  The patient was then prepped and draped in the usual sterile fashion.    A timeout was performed prior to the procedure.  Three separate staff members confirmed the patient's name, correct site and side of surgery and procedure being performed.  Antibiotics and tranexamic acid were confirmed to be given prior to incision.     We utilized a standard posterior approach.  We came sharply down through the skin.  Electrocautery was used through the soft tissues.  We identified the IT band.  This was then divided.  A Charnley retractor was placed.  Identified the posterior superior corner of the greater trochanter.  Bursal tissue was taken down.  Identified the posterior capsule.  The posterior capsule and short external rotators were taken down in a single flap and tagged.  Hematoma from the fracture was encountered on entering the capsule.  The remaining femoral neck was rotated up and out.  A neck cut was made at about 12 mm above the lesser trochanter as templated.  We then removed the remaining femoral head with a corkscrew.  The acetabulum was appropriately sized for the bipolar head at a 44 mm.  We then turned our attention back to femoral preparation.  A box osteotome was utilized.  A canal finder was utilized.  We then sequentially broached up to a size 3, which achieved good proximal fill and rotational control.  We reduced and trialed.  We had good length and stability with a standard offset +5 mm neck.  As such we dislocated and removed the broach.  We then mixed the cement on the back table and prepped the femoral canal for cementing.  Cement restrictor was placed.  The canal was thoroughly irrigated and dried.  We utilized a retrograde cementing technique.  We pressurized the cement.  We inserted the femoral stem into the canal with about 15 to 20 degrees of anteversion as we had when we  broached.  We held the stem in place and allow the cement to cure.  The excess cement was removed from around the stem and within the acetabulum.  The final head was then placed and the head was reduced.  We had good stability with flexion to 90 degrees and internal rotation to 70 degrees.  The wound was then thoroughly irrigated. 1g of vancomycin power was placed deep to capsule.     The posterior capsule was repaired with a #5 Ethibond.  The IT band was repaired with #1 Vicryl and a running #1 stratafix.  The fat layer was closed with a running 0 Vicryl.  The deep dermal tissue was closed with 2-0 Vicryl.  Skin was closed with a running subcuticular 3-0 Monocryl and skin glue. The wound was then dressed with a sterile Mepilex dressing.     All sponge needle instrument counts are correct in the procedure.     The patient was rolled from the lateral decubitus position taken to the recovery room in stable condition.      POSTOPERATIVE PLAN:  -Weightbearing as tolerated to the right lower extremity with a walker at all times for stability  -Posterior precautions x 3 months as able  - Anticipated difficulty with mobilization in the postoperative period due to advanced dementia and baseline poor mobility; recommend Xarelto 10 mg daily starting on postop day 1 and continuing for 6 weeks. Early mobilization as able. SCDs while in bed.  -IV cefazolin x 2 doses postop  -Multimodal analgesia  -Please leave occlusive dressing in place until first clinic follow up  -PT/OT  -Please follow-up with Dr. Dodson/Veena Hoskins PA-C in 2 weeks at [facility]. Call our scheduling line at [phone] to make an appointment if you do not already have one scheduled. Call [phone] to reach Dr. Dodson's team with questions or concerns. Referral to Dr. Monge for bone health evaluation.        Nitish Dodson MD  [facility]

## 2025-07-24 NOTE — MEDICATION SCRIBE - ADMISSION MEDICATION HISTORY
Medication Scribe Admission Medication History    Admission medication history is complete. The information provided in this note is only as accurate as the sources available at the time of the update.    Information Source(s): Facility ([facility]/) medication list/MAR via Printout    Pertinent Information: PTA med list updated per facility MAR    Changes made to PTA medication list:  Added: Loperamide, Ondansetron (per facility list)  Deleted: Tramadol, Senna-Docusate (per facility list)  Changed: None    Allergies reviewed with patient and updates made in EHR: yes    Medication History Completed By: Craig Ross 7/24/2025 4:51 AM    PTA Med List   Medication Sig Last Dose/Taking    acetaminophen (TYLENOL) 500 MG tablet Take 1,000 mg by mouth daily as needed for mild pain. Taking As Needed    acetaminophen (TYLENOL) 500 MG tablet Take 2 tablets (1,000 mg) by mouth 3 times daily And 1000mg daily PRN 7/23/2025 Bedtime    gabapentin (NEURONTIN) 300 MG capsule [GABAPENTIN (NEURONTIN) 300 MG CAPSULE] Take 300 mg by mouth daily. 7/23/2025 Morning    hydrALAZINE (APRESOLINE) 10 MG tablet Take 10 mg by mouth 3 times daily as needed. tid Taking As Needed    levothyroxine (SYNTHROID/LEVOTHROID) 88 MCG tablet Take 1 tablet (88 mcg) by mouth every morning (before breakfast). 7/23/2025 Morning    loperamide (IMODIUM) 2 MG capsule Take 2 mg by mouth every 4 hours as needed for diarrhea. Taking As Needed    ondansetron (ZOFRAN) 4 MG tablet Take 4 mg by mouth every 6 hours as needed for nausea. Taking As Needed    sennosides (SENOKOT) 8.6 MG tablet Take 2 tablets by mouth daily. 7/23/2025 Morning    traMADol (ULTRAM) 50 MG tablet Take 0.5 tablets (25 mg) by mouth every 4 hours as needed for severe pain. Taking As Needed    Vitamin D3 (CHOLECALCIFEROL) 25 mcg (1000 units) tablet Take 1 tablet (25 mcg) by mouth daily. 7/23/2025 Morning

## 2025-07-24 NOTE — ED TRIAGE NOTES
Patient arrives via [facility] from [facility].  It is unclear if patient is from [facility] care unit.  It is reported that patient had an unwitnessed fall on Wednesday about 0900.  Patient not able to describe fall at this time.  Patient was returned to bed via SNF staff.  Staff called EMS now stating radiology results were just received and staff reported a right femoral head fracture.  No blood thinners.  Easy respirations.  Skin of normal color, warm, and dry.  Patient is in no acute distress.      Triage Assessment (Adult)       Row Name 07/24/25 0355          Triage Assessment    Airway WDL WDL        Respiratory WDL    Respiratory WDL WDL        Peripheral/Neurovascular WDL    Peripheral Neurovascular WDL WDL

## 2025-07-24 NOTE — H&P
[facility]    History and Physical - Hospitalist Service       Date of Admission:  7/24/2025    Assessment & Plan   Principal Problem:    Closed fracture of neck of right femur, initial encounter (H)  Active Problems:    CKD (chronic kidney disease) stage 3, GFR 30-59 ml/min (H)    Diastolic heart failure (H)    Hypertension    Hypothyroidism, unspecified type    Dementia without behavioral disturbance (H)       Sissy Mccloud is a 89 year old female with history of dementia, CKD 3, diet-controlled DM2, chronic HFpEF, hypertension, and hypothyroidism admitted on 7/24/2025 with acute right femoral neck fracture after a mechanical fall.      Right femoral neck fracture:  X-ray shows transverse fracture of the right femoral neck  -- Ortho consultation  -- N.p.o., supportive cares, avoid IVF for now in setting of chronic CHF      Right shoulder pain:  CT shows severe changes of bursitis of the right shoulder   -- ortho consultation  -- try lidocaine patch  -- pain control as above for hip fracture  -- continue home gabapentin      CKD 3: GFR within previous baseline  -- trend  -- avoid NSAIDs      HFpEF  HTN  -- prn IV hydralazine ordered in place of home PO PRN hydralazine      H/O diet controlled DM2:  -- monitor for post op hyperglycemia  -- will place on sliding scale insulin for now      Dementia:  Resides in memory care  -- supportive cares  -- prn seroquel and zyprexa ordered         Hypothyroidism: continue home replacement       Incidental findings of bilateral pulmonary nodules concerning for metastatic malignancy and 2 cm nodular focus in the right breast   -- Radiology recommends PET CT in further evaluation and breast center referral for further imaging   -- this was not discussed with patient and family was not present in the ED          Diet: NPO for Procedure/Surgery per Anesthesia Guidelines Except for: Meds; Clear liquids before procedure/surgery: ADULT (Age GREATER than or  Equal to 18 years) - Clear liquids 2 hours before procedure/surgery    DVT Prophylaxis: Pneumatic Compression Devices  Foster Catheter: Not present  Lines: None     Cardiac Monitoring: None  Code Status: No CPR- Do NOT Intubate      Clinically Significant Risk Factors Present on Admission                   # Hypertension: Noted on problem list     # Dementia: noted on problem list      # DMII: A1C = 6.8 % (Ref range: <5.7 %) within past 6 months        # Financial/Environmental Concerns:           Disposition Plan      Expected Discharge Date: 07/26/2025             Medically Ready for Discharge: Anticipated in 2-4 Days      Shlomo Blackwell DO  Hospitalist Service  [facility]  Securely message with [employer] (more info)  Text page via [facility] Paging/Directory     ______________________________________________________________________    Chief Complaint   Right hip and shoulder pain    History is obtained from the patient    History of Present Illness   Sissy Mccloud is a 89 year old female who presents after a fall at her memory care facility.  Patient states that she fell due to an accident with her walker not breaking appropriately.  Currently she endorses right hip and right shoulder pain with any movement.      Past Medical History    History reviewed. No pertinent past medical history.    Past Surgical History   Past Surgical History:   Procedure Laterality Date    CHOLECYSTECTOMY         Prior to Admission Medications   Prior to Admission Medications   Prescriptions Last Dose Informant Patient Reported? Taking?   Vitamin D3 (CHOLECALCIFEROL) 25 mcg (1000 units) tablet 7/23/2025 Morning  No Yes   Sig: Take 1 tablet (25 mcg) by mouth daily.   acetaminophen (TYLENOL) 500 MG tablet 7/23/2025 Bedtime  No Yes   Sig: Take 2 tablets (1,000 mg) by mouth 3 times daily And 1000mg daily PRN   acetaminophen (TYLENOL) 500 MG tablet   Yes Yes   Sig: Take 1,000 mg by mouth daily as needed for mild pain.    gabapentin (NEURONTIN) 300 MG capsule 7/23/2025 Morning  Yes Yes   Sig: [GABAPENTIN (NEURONTIN) 300 MG CAPSULE] Take 300 mg by mouth daily.   hydrALAZINE (APRESOLINE) 10 MG tablet   Yes Yes   Sig: Take 10 mg by mouth 3 times daily as needed. tid   levothyroxine (SYNTHROID/LEVOTHROID) 88 MCG tablet 7/23/2025 Morning  No Yes   Sig: Take 1 tablet (88 mcg) by mouth every morning (before breakfast).   loperamide (IMODIUM) 2 MG capsule   Yes Yes   Sig: Take 2 mg by mouth every 4 hours as needed for diarrhea.   ondansetron (ZOFRAN) 4 MG tablet   Yes Yes   Sig: Take 4 mg by mouth every 6 hours as needed for nausea.   sennosides (SENOKOT) 8.6 MG tablet 7/23/2025 Morning  Yes Yes   Sig: Take 2 tablets by mouth daily.   traMADol (ULTRAM) 50 MG tablet   No Yes   Sig: Take 0.5 tablets (25 mg) by mouth every 4 hours as needed for severe pain.      Facility-Administered Medications: None           Physical Exam   Vital Signs: Temp: 98.1  F (36.7  C) Temp src: Oral BP: (!) 191/81 Pulse: 62   Resp: 30 SpO2: 99 % O2 Device: Nasal cannula Oxygen Delivery: 2 LPM  Weight: 0 lbs 0 oz    General Appearance: In no acute distress  RESPIRATORY: Respirations nonlabored  CARDIOVASCULAR: No le edema bilat.  ABDOMEN: soft and non-tender   MUSCULOSKELETAL: tenderness on active ROM right shoulder, active ROM RLE limited due to pain  NEUROLOGIC: Alert, speech is clear         Medical Decision Making       >75 MINUTES SPENT BY ME on the date of service doing chart review, history, exam, documentation & further activities per the note.      Data

## 2025-07-24 NOTE — PROGRESS NOTES
Pt was transfer from Room  3 to Rm  37 as a boarder pt.   Alert and oriented.   She c/o hip pain with turning and repositioning needing prn.   Iv dilaudid  given at 0752.

## 2025-07-24 NOTE — ED PROVIDER NOTES
EMERGENCY DEPARTMENT ENCOUNTER      NAME: Sissy Mccloud  AGE: 89 year old female  YOB: 1931  MRN: [id number]  EVALUATION DATE & TIME: 7/24/2025  3:31 AM    PCP: Dayron Mann    ED PROVIDER: Josue Call MD      Chief Complaint   Patient presents with    Fall    generalized pain          FINAL IMPRESSION:  1. Closed fracture of neck of right femur, initial encounter (H)    2. Breast nodule    3. Pulmonary nodules    4. Ground-level fall          ED COURSE & MEDICAL DECISION MAKING:    Pertinent Labs & Imaging studies reviewed. (See chart for details)  89 year old female presents to the Emergency Department for evaluation of fall, R hip pain    ED Course as of 07/24/25 0614   Thu Jul 24, 2025   0358 My independent interpretation of the EKG is sinus rhythm with first-degree AV block and occasional PVCs at a rate of 66.  No ST segment changes indicative of emergent ischemia.   0359 Patient is a 89-year-old female who presents to the emergency department from her [facility] facility after an unwitnessed fall 19 hours ago, however she presented to the emergency department this evening because the x-ray results that they obtained of her right hip had just resulted at 3 in the morning.  This reportedly showed a femur fracture.  She endorses pain at this area, and has significant tenderness of her right hip.  She has normal distal pulses.  No other evidence of trauma on exam.  However, given her age, and significant risk for other fractures if a hip fracture is present, will obtain CT scans of the head, C-spine, T-spine, L-spine, chest, abdomen/pelvis.  She reports that she fell as she was trying to get up to open the door as she does not like when her door is closed.   0424 Mild anemia with a hemoglobin of 11.0.  No leukocytosis.   0437 Coags normal   0440 Chemistry panel shows baseline CKD.  No electrolyte abnormalities.   0539 CT head and CT C-spine negative for acute traumatic  pathology   0544 CT chest, abdomen, pelvis shows acute, impacted subcapital right femoral neck fracture as well as bilateral pulmonary nodules, which may need follow-up as an outpatient as well as a 2 cm nodule of the right breast, which will also need follow-up imaging.   0545 CT T-spine negative for acute fracture.   0549 CT L-spine negative for acute traumatic pathology.   0611 [place] [facility]: admit, NPO, will see this morning to discuss options with family       Medical Decision Making  I obtained history from Family Member/Significant Other and EMS  Care impacted by Chronic Kidney Disease, Dementia, Diabetes, and Hypertension  I independently interpreted the CT head, chest, abd, pelvis, C spine, T spine, L spine, as well as plain film of R femur and note R femoral neck fracture, no other acute traumatic pathology, but incidentally seen pulmonary nodules and a breast nodule. See radiology report for final interpretation.  I discussed the care with another health care provider: orthopedics (Dr. Rodarte) and hospitalist  Admit.    MIPS (CTPE, Dental pain, Foster, Sinusitis, Asthma/COPD, Head Trauma): Adult Minor Head Trauma:Age 65 years or older    SEPSIS: None    At the conclusion of the encounter I discussed the results of all of the tests and the disposition. The questions were answered. The patient or family acknowledged understanding and was agreeable with the care plan.     0 minutes of critical care time     MEDICATIONS GIVEN IN THE EMERGENCY:  Medications - No data to display    NEW PRESCRIPTIONS STARTED AT TODAY'S ER VISIT  New Prescriptions    No medications on file          =================================================================    HPI    Patient information was obtained from: Patient and EMS    Use of : N/A    LIMITED BY DEMENTIA.      Sissy Mccloud is a 89 year old female with a pertinent history of esophageal reflux, CKD, dementia, diabetes, diastolic heart failure, and sepsis who  presents to this ED via ambulance for evaluation of fall.    Per EMS, patient had a fall when trying to open the door at 0900 yesterday morning. They got an x-ray and the results came back this morning 0300 which showed a fracture of her right femur. Patient endorses pain to her right hip.      PAST MEDICAL HISTORY:  History reviewed. No pertinent past medical history.    PAST SURGICAL HISTORY:  Past Surgical History:   Procedure Laterality Date    CHOLECYSTECTOMY             CURRENT MEDICATIONS:    acetaminophen (TYLENOL) 500 MG tablet  acetaminophen (TYLENOL) 500 MG tablet  gabapentin (NEURONTIN) 300 MG capsule  hydrALAZINE (APRESOLINE) 10 MG tablet  levothyroxine (SYNTHROID/LEVOTHROID) 88 MCG tablet  loperamide (IMODIUM) 2 MG capsule  ondansetron (ZOFRAN) 4 MG tablet  sennosides (SENOKOT) 8.6 MG tablet  traMADol (ULTRAM) 50 MG tablet  Vitamin D3 (CHOLECALCIFEROL) 25 mcg (1000 units) tablet        ALLERGIES:  Allergies   Allergen Reactions    Ambien [Zolpidem] Unknown    Cleocin [Clindamycin] Unknown    Codeine Unknown       FAMILY HISTORY:  History reviewed. No pertinent family history.    SOCIAL HISTORY:   Social History     Socioeconomic History    Marital status:    Tobacco Use    Smoking status: Never    Smokeless tobacco: Never   Substance and Sexual Activity    Drug use: Never     Social Drivers of Health      Received from [facility]    Financial Resource Strain    Received from [facility]    Social Connections       VITALS:  BP (!) 166/72   Pulse 60   Temp 98.1  F (36.7  C) (Oral)   Resp 18   LMP  (LMP Unknown)   SpO2 92%     PHYSICAL EXAM    Physical Exam  Vitals and nursing note reviewed.   Constitutional:       General: She is not in acute distress.     Appearance: Normal appearance. She is normal weight. She is not ill-appearing.   HENT:      Head: Normocephalic and atraumatic.      Nose: Nose normal.      Mouth/Throat:       Mouth: Mucous membranes are dry.      Pharynx: Oropharynx is clear.   Eyes:      Extraocular Movements: Extraocular movements intact.      Conjunctiva/sclera: Conjunctivae normal.      Pupils: Pupils are equal, round, and reactive to light.   Cardiovascular:      Rate and Rhythm: Normal rate.      Pulses: Normal pulses.   Pulmonary:      Effort: Pulmonary effort is normal. No respiratory distress.      Breath sounds: Normal breath sounds.   Chest:      Chest wall: No tenderness.   Abdominal:      General: Abdomen is flat. There is no distension.      Palpations: Abdomen is soft.      Tenderness: There is no abdominal tenderness.   Musculoskeletal:         General: Tenderness (R hip) present. Normal range of motion.      Cervical back: Normal range of motion. No bony tenderness.      Thoracic back: No bony tenderness.      Lumbar back: Bony tenderness present.      Right lower leg: No edema.      Left lower leg: No edema.   Skin:     General: Skin is warm and dry.      Capillary Refill: Capillary refill takes less than 2 seconds.   Neurological:      General: No focal deficit present.      Mental Status: She is alert and oriented to person, place, and time. Mental status is at baseline.   Psychiatric:         Mood and Affect: Mood normal.            LAB:  All pertinent labs reviewed and interpreted.  Results for orders placed or performed during the hospital encounter of 07/24/25   Head CT w/o contrast    Impression    IMPRESSION:  HEAD CT:  1.  No CT finding of a mass, hemorrhage or focal area suggestive of acute infarct.  2.  Diffuse age related changes.    CERVICAL SPINE CT:  1.  No CT evidence for acute fracture or post traumatic subluxation.  2.  No significant canal compromise or neural foraminal narrowing throughout cervical spine.     CT Cervical Spine w/o Contrast    Impression    IMPRESSION:  HEAD CT:  1.  No CT finding of a mass, hemorrhage or focal area suggestive of acute infarct.  2.  Diffuse age  related changes.    CERVICAL SPINE CT:  1.  No CT evidence for acute fracture or post traumatic subluxation.  2.  No significant canal compromise or neural foraminal narrowing throughout cervical spine.     CT Chest Abdomen Pelvis w/o Contrast    Impression    IMPRESSION:  1.  Acute, impacted subcapital right femoral neck fracture.    2.  No obvious solid organ injury within limitations of noncontrast technique.    3.  Please see separate dictation for detailed findings of the spine.    4.  Bilateral pulmonary nodules, some of which are indeterminant, though there are at least 2 concerning nodules in the right middle lobe which could relate to primary or metastatic malignancy. Recommend PET CT in further evaluation.    5.  2 cm nodular focus in the right breast for which follow-up imaging in breast center is recommended.    6.  Small bowel and large bowel diverticulosis.   CT Thoracic Spine Reconstructed    Impression    IMPRESSION:  1.  No evidence of an acute thoracic spine fracture.  2.  Chronic mild loss of height along the superior endplates of the T7 and T12 vertebral bodies.  3.  No significant canal compromise or neural foraminal narrowing throughout thoracic spine.     CT Lumbar Spine Reconstructed    Impression    IMPRESSION:  1.  No evidence of an acute lumbar spine fracture.  2.  Degenerative disc disease with canal compromise and neural foraminal narrowing from the L3-4 to the L5-S1 disc space levels as described above.     XR Femur Right 2 Views    Impression    IMPRESSION:   1.  An age-indeterminate slightly impacted transverse fracture of the right femoral neck is present, also noted on the recent CT scan. Slight valgus angulation about the fracture.  2.  No other visualized acute fractures of the right femur.   3.  No right knee joint effusion.   INR   Result Value Ref Range    INR 1.12 0.85 - 1.15    PT 14.6 11.8 - 14.8 Seconds   Partial thromboplastin time   Result Value Ref Range    aPTT 29 22 -  38 Seconds   Basic Metabolic Panel (Limited Occurrences)   Result Value Ref Range    Sodium 143 135 - 145 mmol/L    Potassium 4.4 3.4 - 5.3 mmol/L    Chloride 105 98 - 107 mmol/L    Carbon Dioxide (CO2) 27 22 - 29 mmol/L    Anion Gap 11 7 - 15 mmol/L    Urea Nitrogen 34.5 (H) 8.0 - 23.0 mg/dL    Creatinine 1.28 (H) 0.51 - 0.95 mg/dL    GFR Estimate 39 (L) >60 mL/min/1.73m2    Calcium 9.0 8.8 - 10.4 mg/dL    Glucose 127 (H) 70 - 99 mg/dL   CBC with platelets and differential   Result Value Ref Range    WBC Count 8.3 4.0 - 11.0 10e3/uL    RBC Count 3.59 (L) 3.80 - 5.20 10e6/uL    Hemoglobin 11.0 (L) 11.7 - 15.7 g/dL    Hematocrit 34.5 (L) 35.0 - 47.0 %    MCV 96 78 - 100 fL    MCH 30.6 26.5 - 33.0 pg    MCHC 31.9 31.5 - 36.5 g/dL    RDW 13.1 10.0 - 15.0 %    Platelet Count 203 150 - 450 10e3/uL    % Neutrophils 75 %    % Lymphocytes 16 %    % Monocytes 5 %    % Eosinophils 3 %    % Basophils 0 %    % Immature Granulocytes 0 %    NRBCs per 100 WBC 0 <1 /100    Absolute Neutrophils 6.3 1.6 - 8.3 10e3/uL    Absolute Lymphocytes 1.3 0.8 - 5.3 10e3/uL    Absolute Monocytes 0.4 0.0 - 1.3 10e3/uL    Absolute Eosinophils 0.2 0.0 - 0.7 10e3/uL    Absolute Basophils 0.0 0.0 - 0.2 10e3/uL    Absolute Immature Granulocytes 0.0 <=0.4 10e3/uL    Absolute NRBCs 0.0 10e3/uL       RADIOLOGY:  Reviewed all pertinent imaging. Please see official radiology report.  XR Femur Right 2 Views   Final Result   IMPRESSION:    1.  An age-indeterminate slightly impacted transverse fracture of the right femoral neck is present, also noted on the recent CT scan. Slight valgus angulation about the fracture.   2.  No other visualized acute fractures of the right femur.    3.  No right knee joint effusion.      CT Lumbar Spine Reconstructed   Final Result   IMPRESSION:   1.  No evidence of an acute lumbar spine fracture.   2.  Degenerative disc disease with canal compromise and neural foraminal narrowing from the L3-4 to the L5-S1 disc space levels as  described above.         CT Thoracic Spine Reconstructed   Final Result   IMPRESSION:   1.  No evidence of an acute thoracic spine fracture.   2.  Chronic mild loss of height along the superior endplates of the T7 and T12 vertebral bodies.   3.  No significant canal compromise or neural foraminal narrowing throughout thoracic spine.         CT Chest Abdomen Pelvis w/o Contrast   Final Result   IMPRESSION:   1.  Acute, impacted subcapital right femoral neck fracture.      2.  No obvious solid organ injury within limitations of noncontrast technique.      3.  Please see separate dictation for detailed findings of the spine.      4.  Bilateral pulmonary nodules, some of which are indeterminant, though there are at least 2 concerning nodules in the right middle lobe which could relate to primary or metastatic malignancy. Recommend PET CT in further evaluation.      5.  2 cm nodular focus in the right breast for which follow-up imaging in breast center is recommended.      6.  Small bowel and large bowel diverticulosis.      CT Cervical Spine w/o Contrast   Final Result   IMPRESSION:   HEAD CT:   1.  No CT finding of a mass, hemorrhage or focal area suggestive of acute infarct.   2.  Diffuse age related changes.      CERVICAL SPINE CT:   1.  No CT evidence for acute fracture or post traumatic subluxation.   2.  No significant canal compromise or neural foraminal narrowing throughout cervical spine.         Head CT w/o contrast   Final Result   IMPRESSION:   HEAD CT:   1.  No CT finding of a mass, hemorrhage or focal area suggestive of acute infarct.   2.  Diffuse age related changes.      CERVICAL SPINE CT:   1.  No CT evidence for acute fracture or post traumatic subluxation.   2.  No significant canal compromise or neural foraminal narrowing throughout cervical spine.             PROCEDURES:   None      [facility] Documentation:   CMS Diagnoses: None             Chris CORTEZ, treva serving as a scribe to  document services personally performed by Josue Call MD based on my observation and the provider's statements to me. I, Josue Call MD, attest that Chris Alma is acting in a scribe capacity, has observed my performance of the services and has documented them in accordance with my direction.    Josue Call MD  [facility] EMERGENCY DEPARTMENT  [address] [place] [id number] [phone]     Josue Call MD  07/24/25 0614

## 2025-07-24 NOTE — ED NOTES
Bed: [place] [address]  Expected date:   Expected time:   Means of arrival:   Comments:  Room 3 when boarder

## 2025-07-24 NOTE — ANESTHESIA PROCEDURE NOTES
Airway       Patient location during procedure: OR       Procedure Start/Stop Times: 7/24/2025 4:39 PM and 7/24/2025 4:40 PM  Staff -        CRNA: Zurdo Nielsen APRN CRNA       Performed By: CRNA  Consent for Airway        Urgency: elective  Indications and Patient Condition       Indications for airway management: megan-procedural       Induction type:intravenous       Mask difficulty assessment: 1 - vent by mask    Final Airway Details       Final airway type: endotracheal airway       Successful airway: ETT - single  Endotracheal Airway Details        ETT size (mm): 6.5       Cuffed: yes       Successful intubation technique: direct laryngoscopy       DL Blade Type: Arriaga 2       Grade View of Cords: 1       Adjucts: stylet       Position: Right       Measured from: lips       Secured at (cm): 22       Bite block used: None    Post intubation assessment        Placement verified by: capnometry, equal breath sounds and chest rise        Number of attempts at approach: 1       Number of other approaches attempted: 0       Secured with: tape       Ease of procedure: easy       Dentition: Intact and Unchanged    Medication(s) Administered   Medication Administration Time: 7/24/2025 4:39 PM

## 2025-07-24 NOTE — PROGRESS NOTES
Patient arrived to unit at approximaely 1310. Vital signs stable, denies pain.  Left unit for [facility]AL[facility] at 1445. Report given to SULEMA Guadarrama. CHG bath completed prior to transfer.    Anh Gorman RN

## 2025-07-25 VITALS
HEART RATE: 84 BPM | DIASTOLIC BLOOD PRESSURE: 63 MMHG | RESPIRATION RATE: 18 BRPM | WEIGHT: 133 LBS | BODY MASS INDEX: 24.33 KG/M2 | SYSTOLIC BLOOD PRESSURE: 138 MMHG | OXYGEN SATURATION: 98 % | TEMPERATURE: 98 F

## 2025-07-25 LAB
ANION GAP SERPL CALCULATED.3IONS-SCNC: 12 MMOL/L (ref 7–15)
BUN SERPL-MCNC: 35.1 MG/DL (ref 8–23)
CALCIUM SERPL-MCNC: 8.6 MG/DL (ref 8.8–10.4)
CHLORIDE SERPL-SCNC: 106 MMOL/L (ref 98–107)
CREAT SERPL-MCNC: 1.15 MG/DL (ref 0.51–0.95)
EGFRCR SERPLBLD CKD-EPI 2021: 44 ML/MIN/1.73M2
GLUCOSE BLDC GLUCOMTR-MCNC: 147 MG/DL (ref 70–99)
GLUCOSE BLDC GLUCOMTR-MCNC: 165 MG/DL (ref 70–99)
GLUCOSE BLDC GLUCOMTR-MCNC: 176 MG/DL (ref 70–99)
GLUCOSE BLDC GLUCOMTR-MCNC: 179 MG/DL (ref 70–99)
GLUCOSE BLDC GLUCOMTR-MCNC: 189 MG/DL (ref 70–99)
GLUCOSE BLDC GLUCOMTR-MCNC: 193 MG/DL (ref 70–99)
GLUCOSE SERPL-MCNC: 170 MG/DL (ref 70–99)
HCO3 SERPL-SCNC: 24 MMOL/L (ref 22–29)
HGB BLD-MCNC: 10.2 G/DL (ref 11.7–15.7)
MCV RBC AUTO: 99 FL (ref 78–100)
POTASSIUM SERPL-SCNC: 4.9 MMOL/L (ref 3.4–5.3)
SODIUM SERPL-SCNC: 142 MMOL/L (ref 135–145)

## 2025-07-25 PROCEDURE — 80048 BASIC METABOLIC PNL TOTAL CA: CPT | Performed by: INTERNAL MEDICINE

## 2025-07-25 PROCEDURE — 85018 HEMOGLOBIN: CPT | Performed by: STUDENT IN AN ORGANIZED HEALTH CARE EDUCATION/TRAINING PROGRAM

## 2025-07-25 PROCEDURE — 258N000003 HC RX IP 258 OP 636: Performed by: STUDENT IN AN ORGANIZED HEALTH CARE EDUCATION/TRAINING PROGRAM

## 2025-07-25 PROCEDURE — 999N000147 HC STATISTIC PT IP EVAL DEFER

## 2025-07-25 PROCEDURE — 250N000013 HC RX MED GY IP 250 OP 250 PS 637: Performed by: INTERNAL MEDICINE

## 2025-07-25 PROCEDURE — 250N000012 HC RX MED GY IP 250 OP 636 PS 637: Performed by: INTERNAL MEDICINE

## 2025-07-25 PROCEDURE — 250N000013 HC RX MED GY IP 250 OP 250 PS 637: Performed by: STUDENT IN AN ORGANIZED HEALTH CARE EDUCATION/TRAINING PROGRAM

## 2025-07-25 PROCEDURE — 36415 COLL VENOUS BLD VENIPUNCTURE: CPT | Performed by: STUDENT IN AN ORGANIZED HEALTH CARE EDUCATION/TRAINING PROGRAM

## 2025-07-25 PROCEDURE — 99232 SBSQ HOSP IP/OBS MODERATE 35: CPT | Performed by: STUDENT IN AN ORGANIZED HEALTH CARE EDUCATION/TRAINING PROGRAM

## 2025-07-25 PROCEDURE — 250N000011 HC RX IP 250 OP 636: Performed by: STUDENT IN AN ORGANIZED HEALTH CARE EDUCATION/TRAINING PROGRAM

## 2025-07-25 PROCEDURE — 120N000001 HC R&B MED SURG/OB

## 2025-07-25 RX ORDER — OXYCODONE HYDROCHLORIDE 5 MG/1
2.5 TABLET ORAL EVERY 4 HOURS PRN
Qty: 10 TABLET | Refills: 0 | Status: SHIPPED | OUTPATIENT
Start: 2025-07-25 | End: 2025-07-28

## 2025-07-25 RX ORDER — SODIUM CHLORIDE, SODIUM LACTATE, POTASSIUM CHLORIDE, CALCIUM CHLORIDE 600; 310; 30; 20 MG/100ML; MG/100ML; MG/100ML; MG/100ML
INJECTION, SOLUTION INTRAVENOUS CONTINUOUS
Status: ACTIVE | OUTPATIENT
Start: 2025-07-25 | End: 2025-07-25

## 2025-07-25 RX ADMIN — SENNOSIDES AND DOCUSATE SODIUM 1 TABLET: 50; 8.6 TABLET ORAL at 08:13

## 2025-07-25 RX ADMIN — QUETIAPINE FUMARATE 12.5 MG: 25 TABLET ORAL at 08:11

## 2025-07-25 RX ADMIN — ACETAMINOPHEN 975 MG: 325 TABLET ORAL at 21:57

## 2025-07-25 RX ADMIN — OXYCODONE HYDROCHLORIDE 2.5 MG: 5 TABLET ORAL at 08:11

## 2025-07-25 RX ADMIN — OXYCODONE HYDROCHLORIDE 2.5 MG: 5 TABLET ORAL at 01:25

## 2025-07-25 RX ADMIN — SODIUM CHLORIDE, SODIUM LACTATE, POTASSIUM CHLORIDE, AND CALCIUM CHLORIDE: .6; .31; .03; .02 INJECTION, SOLUTION INTRAVENOUS at 11:24

## 2025-07-25 RX ADMIN — QUETIAPINE FUMARATE 12.5 MG: 25 TABLET ORAL at 01:25

## 2025-07-25 RX ADMIN — POLYETHYLENE GLYCOL 3350 17 G: 17 POWDER, FOR SOLUTION ORAL at 08:15

## 2025-07-25 RX ADMIN — Medication 25 MCG: at 08:13

## 2025-07-25 RX ADMIN — INSULIN ASPART 2 UNITS: 100 INJECTION, SOLUTION INTRAVENOUS; SUBCUTANEOUS at 01:32

## 2025-07-25 RX ADMIN — CEFAZOLIN 1 G: 1 INJECTION, POWDER, FOR SOLUTION INTRAMUSCULAR; INTRAVENOUS at 04:12

## 2025-07-25 RX ADMIN — ACETAMINOPHEN 975 MG: 325 TABLET ORAL at 14:24

## 2025-07-25 RX ADMIN — SENNOSIDES AND DOCUSATE SODIUM 1 TABLET: 50; 8.6 TABLET ORAL at 20:42

## 2025-07-25 RX ADMIN — GABAPENTIN 300 MG: 300 CAPSULE ORAL at 08:13

## 2025-07-25 RX ADMIN — ACETAMINOPHEN 975 MG: 325 TABLET ORAL at 06:58

## 2025-07-25 RX ADMIN — LIDOCAINE 1 PATCH: 4 PATCH TOPICAL at 08:17

## 2025-07-25 RX ADMIN — OXYCODONE HYDROCHLORIDE 2.5 MG: 5 TABLET ORAL at 18:00

## 2025-07-25 RX ADMIN — RIVAROXABAN 10 MG: 10 TABLET, FILM COATED ORAL at 16:39

## 2025-07-25 RX ADMIN — LEVOTHYROXINE SODIUM 88 MCG: 0.09 TABLET ORAL at 06:58

## 2025-07-25 RX ADMIN — CEFAZOLIN 1 G: 1 INJECTION, POWDER, FOR SOLUTION INTRAMUSCULAR; INTRAVENOUS at 16:39

## 2025-07-25 ASSESSMENT — ACTIVITIES OF DAILY LIVING (ADL)
DIFFICULTY_COMMUNICATING: YES
ADLS_ACUITY_SCORE: 68
ADLS_ACUITY_SCORE: 68
ADLS_ACUITY_SCORE: 67
ADLS_ACUITY_SCORE: 67
ADLS_ACUITY_SCORE: 68
ADLS_ACUITY_SCORE: 68
ADLS_ACUITY_SCORE: 67
ADLS_ACUITY_SCORE: 68
ADLS_ACUITY_SCORE: 67
DIFFICULTY_EATING/SWALLOWING: NO
ADLS_ACUITY_SCORE: 68
ADLS_ACUITY_SCORE: 68
ADLS_ACUITY_SCORE: 67
ADLS_ACUITY_SCORE: 68
ADLS_ACUITY_SCORE: 67
DEPENDENT_IADLS:: CLEANING;COOKING;LAUNDRY;SHOPPING;MEAL PREPARATION;MEDICATION MANAGEMENT;MONEY MANAGEMENT;TRANSPORTATION;INCONTINENCE

## 2025-07-25 NOTE — PROGRESS NOTES
Physical Therapy: Orders received. Chart reviewed and discussed with care team.? Physical Therapy not indicated due to per chart review & conversation with , pt is wheelchair dependent with use of 2 staff/ EZ stand for transfers [facility].? Defer discharge recommendations to Care Team.? Will complete orders.

## 2025-07-25 NOTE — PROGRESS NOTES
[facility]    Medicine Progress Note - Hospitalist Service    Date of Admission:  7/24/2025    Assessment & Plan   Sissy Mccloud is a 89 year old female with history of dementia, CKD 3, diet-controlled DM2, chronic HFpEF, hypertension, and hypothyroidism admitted on 7/24/2025 with acute right femoral neck fracture after a mechanical fall.     Right femoral neck fracture:  S/p right hip hemiarthroplasty  -- Ortho consulted, post op cares per ortho     Right shoulder pain:  CT shows severe changes of bursitis of the right shoulder   -- ortho consultation  -- try lidocaine patch  -- pain control as above for hip fracture  -- continue home gabapentin      CKD 3: GFR within previous baseline  -- trend  -- avoid NSAIDs      HFpEF  HTN  -- prn IV hydralazine ordered in place of home PO PRN hydralazine      H/O diet controlled DM2:  -- monitor for post op hyperglycemia  -- will place on sliding scale insulin for now      Dementia:  Resides in memory care  -- supportive cares  -- prn seroquel and zyprexa ordered       Hypothyroidism: continue home replacement       Incidental findings of bilateral pulmonary nodules concerning for metastatic malignancy and 2 cm nodular focus in the right breast   -Radiology recommends PET CT in further evaluation and breast center referral for further imaging, 7/25 discussed findings with daughter, she currently declines further work up at this time and will consider it in the future as she doesn't want anything invasive done and if there was cancer, they would not pursue treatment.         Diet: Discharge Instruction - Regular Diet Adult  Regular Diet Adult    DVT Prophylaxis: Pneumatic Compression Devices  Foster Catheter: Not present  Lines: None     Cardiac Monitoring: None  Code Status: No CPR- Do NOT Intubate      Clinically Significant Risk Factors                   # Hypertension: Noted on problem list     # Dementia: noted on problem list       # DMII: A1C =  6.8 % (Ref range: <5.7 %) within past 6 months, PRESENT ON ADMISSION      # Financial/Environmental Concerns: none         Social Drivers of Health    Food Insecurity: Unknown (7/25/2025)    Food Insecurity     Within the past 12 months, did you worry that your food would run out before you got money to buy more?: Patient unable to answer     Within the past 12 months, did the food you bought just not last and you didn t have money to get more?: Patient unable to answer   Housing Stability: Unknown (7/25/2025)    Housing Stability     Do you have housing? : Patient unable to answer     Are you worried about losing your housing?: Patient unable to answer   Financial Resource Strain: Unknown (7/25/2025)    Financial Resource Strain     Within the past 12 months, have you or your family members you live with been unable to get utilities (heat, electricity) when it was really needed?: Patient unable to answer   Transportation Needs: Unknown (7/25/2025)    Transportation Needs     Within the past 12 months, has lack of transportation kept you from medical appointments, getting your medicines, non-medical meetings or appointments, work, or from getting things that you need?: Patient unable to answer   Interpersonal Safety: Unknown (7/25/2025)    Interpersonal Safety     Do you feel physically and emotionally safe where you currently live?: Patient unable to answer     Within the past 12 months, have you been hit, slapped, kicked or otherwise physically hurt by someone?: Patient unable to answer     Within the past 12 months, have you been humiliated or emotionally abused in other ways by your partner or ex-partner?: Patient unable to answer    Received from [facility] [employer] [facility] & [facility]    Social Connections          Disposition Plan     Medically Ready for Discharge: Anticipated in 2-4 Days             Max Yuen DO  Hospitalist Service  [facility]  Securely message with  "Chad (more info)  Text page via [facility] Paging/Directory   ______________________________________________________________________    Interval History       Physical Exam   Vital Signs: Temp: 97.5  F (36.4  C) Temp src: Oral BP: (!) 144/73 Pulse: 72   Resp: 22 SpO2: 95 % O2 Device: None (Room air) Oxygen Delivery: 2 LPM  Weight: 133 lbs 0 oz    GEN: in no acute distress, alert and answers questions appropriately  HEENT: Moist mucus membranes, anicteric sclerae  NECK: symmetric without tracheal deviation  CV: reg rhythm, normal rate, audible s1 and s2, no murmurs / rubs / gallops  RESP: clear to auscultation, no rhonchi / rales / wheezes  ABD: non-distended  EXT: no peripheral edema  SKIN: Dressing C/D/I without strikethrough, no surrounding erythema   NEURO: moves all four extremities, no focal deficits    Medical Decision Making       35 MINUTES SPENT BY ME on the date of service doing chart review, history, exam, documentation & further activities per the note.      Data   ------------------------- PAST 24 HR DATA REVIEWED -----------------------------------------------    I have personally reviewed the following data over the past 24 hrs:    N/A  \   10.2 (L)   / N/A     142 106 35.1 (H) /  176 (H)   4.9 24 1.15 (H) \       Imaging results reviewed over the past 24 hrs:   Recent Results (from the past 24 hours)   POC US Guidance Needle Placement    Narrative    Ultrasound was performed as guidance to an anesthesia procedure.  Click   \"PACS images\" hyperlink below to view any stored images.  For specific   procedure details, view procedure note authored by anesthesia.   XR Pelvis w Hip Right 1 View    Narrative    EXAM: XR PELVIS AND HIP RIGHT 1 VIEW  LOCATION: [facility]  DATE: 7/24/2025    INDICATION: Status post hip surgery.  COMPARISON: Radiographs from 7/24/2025.      Impression    IMPRESSION: There is a new bipolar right hip hemiarthroplasty with adjacent gas. Excellent position and " alignment of components. No other change.

## 2025-07-25 NOTE — CONSULTS
7/25 Test claim for Xarelto 10mg- covered $35 per 30 days supply.   Thank you for allowing me to help with your patient  Heena Kapoor [facility]  Pharmacy Discharge Liaison   [place]/[facility] locations  Available on [facility] and [facility]

## 2025-07-25 NOTE — ANESTHESIA CARE TRANSFER NOTE
Patient: Sissy Mccloud    Procedure: Procedure(s):  HEMIARTHROPLASTY, HIP, BIPOLAR, RIGHT       Diagnosis: Closed fracture of neck of right femur, initial encounter (H) [S72.001A]  Diagnosis Additional Information: No value filed.    Anesthesia Type:   General     Note:    Oropharynx: spontaneously breathing and oropharynx clear of all foreign objects  Level of Consciousness: drowsy  Oxygen Supplementation: nasal cannula  Level of Supplemental Oxygen (L/min / FiO2): 3  Independent Airway: airway patency satisfactory and stable  Dentition: dentition unchanged  Vital Signs Stable: post-procedure vital signs reviewed and stable  Report to RN Given: handoff report given  Patient transferred to: PACU    Handoff Report: Identifed the Patient, Identified the Reponsible Provider, Reviewed the pertinent medical history, Discussed the surgical course, Reviewed Intra-OP anesthesia mangement and issues during anesthesia, Set expectations for post-procedure period and Allowed opportunity for questions and acknowledgement of understanding      Vitals:  Vitals Value Taken Time   /75 07/24/25 19:30   Temp     Pulse 75 07/24/25 19:41   Resp 14 07/24/25 19:41   SpO2 95 % 07/24/25 19:41   Vitals shown include unfiled device data.    Electronically Signed By: EFRAIN Zapata CRNA  July 24, 2025  7:43 PM

## 2025-07-25 NOTE — PLAN OF CARE
Problem: Adult Inpatient Plan of Care  Goal: Plan of Care Review  Description: The Plan of Care Review/Shift note should be completed every shift.  The Outcome Evaluation is a brief statement about your assessment that the patient is improving, declining, or no change.  This information will be displayed automatically on your shift  note.  Outcome: Progressing  Goal: Absence of Hospital-Acquired Illness or Injury  Intervention: Prevent Skin Injury  Recent Flowsheet Documentation  Taken 7/25/2025 0106 by Maria Ines Pedro RN  Body Position:   weight shifting   supine, head elevated  Intervention: Prevent and Manage VTE (Venous Thromboembolism) Risk  Recent Flowsheet Documentation  Taken 7/25/2025 0106 by Maria Ines Pedro RN  VTE Prevention/Management: SCDs on (sequential compression devices)  Intervention: Prevent Infection  Recent Flowsheet Documentation  Taken 7/25/2025 0106 by Maria Ines Pedro RN  Infection Prevention: rest/sleep promoted  Goal: Optimal Comfort and Wellbeing  Intervention: Monitor Pain and Promote Comfort  Recent Flowsheet Documentation  Taken 7/25/2025 0125 by Maria Ines Pedro RN  Pain Management Interventions: medication (see MAR)     Problem: Hip Fracture Medical Management  Goal: Pain Control and Function  Outcome: Progressing  Intervention: Manage Acute Orthopaedic-Related Pain  Recent Flowsheet Documentation  Taken 7/25/2025 0125 by Maria Ines Pedro RN  Pain Management Interventions: medication (see MAR)     Problem: Comorbidity Management  Goal: Blood Glucose Levels Within Targeted Range  Outcome: Progressing  Goal: Blood Pressure in Desired Range  Outcome: Progressing     Problem: Dementia Signs/Symptoms  Goal: Improved Sleep (Dementia Signs/Symptoms)  Outcome: Progressing     Problem: Pain Acute  Goal: Optimal Pain Control and Function  Outcome: Progressing  Intervention: Develop Pain Management Plan  Recent Flowsheet Documentation  Taken 7/25/2025  0125 by Maria Ines Pedro RN  Pain Management Interventions: medication (see MAR)   Goal Outcome Evaluation:       Pt alert & oriented to self. Reorientation provided. Prn oxycodone given for pain. Prn seroquel for agitation. Oxygen at 2LPM via oxymask. Rt hip dressing C/D/I. IV abx given as scheduled. Vital Signs WNL. Will monitor.

## 2025-07-25 NOTE — CONSULTS
Care Management Initial Consult    General Information  Assessment completed with: Children, Other (Facility), Jasmyn (daughter)  Type of CM/SW Visit: Initial Assessment    Primary Care Provider verified and updated as needed: Yes   Readmission within the last 30 days: no previous admission in last 30 days      Reason for Consult: discharge planning  Advance Care Planning: Advance Care Planning Reviewed: verified with patient (requested for copy of POLST & HCD from [facility] facility)  Copy of POLST & HCD sent to [facility] for validation.       Communication Assessment  Patient's communication style: spoken language (English or Bilingual)    Hearing Difficulty or Deaf: yes   Wear Glasses or Blind: yes    Cognitive  Cognitive/Neuro/Behavioral: .WDL except, orientation  Level of Consciousness: confused  Arousal Level: opens eyes spontaneously  Orientation: disoriented to, place, time, situation  Mood/Behavior: calm, cooperative     Speech: clear    Living Environment:   People in home: facility resident     Current living Arrangements: extended care facility  Name of Facility: [facility] ([facility])   Able to return to prior arrangements: yes       Family/Social Support:  Care provided by: other (see comments), child(una) (facility staff)  Provides care for: no one, unable/limited ability to care for self  Marital Status:   Support system: Children, Facility resident(s)/Staff          Description of Support System: Supportive, Involved    Support Assessment: Adequate family and caregiver support    Current Resources:   Patient receiving home care services: No        Community Resources: DME, Skilled Nursing Facility  Equipment currently used at home: wheelchair, manual, hospital bed  Supplies currently used at home: Incontinence Supplies    Employment/Financial:  Employment Status: retired        Financial Concerns: none   Referral to Financial Worker: No       Does the patient's  insurance plan have a 3 day qualifying hospital stay waiver?  Yes     Which insurance plan 3 day waiver is available? Alternative insurance waiver    Will the waiver be used for post-acute placement? No    Lifestyle & Psychosocial Needs:  Social Drivers of Health     Food Insecurity: Not on file   Depression: Not at risk (7/15/2025)    PHQ-2     PHQ-2 Score: 0   Housing Stability: Not on file   Tobacco Use: Low Risk  (7/24/2025)    Patient History     Smoking Tobacco Use: Never     Smokeless Tobacco Use: Never     Passive Exposure: Not on file   Financial Resource Strain: High Risk (1/1/2022)    Received from [facility]    Financial Resource Strain     Difficulty of Paying Living Expenses: Not on file     Difficulty of Paying Living Expenses: Not on file   Alcohol Use: Not on file   Transportation Needs: Not on file   Physical Activity: Not on file   Interpersonal Safety: Not on file   Stress: Not on file   Social Connections: Unknown (1/3/2024)    Received from [facility]    Social Connections     Frequency of Communication with Friends and Family: Not on file   Health Literacy: Not on file       Functional Status:  Prior to admission patient needed assistance:   Dependent ADLs:: Wheelchair-with assist, Bathing, Dressing, Eating, Grooming, Incontinence, Positioning, Transfers, Toileting  Dependent IADLs:: Cleaning, Cooking, Laundry, Shopping, Meal Preparation, Medication Management, Money Management, Transportation, Incontinence       Mental Health Status:  Mental Health Status: No Current Concerns       Chemical Dependency Status:  Chemical Dependency Status: No Current Concerns             Values/Beliefs:  Spiritual, Cultural Beliefs, Jew Practices, Values that affect care: no               Discussed  Partnership in Safe Discharge Planning  document with patient/family: No    Additional Information:  Initial CM Assessment completed with  Pt's daughter Jasmyn over the phone. Pt is a LTC resident at [facility]. Pt is wheelchair bound at baseline and requires assistance from LTC staff for all I/ADLs. Pt's daughter Jasmyn is this primary contact as Pt's spouse passed away about a year ago. Contact list updated in Pt's EMR. Goal is to return to LTC when medically ready. Jasmyn requested hospital transport at discharge and is understanding of the potential private costs for transport.     [employer] called and spoke to Monet at [facility] who confirmed Pt would be able to return when medically ready for discharge. Monet sent CM a copy of Pt's HCD which CM sent to [facility] for validation. Monet also provided contact information if Pt is ready to return over the weekend.   Supervisor: [phone]  Nursing Station: [phone]    Next Steps: CM to follow Pt's medical progression and update [facility] when medically ready for discharge.       MARQUISE Worthy

## 2025-07-25 NOTE — PROGRESS NOTES
Occupational Therapy:  Pt at baseline is from memory care/LTC.  Pt is wc dependent and is also dependent with all ADL's.  No indication for OT at this time.  Will defer to care team/PT for discharge recs however it would appear that pt will need continued assistance with all trsfs, ADL's.  Will discontinue OT at this time.    Constance Diaz OTR/L, CLT  7/25/2025

## 2025-07-25 NOTE — PLAN OF CARE
"  Problem: Adult Inpatient Plan of Care  Goal: Plan of Care Review  Description: The Plan of Care Review/Shift note should be completed every shift.  The Outcome Evaluation is a brief statement about your assessment that the patient is improving, declining, or no change.  This information will be displayed automatically on your shift  note.  Outcome: Progressing  Goal: Patient-Specific Goal (Individualized)  Description: You can add care plan individualizations to a care plan. Examples of Individualization might be:  \"Parent requests to be called daily at 9am for status\", \"I have a hard time hearing out of my right ear\", or \"Do not touch me to wake me up as it startles  me\".  Outcome: Progressing  Goal: Absence of Hospital-Acquired Illness or Injury  Outcome: Progressing  Intervention: Prevent and Manage VTE (Venous Thromboembolism) Risk  Recent Flowsheet Documentation  Taken 7/24/2025 2100 by Jesi Abreu RN  VTE Prevention/Management: SCDs on (sequential compression devices)  Intervention: Prevent Infection  Recent Flowsheet Documentation  Taken 7/24/2025 2100 by Jesi Abreu RN  Infection Prevention: rest/sleep promoted  Goal: Optimal Comfort and Wellbeing  Outcome: Progressing  Goal: Readiness for Transition of Care  Outcome: Progressing     Problem: Hip Fracture Medical Management  Goal: Optimal Coping with Change in Health Status  Outcome: Progressing  Goal: Absence of Bleeding  Outcome: Progressing  Goal: Effective Bowel Elimination  Outcome: Progressing  Goal: Baseline Cognitive Function Maintained  Outcome: Progressing  Intervention: Maximize Cognitive Function  Recent Flowsheet Documentation  Taken 7/24/2025 2100 by Jesi Abreu RN  Reorientation Measures: clock in view  Goal: Absence of Embolism  Outcome: Progressing  Intervention: Prevent or Manage Embolism Risk  Recent Flowsheet Documentation  Taken 7/24/2025 2100 by Jesi Abreu RN  VTE Prevention/Management: SCDs on (sequential " compression devices)  Goal: Fracture Stability  Outcome: Progressing  Goal: Optimal Functional Performance  Outcome: Progressing  Goal: Pain Control and Function  Outcome: Progressing  Goal: Effective Urinary Elimination  Outcome: Progressing   Goal Outcome Evaluation:       Pt alert, oriented to self, intermittent confusion, keeps asking for the time of her   her surgical, reminded several times that her surgery is done and she now recovering, pain was managed with 0.2 iv dilaudid. Surgical dressing C/D/I, BP was elevated, prn hydralazine administered with effectiveness. CMS intact.

## 2025-07-25 NOTE — PLAN OF CARE
Goal Outcome Evaluation:      Plan of Care Reviewed With: child          Outcome Evaluation: Goal is to return to LTC when medically ready.

## 2025-07-25 NOTE — PLAN OF CARE
Problem: Adult Inpatient Plan of Care  Goal: Plan of Care Review  Description: The Plan of Care Review/Shift note should be completed every shift.  The Outcome Evaluation is a brief statement about your assessment that the patient is improving, declining, or no change.  This information will be displayed automatically on your shift  note.  Outcome: Progressing     Problem: Hip Fracture Medical Management  Goal: Pain Control and Function  Outcome: Progressing  Intervention: Manage Acute Orthopaedic-Related Pain  Recent Flowsheet Documentation  Taken 7/25/2025 0811 by Dorothy Felix, RN  Pain Management Interventions:   medication (see MAR)   cold applied   care clustered   food   emotional support     Problem: Hip Fracture Medical Management  Goal: Fracture Stability  Outcome: Progressing     Problem: Dementia Signs/Symptoms  Goal: Improved Behavioral Control (Dementia Signs/Symptoms)  Outcome: Progressing   Goal Outcome Evaluation:       R hip dressing C/D/I. Posterior hip precautions. Painful with repo, PRN oxy given & ice applied. PRN seroquel given with good effect. No complaints of R shoulder pain. Voiding using purewick, PVR of 116. Fair appetite, drinking fluids when encouraged. Pt does refuse some cares & sitting at edge of bed. Daughter visited today.

## 2025-07-25 NOTE — PROGRESS NOTES
Orthopedic Progress Note      Assessment: 1 Day Post-Op  S/P Procedure(s):  HEMIARTHROPLASTY, HIP, BIPOLAR, RIGHT     Plan:   - Continue PT/OT  - Weightbearing status: WBAT RLE  - Posterior hip precautions  - Activity: Up with assist and assistive device until independent.  - Anticoagulation: Xarelto 10 mg daily x 6 weeks in addition to SCDs, ramos stockings and early ambulation.  - Antibiotics: 24 hours periop  - Pain Management; continue current regimen  - Diet: progress diet as tolerated  - Labs: hgb 10.2, transfuse if <7.0. No indication today  - Dressing: Keep dry and intact  - Elevation: Elevate RLE on pillow to keep above the level of the heart as much as possible  - Follow-up: Outpatient follow up in 2 weeks  - Disposition: Anticipate discharge TCU pending placement, medical stability      Subjective:  Pain: mild  Nausea, Vomiting:  No  Lightheadedness, Dizziness:  No  Neuro:  Patient denies new onset numbness or paresthesias  Fever, chills: No  Chest pain: No  SOB: No    Patient reports feeling well today. Patient reports pain is tolerable with current pain regimen. Patient eating and drinking well. Patient voiding and passing gas however no BM. All questions/concerns answered.      Objective:  BP (!) 144/73 (BP Location: Left arm)   Pulse 72   Temp 97.5  F (36.4  C) (Oral)   Resp 22   Wt 60.3 kg (133 lb)   LMP  (LMP Unknown)   SpO2 95%   BMI 24.33 kg/m      The patient is A&Ox3. Appears comfortable, sitting up in bed  Calves without tenderness, neg Digna's  Brisk capillary refill in the toes.   Palpable Right dorsalis pedis pulse. Right foot warm & well-perfused.  Sensation is intact to light touch & equal bilaterally in the femoral, DP, SP & tibial nerve distributions.  ROM: Appropriately flexes & extends all toes bilaterally.   Motor: +5/5 dorsiflexion, plantar flexion & EHL bilaterally.   Leg lengths equal.  Dressing C/D/I without strikethrough, no surrounding erythema.      No drain     Pertinent  Labs   Lab Results: personally reviewed.   Lab Results   Component Value Date    INR 1.12 07/24/2025    INR 1.35 (H) 02/26/2022    PROTIME 14.6 07/24/2025     Lab Results   Component Value Date    WBC 8.3 07/24/2025    HGB 10.2 (L) 07/25/2025    HCT 34.5 (L) 07/24/2025    MCV 99 07/25/2025     07/24/2025     Lab Results   Component Value Date     07/25/2025    CO2 24 07/25/2025         Report completed by:  EYAD LUND PA-C  Date: 07/25/2025  [facility]

## 2025-07-25 NOTE — ANESTHESIA POSTPROCEDURE EVALUATION
Patient: Sissy Mccloud    Procedure: Procedure(s):  HEMIARTHROPLASTY, HIP, BIPOLAR, RIGHT       Anesthesia Type:  General    Note:  Disposition: Outpatient   Postop Pain Control: Uneventful            Sign Out: Pain at Preoperative BASELINE   PONV: No   Neuro/Psych: Uneventful            Sign Out: Acceptable/Baseline neuro status   Airway/Respiratory: Uneventful            Sign Out: Acceptable/Baseline resp. status   CV/Hemodynamics: Uneventful            Sign Out: Acceptable CV status; No obvious hypovolemia; No obvious fluid overload   Other NRE: NONE   DID A NON-ROUTINE EVENT OCCUR? No           Last vitals:  Vitals Value Taken Time   /65 07/24/25 19:00   Temp     Pulse 71 07/24/25 19:06   Resp 12 07/24/25 19:06   SpO2 96 % 07/24/25 19:06   Vitals shown include unfiled device data.    Electronically Signed By: Juan Diego Santana MD  July 24, 2025  7:08 PM

## 2025-07-26 PROBLEM — S72.001A CLOSED FRACTURE OF NECK OF RIGHT FEMUR, INITIAL ENCOUNTER (H): Status: RESOLVED | Noted: 2025-07-24 | Resolved: 2025-07-26

## 2025-07-26 LAB
ALBUMIN SERPL BCG-MCNC: 3.2 G/DL (ref 3.5–5.2)
ALP SERPL-CCNC: 51 U/L (ref 40–150)
ALT SERPL W P-5'-P-CCNC: 8 U/L (ref 0–50)
ANION GAP SERPL CALCULATED.3IONS-SCNC: 8 MMOL/L (ref 7–15)
AST SERPL W P-5'-P-CCNC: 25 U/L (ref 0–45)
BILIRUB SERPL-MCNC: 0.2 MG/DL
BUN SERPL-MCNC: 38.1 MG/DL (ref 8–23)
CALCIUM SERPL-MCNC: 8.5 MG/DL (ref 8.8–10.4)
CHLORIDE SERPL-SCNC: 107 MMOL/L (ref 98–107)
CREAT SERPL-MCNC: 1.22 MG/DL (ref 0.51–0.95)
EGFRCR SERPLBLD CKD-EPI 2021: 41 ML/MIN/1.73M2
GLUCOSE BLDC GLUCOMTR-MCNC: 108 MG/DL (ref 70–99)
GLUCOSE BLDC GLUCOMTR-MCNC: 119 MG/DL (ref 70–99)
GLUCOSE BLDC GLUCOMTR-MCNC: 127 MG/DL (ref 70–99)
GLUCOSE SERPL-MCNC: 127 MG/DL (ref 70–99)
HCO3 SERPL-SCNC: 24 MMOL/L (ref 22–29)
HGB BLD-MCNC: 9.4 G/DL (ref 11.7–15.7)
MCV RBC AUTO: 99 FL (ref 78–100)
POTASSIUM SERPL-SCNC: 4.7 MMOL/L (ref 3.4–5.3)
PROT SERPL-MCNC: 6 G/DL (ref 6.4–8.3)
SODIUM SERPL-SCNC: 139 MMOL/L (ref 135–145)

## 2025-07-26 PROCEDURE — 120N000001 HC R&B MED SURG/OB

## 2025-07-26 PROCEDURE — 85018 HEMOGLOBIN: CPT | Performed by: STUDENT IN AN ORGANIZED HEALTH CARE EDUCATION/TRAINING PROGRAM

## 2025-07-26 PROCEDURE — 84155 ASSAY OF PROTEIN SERUM: CPT | Performed by: STUDENT IN AN ORGANIZED HEALTH CARE EDUCATION/TRAINING PROGRAM

## 2025-07-26 PROCEDURE — 36415 COLL VENOUS BLD VENIPUNCTURE: CPT | Performed by: STUDENT IN AN ORGANIZED HEALTH CARE EDUCATION/TRAINING PROGRAM

## 2025-07-26 PROCEDURE — 99233 SBSQ HOSP IP/OBS HIGH 50: CPT | Performed by: HOSPITALIST

## 2025-07-26 PROCEDURE — 250N000013 HC RX MED GY IP 250 OP 250 PS 637: Performed by: STUDENT IN AN ORGANIZED HEALTH CARE EDUCATION/TRAINING PROGRAM

## 2025-07-26 PROCEDURE — 250N000013 HC RX MED GY IP 250 OP 250 PS 637: Performed by: INTERNAL MEDICINE

## 2025-07-26 RX ADMIN — OXYCODONE HYDROCHLORIDE 2.5 MG: 5 TABLET ORAL at 08:08

## 2025-07-26 RX ADMIN — ACETAMINOPHEN 975 MG: 325 TABLET ORAL at 13:01

## 2025-07-26 RX ADMIN — POLYETHYLENE GLYCOL 3350 17 G: 17 POWDER, FOR SOLUTION ORAL at 08:11

## 2025-07-26 RX ADMIN — GABAPENTIN 300 MG: 300 CAPSULE ORAL at 08:08

## 2025-07-26 RX ADMIN — LEVOTHYROXINE SODIUM 88 MCG: 0.09 TABLET ORAL at 06:08

## 2025-07-26 RX ADMIN — SENNOSIDES AND DOCUSATE SODIUM 1 TABLET: 50; 8.6 TABLET ORAL at 21:48

## 2025-07-26 RX ADMIN — Medication 250 MG: at 13:01

## 2025-07-26 RX ADMIN — OXYCODONE HYDROCHLORIDE 2.5 MG: 5 TABLET ORAL at 02:21

## 2025-07-26 RX ADMIN — LIDOCAINE 1 PATCH: 4 PATCH TOPICAL at 08:12

## 2025-07-26 RX ADMIN — Medication 25 MCG: at 08:10

## 2025-07-26 RX ADMIN — QUETIAPINE FUMARATE 12.5 MG: 25 TABLET ORAL at 08:08

## 2025-07-26 RX ADMIN — RIVAROXABAN 10 MG: 10 TABLET, FILM COATED ORAL at 16:54

## 2025-07-26 RX ADMIN — ACETAMINOPHEN 975 MG: 325 TABLET ORAL at 06:08

## 2025-07-26 RX ADMIN — OXYCODONE HYDROCHLORIDE 5 MG: 5 TABLET ORAL at 20:15

## 2025-07-26 RX ADMIN — SENNOSIDES AND DOCUSATE SODIUM 1 TABLET: 50; 8.6 TABLET ORAL at 08:10

## 2025-07-26 RX ADMIN — ACETAMINOPHEN 975 MG: 325 TABLET ORAL at 21:48

## 2025-07-26 ASSESSMENT — ACTIVITIES OF DAILY LIVING (ADL)
ADLS_ACUITY_SCORE: 68
ADLS_ACUITY_SCORE: 68
ADLS_ACUITY_SCORE: 73
ADLS_ACUITY_SCORE: 73
ADLS_ACUITY_SCORE: 68
ADLS_ACUITY_SCORE: 73
ADLS_ACUITY_SCORE: 68
ADLS_ACUITY_SCORE: 73
ADLS_ACUITY_SCORE: 68
ADLS_ACUITY_SCORE: 73
ADLS_ACUITY_SCORE: 73
ADLS_ACUITY_SCORE: 68
ADLS_ACUITY_SCORE: 73
ADLS_ACUITY_SCORE: 68
ADLS_ACUITY_SCORE: 73
ADLS_ACUITY_SCORE: 73

## 2025-07-26 NOTE — PLAN OF CARE
"  Problem: Adult Inpatient Plan of Care  Goal: Plan of Care Review  Description: The Plan of Care Review/Shift note should be completed every shift.  The Outcome Evaluation is a brief statement about your assessment that the patient is improving, declining, or no change.  This information will be displayed automatically on your shift  note.  Outcome: Progressing  Goal: Patient-Specific Goal (Individualized)  Description: You can add care plan individualizations to a care plan. Examples of Individualization might be:  \"Parent requests to be called daily at 9am for status\", \"I have a hard time hearing out of my right ear\", or \"Do not touch me to wake me up as it startles  me\".  Outcome: Progressing  Goal: Absence of Hospital-Acquired Illness or Injury  Outcome: Progressing  Intervention: Identify and Manage Fall Risk  Recent Flowsheet Documentation  Taken 7/25/2025 1649 by Jesi Abreu RN  Safety Promotion/Fall Prevention:   activity supervised   assistive device/personal items within reach   clutter free environment maintained   nonskid shoes/slippers when out of bed   safety round/check completed   supervised activity  Intervention: Prevent Skin Injury  Recent Flowsheet Documentation  Taken 7/25/2025 1649 by Jesi Abreu RN  Body Position:   weight shifting   supine, head elevated  Intervention: Prevent and Manage VTE (Venous Thromboembolism) Risk  Recent Flowsheet Documentation  Taken 7/25/2025 1649 by Jesi Abreu RN  VTE Prevention/Management: SCDs on (sequential compression devices)  Intervention: Prevent Infection  Recent Flowsheet Documentation  Taken 7/25/2025 1649 by Jesi Abreu RN  Infection Prevention: rest/sleep promoted  Goal: Optimal Comfort and Wellbeing  Outcome: Progressing  Goal: Readiness for Transition of Care  Outcome: Progressing     Problem: Hip Fracture Medical Management  Goal: Optimal Coping with Change in Health Status  Outcome: Progressing  Goal: Absence of " Bleeding  Outcome: Progressing  Goal: Effective Bowel Elimination  Outcome: Progressing  Goal: Baseline Cognitive Function Maintained  Outcome: Progressing  Intervention: Maximize Cognitive Function  Recent Flowsheet Documentation  Taken 7/25/2025 1649 by Jesi Abreu RN  Reorientation Measures: reorientation provided  Goal: Absence of Embolism  Outcome: Progressing  Intervention: Prevent or Manage Embolism Risk  Recent Flowsheet Documentation  Taken 7/25/2025 1649 by Jesi Abreu RN  VTE Prevention/Management: SCDs on (sequential compression devices)  Goal: Fracture Stability  Outcome: Progressing  Goal: Optimal Functional Performance  Outcome: Progressing  Intervention: Promote Optimal Functional Status  Recent Flowsheet Documentation  Taken 7/25/2025 1649 by Jesi Abreu, RN  Activity Management: activity adjusted per tolerance  Goal: Pain Control and Function  Outcome: Progressing  Goal: Effective Urinary Elimination  Outcome: Progressing   Goal Outcome Evaluation:       Pt surgical wound dressing C/D/I, ice and prn oxycodone administered for pain in right hip. Pure wick in place with output, Pt eating and drinking well, IVF discontinue during the shift, bigger medication crush and administered in apple

## 2025-07-26 NOTE — PROGRESS NOTES
Care Management Discharge Note    Discharge Date: 07/26/2025       Discharge Disposition: Long Term Care    Discharge Services:     Discharge DME:      Discharge Transportation: health plan transportation (costs discussed with daughter)    Private pay costs discussed: transportation costs    Does the patient's insurance plan have a 3 day qualifying hospital stay waiver?  No    PAS Confirmation Code:  returning to nursing home  Patient/family educated on Medicare website which has current facility and service quality ratings: no    Education Provided on the Discharge Plan:  yes  Persons Notified of Discharge Plans: patient and message left for Jasmyn valdez  Patient/Family in Agreement with the Plan:  yes    Handoff Referral Completed: No, handoff not indicated or clinically appropriate    Additional Information:  [facility] notified patient will be returning today. [employer] wheelchair ride set for [phone]. [facility] to fax discharge orders to [phone] Discharge is on hold for today. [facility] wheelchair ride canceled. [facility] updated. MD to call Jasmyn valdez at .    MARQUISE Puga

## 2025-07-26 NOTE — PROGRESS NOTES
"Ortho Progress Note    Subjective:  Pain in right hip, medications this AM are helping. Denies numbness, tingling, chest pain, shortness of breath.  Tolerating diet and voiding.     Objective:  Vital signs in last 24 hours  Temp:  [98  F (36.7  C)-98.6  F (37  C)] 98.1  F (36.7  C)  Pulse:  [58-65] 63  Resp:  [18] 18  BP: (119-157)/(56-70) 139/59  SpO2:  [91 %-96 %] 91 %  General: resting in bed, alert and oriented, NAD  Resp: breathing nonlabored  RLE: Dressing is c/d/I without strikethrough. Fires quad, SILT femoral nerve. 5/5 TA/GSC/EHL. SILT DP/SP/tib. Palpable DP pulse, foot wwp.       Pertinent Labs   Lab Results: personally reviewed.   Recent Labs   Lab 07/24/25  0415   INR 1.12     Recent Labs   Lab 07/26/25  0518   HGB 9.4*     No results for input(s): \"CREATININE\" in the last 168 hours.    Assessment: 2 Days Post-Op   Procedure(s):  HEMIARTHROPLASTY, HIP, BIPOLAR, RIGHT     Plan:   - Continue PT/OT  - Weightbearing status: WBAT RLE  - Posterior hip precautions  - Activity: Up with assist and assistive device until independent.  - Anticoagulation: Xarelto 10 mg daily x 6 weeks in addition to SCDs, ramos stockings and early ambulation.  - Antibiotics: 24 hours periop  - Pain Management; continue current regimen  - Diet: progress diet as tolerated  - Labs: hgb 9.4, transfuse if <7.0. No indication today  - Dressing: Keep dry and intact  - Elevation: Elevate RLE on pillow to keep above the level of the heart as much as possible  - Follow-up: Outpatient follow up in 2 weeks  - Disposition: Anticipate discharge TCU pending placement, medical stability    Juve Pritchett MD  [facility]            "

## 2025-07-26 NOTE — PLAN OF CARE
Problem: Adult Inpatient Plan of Care  Goal: Plan of Care Review  Description: The Plan of Care Review/Shift note should be completed every shift.  The Outcome Evaluation is a brief statement about your assessment that the patient is improving, declining, or no change.  This information will be displayed automatically on your shift  note.  Outcome: Progressing  Goal: Absence of Hospital-Acquired Illness or Injury  Intervention: Identify and Manage Fall Risk  Recent Flowsheet Documentation  Taken 7/26/2025 0123 by Maria Ines Pedro RN  Safety Promotion/Fall Prevention:   activity supervised   assistive device/personal items within reach   clutter free environment maintained   nonskid shoes/slippers when out of bed   safety round/check completed   supervised activity  Intervention: Prevent Skin Injury  Recent Flowsheet Documentation  Taken 7/26/2025 0221 by Maria Ines Pedro RN  Body Position:   turned   right   weight shifting  Intervention: Prevent and Manage VTE (Venous Thromboembolism) Risk  Recent Flowsheet Documentation  Taken 7/26/2025 0123 by Maria Ines Pedro RN  VTE Prevention/Management: SCDs on (sequential compression devices)  Intervention: Prevent Infection  Recent Flowsheet Documentation  Taken 7/26/2025 0123 by Maria Ines Pedro RN  Infection Prevention:   hand hygiene promoted   rest/sleep promoted  Goal: Optimal Comfort and Wellbeing  Intervention: Monitor Pain and Promote Comfort  Recent Flowsheet Documentation  Taken 7/26/2025 0221 by Maria Ines Pedro RN  Pain Management Interventions:   medication (see MAR)   cold applied     Problem: Hip Fracture Medical Management  Goal: Fracture Stability  Outcome: Progressing     Problem: Hip Fracture Medical Management  Goal: Pain Control and Function  Outcome: Progressing  Intervention: Manage Acute Orthopaedic-Related Pain  Recent Flowsheet Documentation  Taken 7/26/2025 0221 by Maria Ines Pedro RN  Pain  Management Interventions:   medication (see MAR)   cold applied     Problem: Comorbidity Management  Goal: Blood Glucose Levels Within Targeted Range  Outcome: Progressing  Intervention: Monitor and Manage Glycemia  Recent Flowsheet Documentation  Taken 7/26/2025 0123 by Maria Ines Pedro RN  Medication Review/Management: medications reviewed  Goal: Maintenance of Heart Failure Symptom Control  Intervention: Maintain Heart Failure Management  Recent Flowsheet Documentation  Taken 7/26/2025 0123 by Maria Ines Pedro RN  Medication Review/Management: medications reviewed  Goal: Blood Pressure in Desired Range  Intervention: Maintain Blood Pressure Management  Recent Flowsheet Documentation  Taken 7/26/2025 0123 by Maria Ines Pedro RN  Medication Review/Management: medications reviewed     Problem: Pain Acute  Goal: Optimal Pain Control and Function  Outcome: Progressing  Intervention: Develop Pain Management Plan  Recent Flowsheet Documentation  Taken 7/26/2025 0221 by Maria Ines Pedro RN  Pain Management Interventions:   medication (see MAR)   cold applied  Intervention: Prevent or Manage Pain  Recent Flowsheet Documentation  Taken 7/26/2025 0123 by Maria Ines Pedro RN  Medication Review/Management: medications reviewed   Goal Outcome Evaluation:       Pt alert to self. Reorientation provided. Prn oxycodone 2.5 given for pain and cold packs. Rt hip dressing C/D/I.  Oxygen at 2LPM via oxymask. Vital Signs WNL. Will monitor.

## 2025-07-26 NOTE — PLAN OF CARE
Problem: Adult Inpatient Plan of Care  Goal: Plan of Care Review  Description: The Plan of Care Review/Shift note should be completed every shift.  The Outcome Evaluation is a brief statement about your assessment that the patient is improving, declining, or no change.  This information will be displayed automatically on your shift  note.  Outcome: Progressing     Problem: Delirium  Goal: Improved Attention and Thought Clarity  Outcome: Progressing     Problem: Hip Fracture Medical Management  Goal: Fracture Stability  Outcome: Progressing     Problem: Hip Fracture Medical Management  Goal: Optimal Functional Performance  Outcome: Progressing  Intervention: Promote Optimal Functional Status  Recent Flowsheet Documentation  Taken 7/26/2025 1046 by Dorothy Felix, RN  Activity Management: up in chair     Problem: Hip Fracture Medical Management  Goal: Pain Control and Function  Outcome: Progressing   Goal Outcome Evaluation:       Pt much more alert today. Up to chair. R hip surgical dressing C/D/I. C/o R hip pain, PRN robaxin & oxy given, ice pack applied.

## 2025-07-26 NOTE — PROGRESS NOTES
[facility]    Medicine Progress Note - Hospitalist Service    Date of Admission:  7/24/2025    Assessment & Plan   89 female with history of dementia, CKD 3, diet-controlled type 2 diabetes mellitus, chronic diastolic heart failure, hypertension and hypothyroidism admitted 7/24/2025 for acute right femoral neck fracture from mechanical fall.    #Mechanical fall  #Right femoral neck fracture  - Ortho consulted, underwent hemiarthroplasty 7/24  - Postoperative cares per surgery  - PT and OT following    #Right shoulder pain  #Bursitis  - Ortho following, conservative measures with analgesics    #CKD 3B  - GFR at baseline, monitor periodically, avoid nephrotoxins    #Heart failure with preserved ejection fraction  - Euvolemic on exam, monitor clinically    #Hypertension  - Resume usual as needed hydralazine    #Diet-controlled diabetes  - Stop insulin blood sugar checks 7/26 for minimal use    #Dementia  - No major agitation, high risk for delirium, plan to return to memory care    #Hypothyroidism  - Home Synthroid resumed    #Incidental bilateral pulmonary nodules  - Likely from breast primary although would need further workup with pet imaging and likely biopsy for further workup, patient's daughter not interested in further workup given advanced age and dementia  - Outpatient follow-up          Diet: Discharge Instruction - Regular Diet Adult  Regular Diet Adult  Diet    DVT Prophylaxis: DOAC  Foster Catheter: Not present  Lines: None     Cardiac Monitoring: None  Code Status: No CPR- Do NOT Intubate      Clinically Significant Risk Factors           # Hypocalcemia: Lowest Ca = 8.5 mg/dL in last 2 days, will monitor and replace as appropriate     # Hypoalbuminemia: Lowest albumin = 3.2 g/dL at 7/26/2025  5:18 AM, will monitor as appropriate     # Hypertension: Noted on problem list     # Dementia: noted on problem list       # DMII: A1C = 6.8 % (Ref range: <5.7 %) within past 6 months,  PRESENT ON ADMISSION      # Financial/Environmental Concerns: none         Social Drivers of Health    Food Insecurity: Unknown (7/25/2025)    Food Insecurity     Within the past 12 months, did you worry that your food would run out before you got money to buy more?: Patient unable to answer     Within the past 12 months, did the food you bought just not last and you didn t have money to get more?: Patient unable to answer   Housing Stability: Unknown (7/25/2025)    Housing Stability     Do you have housing? : Patient unable to answer     Are you worried about losing your housing?: Patient unable to answer   Financial Resource Strain: Unknown (7/25/2025)    Financial Resource Strain     Within the past 12 months, have you or your family members you live with been unable to get utilities (heat, electricity) when it was really needed?: Patient unable to answer   Transportation Needs: Unknown (7/25/2025)    Transportation Needs     Within the past 12 months, has lack of transportation kept you from medical appointments, getting your medicines, non-medical meetings or appointments, work, or from getting things that you need?: Patient unable to answer   Interpersonal Safety: Unknown (7/25/2025)    Interpersonal Safety     Do you feel physically and emotionally safe where you currently live?: Patient unable to answer     Within the past 12 months, have you been hit, slapped, kicked or otherwise physically hurt by someone?: Patient unable to answer     Within the past 12 months, have you been humiliated or emotionally abused in other ways by your partner or ex-partner?: Patient unable to answer    Received from [facility] & [facility]    Social Connections          Disposition Plan     Medically Ready for Discharge: Anticipated Tomorrow             Johnie Luciano MD  Hospitalist Service  [facility]  Securely message with [employer] (more info)  Text page via [facility]  Paging/Directory   ______________________________________________________________________    Interval History   No new complaints.    Physical Exam   Vital Signs: Temp: 98.1  F (36.7  C) Temp src: Oral BP: 139/59 Pulse: 63   Resp: 18 SpO2: (!) 91 % O2 Device: Oxymask Oxygen Delivery: 2 LPM  Weight: 141 lbs 1.51 oz    Alert, no distress, fleeting basilar crackles, heart rate regular, abdomen soft, trace leg edema, distracted affect    Medical Decision Making       53 MINUTES SPENT BY ME on the date of service doing chart review, history, exam, documentation & further activities per the note.  NOTE(S)/MEDICAL RECORDS REVIEWED over the past 24 hours: Vitals, labs, new imaging, documentation and medication administrations      Data     I have personally reviewed the following data over the past 24 hrs:    N/A  \   9.4 (L)   / N/A     139 107 38.1 (H) /  108 (H)   4.7 24 1.22 (H) \     ALT: 8 AST: 25 AP: 51 TBILI: 0.2   ALB: 3.2 (L) TOT PROTEIN: 6.0 (L) LIPASE: N/A       Imaging results reviewed over the past 24 hrs:   No results found for this or any previous visit (from the past 24 hours).

## 2025-07-27 LAB
ALBUMIN SERPL BCG-MCNC: 3.2 G/DL (ref 3.5–5.2)
ANION GAP SERPL CALCULATED.3IONS-SCNC: 7 MMOL/L (ref 7–15)
BUN SERPL-MCNC: 33.9 MG/DL (ref 8–23)
CALCIUM SERPL-MCNC: 8.7 MG/DL (ref 8.8–10.4)
CHLORIDE SERPL-SCNC: 105 MMOL/L (ref 98–107)
CREAT SERPL-MCNC: 1.08 MG/DL (ref 0.51–0.95)
EGFRCR SERPLBLD CKD-EPI 2021: 48 ML/MIN/1.73M2
ERYTHROCYTE [DISTWIDTH] IN BLOOD BY AUTOMATED COUNT: 13.5 % (ref 10–15)
GLUCOSE SERPL-MCNC: 115 MG/DL (ref 70–99)
HCO3 SERPL-SCNC: 27 MMOL/L (ref 22–29)
HCT VFR BLD AUTO: 30.1 % (ref 35–47)
HGB BLD-MCNC: 9.5 G/DL (ref 11.7–15.7)
MCH RBC QN AUTO: 31.1 PG (ref 26.5–33)
MCHC RBC AUTO-ENTMCNC: 31.6 G/DL (ref 31.5–36.5)
MCV RBC AUTO: 99 FL (ref 78–100)
PHOSPHATE SERPL-MCNC: 2.5 MG/DL (ref 2.5–4.5)
PLATELET # BLD AUTO: 176 10E3/UL (ref 150–450)
POTASSIUM SERPL-SCNC: 4.5 MMOL/L (ref 3.4–5.3)
RBC # BLD AUTO: 3.05 10E6/UL (ref 3.8–5.2)
SODIUM SERPL-SCNC: 139 MMOL/L (ref 135–145)
WBC # BLD AUTO: 6.7 10E3/UL (ref 4–11)

## 2025-07-27 PROCEDURE — 99233 SBSQ HOSP IP/OBS HIGH 50: CPT | Performed by: HOSPITALIST

## 2025-07-27 PROCEDURE — 36415 COLL VENOUS BLD VENIPUNCTURE: CPT | Performed by: HOSPITALIST

## 2025-07-27 PROCEDURE — 85018 HEMOGLOBIN: CPT | Performed by: HOSPITALIST

## 2025-07-27 PROCEDURE — 250N000013 HC RX MED GY IP 250 OP 250 PS 637: Performed by: STUDENT IN AN ORGANIZED HEALTH CARE EDUCATION/TRAINING PROGRAM

## 2025-07-27 PROCEDURE — 120N000001 HC R&B MED SURG/OB

## 2025-07-27 PROCEDURE — 82947 ASSAY GLUCOSE BLOOD QUANT: CPT | Performed by: HOSPITALIST

## 2025-07-27 PROCEDURE — 250N000013 HC RX MED GY IP 250 OP 250 PS 637: Performed by: INTERNAL MEDICINE

## 2025-07-27 RX ADMIN — SENNOSIDES AND DOCUSATE SODIUM 1 TABLET: 50; 8.6 TABLET ORAL at 21:26

## 2025-07-27 RX ADMIN — POLYETHYLENE GLYCOL 3350 17 G: 17 POWDER, FOR SOLUTION ORAL at 08:58

## 2025-07-27 RX ADMIN — Medication 250 MG: at 08:58

## 2025-07-27 RX ADMIN — ACETAMINOPHEN 650 MG: 325 TABLET ORAL at 11:10

## 2025-07-27 RX ADMIN — Medication 25 MCG: at 08:58

## 2025-07-27 RX ADMIN — ACETAMINOPHEN 975 MG: 325 TABLET ORAL at 13:39

## 2025-07-27 RX ADMIN — ACETAMINOPHEN 975 MG: 325 TABLET ORAL at 21:26

## 2025-07-27 RX ADMIN — SENNOSIDES AND DOCUSATE SODIUM 1 TABLET: 50; 8.6 TABLET ORAL at 08:57

## 2025-07-27 RX ADMIN — ACETAMINOPHEN 975 MG: 325 TABLET ORAL at 06:37

## 2025-07-27 RX ADMIN — LEVOTHYROXINE SODIUM 88 MCG: 0.09 TABLET ORAL at 06:38

## 2025-07-27 RX ADMIN — QUETIAPINE FUMARATE 12.5 MG: 25 TABLET ORAL at 08:57

## 2025-07-27 RX ADMIN — OXYCODONE HYDROCHLORIDE 2.5 MG: 5 TABLET ORAL at 13:39

## 2025-07-27 RX ADMIN — LIDOCAINE 1 PATCH: 4 PATCH TOPICAL at 21:40

## 2025-07-27 RX ADMIN — RIVAROXABAN 10 MG: 10 TABLET, FILM COATED ORAL at 16:24

## 2025-07-27 ASSESSMENT — ACTIVITIES OF DAILY LIVING (ADL)
ADLS_ACUITY_SCORE: 73

## 2025-07-27 NOTE — PLAN OF CARE
Problem: Adult Inpatient Plan of Care  Goal: Absence of Hospital-Acquired Illness or Injury  Intervention: Identify and Manage Fall Risk  Recent Flowsheet Documentation  Taken 7/26/2025 1650 by Jesi Abreu RN  Safety Promotion/Fall Prevention:   activity supervised   assistive device/personal items within reach   clutter free environment maintained   safety round/check completed  Intervention: Prevent Skin Injury  Recent Flowsheet Documentation  Taken 7/26/2025 1650 by Jesi Abreu RN  Body Position: heels elevated  Intervention: Prevent and Manage VTE (Venous Thromboembolism) Risk  Recent Flowsheet Documentation  Taken 7/26/2025 1650 by Jesi Abreu RN  VTE Prevention/Management: SCDs on (sequential compression devices)  Intervention: Prevent Infection  Recent Flowsheet Documentation  Taken 7/26/2025 1650 by Jesi Abreu RN  Infection Prevention:   hand hygiene promoted   rest/sleep promoted     Problem: Delirium  Goal: Improved Behavioral Control  Intervention: Minimize Safety Risk  Recent Flowsheet Documentation  Taken 7/26/2025 1650 by Jesi Abreu RN  Enhanced Safety Measures:   patient/family teach back on injury risk   Pre/Post Op education on fall prevention  Goal: Improved Attention and Thought Clarity  Intervention: Maximize Cognitive Function  Recent Flowsheet Documentation  Taken 7/26/2025 1650 by Jesi Abreu RN  Sensory Stimulation Regulation:   television on   care clustered  Reorientation Measures:   reorientation provided   clock in view   Goal Outcome Evaluation:       Pt alert and disoriented, wanting to go get in in bed for sleep when she was already in bed, reorientation provided several times. Right hip incisional site dressing C/D/I, pain meds and ice pack used to manage pain with effectiveness.

## 2025-07-27 NOTE — PROGRESS NOTES
[facility]    Medicine Progress Note - Hospitalist Service    Date of Admission:  7/24/2025    Assessment & Plan   89 female with history of dementia, CKD 3, diet-controlled type 2 diabetes mellitus, chronic diastolic heart failure, hypertension and hypothyroidism admitted 7/24/2025 for acute right femoral neck fracture from mechanical fall.    #Mechanical fall  #Right femoral neck fracture  - Ortho consulted, underwent hemiarthroplasty 7/24  - Postoperative cares per surgery  - PT and OT following    #Right shoulder pain  #Bursitis  - Ortho following, conservative measures with analgesics    #CKD 3B  - GFR at baseline, monitor periodically, avoid nephrotoxins    #Heart failure with preserved ejection fraction  - Euvolemic on exam, monitor clinically    #Hypertension  - Resume usual as needed hydralazine    #Diet-controlled diabetes  - Stop insulin blood sugar checks 7/26 for minimal use    #Dementia  #Postoperative delirium, agitation  - Developed intermittent agitation, increased impulsiveness overnight 7/26  - Delirium precautions, low-dose antipsychotics as a last resort  - Reorientation measures, one-to-one  if needed    #Hypothyroidism  - Home Synthroid resumed    #Incidental bilateral pulmonary nodules  - Likely from breast primary although would need further workup with pet imaging and likely biopsy for further workup, patient's daughter not interested in further workup given advanced age and dementia  - Patient's daughter interested in palliative care consult, anticipate discussion 7/28 to consider hospice        Diet: Discharge Instruction - Regular Diet Adult  Regular Diet Adult  Diet    DVT Prophylaxis: DOAC  Foster Catheter: Not present  Lines: None     Cardiac Monitoring: None  Code Status: No CPR- Do NOT Intubate      Clinically Significant Risk Factors           # Hypocalcemia: Lowest Ca = 8.5 mg/dL in last 2 days, will monitor and replace as appropriate     #  Hypoalbuminemia: Lowest albumin = 3.2 g/dL at 7/27/2025  5:29 AM, will monitor as appropriate     # Hypertension: Noted on problem list     # Dementia: noted on problem list       # DMII: A1C = 6.8 % (Ref range: <5.7 %) within past 6 months, PRESENT ON ADMISSION      # Financial/Environmental Concerns: none         Social Drivers of Health    Food Insecurity: Unknown (7/25/2025)    Food Insecurity     Within the past 12 months, did you worry that your food would run out before you got money to buy more?: Patient unable to answer     Within the past 12 months, did the food you bought just not last and you didn t have money to get more?: Patient unable to answer   Housing Stability: Unknown (7/25/2025)    Housing Stability     Do you have housing? : Patient unable to answer     Are you worried about losing your housing?: Patient unable to answer   Financial Resource Strain: Unknown (7/25/2025)    Financial Resource Strain     Within the past 12 months, have you or your family members you live with been unable to get utilities (heat, electricity) when it was really needed?: Patient unable to answer   Transportation Needs: Unknown (7/25/2025)    Transportation Needs     Within the past 12 months, has lack of transportation kept you from medical appointments, getting your medicines, non-medical meetings or appointments, work, or from getting things that you need?: Patient unable to answer   Interpersonal Safety: Unknown (7/25/2025)    Interpersonal Safety     Do you feel physically and emotionally safe where you currently live?: Patient unable to answer     Within the past 12 months, have you been hit, slapped, kicked or otherwise physically hurt by someone?: Patient unable to answer     Within the past 12 months, have you been humiliated or emotionally abused in other ways by your partner or ex-partner?: Patient unable to answer    Received from [email or web address] & [facility]    Social [employer]           Disposition Plan     Medically Ready for Discharge: Anticipated Tomorrow             Johnie Luciano MD  Hospitalist Service  [facility]  Securely message with [employer] (more info)  Text page via [employer] Paging/Directory   ______________________________________________________________________    Interval History   Agitation and confusion overnight.  Denies uncontrolled pain.    Physical Exam   Vital Signs: Temp: 97  F (36.1  C) Temp src: Oral BP: (!) 152/62 Pulse: 59   Resp: 18 SpO2: 95 % O2 Device: None (Room air) Oxygen Delivery: 1 LPM  Weight: 141 lbs 1.51 oz    Alert, no distress, confused, poor dentition, heart rate regular, lungs clear, abdomen soft, trace leg edema, benign hip wound bandaging    Medical Decision Making       52 MINUTES SPENT BY ME on the date of service doing chart review, history, exam, documentation & further activities per the note.  NOTE(S)/MEDICAL RECORDS REVIEWED over the past 24 hours: Vitals, labs, new imaging, documentation and medication administrations      Data     I have personally reviewed the following data over the past 24 hrs:    6.7  \   9.5 (L)   / 176     139 105 33.9 (H) /  115 (H)   4.5 27 1.08 (H) \     ALT: N/A AST: N/A AP: N/A TBILI: N/A   ALB: 3.2 (L) TOT PROTEIN: N/A LIPASE: N/A       Imaging results reviewed over the past 24 hrs:   No results found for this or any previous visit (from the past 24 hours).

## 2025-07-27 NOTE — PLAN OF CARE
Problem: Adult Inpatient Plan of Care  Goal: Plan of Care Review  Description: The Plan of Care Review/Shift note should be completed every shift.  The Outcome Evaluation is a brief statement about your assessment that the patient is improving, declining, or no change.  This information will be displayed automatically on your shift  note.  Outcome: Progressing     Problem: Delirium  Goal: Improved Behavioral Control  Outcome: Progressing  Intervention: Minimize Safety Risk  Recent Flowsheet Documentation  Taken 7/27/2025 0815 by Dorothy Felix RN  Enhanced Safety Measures:   patient/family teach back on injury risk   Pre/Post Op education on fall prevention     Problem: Hip Fracture Medical Management  Goal: Fracture Stability  Outcome: Progressing     Problem: Hip Fracture Medical Management  Goal: Pain Control and Function  Outcome: Progressing  Intervention: Manage Acute Orthopaedic-Related Pain  Recent Flowsheet Documentation  Taken 7/27/2025 0830 by Dorothy Felix, RN  Pain Management Interventions:   cold applied   medication (see MAR)   Goal Outcome Evaluation:       Pt started out morning very confused thinking we were trying to poison her and that there were 15 blind people coming after her. Daughter came & pt was more oriented and able to acknowledge her hallucinations this morning. Up in chair. Ate 100% lunch.

## 2025-07-27 NOTE — PLAN OF CARE
Problem: Adult Inpatient Plan of Care  Goal: Optimal Comfort and Wellbeing  Outcome: Progressing     Problem: Delirium  Goal: Improved Sleep  Outcome: Progressing     Problem: Hip Fracture Medical Management  Goal: Fracture Stability  Outcome: Progressing     Goal Outcome Evaluation:    POD 3: Right hip fracture with hemiarthroplasty    No issues overnight. Slept well. Hip dressing clean/dry/intact. Pain managed with scheduled Tylenol and ice packs. Refused offer of other pain medications. Probable discharge today to LTC by wheelchair.

## 2025-07-27 NOTE — PROGRESS NOTES
"Ortho Progress Note    Subjective:  No acute events overnight.  Demented.    Objective:  Vital signs in last 24 hours  Temp:  [97  F (36.1  C)-98.1  F (36.7  C)] 97  F (36.1  C)  Pulse:  [59-69] 59  Resp:  [18] 18  BP: (136-183)/(59-78) 152/62  SpO2:  [91 %-100 %] 98 %  General: resting in bed, alert and oriented, NAD  Resp: breathing nonlabored  RLE: Dressing is c/d/I without strikethrough. Fires quad, SILT femoral nerve. 5/5 TA/GSC/EHL. SILT DP/SP/tib. Palpable DP pulse, foot wwp.       Pertinent Labs   Lab Results: personally reviewed.   Recent Labs   Lab 07/24/25  0415   INR 1.12     Recent Labs   Lab 07/27/25  0529   HGB 9.5*     No results for input(s): \"CREATININE\" in the last 168 hours.    Assessment: 3 Days Post-Op   Procedure(s):  HEMIARTHROPLASTY, HIP, BIPOLAR, RIGHT     Plan:   - Continue PT/OT  - Weightbearing status: WBAT RLE  - Posterior hip precautions  - Activity: Up with assist and assistive device until independent.  - Anticoagulation: Xarelto 10 mg daily x 6 weeks in addition to SCDs, ramos stockings and early ambulation.  - Antibiotics: 24 hours periop  - Pain Management; continue current regimen  - Diet: progress diet as tolerated  - Labs: hgb 9.4, transfuse if <7.0. No indication today  - Dressing: Keep dry and intact  - Elevation: Elevate RLE on pillow to keep above the level of the heart as much as possible  - Follow-up: Outpatient follow up in 2 weeks  - Disposition: Anticipate discharge TCU pending placement, medical stability    Nagi Jefferson MD  [facility]            "

## 2025-07-28 VITALS
OXYGEN SATURATION: 91 % | BODY MASS INDEX: 25.81 KG/M2 | TEMPERATURE: 98.3 F | SYSTOLIC BLOOD PRESSURE: 207 MMHG | WEIGHT: 141.09 LBS | DIASTOLIC BLOOD PRESSURE: 84 MMHG | RESPIRATION RATE: 20 BRPM | HEART RATE: 72 BPM

## 2025-07-28 DIAGNOSIS — S72.001A CLOSED FRACTURE OF NECK OF RIGHT FEMUR, INITIAL ENCOUNTER (H): ICD-10-CM

## 2025-07-28 LAB — GLUCOSE BLDC GLUCOMTR-MCNC: 133 MG/DL (ref 70–99)

## 2025-07-28 PROCEDURE — 250N000011 HC RX IP 250 OP 636: Performed by: STUDENT IN AN ORGANIZED HEALTH CARE EDUCATION/TRAINING PROGRAM

## 2025-07-28 PROCEDURE — 250N000011 HC RX IP 250 OP 636: Performed by: INTERNAL MEDICINE

## 2025-07-28 PROCEDURE — 250N000013 HC RX MED GY IP 250 OP 250 PS 637: Performed by: STUDENT IN AN ORGANIZED HEALTH CARE EDUCATION/TRAINING PROGRAM

## 2025-07-28 PROCEDURE — 250N000013 HC RX MED GY IP 250 OP 250 PS 637: Performed by: HOSPITALIST

## 2025-07-28 PROCEDURE — 99239 HOSP IP/OBS DSCHRG MGMT >30: CPT | Performed by: HOSPITALIST

## 2025-07-28 PROCEDURE — 250N000013 HC RX MED GY IP 250 OP 250 PS 637: Performed by: INTERNAL MEDICINE

## 2025-07-28 RX ORDER — HYDRALAZINE HYDROCHLORIDE 25 MG/1
25 TABLET, FILM COATED ORAL ONCE
Status: COMPLETED | OUTPATIENT
Start: 2025-07-28 | End: 2025-07-28

## 2025-07-28 RX ORDER — OXYCODONE HYDROCHLORIDE 5 MG/1
2.5 TABLET ORAL EVERY 4 HOURS PRN
COMMUNITY
Start: 2025-07-28

## 2025-07-28 RX ORDER — OXYCODONE HYDROCHLORIDE 5 MG/1
2.5 TABLET ORAL EVERY 4 HOURS PRN
Qty: 10 TABLET | Refills: 0 | Status: SHIPPED | OUTPATIENT
Start: 2025-07-28 | End: 2025-07-28

## 2025-07-28 RX ADMIN — HYDRALAZINE HYDROCHLORIDE 25 MG: 25 TABLET ORAL at 17:23

## 2025-07-28 RX ADMIN — OXYCODONE HYDROCHLORIDE 2.5 MG: 5 TABLET ORAL at 16:31

## 2025-07-28 RX ADMIN — GABAPENTIN 300 MG: 300 CAPSULE ORAL at 08:10

## 2025-07-28 RX ADMIN — POLYETHYLENE GLYCOL 3350 17 G: 17 POWDER, FOR SOLUTION ORAL at 08:11

## 2025-07-28 RX ADMIN — Medication 250 MG: at 16:04

## 2025-07-28 RX ADMIN — ACETAMINOPHEN 325 MG: 325 TABLET ORAL at 13:39

## 2025-07-28 RX ADMIN — OXYCODONE HYDROCHLORIDE 2.5 MG: 5 TABLET ORAL at 00:43

## 2025-07-28 RX ADMIN — ACETAMINOPHEN 975 MG: 325 TABLET ORAL at 05:58

## 2025-07-28 RX ADMIN — Medication 25 MCG: at 08:11

## 2025-07-28 RX ADMIN — HYDRALAZINE HYDROCHLORIDE 10 MG: 20 INJECTION INTRAMUSCULAR; INTRAVENOUS at 01:12

## 2025-07-28 RX ADMIN — ONDANSETRON 4 MG: 2 INJECTION, SOLUTION INTRAMUSCULAR; INTRAVENOUS at 10:46

## 2025-07-28 RX ADMIN — ACETAMINOPHEN 650 MG: 325 TABLET ORAL at 11:03

## 2025-07-28 RX ADMIN — RIVAROXABAN 10 MG: 10 TABLET, FILM COATED ORAL at 16:31

## 2025-07-28 RX ADMIN — OXYCODONE HYDROCHLORIDE 2.5 MG: 5 TABLET ORAL at 13:39

## 2025-07-28 RX ADMIN — LEVOTHYROXINE SODIUM 88 MCG: 0.09 TABLET ORAL at 06:00

## 2025-07-28 RX ADMIN — HYDRALAZINE HYDROCHLORIDE 10 MG: 20 INJECTION INTRAMUSCULAR; INTRAVENOUS at 05:55

## 2025-07-28 RX ADMIN — SENNOSIDES AND DOCUSATE SODIUM 1 TABLET: 50; 8.6 TABLET ORAL at 08:10

## 2025-07-28 RX ADMIN — ONDANSETRON 4 MG: 2 INJECTION, SOLUTION INTRAMUSCULAR; INTRAVENOUS at 05:49

## 2025-07-28 RX ADMIN — HYDROXYZINE HYDROCHLORIDE 10 MG: 10 TABLET, FILM COATED ORAL at 00:42

## 2025-07-28 ASSESSMENT — ACTIVITIES OF DAILY LIVING (ADL)
ADLS_ACUITY_SCORE: 74
ADLS_ACUITY_SCORE: 73
ADLS_ACUITY_SCORE: 74
ADLS_ACUITY_SCORE: 73
ADLS_ACUITY_SCORE: 73
ADLS_ACUITY_SCORE: 74
ADLS_ACUITY_SCORE: 73
ADLS_ACUITY_SCORE: 70
ADLS_ACUITY_SCORE: 74

## 2025-07-28 NOTE — PROGRESS NOTES
Dr. Luciano provided written script for PRN oxycodone for patient. MD also updated on patient's SBP of 190's. Patient given PRN Robaxin initially and then one time oxycodone 2.5 mg per MD orders for hip pain. When EMS arrived to transport patient, patient's BP was 218/86. Patient denied any new symptoms at the time. Repeat SBP was 207. MD updated and spoke with EMS over the phone. MD gave the approval to discharge patient with planned hospice consult tomorrow in the patient's facility. EMS also called their MD, which they got approval to transport patient with current plan. RN updated patient's LTC. Patient transported with her bagged belongings to discharge via stretcher to her facility.

## 2025-07-28 NOTE — PLAN OF CARE
Problem: Adult Inpatient Plan of Care  Goal: Optimal Comfort and Wellbeing  Intervention: Monitor Pain and Promote Comfort  Recent Flowsheet Documentation  Taken 7/28/2025 0043 by Lolly Branch RN  Pain Management Interventions:   medication (see MAR)   cold applied     Problem: Delirium  Goal: Improved Behavioral Control  Outcome: Not Progressing     Problem: Hip Fracture Medical Management  Goal: Pain Control and Function  Intervention: Manage Acute Orthopaedic-Related Pain  Recent Flowsheet Documentation  Taken 7/28/2025 0043 by Lolly Branch RN  Pain Management Interventions:   medication (see MAR)   cold applied  Goal: Effective Urinary Elimination  Outcome: Progressing     Problem: Comorbidity Management  Goal: Blood Pressure in Desired Range  Outcome: Progressing     Goal Outcome Evaluation:    POD 4: Right hip hemiarthroplasty    Patient alert and oriented x 2-3 at beginning of night. Calm, cooperative and pleasant with staff and cares. Became more confused, paranoid, yelling out and uncooperative with staff after receiving prn oxycodone 2.5 mg for post surgical hip pain. Will avoid narcotics. PRN IV Zofran given for nausea; helpful. Surgical dressing clean/dry/intact. Blood pressure elevated; prn IV hydralazine given x 2; effective.

## 2025-07-28 NOTE — PROGRESS NOTES
Care Management Discharge Note    Discharge Date: 07/28/2025       Discharge Disposition: Long Term Care [facility]    Discharge Services: LTC    Discharge DME:      Discharge Transportation: [facility] Stretcher between [id number]    Private pay costs discussed: transportation costs    Does the patient's insurance plan have a 3 day qualifying hospital stay waiver?  No    PAS Confirmation Code:    Patient/family educated on Medicare website which has current facility and service quality ratings: no    Education Provided on the Discharge Plan:    Persons Notified of Discharge Plans: yes  Patient/Family in Agreement with the Plan:      Handoff Referral Completed: No, handoff not indicated or clinically appropriate    Additional Information:  Got a consult to set up hospice conversation with the pt's daughter, Jasmyn for more information. Writer then called Jasmyn to further discuss and per Jasmyn she has been thinking about Hospice for her mother for awhile but now with the new cancer diagnosis she just wants her to be comfortable. We discussed that she can have hospice come out to her LTC facility and that we do not have to wait it can be done outpatient. Jasmyn is interested in discussing further with a hospice agency. We discussed that I will call the pt's LTC facility to see who they have a contract with and call her back. Writer then called Ema in admissions at [facility] and was told they use [facility], [facility], and [facility]. We also discussed that pt is medically ready and what time they can take her back today and I was told they would need her back before 1700. Called Jasmyn back to give her hospice agencies names and Jasmyn would like to try [facility]. We discussed that I will send a referral to [facility] and they will give her a call to set up a meeting. Jasmyn was accepting of this plan. We then discussed transport and Jasmyn wants  to set up transport. Writer went over possible out of pocket cost  [phone]. Pt, Jasmyn, LTC and nursing aware of ride time. PCS done. No further CM needs at this time. CM will sign off.    Nisha Tanner RN  ,a

## 2025-07-28 NOTE — PROGRESS NOTES
I spoke with daughter, Jasmyn, and scheduled a hospice consultation at [facility] in [place], for tomorrow, July 29th at 1 pm.     Thank you,  Monet Sharma  Hospice Care Consultant  [phone]

## 2025-07-28 NOTE — PROGRESS NOTES
"Ortho Progress Note    Subjective:  No acute events overnight.  Demented.    Objective:  Vital signs in last 24 hours  Temp:  [97.2  F (36.2  C)-98.7  F (37.1  C)] 97.9  F (36.6  C)  Pulse:  [64-77] 77  Resp:  [16-18] 18  BP: (135-204)/(60-90) 150/70  SpO2:  [93 %-97 %] 95 %  General: resting in bed, alert and oriented, NAD  Resp: breathing nonlabored  RLE: Dressing is c/d/I without strikethrough. Fires quad, SILT femoral nerve. 5/5 TA/GSC/EHL. SILT DP/SP/tib. Palpable DP pulse, foot wwp.       Pertinent Labs   Lab Results: personally reviewed.   Recent Labs   Lab 07/24/25  0415   INR 1.12     Recent Labs   Lab 07/27/25  0529   HGB 9.5*     No results for input(s): \"CREATININE\" in the last 168 hours.    Assessment: 4 Days Post-Op   Procedure(s):  HEMIARTHROPLASTY, HIP, BIPOLAR, RIGHT     Plan:   - Continue PT/OT  - Weightbearing status: WBAT RLE  - Posterior hip precautions  - Activity: Up with assist and assistive device until independent.  - Anticoagulation: Xarelto 10 mg daily x 6 weeks in addition to SCDs, ramos stockings and early ambulation.  - Pain Management; continue current regimen  - Diet: progress diet as tolerated  - Dressing: Keep dry and intact  - Elevation: Elevate RLE on pillow to keep above the level of the heart as much as possible  - Follow-up: Outpatient follow up in 2 weeks  - Disposition: Anticipate discharge TCU pending placement, medical stability. Okay to discharge from an orthopedic standpoint.      EYAD LUND PA-C  [facility]            "

## 2025-07-28 NOTE — PLAN OF CARE
"  Problem: Adult Inpatient Plan of Care  Goal: Plan of Care Review  Description: The Plan of Care Review/Shift note should be completed every shift.  The Outcome Evaluation is a brief statement about your assessment that the patient is improving, declining, or no change.  This information will be displayed automatically on your shift  note.  Outcome: Progressing   Goal Outcome Evaluation:             Alert, confused. Surgical dressing dry and intact to right hip. Ice packs applied. PRN tylenol administered. CMS+ Transfers with the assist of (2) using transfer belt. Fearful of falling with mobility. Up in chair for 2 hours this morning. Refused breakfast stating \"I'm going to throw up\"  Zofran administered with relief. Discharging this afternoon to TCU.  Nisha Mancera RN               "

## 2025-07-28 NOTE — PLAN OF CARE
Problem: Adult Inpatient Plan of Care  Goal: Absence of Hospital-Acquired Illness or Injury  Intervention: Prevent Skin Injury  Recent Flowsheet Documentation  Taken 7/27/2025 1600 by Jesi Abreu RN  Body Position: heels elevated  Intervention: Prevent and Manage VTE (Venous Thromboembolism) Risk  Recent Flowsheet Documentation  Taken 7/27/2025 1600 by Jesi Abreu RN  VTE Prevention/Management: SCDs on (sequential compression devices)  Intervention: Prevent Infection  Recent Flowsheet Documentation  Taken 7/27/2025 1600 by Jesi Abreu RN  Infection Prevention:   hand hygiene promoted   rest/sleep promoted     Problem: Delirium  Goal: Improved Attention and Thought Clarity  Intervention: Maximize Cognitive Function  Recent Flowsheet Documentation  Taken 7/27/2025 1600 by Jesi Abreu RN  Sensory Stimulation Regulation:   television on   care clustered  Reorientation Measures:   reorientation provided   clock in view   Goal Outcome Evaluation:       Pt was more alert and cooperated with cares, tolerated sitting up in the chair, and later transfer to bed, slept between cares, incisional site dressing clear.

## 2025-07-28 NOTE — DISCHARGE SUMMARY
[facility]  Hospitalist Discharge Summary      Date of Admission:  7/24/2025  Date of Discharge:  7/28/2025  5:27 PM  Discharging Provider: Johnie Luciano MD  Discharge Service: Hospitalist Service    Discharge Diagnoses   Acute hip fracture  Mechanical fall  Chronic dementia  Acute encephalopathy/delirium  Unspecified metastatic cancer, new diagnosis    Clinically Significant Risk Factors     # DMII: A1C = 6.8 % (Ref range: <5.7 %) within past 6 months       Follow-ups Needed After Discharge   Follow-up Appointments       Follow Up Care      Please follow-up with Dr. Dodson/Veena Hoskins PA-C in 2 weeks at [facility]. Call our scheduling line at [phone] to make an appointment if you do not already have one scheduled. Call [phone] to reach Dr. Dodson's team with questions or concerns.    Please follow-up with Dr. Monge's team at [facility] for a bone health evaluation. Call our scheduling line at [phone] to make an appointment, if you do not already have one scheduled.        Follow Up and recommended labs and tests      Follow up with Nursing home physician.  No follow up labs or test are needed.  Follow up with orthopedics as instructed.              \Patient planning to discharge with hospice cares.    Unresulted Labs Ordered in the Past 30 Days of this Admission       No orders found from 6/24/2025 to 7/25/2025.        These results will be followed up by       Discharge Disposition   Discharge to long-term care facility with hospice  Condition at discharge: Poor    Hospital Course   Patient was admitted following a mechanical fall resulting in a hip fracture.  She underwent arthroplasty.  Her course was complicated by perioperative delirium and agitation.  She had incidentally discovered bilateral pulmonary nodules and a breast lesion concerning for new metastatic cancer diagnosis.  Given her age and multiple comorbidities there was no interest  "in pursuing this further.  Eventually patient's daughter Jasmyn elected to pursue hospice which was arranged upon discharge given all her various medical issues.    Consultations This Hospital Stay   PHYSICAL THERAPY ADULT IP CONSULT  OCCUPATIONAL THERAPY ADULT IP CONSULT  ORTHOPEDIC SURGERY IP CONSULT  CARE MANAGEMENT / SOCIAL WORK IP CONSULT  PHYSICAL THERAPY ADULT IP CONSULT  OCCUPATIONAL THERAPY ADULT IP CONSULT  CARE MANAGEMENT / SOCIAL WORK IP CONSULT  PHARMACY LIAISON FOR MEDICATION COVERAGE CONSULT  PHYSICAL THERAPY ADULT IP CONSULT  OCCUPATIONAL THERAPY ADULT IP CONSULT  PALLIATIVE CARE ADULT IP CONSULT  CARE MANAGEMENT / SOCIAL WORK IP CONSULT    Code Status   Prior    Time Spent on this Encounter   I, Johnie Luciano MD, personally saw the patient today and spent greater than 30 minutes discharging this patient.       Johnie Luciano MD  [place] [id number]  Phone: [phone]  Fax: [phone]  ______________________________________________________________________    Physical Exam   Vital Signs: Temp: 98.3  F (36.8  C) Temp src: Oral BP: (!) 207/84 (MD aware) Pulse: 72   Resp: 20 SpO2: (!) 91 % O2 Device: None (Room air)    Weight: 141 lbs 1.51 oz  Alert, mild distress, confused, heart and lungs normal, abdomen soft, trace leg edema       Primary Care Physician   Dayron Mann    Discharge Orders      DX Bone Density     Hospice Referral      When to call - Contact Surgeon Team    You may experience symptoms that require follow-up before your scheduled appointment. Refer to the \"Stoplight Tool\" for instructions on when to contact your Surgeon Team if you are concerned about pain control, blood clots, constipation, or if you are unable to urinate.     When to call - Reach out to Urgent Care    If you are not able to reach your Surgeon Team and you need immediate care, go to the Orthopedic Walk-in Clinic or Urgent Care at your " Surgeon's office.  Do NOT go to the Emergency Room unless you have shortness of breath, chest pain, or other signs of a medical emergency.     When to call - Reasons to Call 911    Call 911 immediately if you experience sudden-onset chest pain, arm weakness/numbness, slurred speech, or shortness of breath     Discharge Instruction - Breathing exercises    Perform breathing exercises 10 times per hours while awake for 2 weeks. (If given, use your Incentive Spirometer)     Symptoms - Fever Management    A low grade fever can be expected after surgery.  Use acetaminophen (TYLENOL) as needed for fever management.  Contact your Surgeon Team if you have a fever greater than 101.5 F, chills, and/or night sweats.     Symptoms - Constipation management    Constipation (hard, dry bowel movements) is expected after surgery due to the combination of being less active, the anesthetic, and the opioid pain medication.  You can do the following to help reduce constipation:  ~  FLUIDS:  Drink clear liquids (water or Gatorade), or juice (apple/prune).  ~  DIET:  Eat a fiber rich diet.    ~  ACTIVITY:  Get up and move around several times a day.  Increase your activity as you are able.  MEDICATIONS:  Reduce the risk of constipation by starting medications before you are constipated.  You can take Miralax   (1 packet as directed) and/or a stool softener (Senokot 1-2 tablets 1-2 times a day).  If you already have constipation and these medications are not working, you can get magnesium citrate and use as directed.  If you continue to have constipation you can try an over the counter suppository or enema.  Call your Surgeon Team if it has been greater than 3 days since your last bowel movement.     Symptoms - Reduced Urine Output    Changes in the amount of fluids you drank before and after surgery may result in problems urinating.  It is important to stay well-hydrated after surgery and drink plenty of water. If it has been greater than  8 hours since you have urinated despite drinking plenty of water, call your Surgeon Team.     Activity - Exercises to prevent blood clots    Unless otherwise directed by your Surgeon team, perform the following exercises at least three times per day for the first four weeks after surgery to prevent blood clots in your legs: 1) Point and flex your feet (Ankle Pumps), 2) Move your ankle around in big circles, 3) Wiggle your toes, 4) Walk, even for short distances, several times a day, will help decrease the risk of blood clots.     Comfort and Pain Management - Pain after Surgery    Pain after surgery is normal and expected.  You will have some amount of pain for several weeks after surgery.  Your pain will improve with time.  There are several things you can do to help reduce your pain including: rest, ice, elevation, and using pain medications as needed. Contact your Surgeon Team if you have pain that persists or worsens after surgery despite rest, ice, elevation, and taking your medication(s) as prescribed. Contact your Surgeon Team if you have new numbness, tingling, or weakness in your operative extremity.     Comfort and Pain Management - Swelling after Surgery    Swelling and/or bruising of the surgical extremity is common and may persist for several months after surgery. In addition to frequent icing and elevation, gentle compressive support with an ACE wrap or tubigrip may help with swelling. Apply compression regularly, removing at least twice daily to perform skin checks. Contact your Surgeon Team if your swelling increases and is NOT associated with an increase in your activity level, or if your swelling increases and is associated with redness and pain.     Comfort and Pain Management - LOWER Extremity Elevation    Swelling is expected for several months after surgery. This type of swelling is usually associated with gravity and activity, and can be improved with elevation.   The best way to do this is to  "get your \"toes above your nose\" by laying down and placing several pillows lengthwise under your calf and heel. When elevating your leg keep your knee completely straight. Perform this elevation as often as possible especially for the first two weeks after surgery.     Comfort and Pain Management - Cold therapy    Ice can be used to control swelling and discomfort after surgery. Place a thin towel over your operative site and apply the ice pack overtop. Leave ice pack in place for 20 minutes, then remove for 20 minutes. Repeat this 20 minutes on/20 minutes off routine as often as tolerated.     Medication Instructions - Acetaminophen (TYLENOL) Instructions    You were discharged with acetaminophen (TYLENOL) for pain management after surgery. Acetaminophen most effectively manages pain symptoms when it is taken on a schedule without missing doses (every four, six, or eight hours). Your Provider will prescribe a safe daily dose between 3000 - 4000 mg.  Do NOT exceed this daily dose. Most patients use acetaminophen for pain control for the first four weeks after surgery.  You can wean from this medication as your pain decreases.     Medication Instructions - NSAID Instructions    You were discharged with an anti-inflammatory medication for pain management to use in combination with acetaminophen (TYLENOL) and the narcotic pain medication.  Take this medication exactly as directed.  You should only take one anti-inflammatory at a time.  Some common anti-inflammatories include: ibuprofen (ADVIL, MOTRIN), naproxen (ALEVE, NAPROSYN), celecoxib (CELEBREX), meloxicam (MOBIC), ketorolac (TORADOL).  Take this medication with food and water.     Opioid Instructions (Greater than or equal to 65 years)    You were discharged with an opioid medication (hydromorphone, oxycodone, hydrocodone, or tramadol). This medication should only be taken for breakthrough pain that is not controlled with acetaminophen (TYLENOL). If you rate your " "pain less than 3 you do not need this medication. Pain rating 0-3: You do not need this medication Pain rating 4-6: Take 1/2 tablet every 4-6 hours as needed Pain rating 7-10: Take 1 tablet every 4-6 hours as needed Do not exceed 4 tablets per day     Medication Instructions - Opioids - Tapering Instructions    In the first three days following surgery, your symptoms may warrant use of the narcotic pain medication every four to six hours as prescribed. This is normal. As your pain symptoms improve, focus your efforts on decreasing (tapering) use of narcotic medications. The most successful tapering strategy is to first, decrease the number of tablets you take every 4-6 hours to the minimum prescribed. Then, increase the amount of time between doses. For example: First, taper to   or 1 tablet every 4-6 hours. Then, taper to   or 1 tablet every 6-8 hours. Then, taper to   or 1 tablet every 8-10 hours. Then, taper to   or 1 tablet every 10-12 hours. Then, taper to   or 1 tablet at bedtime. The bedtime dose can help with comfort during sleep and is typically the last dose to be discontinued after surgery.     Activity - Total Hip Arthroplasty    Refer to the [facility] \"Your Guide to Total Joint Replacement\" for recommendations on activities and Exercises.     Return to Driving    Return to driving - Driving is NOT permitted until directed by your provider. Under no circumstance are you permitted to drive while using narcotic pain medications.     Weight bearing as tolerated    Weight bearing as tolerated on your operative extremity.     Dressing / Wound Care - Wound    You have a clean dressing on your surgical wound. Dressing change instructions as follows: dressing will be removed at your follow-up appointment. Contact your Surgeon Team if you have increased redness, warmth around the surgical wound, and/or drainage from the surgical wound.     Dressing Wound Care - Shower with wound/dressing NOT covered    " You do not need to cover your dressing in the shower, you may allow water and soap to run over top of the surgical dressing. You may shower 2 days after surgery.  You are strictly prohibited from soaking or submerging the surgical wound underwater.     Dressing / Wound Care - NO Tub Bathing    Tub bathing, swimming, or any other activities that will cause your incision to be submerged in water should be avoided until allowed by your Surgeon.     Medication instructions - Anticoagulation - other    Take the Xarelto as prescribed for a total of 6 weeks after surgery.  This is given to help minimize your risk of blood clot     No flexion past 90 degrees    No bending at waist past 90 degrees.     No internal rotation    No pivoting on your operative leg.     No adduction past midline    No crossing your operative leg.     No active abduction    No lifting your operative leg to the side.     Follow Up Care    Please follow-up with Dr. Dodson/Veena Hoskins PA-C in 2 weeks at [facility]. Call our scheduling line at [phone] to make an appointment if you do not already have one scheduled. Call [phone] to reach Dr. Dodson's team with questions or concerns.    Please follow-up with Dr. Monge's team at [facility] for a bone health evaluation. Call our scheduling line at [phone] to make an appointment, if you do not already have one scheduled.     General info for SNF    Length of Stay Estimate: Short Term Care: Estimated # of Days <30  Condition at Discharge: Improving  Level of care:skilled   Rehabilitation Potential: Good  Admission H&P remains valid and up-to-date: Yes  Recent Chemotherapy: N/A  Use Nursing Home Standing Orders: Yes     Mantoux instructions    Give two-step Mantoux (PPD) Per Facility Policy Yes     Follow Up and recommended labs and tests    Follow up with Nursing home physician.  No follow up labs or test are needed.  Follow up with orthopedics as instructed.      Reason for your hospital stay    Fall and hip fracture.     Activity - Up with assistive device     Activity - Up with nursing assistance     Physical Therapy Adult Consult    Evaluate and treat as clinically indicated.    Reason:  Hip fracture     Occupational Therapy Adult Consult    Evaluate and treat as clinically indicated.    Reason:  Hip fracture     Fall precautions     Hip precautions     Walker DME    DME Documentation: Describe the reason for need to support medical necessity: Impaired gait status post hip surgery. I, the undersigned, certify that the above prescribed supplies are medically necessary for this patient and is both reasonable and necessary in reference to accepted standards of medical practice in the treatment of this patient's condition and is not prescribed as a convenience.     Discharge Instruction - Regular Diet Adult    Return to your pre-surgery diet unless instructed otherwise     Diet    Follow this diet upon discharge: Current Diet:Orders Placed This Encounter      Discharge Instruction - Regular Diet Adult      Regular Diet Adult       Significant Results and Procedures   Most Recent 3 CBC's:  Recent Labs   Lab Test 07/27/25  0529 07/26/25  0518 07/25/25  0623 07/24/25  0415 08/12/24  0745   WBC 6.7  --   --  8.3 7.0   HGB 9.5* 9.4* 10.2* 11.0* 12.0   MCV 99 99 99 96 97     --   --  203 221     Most Recent 3 BMP's:  Recent Labs   Lab Test 07/28/25  0824 07/27/25  0529 07/26/25  1801 07/26/25  0800 07/26/25  0518 07/25/25  0902 07/25/25  0621   NA  --  139  --   --  139  --  142   POTASSIUM  --  4.5  --   --  4.7  --  4.9   CHLORIDE  --  105  --   --  107  --  106   CO2  --  27  --   --  24  --  24   BUN  --  33.9*  --   --  38.1*  --  35.1*   CR  --  1.08*  --   --  1.22*  --  1.15*   ANIONGAP  --  7  --   --  8  --  12   CRISTY  --  8.7*  --   --  8.5*  --  8.6*   * 115* 119*   < > 127*   < > 170*    < > = values in this interval not displayed.     Most Recent 2  LFT's:  Recent Labs   Lab Test 07/26/25  0518 06/14/24  0145   AST 25 26   ALT 8 14   ALKPHOS 51 82   BILITOTAL 0.2 0.4   ,   Results for orders placed or performed during the hospital encounter of 07/24/25   Head CT w/o contrast    Narrative    EXAM: CT HEAD W/O CONTRAST, CT CERVICAL SPINE W/O CONTRAST  LOCATION: [facility]  DATE: 7/24/2025    INDICATION: fall  COMPARISON: 1/14/2024.  TECHNIQUE:   1) Routine CT Head without IV contrast. Multiplanar reformats. Dose reduction techniques were used.  2) Routine CT Cervical Spine without IV contrast. Multiplanar reformats. Dose reduction techniques were used.    FINDINGS:   HEAD CT:   INTRACRANIAL CONTENTS: No intracranial hemorrhage, extraaxial collection, or mass effect.  No CT evidence of acute infarct. Diffuse confluent small vessel ischemic disease. Diffuse volume loss.     VISUALIZED ORBITS/SINUSES/MASTOIDS: No intraorbital abnormality. No paranasal sinus mucosal disease. No middle ear or mastoid effusion.    BONES/SOFT TISSUES: No acute abnormality.    CERVICAL SPINE CT:   VERTEBRA: Normal vertebral body heights and alignment. No fracture or posttraumatic subluxation.     CANAL/FORAMINA: There is diffuse facet arthropathy visualized. There is degenerative disc disease primarily from the C5-6 to the C7-T1 disc space levels. These levels have a mild loss of disc space height, endplate changes and small anterior osteophyte   formations.    There is no significant canal compromise or neural foraminal narrowing throughout the cervical spine.    PARASPINAL: No extraspinal abnormality. Visualized lung fields are clear.      Impression    IMPRESSION:  HEAD CT:  1.  No CT finding of a mass, hemorrhage or focal area suggestive of acute infarct.  2.  Diffuse age related changes.    CERVICAL SPINE CT:  1.  No CT evidence for acute fracture or post traumatic subluxation.  2.  No significant canal compromise or neural foraminal narrowing throughout  cervical spine.     CT Cervical Spine w/o Contrast    Narrative    EXAM: CT HEAD W/O CONTRAST, CT CERVICAL SPINE W/O CONTRAST  LOCATION: [facility]  DATE: 7/24/2025    INDICATION: fall  COMPARISON: 1/14/2024.  TECHNIQUE:   1) Routine CT Head without IV contrast. Multiplanar reformats. Dose reduction techniques were used.  2) Routine CT Cervical Spine without IV contrast. Multiplanar reformats. Dose reduction techniques were used.    FINDINGS:   HEAD CT:   INTRACRANIAL CONTENTS: No intracranial hemorrhage, extraaxial collection, or mass effect.  No CT evidence of acute infarct. Diffuse confluent small vessel ischemic disease. Diffuse volume loss.     VISUALIZED ORBITS/SINUSES/MASTOIDS: No intraorbital abnormality. No paranasal sinus mucosal disease. No middle ear or mastoid effusion.    BONES/SOFT TISSUES: No acute abnormality.    CERVICAL SPINE CT:   VERTEBRA: Normal vertebral body heights and alignment. No fracture or posttraumatic subluxation.     CANAL/FORAMINA: There is diffuse facet arthropathy visualized. There is degenerative disc disease primarily from the C5-6 to the C7-T1 disc space levels. These levels have a mild loss of disc space height, endplate changes and small anterior osteophyte   formations.    There is no significant canal compromise or neural foraminal narrowing throughout the cervical spine.    PARASPINAL: No extraspinal abnormality. Visualized lung fields are clear.      Impression    IMPRESSION:  HEAD CT:  1.  No CT finding of a mass, hemorrhage or focal area suggestive of acute infarct.  2.  Diffuse age related changes.    CERVICAL SPINE CT:  1.  No CT evidence for acute fracture or post traumatic subluxation.  2.  No significant canal compromise or neural foraminal narrowing throughout cervical spine.     CT Chest Abdomen Pelvis w/o Contrast    Narrative    EXAM: CT CHEST ABDOMEN PELVIS W/O CONTRAST  LOCATION: [facility]  DATE:  7/24/2025    INDICATION: fall  COMPARISON: CT abdomen/pelvis from 6/14/2024.  TECHNIQUE: CT scan of the chest, abdomen, and pelvis was performed without IV contrast. Multiplanar reformats were obtained. Dose reduction techniques were used.   CONTRAST: None.    FINDINGS:   LUNGS AND PLEURA: No focal consolidation or contusion. No pneumothorax. Trace pleural effusions. There are multiple pulmonary nodules. For instance, in the left upper lobe there is a 6 mm nodule on image 76 of series 5, in the right lower lobe there is a   5 mm nodule on image 118, in the right middle lobe there is an 11 x 9 mm nodule on image 140 with early spiculated change. There is a central right middle lobe nodule measuring 1.3 x 0.8 cm on image 141, there is a middle lobe lateral segment nodule   measuring 8 mm on image 147, and a middle lobe nodule measuring 5 mm on image 153.    MEDIASTINUM/AXILLAE: Cardiomegaly. No pericardial effusion.    CORONARY ARTERY CALCIFICATION: Mild.    HEPATOBILIARY: Absent gallbladder. Normal noncontrast appearance of the liver.    PANCREAS: Normal.    SPLEEN: Normal.    ADRENAL GLANDS: Normal.    KIDNEYS/BLADDER: There is a 4 mm nonobstructing left lower pole stone and a 2 mm nonobstructing right lower pole stone. The left kidney is mildly atrophic. The bladder is normal.    BOWEL: The stomach is decompressed. Normal caliber small bowel. Normal appendix. Multifocal diverticulosis of the distal ileum. Diffuse diverticular change of the colon. No free air.    LYMPH NODES: Normal.    VASCULATURE: Mild atherosclerotic change. No abdominal aortic aneurysm.    PELVIC ORGANS: The uterus is atrophic or absent.    MUSCULOSKELETAL: Diffuse osteopenia. Please see separate dictation for detailed findings of the spine. Briefly, there is an age indeterminant and potentially acute superior endplate compression fracture deformity at the T7 level and a chronic T12   superior endplate compression deformity. Fixation screw is  present in the left acetabulum. There is an impacted, nondisplaced subcapital fracture of the right femoral neck. Prior internal fixation of left proximal humerus fracture. Severe right   glenohumeral osteoarthritis. Severe changes of bursitis of the right shoulder. Focal nodule in the left breast measuring 2 cm on image 102 of series 4.      Impression    IMPRESSION:  1.  Acute, impacted subcapital right femoral neck fracture.    2.  No obvious solid organ injury within limitations of noncontrast technique.    3.  Please see separate dictation for detailed findings of the spine.    4.  Bilateral pulmonary nodules, some of which are indeterminant, though there are at least 2 concerning nodules in the right middle lobe which could relate to primary or metastatic malignancy. Recommend PET CT in further evaluation.    5.  2 cm nodular focus in the right breast for which follow-up imaging in breast center is recommended.    6.  Small bowel and large bowel diverticulosis.   CT Thoracic Spine Reconstructed    Narrative    EXAM: CT THORACIC SPINE RECONSTRUCTED  LOCATION: [facility]  DATE: 7/24/2025    INDICATION: Fall with thoracic back pain.  COMPARISON: 5/22/2023.  TECHNIQUE: CT thoracic spine without contrast. Dose reduction techniques were performed.    FINDINGS: There is good anatomic alignment of the thoracic spine. There is a mild loss of height along the superior endplate of the T7 vertebral body which is chronic at this time. On the previous study it was acute in nature. There is a chronic loss of   height along the superior endplate of the T12 vertebral body with slight retropulsion of its posterior superior corner. There is no evidence of an acute thoracic spine fracture. There are mild diffuse degenerative changes throughout the thoracic disc   spaces with endplate changes and small anterior lateral osteophyte formations. There is kyphosis again noted in the upper thoracic region. There  is no significant canal compromise or neural foraminal narrowing throughout the thoracic spine. There are   small bilateral pleural effusions right greater than left. The paraspinal soft tissues are unremarkable.      Impression    IMPRESSION:  1.  No evidence of an acute thoracic spine fracture.  2.  Chronic mild loss of height along the superior endplates of the T7 and T12 vertebral bodies.  3.  No significant canal compromise or neural foraminal narrowing throughout thoracic spine.     CT Lumbar Spine Reconstructed    Narrative    EXAM: CT LUMBAR SPINE RECONSTRUCTED  LOCATION: [facility]  DATE: 7/24/2025    INDICATION: Fall with lumbar back pain.  COMPARISON: 6/14/2024.  TECHNIQUE: CT lumbar spine without contrast. Dose reduction techniques were performed.    FINDINGS: There are 5 lumbar type vertebral bodies. There is a slight anterior listhesis of L3 in relation to L4 and L4 in relation to L5. The vertebral body heights are well-maintained throughout. There is no evidence of an acute lumbar spine fracture.   There is degenerative disc disease seen primarily at the L3-4 and the L5-S1 disc space levels. These levels have a moderate loss of disc space height and endplate changes. There is facet arthropathy from L3 to the sacrum. The paraspinal soft tissues are   unremarkable.    At the L3-4 disc space level there is a diffuse annular bulge more prominent to the right posterior lateral region. This along with the prominent facet arthropathy leads to severe canal compromise, bilateral lateral recess narrowing along with moderate   right greater than left neural foraminal narrowing.    At the L4-5 disc space level there is a diffuse annular bulge more prominent to the posterior lateral regions. This along with the facet arthropathy leads to moderate to severe canal compromise and mild bilateral neural foraminal narrowing.    At the L5-S1 disc space level there is no evidence of canal  "compromise. There is prominent facet arthropathy leading to moderate left neural foraminal narrowing.      Impression    IMPRESSION:  1.  No evidence of an acute lumbar spine fracture.  2.  Degenerative disc disease with canal compromise and neural foraminal narrowing from the L3-4 to the L5-S1 disc space levels as described above.     XR Femur Right 2 Views    Narrative    EXAM: RIGHT FEMUR 4 VIEWS  LOCATION: [facility]  DATE/TIME: 7/24/2025 5:20 AM CDT    INDICATION: Fall. Right hip pain.  COMPARISON: 7/24/2025 at 0506 hours - CT chest, abdomen and pelvis.      Impression    IMPRESSION:   1.  An age-indeterminate slightly impacted transverse fracture of the right femoral neck is present, also noted on the recent CT scan. Slight valgus angulation about the fracture.  2.  No other visualized acute fractures of the right femur.   3.  No right knee joint effusion.   POC US Guidance Needle Placement    Narrative    Ultrasound was performed as guidance to an anesthesia procedure.  Click   \"PACS images\" hyperlink below to view any stored images.  For specific   procedure details, view procedure note authored by anesthesia.   XR Pelvis w Hip Right 1 View    Narrative    EXAM: XR PELVIS AND HIP RIGHT 1 VIEW  LOCATION: [facility]  DATE: 7/24/2025    INDICATION: Status post hip surgery.  COMPARISON: Radiographs from 7/24/2025.      Impression    IMPRESSION: There is a new bipolar right hip hemiarthroplasty with adjacent gas. Excellent position and alignment of components. No other change.       Discharge Medications      Review of your medicines        START taking        Dose / Directions   hydrOXYzine HCl 10 MG tablet  Commonly known as: ATARAX  Used for: Closed fracture of neck of right femur, initial encounter (H)      Dose: 10 mg  Take 1 tablet (10 mg) by mouth every 6 hours as needed for itching or anxiety (with pain, moderate pain).  Quantity: 30 tablet  Refills: 0   "   polyethylene glycol 17 g packet  Commonly known as: MIRALAX  Used for: Closed fracture of neck of right femur, initial encounter (H)      Dose: 1 packet  Take 17 g by mouth daily.  Quantity: 7 packet  Refills: 0     rivaroxaban ANTICOAGULANT 10 MG Tabs tablet  Commonly known as: XARELTO  Used for: Closed fracture of neck of right femur, initial encounter (H)      Dose: 10 mg  Take 1 tablet (10 mg) by mouth daily (with dinner).  Quantity: 42 tablet  Refills: 0     senna-docusate 8.6-50 MG tablet  Commonly known as: SENOKOT-S/PERICOLACE  Used for: Closed fracture of neck of right femur, initial encounter (H)      Dose: 1-2 tablet  Take 1-2 tablets by mouth 2 times daily. Take while on oral narcotics to prevent or treat constipation.  Quantity: 30 tablet  Refills: 0            CHANGE how you take these medications        Dose / Directions   acetaminophen 325 MG tablet  Commonly known as: TYLENOL  This may have changed:   medication strength  how much to take  when to take this  reasons to take this  additional instructions  Another medication with the same name was removed. Continue taking this medication, and follow the directions you see here.  Used for: Closed fracture of neck of right femur, initial encounter (H)      Dose: 650 mg  Take 2 tablets (650 mg) by mouth every 4 hours as needed for other (mild pain).  Quantity: 100 tablet  Refills: 0            CONTINUE these medicines which have NOT CHANGED        Dose / Directions   gabapentin 300 MG capsule  Commonly known as: NEURONTIN  Indication: Neuropathic Pain      Dose: 300 mg  [GABAPENTIN (NEURONTIN) 300 MG CAPSULE] Take 300 mg by mouth daily.  Refills: 0     hydrALAZINE 10 MG tablet  Commonly known as: APRESOLINE  Indication: High Blood Pressure      Dose: 10 mg  Take 10 mg by mouth 3 times daily as needed. tid  Refills: 0     levothyroxine 88 MCG tablet  Commonly known as: SYNTHROID/LEVOTHROID  Indication: Hypothyroidism not due to Hormone Abnormality  Used  for: Hypothyroidism, unspecified type      Dose: 88 mcg  Take 1 tablet (88 mcg) by mouth every morning (before breakfast).  Refills: 0     loperamide 2 MG capsule  Commonly known as: IMODIUM  Indication: Diarrhea      Dose: 2 mg  Take 2 mg by mouth every 4 hours as needed for diarrhea.  Refills: 0     ondansetron 4 MG tablet  Commonly known as: ZOFRAN      Dose: 4 mg  Take 4 mg by mouth every 6 hours as needed for nausea.  Refills: 0     Vitamin D3 25 mcg (1000 units) tablet  Commonly known as: CHOLECALCIFEROL  Indication: Vitamin D Deficiency  Used for: Dementia without behavioral disturbance (H)      Dose: 25 mcg  Take 1 tablet (25 mcg) by mouth daily.  Refills: 0            STOP taking      sennosides 8.6 MG tablet  Commonly known as: SENOKOT        traMADol 50 MG tablet  Commonly known as: ULTRAM                  Where to get your medicines        These medications were sent to [facility] [address] [address]      Phone: [phone]   acetaminophen 325 MG tablet  hydrOXYzine HCl 10 MG tablet  polyethylene glycol 17 g packet  rivaroxaban ANTICOAGULANT 10 MG Tabs tablet  senna-docusate 8.6-50 MG tablet       Allergies   Allergies   Allergen Reactions    Ambien [Zolpidem] Unknown    Cleocin [Clindamycin] Unknown    Codeine Unknown

## 2025-07-29 ENCOUNTER — NURSING HOME VISIT (OUTPATIENT)
Dept: GERIATRICS | Facility: CLINIC | Age: OVER 89
End: 2025-07-29
Payer: COMMERCIAL

## 2025-07-29 VITALS
RESPIRATION RATE: 18 BRPM | TEMPERATURE: 95.9 F | SYSTOLIC BLOOD PRESSURE: 190 MMHG | BODY MASS INDEX: 24.33 KG/M2 | HEART RATE: 75 BPM | DIASTOLIC BLOOD PRESSURE: 74 MMHG | WEIGHT: 133 LBS | OXYGEN SATURATION: 97 %

## 2025-07-29 DIAGNOSIS — F03.A4 MILD DEMENTIA WITH ANXIETY, UNSPECIFIED DEMENTIA TYPE (H): ICD-10-CM
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
## 2025-07-29 DIAGNOSIS — I10 PRIMARY HYPERTENSION: ICD-10-CM

## 2025-07-29 DIAGNOSIS — R91.8 PULMONARY NODULES: ICD-10-CM

## 2025-07-29 DIAGNOSIS — N63.0 BREAST NODULE: ICD-10-CM

## 2025-07-29 DIAGNOSIS — S72.001D: Primary | ICD-10-CM

## 2025-07-29 DIAGNOSIS — Z98.890 S/P ORIF (OPEN REDUCTION INTERNAL FIXATION) FRACTURE: ICD-10-CM

## 2025-07-29 DIAGNOSIS — Z87.81 S/P ORIF (OPEN REDUCTION INTERNAL FIXATION) FRACTURE: ICD-10-CM

## 2025-07-29 PROCEDURE — 99309 SBSQ NF CARE MODERATE MDM 30: CPT | Performed by: NURSE PRACTITIONER

## 2025-07-29 NOTE — LETTER
7/29/2025      Sissy Mccloud  [facility]  [address]  [place] [address]        [facility]    PRIMARY CARE PROVIDER AND CLINIC:  EFRAIN Hines CNP, [address] [facility] / [place] [address]  Chief Complaint   Patient presents with     Hospital F/U      [facility] Medical Record Number:  [id number]  Place of Service where encounter took place:  [facility] () [[id number]]    Sissy Mccloud  is a 89 year old  (8/24/1931), re-admitted to the above facility from  [facility]. Hospital stay 7/24/2025 - 7/28/2025. .   HPI:    Discharge Diagnoses  Acute hip fracture  Mechanical fall  Chronic dementia  Acute encephalopathy/delirium  Unspecified metastatic cancer, new diagnosis     Discharge Disposition  Discharge to long-term care facility with hospice  Condition at discharge: Poor     Hospital Course  Patient was admitted following a mechanical fall resulting in a hip fracture.  She underwent arthroplasty.  Her course was complicated by perioperative delirium and agitation.  She had incidentally discovered bilateral pulmonary nodules and a breast lesion concerning for new metastatic cancer diagnosis.  Given her age and multiple comorbidities there was no interest in pursuing this further.  Eventually patient's daughter Jasmyn elected to pursue hospice which was arranged upon discharge given all her various medical issues.    Patient seen for follow up, of note has appt with [facility] 7/30 for consult, post fall on 7/23 at [facility], work up with R femur neck Fx, incision dressing CDI, no apparent pain nor complaint today with palpation and mild ROM, no edema, SBP's elevated in the 140-190 range, labs on 7/28 generally WNL, incidental findings with breast and lung nodules and suspect malignancy, overall appears as-age, no distress.    CODE STATUS/ADVANCE DIRECTIVES DISCUSSION:    DNR / DNI  ALLERGIES:   Allergies   Allergen  Reactions     Ambien [Zolpidem] Unknown     Cleocin [Clindamycin] Unknown     Codeine Unknown      PAST MEDICAL HISTORY: No past medical history on file.   PAST SURGICAL HISTORY:   has a past surgical history that includes Cholecystectomy and Open reduction internal fixation hip bipolar (Right, 7/24/2025).  FAMILY HISTORY: family history is not on file.  SOCIAL HISTORY:   reports that she has never smoked. She has never used smokeless tobacco. She reports that she does not use drugs.  Patient's living condition: lives in a skilled nursing facility    Post Discharge Medication Reconciliation Status:   MED REC REQUIRED  Post Medication Reconciliation Status: discharge medications reconciled, continue medications without change       Current Outpatient Medications   Medication Sig Dispense Refill     acetaminophen (TYLENOL) 325 MG tablet Take 2 tablets (650 mg) by mouth every 4 hours as needed for other (mild pain). 100 tablet 0     gabapentin (NEURONTIN) 300 MG capsule [GABAPENTIN (NEURONTIN) 300 MG CAPSULE] Take 300 mg by mouth daily.       hydrALAZINE (APRESOLINE) 10 MG tablet Take 10 mg by mouth 3 times daily as needed. tid       hydrOXYzine HCl (ATARAX) 10 MG tablet Take 1 tablet (10 mg) by mouth every 6 hours as needed for itching or anxiety (with pain, moderate pain). 30 tablet 0     levothyroxine (SYNTHROID/LEVOTHROID) 88 MCG tablet Take 1 tablet (88 mcg) by mouth every morning (before breakfast).       loperamide (IMODIUM) 2 MG capsule Take 2 mg by mouth every 4 hours as needed for diarrhea.       ondansetron (ZOFRAN) 4 MG tablet Take 4 mg by mouth every 6 hours as needed for nausea.       oxyCODONE (ROXICODONE) 5 MG tablet Take 0.5 tablets (2.5 mg) by mouth every 4 hours as needed for pain.       polyethylene glycol (MIRALAX) 17 g packet Take 17 g by mouth daily. 7 packet 0     rivaroxaban ANTICOAGULANT (XARELTO) 10 MG TABS tablet Take 1 tablet (10 mg) by mouth daily (with dinner). 42 tablet 0      senna-docusate (SENOKOT-S/PERICOLACE) 8.6-50 MG tablet Take 1-2 tablets by mouth 2 times daily. Take while on oral narcotics to prevent or treat constipation. 30 tablet 0     Vitamin D3 (CHOLECALCIFEROL) 25 mcg (1000 units) tablet Take 1 tablet (25 mcg) by mouth daily.       No current facility-administered medications for this visit.       ROS:  4 point ROS including Respiratory, CV, GI and , other than that noted in the HPI,  is negative    Vitals:  BP (!) 190/74   Pulse 75   Temp (!) 95.9  F (35.5  C)   Resp 18   Wt 60.3 kg (133 lb)   LMP  (LMP Unknown)   SpO2 97%   BMI 24.33 kg/m    Exam:  GENERAL APPEARANCE:  in no distress, appears healthy  ENT:  Mouth and posterior oropharynx normal, moist mucous membranes  RESP:  lungs clear to auscultation , no respiratory distress  CV:  no edema, rate-normal  ABDOMEN:  no guarding or rebound  M/S:   Gait and station abnormal unsteady  SKIN:  Inspection of skin and subcutaneous tissue baseline  NEURO:   Examination of sensation by touch normal  PSYCH:  affect and mood normal    Lab/Diagnostic data:  Labs done in SNF are in [facility]. Please refer to them using [employer] Everywhere.    ASSESSMENT/PLAN:    (S72.001D) Traumatic closed displaced fracture of neck of femur, right, with routine healing, subsequent encounter  (primary encounter diagnosis)  (W19.XXXD) Fall, subsequent encounter  (Z98.890,  Z87.81) S/P ORIF (open reduction internal fixation) fracture  Comment: fall 7/23, ORIF, incision dressing CDI  Plan:   -PT/OT eval only  -change APAP to 1000mg TID scheduled  -continue oxycodone PRN, hydroxyzine PRN  -take xarelto 10mg x6 weeks  -WBAT, transfer assist  -remove dressing next week, consider follow up XR only if anomaly present    (F03.A4) Mild dementia with anxiety, unspecified dementia type (H)  Comment: cognitive limitations, anxiety component  Plan:   -staff to support as indicated  -continue without medication intervention    (I10) Primary  hypertension  Comment: SBP's 140-190 range  Plan:   -continue hydralazine PRN for SBP>140  -consider starting losartan    (N63.0) Breast nodule  (R91.8) Pulmonary nodules  Comment: incidental findings, possible mets  Plan:   -no plan to work up nor intervene  -hospice consult on 7/30        Electronically signed by:  EFRANI Hines CNP                    Sincerely,        EFRAIN Hines CNP    Electronically signed

## 2025-07-29 NOTE — PROGRESS NOTES
[facility] GERIATRICS    PRIMARY CARE PROVIDER AND CLINIC:  Dayron Mann, EFRAIN CNP, [address] [facility] [address]  Chief Complaint   Patient presents with    Hospital F/U      [facility] Medical Record Number:  [id number]  Place of Service where encounter took place:  [facility] () [[id number]]    Sissy Mccloud  is a 89 year old  (8/24/1931), re-admitted to the above facility from  [facility]. Hospital stay 7/24/2025 - 7/28/2025. .   HPI:    Discharge Diagnoses  Acute hip fracture  Mechanical fall  Chronic dementia  Acute encephalopathy/delirium  Unspecified metastatic cancer, new diagnosis     Discharge Disposition  Discharge to long-term care facility with hospice  Condition at discharge: Poor     Hospital Course  Patient was admitted following a mechanical fall resulting in a hip fracture.  She underwent arthroplasty.  Her course was complicated by perioperative delirium and agitation.  She had incidentally discovered bilateral pulmonary nodules and a breast lesion concerning for new metastatic cancer diagnosis.  Given her age and multiple comorbidities there was no interest in pursuing this further.  Eventually patient's daughter Jasmyn elected to pursue hospice which was arranged upon discharge given all her various medical issues.    Patient seen for follow up, of note has appt with [facility] 7/30 for consult, post fall on 7/23 at [facility], work up with R femur neck Fx, incision dressing CDI, no apparent pain nor complaint today with palpation and mild ROM, no edema, SBP's elevated in the 140-190 range, labs on 7/28 generally WNL, incidental findings with breast and lung nodules and suspect malignancy, overall appears as-age, no distress.    CODE STATUS/ADVANCE DIRECTIVES DISCUSSION:    DNR / DNI  ALLERGIES:   Allergies   Allergen Reactions    Ambien [Zolpidem] Unknown    Cleocin [Clindamycin] Unknown    Codeine Unknown      PAST MEDICAL  HISTORY: No past medical history on file.   PAST SURGICAL HISTORY:   has a past surgical history that includes Cholecystectomy and Open reduction internal fixation hip bipolar (Right, 7/24/2025).  FAMILY HISTORY: family history is not on file.  SOCIAL HISTORY:   reports that she has never smoked. She has never used smokeless tobacco. She reports that she does not use drugs.  Patient's living condition: lives in a skilled nursing facility    Post Discharge Medication Reconciliation Status:   MED REC REQUIRED  Post Medication Reconciliation Status: discharge medications reconciled, continue medications without change       Current Outpatient Medications   Medication Sig Dispense Refill    acetaminophen (TYLENOL) 325 MG tablet Take 2 tablets (650 mg) by mouth every 4 hours as needed for other (mild pain). 100 tablet 0    gabapentin (NEURONTIN) 300 MG capsule [GABAPENTIN (NEURONTIN) 300 MG CAPSULE] Take 300 mg by mouth daily.      hydrALAZINE (APRESOLINE) 10 MG tablet Take 10 mg by mouth 3 times daily as needed. tid      hydrOXYzine HCl (ATARAX) 10 MG tablet Take 1 tablet (10 mg) by mouth every 6 hours as needed for itching or anxiety (with pain, moderate pain). 30 tablet 0    levothyroxine (SYNTHROID/LEVOTHROID) 88 MCG tablet Take 1 tablet (88 mcg) by mouth every morning (before breakfast).      loperamide (IMODIUM) 2 MG capsule Take 2 mg by mouth every 4 hours as needed for diarrhea.      ondansetron (ZOFRAN) 4 MG tablet Take 4 mg by mouth every 6 hours as needed for nausea.      oxyCODONE (ROXICODONE) 5 MG tablet Take 0.5 tablets (2.5 mg) by mouth every 4 hours as needed for pain.      polyethylene glycol (MIRALAX) 17 g packet Take 17 g by mouth daily. 7 packet 0    rivaroxaban ANTICOAGULANT (XARELTO) 10 MG TABS tablet Take 1 tablet (10 mg) by mouth daily (with dinner). 42 tablet 0    senna-docusate (SENOKOT-S/PERICOLACE) 8.6-50 MG tablet Take 1-2 tablets by mouth 2 times daily. Take while on oral narcotics to  prevent or treat constipation. 30 tablet 0    Vitamin D3 (CHOLECALCIFEROL) 25 mcg (1000 units) tablet Take 1 tablet (25 mcg) by mouth daily.       No current facility-administered medications for this visit.       ROS:  4 point ROS including Respiratory, CV, GI and , other than that noted in the HPI,  is negative    Vitals:  BP (!) 190/74   Pulse 75   Temp (!) 95.9  F (35.5  C)   Resp 18   Wt 60.3 kg (133 lb)   LMP  (LMP Unknown)   SpO2 97%   BMI 24.33 kg/m    Exam:  GENERAL APPEARANCE:  in no distress, appears healthy  ENT:  Mouth and posterior oropharynx normal, moist mucous membranes  RESP:  lungs clear to auscultation , no respiratory distress  CV:  no edema, rate-normal  ABDOMEN:  no guarding or rebound  M/S:   Gait and station abnormal unsteady  SKIN:  Inspection of skin and subcutaneous tissue baseline  NEURO:   Examination of sensation by touch normal  PSYCH:  affect and mood normal    Lab/Diagnostic data:  Labs done in SNF are in [facility]. Please refer to them using [employer] Everywhere.    ASSESSMENT/PLAN:    (S72.001D) Traumatic closed displaced fracture of neck of femur, right, with routine healing, subsequent encounter  (primary encounter diagnosis)  (W19.XXXD) Fall, subsequent encounter  (Z98.890,  Z87.81) S/P ORIF (open reduction internal fixation) fracture  Comment: fall 7/23, ORIF, incision dressing CDI  Plan:   -PT/OT eval only  -change APAP to 1000mg TID scheduled  -continue oxycodone PRN, hydroxyzine PRN  -take xarelto 10mg x6 weeks  -WBAT, transfer assist  -remove dressing next week, consider follow up XR only if anomaly present    (F03.A4) Mild dementia with anxiety, unspecified dementia type (H)  Comment: cognitive limitations, anxiety component  Plan:   -staff to support as indicated  -continue without medication intervention    (I10) Primary hypertension  Comment: SBP's 140-190 range  Plan:   -continue hydralazine PRN for SBP>140  -consider starting losartan    (N63.0) Breast  nodule  (R91.8) Pulmonary nodules  Comment: incidental findings, possible mets  Plan:   -no plan to work up nor intervene  -hospice consult on 7/30        Electronically signed by:  EFRAIN Hines CNP

## 2025-07-31 NOTE — PROGRESS NOTES
[facility] Medicare-  Care Coordinator Contact:  Deborah Madsen RNCC  Care System: [facility]    Received notice member was discharged from [facility] on 7/28/2025 to  [facility]   Discharge DX of hip fracture.      Called daughter Jasmyn reviewed discharge summary.      [facility]  Do you have a follow-up appointment scheduled? Yes:    (CM Note - if due to a Mental Health hospitalization, follow-up appointment should be within 7 days.)    Comments: Will follow up with NP at facility   Can you verbalize the warning signs/symptoms to watch for & how to respond? Yes:      Comments: na   Do you have a Personal Health Care Record? Yes:      Comments: EMR   CM:  Was medication reconciliation completed?  Yes:    (Document current meds in medication section of assessment.)    Comments: per facility   CM:  Any homecare/DME or resource needs (etc.) from most recent admit? Yes: Hospice Services    Comments: Hospice Services -[facility] hosp[facility]        Will follow up as needed or yearly as scheduled.         TIANNA Haines, RN, PHN, CCM  Care Coordinator-Long Term Care  [address] [address]  [email or web address]   [email or web address]     Office: [phone]   Fax: [phone]

## 2025-08-01 LAB
ATRIAL RATE - MUSE: 66 BPM
DIASTOLIC BLOOD PRESSURE - MUSE: 72 MMHG
INTERPRETATION ECG - MUSE: NORMAL
P AXIS - MUSE: 60 DEGREES
PR INTERVAL - MUSE: 212 MS
QRS DURATION - MUSE: 64 MS
QT - MUSE: 460 MS
QTC - MUSE: 482 MS
R AXIS - MUSE: 47 DEGREES
SYSTOLIC BLOOD PRESSURE - MUSE: 168 MMHG
T AXIS - MUSE: 101 DEGREES
VENTRICULAR RATE- MUSE: 66 BPM

## 2025-08-05 ENCOUNTER — NURSING HOME VISIT (OUTPATIENT)
Dept: GERIATRICS | Facility: CLINIC | Age: OVER 89
End: 2025-08-05
Payer: COMMERCIAL

## 2025-08-05 VITALS
TEMPERATURE: 97.1 F | DIASTOLIC BLOOD PRESSURE: 67 MMHG | OXYGEN SATURATION: 97 % | BODY MASS INDEX: 24.33 KG/M2 | HEART RATE: 57 BPM | WEIGHT: 133 LBS | RESPIRATION RATE: 18 BRPM | SYSTOLIC BLOOD PRESSURE: 194 MMHG

## 2025-08-05 DIAGNOSIS — R91.8 PULMONARY NODULES: ICD-10-CM

## 2025-08-05 DIAGNOSIS — Z51.5 HOSPICE CARE PATIENT: ICD-10-CM

## 2025-08-05 DIAGNOSIS — N63.0 BREAST NODULE: ICD-10-CM

## 2025-08-05 DIAGNOSIS — S72.001D: ICD-10-CM

## 2025-08-05 DIAGNOSIS — F03.A4 MILD DEMENTIA WITH ANXIETY, UNSPECIFIED DEMENTIA TYPE (H): Primary | ICD-10-CM

## 2025-08-05 PROCEDURE — 99309 SBSQ NF CARE MODERATE MDM 30: CPT | Performed by: NURSE PRACTITIONER

## 2025-08-05 RX ORDER — HYOSCYAMINE SULFATE 0.12 MG/1
0.12 TABLET SUBLINGUAL EVERY 4 HOURS PRN
Status: SHIPPED
Start: 2025-08-05

## 2025-08-05 RX ORDER — LORAZEPAM 0.5 MG/1
0.5 TABLET ORAL EVERY 6 HOURS PRN
Status: SHIPPED
Start: 2025-08-05

## 2025-08-05 RX ORDER — BISACODYL 10 MG
10 SUPPOSITORY, RECTAL RECTAL DAILY PRN
Status: SHIPPED
Start: 2025-08-05

## 2025-08-05 RX ORDER — ACETAMINOPHEN 500 MG
1000 TABLET ORAL 3 TIMES DAILY
Status: SHIPPED
Start: 2025-08-05

## 2025-08-05 RX ORDER — MORPHINE SULFATE 20 MG/ML
5 SOLUTION ORAL
Status: SHIPPED
Start: 2025-08-05

## 2025-08-07 ENCOUNTER — PATIENT OUTREACH (OUTPATIENT)
Dept: GERIATRIC MEDICINE | Facility: CLINIC | Age: OVER 89
End: 2025-08-07
Payer: COMMERCIAL

## (undated) DEVICE — SUTURE VICRYL+ 1 27IN CT-1 UND VCP261H

## (undated) DEVICE — SUTURE MONOCRYL 3-0 18 PS2 UND MCP497G

## (undated) DEVICE — BONE CLEANING TIP INTERPULSE  0210-010-000

## (undated) DEVICE — DRAPE STERI TOWEL LG 1010

## (undated) DEVICE — BONE CEMENT MIXEVAC HI VAC W/CARTRIDGE 0306-536-000

## (undated) DEVICE — SU STRATAFIX PDS PLUS 1 CT-1 18" SXPP1A404

## (undated) DEVICE — BONE CLEANING TIP INTERPULSE FEMORAL CANAL 0210-007-000

## (undated) DEVICE — ESU ELEC BLADE 6" COATED E1450-6

## (undated) DEVICE — DRSG KERLIX FLUFFS X5

## (undated) DEVICE — GLOVE BIOGEL PI SZ 7.0 40870

## (undated) DEVICE — SU DERMABOND ADVANCED .7ML DNX12

## (undated) DEVICE — SOL ISOPROPYL RUBBING ALCOHOL USP 70% 4OZ HDX-20 I0020

## (undated) DEVICE — DRAPE CONVERTORS U-DRAPE 60X72" 8476

## (undated) DEVICE — SOLUTION IV IRRIGATION 0.9% NACL 3L R8206

## (undated) DEVICE — SU VICRYL+ 0 27IN CT-1 UND VCP260H

## (undated) DEVICE — A3 SUPPLIES- SEE NURSING INFO PAGE

## (undated) DEVICE — GLOVE UNDER INDICATOR PI SZ 7.0 LF 41670

## (undated) DEVICE — SUCTION MANIFOLD NEPTUNE 2 SYS 1 PORT 702-025-000

## (undated) DEVICE — BLADE SAW SAGITTAL STRK WIDE 25.4X85X1.2MM 2108-151-000

## (undated) DEVICE — DRAPE IOBAN INCISE 36X23" 6651EZ

## (undated) DEVICE — SUCTION IRR SYSTEM W/O TIP INTERPULSE HANDPIECE 0210-100-000

## (undated) DEVICE — HOLDER LIMB VELCRO OR 0814-1533

## (undated) DEVICE — CUSTOM PACK TOTAL HIP SOP5BTHHEA

## (undated) DEVICE — SUTURE VICRYL+ 2-0 27IN CT-1 UND VCP259H

## (undated) DEVICE — GLOVE BIOGEL INDICATOR 7.5 LF 41675

## (undated) DEVICE — KIT TURNOVER FAIRVIEW SOUTHDALE FULL SP3889

## (undated) DEVICE — CEMENT PRESSURIZER FEMORAL CANAL SM

## (undated) DEVICE — SU ETHIBOND 5 V-37 4X30" MB66G

## (undated) DEVICE — DRAPE SHEET REV FOLD 3/4 9349

## (undated) DEVICE — ESU GROUND PAD ADULT REM W/15' CORD E7507DB

## (undated) DEVICE — SOLUTION WATER 1000ML BOTTLE R5000-01

## (undated) DEVICE — SOLUTION IRRIGATION 0.9% NACL 1000ML BOTTLE R5200-01

## (undated) RX ORDER — FENTANYL CITRATE-0.9 % NACL/PF 10 MCG/ML
PLASTIC BAG, INJECTION (ML) INTRAVENOUS
Status: DISPENSED
Start: 2025-07-24

## (undated) RX ORDER — TRIAMCINOLONE ACETONIDE 40 MG/ML
INJECTION, SUSPENSION INTRA-ARTICULAR; INTRAMUSCULAR
Status: DISPENSED
Start: 2024-01-15

## (undated) RX ORDER — FENTANYL CITRATE 50 UG/ML
INJECTION, SOLUTION INTRAMUSCULAR; INTRAVENOUS
Status: DISPENSED
Start: 2025-07-24

## (undated) RX ORDER — VANCOMYCIN HYDROCHLORIDE 1 G/20ML
INJECTION, POWDER, LYOPHILIZED, FOR SOLUTION INTRAVENOUS
Status: DISPENSED
Start: 2025-07-24

## (undated) RX ORDER — ONDANSETRON 2 MG/ML
INJECTION INTRAMUSCULAR; INTRAVENOUS
Status: DISPENSED
Start: 2025-07-24

## (undated) RX ORDER — DEXAMETHASONE SODIUM PHOSPHATE 10 MG/ML
INJECTION, SOLUTION INTRAMUSCULAR; INTRAVENOUS
Status: DISPENSED
Start: 2025-07-24